# Patient Record
Sex: FEMALE | Race: WHITE | NOT HISPANIC OR LATINO | URBAN - METROPOLITAN AREA
[De-identification: names, ages, dates, MRNs, and addresses within clinical notes are randomized per-mention and may not be internally consistent; named-entity substitution may affect disease eponyms.]

---

## 2017-06-18 ENCOUNTER — EMERGENCY (EMERGENCY)
Facility: HOSPITAL | Age: 62
LOS: 0 days | Discharge: NURSING, REHAB OR LONG-TERM CARE | End: 2017-06-18

## 2017-06-18 DIAGNOSIS — N20.0 CALCULUS OF KIDNEY: ICD-10-CM

## 2017-06-18 DIAGNOSIS — E11.9 TYPE 2 DIABETES MELLITUS WITHOUT COMPLICATIONS: ICD-10-CM

## 2017-06-28 DIAGNOSIS — E11.9 TYPE 2 DIABETES MELLITUS WITHOUT COMPLICATIONS: ICD-10-CM

## 2017-06-28 DIAGNOSIS — Z79.899 OTHER LONG TERM (CURRENT) DRUG THERAPY: ICD-10-CM

## 2017-06-28 DIAGNOSIS — J18.9 PNEUMONIA, UNSPECIFIED ORGANISM: ICD-10-CM

## 2017-06-28 DIAGNOSIS — Z79.51 LONG TERM (CURRENT) USE OF INHALED STEROIDS: ICD-10-CM

## 2017-06-28 DIAGNOSIS — I10 ESSENTIAL (PRIMARY) HYPERTENSION: ICD-10-CM

## 2017-06-28 DIAGNOSIS — Z88.8 ALLERGY STATUS TO OTHER DRUGS, MEDICAMENTS AND BIOLOGICAL SUBSTANCES STATUS: ICD-10-CM

## 2017-06-28 DIAGNOSIS — Z79.84 LONG TERM (CURRENT) USE OF ORAL HYPOGLYCEMIC DRUGS: ICD-10-CM

## 2017-06-28 DIAGNOSIS — Z88.0 ALLERGY STATUS TO PENICILLIN: ICD-10-CM

## 2017-07-12 ENCOUNTER — EMERGENCY (EMERGENCY)
Facility: HOSPITAL | Age: 62
LOS: 0 days | Discharge: NURSING, REHAB OR LONG-TERM CARE | End: 2017-07-12

## 2017-07-12 DIAGNOSIS — Z88.0 ALLERGY STATUS TO PENICILLIN: ICD-10-CM

## 2017-07-12 DIAGNOSIS — R19.7 DIARRHEA, UNSPECIFIED: ICD-10-CM

## 2017-07-12 DIAGNOSIS — E11.9 TYPE 2 DIABETES MELLITUS WITHOUT COMPLICATIONS: ICD-10-CM

## 2017-07-12 DIAGNOSIS — Z79.84 LONG TERM (CURRENT) USE OF ORAL HYPOGLYCEMIC DRUGS: ICD-10-CM

## 2017-07-12 DIAGNOSIS — R05 COUGH: ICD-10-CM

## 2017-07-12 DIAGNOSIS — R11.0 NAUSEA: ICD-10-CM

## 2017-07-12 DIAGNOSIS — N20.0 CALCULUS OF KIDNEY: ICD-10-CM

## 2017-07-12 DIAGNOSIS — J45.909 UNSPECIFIED ASTHMA, UNCOMPLICATED: ICD-10-CM

## 2017-07-12 DIAGNOSIS — Z88.8 ALLERGY STATUS TO OTHER DRUGS, MEDICAMENTS AND BIOLOGICAL SUBSTANCES STATUS: ICD-10-CM

## 2017-07-12 DIAGNOSIS — Z79.899 OTHER LONG TERM (CURRENT) DRUG THERAPY: ICD-10-CM

## 2017-10-18 ENCOUNTER — INPATIENT (INPATIENT)
Facility: HOSPITAL | Age: 62
LOS: 4 days | Discharge: NURSING, REHAB OR LONG-TERM CARE | End: 2017-10-23
Attending: INTERNAL MEDICINE | Admitting: INTERNAL MEDICINE

## 2017-10-18 DIAGNOSIS — N20.0 CALCULUS OF KIDNEY: ICD-10-CM

## 2017-10-18 DIAGNOSIS — E11.9 TYPE 2 DIABETES MELLITUS WITHOUT COMPLICATIONS: ICD-10-CM

## 2017-10-26 DIAGNOSIS — A41.9 SEPSIS, UNSPECIFIED ORGANISM: ICD-10-CM

## 2017-10-26 DIAGNOSIS — J45.909 UNSPECIFIED ASTHMA, UNCOMPLICATED: ICD-10-CM

## 2017-10-26 DIAGNOSIS — R09.02 HYPOXEMIA: ICD-10-CM

## 2017-10-26 DIAGNOSIS — N13.6 PYONEPHROSIS: ICD-10-CM

## 2017-10-26 DIAGNOSIS — G40.909 EPILEPSY, UNSPECIFIED, NOT INTRACTABLE, WITHOUT STATUS EPILEPTICUS: ICD-10-CM

## 2017-10-26 DIAGNOSIS — N20.0 CALCULUS OF KIDNEY: ICD-10-CM

## 2017-10-26 DIAGNOSIS — E66.01 MORBID (SEVERE) OBESITY DUE TO EXCESS CALORIES: ICD-10-CM

## 2017-10-26 DIAGNOSIS — E11.9 TYPE 2 DIABETES MELLITUS WITHOUT COMPLICATIONS: ICD-10-CM

## 2017-10-26 DIAGNOSIS — Z88.8 ALLERGY STATUS TO OTHER DRUGS, MEDICAMENTS AND BIOLOGICAL SUBSTANCES: ICD-10-CM

## 2017-10-26 DIAGNOSIS — L03.311 CELLULITIS OF ABDOMINAL WALL: ICD-10-CM

## 2017-10-26 DIAGNOSIS — N39.0 URINARY TRACT INFECTION, SITE NOT SPECIFIED: ICD-10-CM

## 2017-10-26 DIAGNOSIS — N17.9 ACUTE KIDNEY FAILURE, UNSPECIFIED: ICD-10-CM

## 2017-10-26 DIAGNOSIS — K80.20 CALCULUS OF GALLBLADDER WITHOUT CHOLECYSTITIS WITHOUT OBSTRUCTION: ICD-10-CM

## 2017-10-26 DIAGNOSIS — Z88.0 ALLERGY STATUS TO PENICILLIN: ICD-10-CM

## 2017-10-26 DIAGNOSIS — Z90.710 ACQUIRED ABSENCE OF BOTH CERVIX AND UTERUS: ICD-10-CM

## 2017-10-26 DIAGNOSIS — Z79.4 LONG TERM (CURRENT) USE OF INSULIN: ICD-10-CM

## 2017-10-31 PROBLEM — Z00.00 ENCOUNTER FOR PREVENTIVE HEALTH EXAMINATION: Status: ACTIVE | Noted: 2017-10-31

## 2017-11-16 ENCOUNTER — APPOINTMENT (OUTPATIENT)
Dept: UROLOGY | Facility: CLINIC | Age: 62
End: 2017-11-16

## 2018-04-19 ENCOUNTER — EMERGENCY (EMERGENCY)
Facility: HOSPITAL | Age: 63
LOS: 0 days | Discharge: NURSING, REHAB OR LONG-TERM CARE | End: 2018-04-19
Attending: EMERGENCY MEDICINE | Admitting: EMERGENCY MEDICINE

## 2018-04-19 VITALS
HEART RATE: 75 BPM | OXYGEN SATURATION: 98 % | TEMPERATURE: 97 F | DIASTOLIC BLOOD PRESSURE: 53 MMHG | RESPIRATION RATE: 19 BRPM | SYSTOLIC BLOOD PRESSURE: 120 MMHG

## 2018-04-19 VITALS
HEART RATE: 69 BPM | WEIGHT: 293 LBS | DIASTOLIC BLOOD PRESSURE: 64 MMHG | OXYGEN SATURATION: 98 % | TEMPERATURE: 96 F | HEIGHT: 61 IN | SYSTOLIC BLOOD PRESSURE: 133 MMHG | RESPIRATION RATE: 18 BRPM

## 2018-04-19 DIAGNOSIS — M25.562 PAIN IN LEFT KNEE: ICD-10-CM

## 2018-04-19 DIAGNOSIS — E11.9 TYPE 2 DIABETES MELLITUS WITHOUT COMPLICATIONS: ICD-10-CM

## 2018-04-19 DIAGNOSIS — Z90.710 ACQUIRED ABSENCE OF BOTH CERVIX AND UTERUS: ICD-10-CM

## 2018-04-19 DIAGNOSIS — Z88.0 ALLERGY STATUS TO PENICILLIN: ICD-10-CM

## 2018-04-19 DIAGNOSIS — R31.9 HEMATURIA, UNSPECIFIED: ICD-10-CM

## 2018-04-19 DIAGNOSIS — Z79.84 LONG TERM (CURRENT) USE OF ORAL HYPOGLYCEMIC DRUGS: ICD-10-CM

## 2018-04-19 DIAGNOSIS — Z98.890 OTHER SPECIFIED POSTPROCEDURAL STATES: Chronic | ICD-10-CM

## 2018-04-19 DIAGNOSIS — Z79.899 OTHER LONG TERM (CURRENT) DRUG THERAPY: ICD-10-CM

## 2018-04-19 DIAGNOSIS — J45.909 UNSPECIFIED ASTHMA, UNCOMPLICATED: ICD-10-CM

## 2018-04-19 DIAGNOSIS — N39.0 URINARY TRACT INFECTION, SITE NOT SPECIFIED: ICD-10-CM

## 2018-04-19 DIAGNOSIS — Z88.8 ALLERGY STATUS TO OTHER DRUGS, MEDICAMENTS AND BIOLOGICAL SUBSTANCES STATUS: ICD-10-CM

## 2018-04-19 DIAGNOSIS — N21.9 CALCULUS OF LOWER URINARY TRACT, UNSPECIFIED: ICD-10-CM

## 2018-04-19 LAB
ALBUMIN SERPL ELPH-MCNC: 3.6 G/DL — SIGNIFICANT CHANGE UP (ref 3.5–5.2)
ALP SERPL-CCNC: 53 U/L — SIGNIFICANT CHANGE UP (ref 30–115)
ALT FLD-CCNC: 11 U/L — SIGNIFICANT CHANGE UP (ref 0–41)
ANION GAP SERPL CALC-SCNC: 12 MMOL/L — SIGNIFICANT CHANGE UP (ref 7–14)
APPEARANCE UR: CLEAR — SIGNIFICANT CHANGE UP
AST SERPL-CCNC: 13 U/L — SIGNIFICANT CHANGE UP (ref 0–41)
BACTERIA # UR AUTO: (no result)
BASOPHILS # BLD AUTO: 0.05 K/UL — SIGNIFICANT CHANGE UP (ref 0–0.2)
BASOPHILS NFR BLD AUTO: 0.6 % — SIGNIFICANT CHANGE UP (ref 0–1)
BILIRUB SERPL-MCNC: 0.2 MG/DL — SIGNIFICANT CHANGE UP (ref 0.2–1.2)
BILIRUB UR-MCNC: NEGATIVE — SIGNIFICANT CHANGE UP
BUN SERPL-MCNC: 13 MG/DL — SIGNIFICANT CHANGE UP (ref 10–20)
CALCIUM SERPL-MCNC: 8.6 MG/DL — SIGNIFICANT CHANGE UP (ref 8.5–10.1)
CHLORIDE SERPL-SCNC: 106 MMOL/L — SIGNIFICANT CHANGE UP (ref 98–110)
CO2 SERPL-SCNC: 23 MMOL/L — SIGNIFICANT CHANGE UP (ref 17–32)
COLOR SPEC: YELLOW — SIGNIFICANT CHANGE UP
COMMENT - URINE: SIGNIFICANT CHANGE UP
CREAT SERPL-MCNC: 0.8 MG/DL — SIGNIFICANT CHANGE UP (ref 0.7–1.5)
DIFF PNL FLD: (no result)
EOSINOPHIL # BLD AUTO: 0.21 K/UL — SIGNIFICANT CHANGE UP (ref 0–0.7)
EOSINOPHIL NFR BLD AUTO: 2.7 % — SIGNIFICANT CHANGE UP (ref 0–8)
EPI CELLS # UR: (no result) /HPF
GLUCOSE SERPL-MCNC: 101 MG/DL — HIGH (ref 70–99)
GLUCOSE UR QL: NEGATIVE — SIGNIFICANT CHANGE UP
HCT VFR BLD CALC: 40.7 % — SIGNIFICANT CHANGE UP (ref 37–47)
HGB BLD-MCNC: 13.3 G/DL — SIGNIFICANT CHANGE UP (ref 12–16)
IMM GRANULOCYTES NFR BLD AUTO: 0.4 % — HIGH (ref 0.1–0.3)
KETONES UR-MCNC: NEGATIVE — SIGNIFICANT CHANGE UP
LEUKOCYTE ESTERASE UR-ACNC: SIGNIFICANT CHANGE UP
LYMPHOCYTES # BLD AUTO: 1.63 K/UL — SIGNIFICANT CHANGE UP (ref 1.2–3.4)
LYMPHOCYTES # BLD AUTO: 20.8 % — SIGNIFICANT CHANGE UP (ref 20.5–51.1)
MCHC RBC-ENTMCNC: 29.8 PG — SIGNIFICANT CHANGE UP (ref 27–31)
MCHC RBC-ENTMCNC: 32.7 G/DL — SIGNIFICANT CHANGE UP (ref 32–37)
MCV RBC AUTO: 91.1 FL — SIGNIFICANT CHANGE UP (ref 81–99)
MONOCYTES # BLD AUTO: 0.66 K/UL — HIGH (ref 0.1–0.6)
MONOCYTES NFR BLD AUTO: 8.4 % — SIGNIFICANT CHANGE UP (ref 1.7–9.3)
NEUTROPHILS # BLD AUTO: 5.24 K/UL — SIGNIFICANT CHANGE UP (ref 1.4–6.5)
NEUTROPHILS NFR BLD AUTO: 67.1 % — SIGNIFICANT CHANGE UP (ref 42.2–75.2)
NITRITE UR-MCNC: NEGATIVE — SIGNIFICANT CHANGE UP
NRBC # BLD: 0 /100 WBCS — SIGNIFICANT CHANGE UP (ref 0–0)
PH UR: >=9 — SIGNIFICANT CHANGE UP (ref 5–8)
PLATELET # BLD AUTO: 234 K/UL — SIGNIFICANT CHANGE UP (ref 130–400)
POTASSIUM SERPL-MCNC: 4.3 MMOL/L — SIGNIFICANT CHANGE UP (ref 3.5–5)
POTASSIUM SERPL-SCNC: 4.3 MMOL/L — SIGNIFICANT CHANGE UP (ref 3.5–5)
PROT SERPL-MCNC: 6 G/DL — SIGNIFICANT CHANGE UP (ref 6–8)
PROT UR-MCNC: >=300 MG/DL — SIGNIFICANT CHANGE UP
RBC # BLD: 4.47 M/UL — SIGNIFICANT CHANGE UP (ref 4.2–5.4)
RBC # FLD: 14.8 % — HIGH (ref 11.5–14.5)
RBC CASTS # UR COMP ASSIST: (no result) /HPF
SODIUM SERPL-SCNC: 141 MMOL/L — SIGNIFICANT CHANGE UP (ref 135–146)
SP GR SPEC: 1.02 — SIGNIFICANT CHANGE UP (ref 1.01–1.03)
UROBILINOGEN FLD QL: 0.2 — SIGNIFICANT CHANGE UP (ref 0.2–0.2)
WBC # BLD: 7.82 K/UL — SIGNIFICANT CHANGE UP (ref 4.8–10.8)
WBC # FLD AUTO: 7.82 K/UL — SIGNIFICANT CHANGE UP (ref 4.8–10.8)
WBC UR QL: (no result) /HPF

## 2018-04-19 RX ORDER — MOXIFLOXACIN HYDROCHLORIDE TABLETS, 400 MG 400 MG/1
1 TABLET, FILM COATED ORAL
Qty: 14 | Refills: 0 | OUTPATIENT
Start: 2018-04-19 | End: 2018-04-25

## 2018-04-19 NOTE — ED PROVIDER NOTE - NS ED ROS FT
Review of Systems    Constitutional: (-) fever/ chills   Eyes/ENT: (-) blurry vision, (-) sore throat (-) ear pain  Cardiovascular: (-) chest pain, (-) syncope (-) palpitations  Respiratory: (-) cough, (-) shortness of breath  Gastrointestinal: (-) vomiting, (-) diarrhea (-) abdominal pain  Musculoskeletal: (-) neck pain, (-) back pain, (+) knee pain(-) pedal edema   Integumentary: (-) rash, (-) swelling  Neurological: (-) headache, (-) altered mental status  Psychiatric: (-) hallucinations or depression   Allergic/Immunologic: (-) pruritus

## 2018-04-19 NOTE — ED PROVIDER NOTE - ATTENDING CONTRIBUTION TO CARE
I personally evaluated the patient. I reviewed the Resident’s or Physician Assistant’s note (as assigned above), and agree with the findings and plan except as documented in my note.  Patient requests urinary stent be removed, no fever, PE as above, labs and studies reviewed, explained to patient she must f/u with  for removal, has no hydro, will d/c on abx

## 2018-04-19 NOTE — ED ADULT NURSE NOTE - PAIN: DESCRIPTION (FREQUENCY/QUALITY)
"it feels like there is a board on it" "it changes it , sometimes its a stabbing when I stand up"/constant

## 2018-04-19 NOTE — ED PROVIDER NOTE - OBJECTIVE STATEMENT
64 y/o female with hx of diabetes, renal failure, renal stones presents c/o hematuria and dysuria x few days. patient denies any fever,back pain, abdominal pain. patient s/p fall over 6 months ago , had xray , having current physical therapy without any relief of symptoms. patient denies tingling to feet or deformity to leg.

## 2018-04-19 NOTE — ED ADULT NURSE NOTE - CHIEF COMPLAINT QUOTE
BIBA via PCA from [facility], patient states, "UTI and leg pain" patient C/O of blood in urine and sensation of burning while voiding, and left "chronic knee pain"

## 2018-04-19 NOTE — ED ADULT TRIAGE NOTE - CHIEF COMPLAINT QUOTE
BIBA via PCA from [facility], patient states, "UTI and leg pain" patient C/O of blood in urine and sensation of burning while voiding, and left "chronic knee pain"

## 2018-04-19 NOTE — ED PROVIDER NOTE - PHYSICAL EXAMINATION
Vital Signs: I have reviewed the initial vital signs.  Constitutional:  no acute distress, normocephalic, dischelved  Eyes: PERRLA, EOMI, clear conjunctiva  ENT: MMM,  no nasal congestion  Cardiovascular: regular rate, regular rhythm, no murmur appreciated  Respiratory: unlabored respiratory effort, clear to auscultation bilaterally  Gastrointestinal: soft, non-tender, non-distended  abdomen, no CVA tenderness  Musculoskeletal: supple neck, no lower extremity edema, +left knee without deformtiy, swelling, erythema/good rom   Integumentary: warm, dry, no rash  Neurologic: awake, alert, cranial nerves II-XII grossly intact, extremities’ motor and sensory functions grossly intact, no focal deficits, GCS 15  Psychiatric: appropriate mood, appropriate affect

## 2018-04-23 RX ORDER — NITROFURANTOIN MACROCRYSTAL 50 MG
1 CAPSULE ORAL
Qty: 14 | Refills: 0 | OUTPATIENT
Start: 2018-04-23 | End: 2018-04-29

## 2018-04-23 NOTE — ED POST DISCHARGE NOTE - OTHER COMMUNICATION
SPOKE WITH MD ARNAUD ASSISTANT AND FAXED REPORT - [phone]. THIS PATIENT LIVES AT [facility]. NITROFURANTOIN SENT TO PHARMACY ON RECORD TODAY. MD WILL F/U ON THIS PATIENT AND REPEAT UCX AFTER TREATMENT.

## 2018-05-10 ENCOUNTER — EMERGENCY (EMERGENCY)
Facility: HOSPITAL | Age: 63
LOS: 0 days | Discharge: NURSING, REHAB OR LONG-TERM CARE | End: 2018-05-10
Attending: EMERGENCY MEDICINE | Admitting: EMERGENCY MEDICINE

## 2018-05-10 VITALS
RESPIRATION RATE: 18 BRPM | HEART RATE: 78 BPM | WEIGHT: 293 LBS | SYSTOLIC BLOOD PRESSURE: 100 MMHG | HEIGHT: 62 IN | OXYGEN SATURATION: 97 % | DIASTOLIC BLOOD PRESSURE: 64 MMHG

## 2018-05-10 VITALS
SYSTOLIC BLOOD PRESSURE: 123 MMHG | RESPIRATION RATE: 18 BRPM | OXYGEN SATURATION: 96 % | HEART RATE: 77 BPM | DIASTOLIC BLOOD PRESSURE: 59 MMHG

## 2018-05-10 DIAGNOSIS — Z88.0 ALLERGY STATUS TO PENICILLIN: ICD-10-CM

## 2018-05-10 DIAGNOSIS — Z79.2 LONG TERM (CURRENT) USE OF ANTIBIOTICS: ICD-10-CM

## 2018-05-10 DIAGNOSIS — Z79.899 OTHER LONG TERM (CURRENT) DRUG THERAPY: ICD-10-CM

## 2018-05-10 DIAGNOSIS — Z88.8 ALLERGY STATUS TO OTHER DRUGS, MEDICAMENTS AND BIOLOGICAL SUBSTANCES: ICD-10-CM

## 2018-05-10 DIAGNOSIS — R39.89 OTHER SYMPTOMS AND SIGNS INVOLVING THE GENITOURINARY SYSTEM: ICD-10-CM

## 2018-05-10 DIAGNOSIS — Z79.84 LONG TERM (CURRENT) USE OF ORAL HYPOGLYCEMIC DRUGS: ICD-10-CM

## 2018-05-10 DIAGNOSIS — Z98.890 OTHER SPECIFIED POSTPROCEDURAL STATES: Chronic | ICD-10-CM

## 2018-05-10 DIAGNOSIS — N39.0 URINARY TRACT INFECTION, SITE NOT SPECIFIED: ICD-10-CM

## 2018-05-10 LAB
APPEARANCE UR: CLEAR — SIGNIFICANT CHANGE UP
BACTERIA # UR AUTO: (no result)
BILIRUB UR-MCNC: NEGATIVE — SIGNIFICANT CHANGE UP
COLOR SPEC: YELLOW — SIGNIFICANT CHANGE UP
DIFF PNL FLD: (no result)
GLUCOSE UR QL: NEGATIVE — SIGNIFICANT CHANGE UP
KETONES UR-MCNC: NEGATIVE — SIGNIFICANT CHANGE UP
LEUKOCYTE ESTERASE UR-ACNC: SIGNIFICANT CHANGE UP
NITRITE UR-MCNC: NEGATIVE — SIGNIFICANT CHANGE UP
PH UR: >=9 — SIGNIFICANT CHANGE UP (ref 5–8)
PROT UR-MCNC: >=300 MG/DL — SIGNIFICANT CHANGE UP
RBC CASTS # UR COMP ASSIST: (no result) /HPF
SP GR SPEC: 1.01 — SIGNIFICANT CHANGE UP (ref 1.01–1.03)
UROBILINOGEN FLD QL: 0.2 — SIGNIFICANT CHANGE UP (ref 0.2–0.2)
WBC UR QL: (no result) /HPF

## 2018-05-10 RX ORDER — NITROFURANTOIN MACROCRYSTAL 50 MG
1 CAPSULE ORAL
Qty: 28 | Refills: 0 | OUTPATIENT
Start: 2018-05-10 | End: 2018-05-16

## 2018-05-10 RX ORDER — NITROFURANTOIN MACROCRYSTAL 50 MG
1 CAPSULE ORAL
Qty: 14 | Refills: 0 | OUTPATIENT
Start: 2018-05-10 | End: 2018-05-16

## 2018-05-10 NOTE — ED PROVIDER NOTE - PROGRESS NOTE DETAILS
pt requesting urine check , food, and then wants to be discharged home pt tolerating po requesting dc back to [facility]. abx based off last urine culture in system

## 2018-05-10 NOTE — ED PROVIDER NOTE - OBJECTIVE STATEMENT
63 to f w/pmhx sig for urethral stent that was placed for kidney stone reports with similar complain 2 weeks ago reports pinching sensation while urinating. Pt. reports that she wants a stent removed. Previously pt. reports she was not able to make it for her appointment. Denies abdominal pain, fevers, chills, rigors, N/V, dark urine.    I have reviewed available current nursing and previous documentation of past medical, surgical, family, and/or social history.

## 2018-05-10 NOTE — ED ADULT NURSE NOTE - CHIEF COMPLAINT QUOTE
Patient from [facility]'s residence here for UTI symptoms and requesting removal of her kidney stent.

## 2018-05-10 NOTE — ED PROVIDER NOTE - ATTENDING CONTRIBUTION TO CARE
63yr old female with hx of urethreal stone here for pinching sensation with urination. pt reports that stent is uncomfortable and wants it out but has trouble following up with urologist and doesn't want to pay 250 one urologist wanted for it. pt has no abd pain, fever, chills, n/v/back pain. here on exam pt no distress soft obese abdomen, with ttp, no cva ttp. pt did not want to have prolonged testing done, requesting food tray. pt tx for possible uti. urine obtained was possible not clean catch. macrobid, pt reports being pencillin allergic

## 2018-05-10 NOTE — ED PROVIDER NOTE - PHYSICAL EXAMINATION
Physical Exam    Vital Signs: I have reviewed the initial vital signs.  Constitutional: well-nourished, appears stated age, no acute distress  ENT: neck supple with no adenopathy, moist MM.  Cardiovascular: +S1/S2, no murmurs, regular rate, regular rhythm, well-perfused extremities  Respiratory: unlabored respiratory effort, clear to auscultation bilaterally, speaks in full sentences  Gastrointestinal: soft, non-tender abdomen, no pulsatile mass.

## 2018-05-10 NOTE — ED PROVIDER NOTE - NS ED ROS FT
Review of Systems    Constitutional: (-) fever (-) weakness (-) diaphoresis   ENT: (-) sore throat  Cardiovascular: (-) chest pain  (-) palpitations  Respiratory: (-) SOB (-) cough   GI: (-) abdominal pain (-) N/V (-) diarrhea  : See HPI  Integumentary: (-) rash (-) redness

## 2018-05-10 NOTE — ED ADULT TRIAGE NOTE - CHIEF COMPLAINT QUOTE
Patient from [facility]'s residence here for UTI symptoms and requesting removal of her kidney stent.

## 2018-06-11 ENCOUNTER — APPOINTMENT (OUTPATIENT)
Dept: UROLOGY | Facility: CLINIC | Age: 63
End: 2018-06-11

## 2018-06-21 ENCOUNTER — EMERGENCY (EMERGENCY)
Facility: HOSPITAL | Age: 63
LOS: 0 days | Discharge: NURSING, REHAB OR LONG-TERM CARE | End: 2018-06-21
Attending: EMERGENCY MEDICINE | Admitting: EMERGENCY MEDICINE

## 2018-06-21 VITALS
TEMPERATURE: 99 F | SYSTOLIC BLOOD PRESSURE: 136 MMHG | HEART RATE: 78 BPM | RESPIRATION RATE: 20 BRPM | DIASTOLIC BLOOD PRESSURE: 72 MMHG | OXYGEN SATURATION: 98 %

## 2018-06-21 VITALS
HEART RATE: 83 BPM | SYSTOLIC BLOOD PRESSURE: 130 MMHG | TEMPERATURE: 100 F | RESPIRATION RATE: 20 BRPM | DIASTOLIC BLOOD PRESSURE: 61 MMHG | WEIGHT: 229.94 LBS | OXYGEN SATURATION: 98 %

## 2018-06-21 DIAGNOSIS — E11.9 TYPE 2 DIABETES MELLITUS WITHOUT COMPLICATIONS: ICD-10-CM

## 2018-06-21 DIAGNOSIS — R10.9 UNSPECIFIED ABDOMINAL PAIN: ICD-10-CM

## 2018-06-21 DIAGNOSIS — Z79.2 LONG TERM (CURRENT) USE OF ANTIBIOTICS: ICD-10-CM

## 2018-06-21 DIAGNOSIS — G40.909 EPILEPSY, UNSPECIFIED, NOT INTRACTABLE, WITHOUT STATUS EPILEPTICUS: ICD-10-CM

## 2018-06-21 DIAGNOSIS — Z96.0 PRESENCE OF UROGENITAL IMPLANTS: ICD-10-CM

## 2018-06-21 DIAGNOSIS — Z87.442 PERSONAL HISTORY OF URINARY CALCULI: ICD-10-CM

## 2018-06-21 DIAGNOSIS — Z88.0 ALLERGY STATUS TO PENICILLIN: ICD-10-CM

## 2018-06-21 DIAGNOSIS — Z88.8 ALLERGY STATUS TO OTHER DRUGS, MEDICAMENTS AND BIOLOGICAL SUBSTANCES: ICD-10-CM

## 2018-06-21 DIAGNOSIS — J45.909 UNSPECIFIED ASTHMA, UNCOMPLICATED: ICD-10-CM

## 2018-06-21 DIAGNOSIS — M54.9 DORSALGIA, UNSPECIFIED: ICD-10-CM

## 2018-06-21 DIAGNOSIS — Z79.899 OTHER LONG TERM (CURRENT) DRUG THERAPY: ICD-10-CM

## 2018-06-21 DIAGNOSIS — Z98.890 OTHER SPECIFIED POSTPROCEDURAL STATES: Chronic | ICD-10-CM

## 2018-06-21 DIAGNOSIS — Z79.84 LONG TERM (CURRENT) USE OF ORAL HYPOGLYCEMIC DRUGS: ICD-10-CM

## 2018-06-21 DIAGNOSIS — B49 UNSPECIFIED MYCOSIS: ICD-10-CM

## 2018-06-21 LAB
ALBUMIN SERPL ELPH-MCNC: 3.7 G/DL — SIGNIFICANT CHANGE UP (ref 3.5–5.2)
ALP SERPL-CCNC: 60 U/L — SIGNIFICANT CHANGE UP (ref 30–115)
ALT FLD-CCNC: 17 U/L — SIGNIFICANT CHANGE UP (ref 0–41)
ANION GAP SERPL CALC-SCNC: 12 MMOL/L — SIGNIFICANT CHANGE UP (ref 7–14)
APPEARANCE UR: CLEAR — SIGNIFICANT CHANGE UP
AST SERPL-CCNC: 43 U/L — HIGH (ref 0–41)
BACTERIA # UR AUTO: (no result)
BASE EXCESS BLDV CALC-SCNC: -2.6 MMOL/L — LOW (ref -2–2)
BILIRUB SERPL-MCNC: 0.2 MG/DL — SIGNIFICANT CHANGE UP (ref 0.2–1.2)
BILIRUB UR-MCNC: NEGATIVE — SIGNIFICANT CHANGE UP
BUN SERPL-MCNC: 15 MG/DL — SIGNIFICANT CHANGE UP (ref 10–20)
CALCIUM SERPL-MCNC: 9.2 MG/DL — SIGNIFICANT CHANGE UP (ref 8.5–10.1)
CHLORIDE SERPL-SCNC: 105 MMOL/L — SIGNIFICANT CHANGE UP (ref 98–110)
CO2 SERPL-SCNC: 24 MMOL/L — SIGNIFICANT CHANGE UP (ref 17–32)
COD CRY URNS QL: NEGATIVE — SIGNIFICANT CHANGE UP
COLOR SPEC: YELLOW — SIGNIFICANT CHANGE UP
CREAT SERPL-MCNC: 1 MG/DL — SIGNIFICANT CHANGE UP (ref 0.7–1.5)
DIFF PNL FLD: (no result)
EPI CELLS # UR: (no result) /HPF
GLUCOSE SERPL-MCNC: 95 MG/DL — SIGNIFICANT CHANGE UP (ref 70–99)
GLUCOSE UR QL: NEGATIVE — SIGNIFICANT CHANGE UP
GRAN CASTS # UR COMP ASSIST: NEGATIVE — SIGNIFICANT CHANGE UP
HCO3 BLDV-SCNC: 22 MMOL/L — SIGNIFICANT CHANGE UP (ref 22–29)
HCT VFR BLD CALC: 41.6 % — SIGNIFICANT CHANGE UP (ref 37–47)
HGB BLD-MCNC: 13.6 G/DL — SIGNIFICANT CHANGE UP (ref 12–16)
HOROWITZ INDEX BLDV+IHG-RTO: 21 — SIGNIFICANT CHANGE UP
HYALINE CASTS # UR AUTO: NEGATIVE — SIGNIFICANT CHANGE UP
KETONES UR-MCNC: NEGATIVE — SIGNIFICANT CHANGE UP
LACTATE BLDV-MCNC: 1.3 MMOL/L — SIGNIFICANT CHANGE UP (ref 0.5–1.6)
LEUKOCYTE ESTERASE UR-ACNC: SIGNIFICANT CHANGE UP
MCHC RBC-ENTMCNC: 30.2 PG — SIGNIFICANT CHANGE UP (ref 27–31)
MCHC RBC-ENTMCNC: 32.7 G/DL — SIGNIFICANT CHANGE UP (ref 32–37)
MCV RBC AUTO: 92.2 FL — SIGNIFICANT CHANGE UP (ref 81–99)
NITRITE UR-MCNC: NEGATIVE — SIGNIFICANT CHANGE UP
NRBC # BLD: 0 /100 WBCS — SIGNIFICANT CHANGE UP (ref 0–0)
PCO2 BLDV: 37 MMHG — LOW (ref 41–51)
PH BLDV: 7.39 — SIGNIFICANT CHANGE UP (ref 7.26–7.43)
PH UR: 8.5 — SIGNIFICANT CHANGE UP (ref 5–8)
PLATELET # BLD AUTO: 200 K/UL — SIGNIFICANT CHANGE UP (ref 130–400)
PO2 BLDV: 62 MMHG — HIGH (ref 20–40)
POTASSIUM SERPL-MCNC: 5.9 MMOL/L — HIGH (ref 3.5–5)
POTASSIUM SERPL-SCNC: 5.9 MMOL/L — HIGH (ref 3.5–5)
PROT SERPL-MCNC: 7.6 G/DL — SIGNIFICANT CHANGE UP (ref 6–8)
PROT UR-MCNC: >=300 MG/DL — SIGNIFICANT CHANGE UP
RBC # BLD: 4.51 M/UL — SIGNIFICANT CHANGE UP (ref 4.2–5.4)
RBC # FLD: 14.9 % — HIGH (ref 11.5–14.5)
RBC CASTS # UR COMP ASSIST: (no result) /HPF
SAO2 % BLDV: 94 % — SIGNIFICANT CHANGE UP
SODIUM SERPL-SCNC: 141 MMOL/L — SIGNIFICANT CHANGE UP (ref 135–146)
SP GR SPEC: 1.02 — SIGNIFICANT CHANGE UP (ref 1.01–1.03)
TRI-PHOS CRY UR QL COMP ASSIST: (no result) /HPF
URATE CRY FLD QL MICRO: NEGATIVE — SIGNIFICANT CHANGE UP
UROBILINOGEN FLD QL: 0.2 — SIGNIFICANT CHANGE UP (ref 0.2–0.2)
WBC # BLD: 9.57 K/UL — SIGNIFICANT CHANGE UP (ref 4.8–10.8)
WBC # FLD AUTO: 9.57 K/UL — SIGNIFICANT CHANGE UP (ref 4.8–10.8)
WBC UR QL: (no result) /HPF

## 2018-06-21 RX ORDER — CEFPODOXIME PROXETIL 100 MG
1 TABLET ORAL
Qty: 14 | Refills: 0 | OUTPATIENT
Start: 2018-06-21 | End: 2018-06-27

## 2018-06-21 NOTE — ED PROVIDER NOTE - NS ED ROS FT
Review of Systems    Constitutional: (-) fever  Eyes/ENT: (-) blurry vision, (-) epistaxis  Cardiovascular: (-) chest pain, (-) syncope  Respiratory: (-) cough, (-) shortness of breath  Gastrointestinal: (-) vomiting, (-) diarrhea  Musculoskeletal: (-) neck pain, (+) back pain, (-) joint pain  Integumentary: (-) rash, (-) edema  Neurological: (-) headache, (-) altered mental status

## 2018-06-21 NOTE — ED PROVIDER NOTE - PROGRESS NOTE DETAILS
Sx SURINDER Elias aware and will come see patient Patient seen by SURINDER Elias, patient can be d/c home and f/u with Dr. Dillard as outpatient for stent removal. Urine results noted. Prior cultures reviewed. Patient had adverse reaction to Macrobid previously. Patient states when she was 5 she had a rash after taking PCN. Patient seen by SURINDER Elias, patient can be d/c home and f/u with Dr. Dillard as outpatient for stent removal. Urine results noted. Prior cultures reviewed. Patient had adverse reaction to Macrobid previously. Patient states when she was 5 she had a rash after taking PCN. Will give rx for Vantin and fungal cream.

## 2018-06-21 NOTE — ED ADULT NURSE NOTE - CHIEF COMPLAINT QUOTE
Pt sent in from [facility]'s [facility] for lower back pain.

## 2018-06-21 NOTE — CONSULT NOTE ADULT - SUBJECTIVE AND OBJECTIVE BOX
HPI:  Patient is a 64yo F with nephrolithiasis, s/p left ureter stent on 10/17 whom has failed to follow up with urology    PAST MEDICAL & SURGICAL HISTORY:  Knee pain  Chronic cough  Asthma  Epilepsy  Renal failure  Renal stone  Diabetes  H/O abdominal hysterectomy      Allergies    Compazine (Unknown)  penicillins (Unknown)      General:	no fever, weight loss,  chills  Skin: no rash, ulcers  Ophthalmologic: no visual changes  ENMT:	no sore throat  Respiratory and Thorax: no cough, wheeze,  sob  Cardiovascular:	no chest pain, palpitations, dizziness  Gastrointestinal:	no nausea, vomiting, diarrhea, abd pain  Genitourinary:	no dysuria, hematuria  Musculoskeletal:	no joint pains  Neurological:	 no speech disturbance, focal weakness, numbness  Psychiatric:	no depression, anxiety, psychosis  Hematology/Lymphatics:	no anemia  Endocrine:	no polyuria, polydipsia        Vital Signs Last 24 Hrs  T(C): 37.1 (21 Jun 2018 15:29), Max: 37.7 (21 Jun 2018 15:19)  T(F): 98.8 (21 Jun 2018 15:29), Max: 99.8 (21 Jun 2018 15:19)  HR: 83 (21 Jun 2018 15:29) (83 - 83)  BP: 130/61 (21 Jun 2018 15:29) (130/61 - 130/61)  BP(mean): --  RR: 18 (21 Jun 2018 15:29) (18 - 20)  SpO2: 97% (21 Jun 2018 15:29) (97% - 98%)    PHYSICAL EXAM:      Constitutional: A&Ox4  Respiratory: cta b/l  Cardiovascular: s1 s2 rrr  Gastrointestinal: soft nt  nd + bs no rebound or guarding  Genitourinary: no cva tenderness  Extremities: normal rom, no edema, calf tenderness  Neurological:no focal deficits  Skin: no rash                            13.6   9.57  )-----------( 200      ( 21 Jun 2018 15:45 )             41.6       06-21    141  |  105  |  15  ----------------------------<  95  5.9<H>   |  24  |  1.0    Ca    9.2      21 Jun 2018 15:45    TPro  7.6  /  Alb  3.7  /  TBili  0.2  /  DBili  x   /  AST  43<H>  /  ALT  17  /  AlkPhos  60  06-21    < from: US Kidney and Bladder (04.19.18 @ 15:56) >  Impression:  Left renal nonobstructing stones measuring 1.1 cm and 0.8 cm at the   midpole, increased in size since October 18, 2017.    No hydronephrosis in either kidney.    Urinary bladder stone measuring 3.8 cm, new.    Mild wall thickening of the urinary bladder (5.4 mm), which may be   secondary to underdistention. Correlate with urinalysis for cystitis.    < from: Xray Kidney Ureter Bladder (04.19.18 @ 13:59) >  There is left side nephroureteral stent. No evidence of ureteral calculus   along the course of the stent. There is limited evaluation of the left   kidney secondary to bowel gas shadow overlying the renal parenchyma. No   obvious nephrolithiasis is seen.    < end of copied text > HPI:  Patient is a 62yo F with nephrolithiasis, s/p left ureter stent on 10/17 whom has failed to follow up with urology as instructed. Patient states it was [birth year] her birthday, not having money for copayment, and that dr hidalgo switched to oncology. patient finally recently seen a urologist in [place] and was told she needed stent removed in OR.  patient c/o left mild flank pain and dysuria    PAST MEDICAL & SURGICAL HISTORY:  Knee pain  Chronic cough  Asthma  Epilepsy  Renal failure  Renal stone  Diabetes  H/O abdominal hysterectomy      Allergies    Compazine (Unknown)  penicillins (Unknown)      General:	no fever, weight loss,  chills  Skin: no rash, ulcers  Ophthalmologic: no visual changes  ENMT:	no sore throat  Respiratory and Thorax: no cough, wheeze,  sob  Cardiovascular:	no chest pain, palpitations, dizziness  Gastrointestinal:	no nausea, vomiting, diarrhea,   Genitourinary:	+flank pain, dysuria  Musculoskeletal:	no joint pains  Neurological:	 no speech disturbance, focal weakness, numbness  Psychiatric:	no depression, anxiety, psychosis  Hematology/Lymphatics:	no anemia  Endocrine:	no polyuria, polydipsia        Vital Signs Last 24 Hrs  T(C): 37.1 (21 Jun 2018 15:29), Max: 37.7 (21 Jun 2018 15:19)  T(F): 98.8 (21 Jun 2018 15:29), Max: 99.8 (21 Jun 2018 15:19)  HR: 83 (21 Jun 2018 15:29) (83 - 83)  BP: 130/61 (21 Jun 2018 15:29) (130/61 - 130/61)  BP(mean): --  RR: 18 (21 Jun 2018 15:29) (18 - 20)  SpO2: 97% (21 Jun 2018 15:29) (97% - 98%)    PHYSICAL EXAM:      Constitutional: A&Ox4  Respiratory: cta b/l  Cardiovascular: s1 s2 rrr  Gastrointestinal: soft nt  nd + bs no rebound or guarding  Genitourinary: no cva tenderness  Extremities: normal rom, no edema, calf tenderness  Neurological:no focal deficits  Skin: no rash                            13.6   9.57  )-----------( 200      ( 21 Jun 2018 15:45 )             41.6       06-21    141  |  105  |  15  ----------------------------<  95  5.9<H>   |  24  |  1.0    Ca    9.2      21 Jun 2018 15:45    TPro  7.6  /  Alb  3.7  /  TBili  0.2  /  DBili  x   /  AST  43<H>  /  ALT  17  /  AlkPhos  60  06-21    < from: US Kidney and Bladder (04.19.18 @ 15:56) >  Impression:  Left renal nonobstructing stones measuring 1.1 cm and 0.8 cm at the   midpole, increased in size since October 18, 2017.    No hydronephrosis in either kidney.    Urinary bladder stone measuring 3.8 cm, new.    Mild wall thickening of the urinary bladder (5.4 mm), which may be   secondary to underdistention. Correlate with urinalysis for cystitis.    < from: Xray Kidney Ureter Bladder (04.19.18 @ 13:59) >  There is left side nephroureteral stent. No evidence of ureteral calculus   along the course of the stent. There is limited evaluation of the left   kidney secondary to bowel gas shadow overlying the renal parenchyma. No   obvious nephrolithiasis is seen.    < end of copied text >

## 2018-06-21 NOTE — CONSULT NOTE ADULT - PROBLEM SELECTOR RECOMMENDATION 9
patient counseled on importance of follow up and possible complications if she does not such as UTI, and kidney injury.  Case was discussed with Dr Liang, patient can follow up in office on Thursday to schedule procedure to be done OR.  No need to admit patient to hospital right now from a urological stand point.

## 2018-06-21 NOTE — ED PROVIDER NOTE - OBJECTIVE STATEMENT
62 yo female hx of seizures, DM, kidney stones here for right sided back pain x months. She states she had a stent placed when she had a kidney stone. Patient has had trouble with urology f/u for stent removal.

## 2018-06-21 NOTE — ED PROVIDER NOTE - ATTENDING CONTRIBUTION TO CARE
64 yo F PMHx noted presents from residence with c/o pinching to her back.  States that she has had a stent for months and she cannot get anyone to remove it.  no fever or chills, no dysuria, On exam pt in NAD AOA x 3, abd isosft nt nd, no CVA tenderness + rash to umbilicus

## 2018-06-21 NOTE — ED PROVIDER NOTE - PHYSICAL EXAMINATION
Gen: Alert, NAD, well appearing  Head: NC, AT, PERRL, EOMI, normal lids/conjunctiva  Neck: +supple, no tenderness/meningismus,  Pulm: Bilateral BS, normal resp effort, no wheeze/stridor/retractions  CV: RRR, no murmer  Abd: soft, NT/ND, large pannus. +fungal infection to umbilicus  Mskel: no edema/erythema/cyanosis  Skin: no rash, warm/dry  Neuro: AAOx3, no sensory/motor deficits

## 2018-07-12 ENCOUNTER — OUTPATIENT (OUTPATIENT)
Dept: OUTPATIENT SERVICES | Facility: HOSPITAL | Age: 63
LOS: 1 days | Discharge: HOME | End: 2018-07-12

## 2018-07-12 ENCOUNTER — APPOINTMENT (OUTPATIENT)
Dept: UROLOGY | Facility: CLINIC | Age: 63
End: 2018-07-12
Payer: MEDICAID

## 2018-07-12 VITALS
HEIGHT: 62 IN | RESPIRATION RATE: 20 BRPM | BODY MASS INDEX: 53.92 KG/M2 | DIASTOLIC BLOOD PRESSURE: 84 MMHG | SYSTOLIC BLOOD PRESSURE: 140 MMHG | HEART RATE: 73 BPM | WEIGHT: 293 LBS

## 2018-07-12 DIAGNOSIS — Z98.890 OTHER SPECIFIED POSTPROCEDURAL STATES: Chronic | ICD-10-CM

## 2018-07-12 DIAGNOSIS — M15.9 POLYOSTEOARTHRITIS, UNSPECIFIED: ICD-10-CM

## 2018-07-12 DIAGNOSIS — Z86.39 PERSONAL HISTORY OF OTHER ENDOCRINE, NUTRITIONAL AND METABOLIC DISEASE: ICD-10-CM

## 2018-07-12 DIAGNOSIS — J45.20 MILD INTERMITTENT ASTHMA, UNCOMPLICATED: ICD-10-CM

## 2018-07-12 DIAGNOSIS — Z86.69 PERSONAL HISTORY OF OTHER DISEASES OF THE NERVOUS SYSTEM AND SENSE ORGANS: ICD-10-CM

## 2018-07-12 DIAGNOSIS — R46.0 VERY LOW LVL OF PERSONAL HYGIENE: ICD-10-CM

## 2018-07-12 PROCEDURE — 99213 OFFICE O/P EST LOW 20 MIN: CPT

## 2018-07-12 RX ORDER — PHENOBARBITAL 32.4 MG/1
32.4 TABLET ORAL
Refills: 0 | Status: ACTIVE | COMMUNITY

## 2018-07-12 RX ORDER — ACETAMINOPHEN 325 MG/1
325 TABLET, FILM COATED ORAL
Refills: 0 | Status: ACTIVE | COMMUNITY

## 2018-07-12 RX ORDER — METFORMIN HYDROCHLORIDE 500 MG/1
500 TABLET, FILM COATED ORAL
Refills: 0 | Status: ACTIVE | COMMUNITY

## 2018-07-12 RX ORDER — LORATADINE 5 MG/5 ML
10 SOLUTION, ORAL ORAL
Refills: 0 | Status: ACTIVE | COMMUNITY

## 2018-07-12 RX ORDER — MONTELUKAST SODIUM 10 MG/1
10 TABLET, FILM COATED ORAL
Refills: 0 | Status: ACTIVE | COMMUNITY

## 2018-07-12 RX ORDER — TOPIRAMATE 100 MG/1
100 TABLET, FILM COATED ORAL
Refills: 0 | Status: ACTIVE | COMMUNITY

## 2018-07-13 ENCOUNTER — OTHER (OUTPATIENT)
Age: 63
End: 2018-07-13

## 2018-07-20 ENCOUNTER — OUTPATIENT (OUTPATIENT)
Dept: OUTPATIENT SERVICES | Facility: HOSPITAL | Age: 63
LOS: 1 days | Discharge: HOME | End: 2018-07-20

## 2018-07-20 VITALS
DIASTOLIC BLOOD PRESSURE: 67 MMHG | HEART RATE: 82 BPM | OXYGEN SATURATION: 96 % | WEIGHT: 293 LBS | RESPIRATION RATE: 15 BRPM | HEIGHT: 62 IN | SYSTOLIC BLOOD PRESSURE: 112 MMHG | TEMPERATURE: 99 F

## 2018-07-20 DIAGNOSIS — Z01.818 ENCOUNTER FOR OTHER PREPROCEDURAL EXAMINATION: ICD-10-CM

## 2018-07-20 DIAGNOSIS — N21.0 CALCULUS IN BLADDER: ICD-10-CM

## 2018-07-20 DIAGNOSIS — K08.409 PARTIAL LOSS OF TEETH, UNSPECIFIED CAUSE, UNSPECIFIED CLASS: Chronic | ICD-10-CM

## 2018-07-20 DIAGNOSIS — Z98.890 OTHER SPECIFIED POSTPROCEDURAL STATES: Chronic | ICD-10-CM

## 2018-07-20 DIAGNOSIS — Z90.89 ACQUIRED ABSENCE OF OTHER ORGANS: Chronic | ICD-10-CM

## 2018-07-20 PROBLEM — M25.569 PAIN IN UNSPECIFIED KNEE: Chronic | Status: ACTIVE | Noted: 2018-04-19

## 2018-07-20 PROBLEM — G40.909 EPILEPSY, UNSPECIFIED, NOT INTRACTABLE, WITHOUT STATUS EPILEPTICUS: Chronic | Status: ACTIVE | Noted: 2018-04-19

## 2018-07-20 PROBLEM — R05 COUGH: Chronic | Status: ACTIVE | Noted: 2018-04-19

## 2018-07-20 PROBLEM — E11.9 TYPE 2 DIABETES MELLITUS WITHOUT COMPLICATIONS: Chronic | Status: ACTIVE | Noted: 2018-04-19

## 2018-07-20 PROBLEM — N19 UNSPECIFIED KIDNEY FAILURE: Chronic | Status: ACTIVE | Noted: 2018-04-19

## 2018-07-20 PROBLEM — J45.909 UNSPECIFIED ASTHMA, UNCOMPLICATED: Chronic | Status: ACTIVE | Noted: 2018-04-19

## 2018-07-20 LAB
ALBUMIN SERPL ELPH-MCNC: 4.1 G/DL — SIGNIFICANT CHANGE UP (ref 3.5–5.2)
ALP SERPL-CCNC: 57 U/L — SIGNIFICANT CHANGE UP (ref 30–115)
ALT FLD-CCNC: 13 U/L — SIGNIFICANT CHANGE UP (ref 0–41)
ANION GAP SERPL CALC-SCNC: 16 MMOL/L — HIGH (ref 7–14)
APPEARANCE UR: ABNORMAL
APTT BLD: 31.6 SEC — SIGNIFICANT CHANGE UP (ref 27–39.2)
AST SERPL-CCNC: 14 U/L — SIGNIFICANT CHANGE UP (ref 0–41)
BACTERIA # UR AUTO: ABNORMAL /HPF
BILIRUB SERPL-MCNC: <0.2 MG/DL — SIGNIFICANT CHANGE UP (ref 0.2–1.2)
BILIRUB UR-MCNC: NEGATIVE — SIGNIFICANT CHANGE UP
BUN SERPL-MCNC: 20 MG/DL — SIGNIFICANT CHANGE UP (ref 10–20)
CALCIUM SERPL-MCNC: 9.4 MG/DL — SIGNIFICANT CHANGE UP (ref 8.5–10.1)
CHLORIDE SERPL-SCNC: 106 MMOL/L — SIGNIFICANT CHANGE UP (ref 98–110)
CO2 SERPL-SCNC: 21 MMOL/L — SIGNIFICANT CHANGE UP (ref 17–32)
COLOR SPEC: YELLOW — SIGNIFICANT CHANGE UP
CREAT SERPL-MCNC: 1 MG/DL — SIGNIFICANT CHANGE UP (ref 0.7–1.5)
DIFF PNL FLD: ABNORMAL
EPI CELLS # UR: ABNORMAL /HPF
ESTIMATED AVERAGE GLUCOSE: 114 MG/DL — SIGNIFICANT CHANGE UP (ref 68–114)
GLUCOSE SERPL-MCNC: 120 MG/DL — HIGH (ref 70–99)
GLUCOSE UR QL: NEGATIVE — SIGNIFICANT CHANGE UP
HBA1C BLD-MCNC: 5.6 % — SIGNIFICANT CHANGE UP (ref 4–5.6)
HCT VFR BLD CALC: 41.6 % — SIGNIFICANT CHANGE UP (ref 37–47)
HGB BLD-MCNC: 13.4 G/DL — SIGNIFICANT CHANGE UP (ref 12–16)
INR BLD: 1.06 RATIO — SIGNIFICANT CHANGE UP (ref 0.65–1.3)
KETONES UR-MCNC: NEGATIVE — SIGNIFICANT CHANGE UP
LEUKOCYTE ESTERASE UR-ACNC: ABNORMAL
MCHC RBC-ENTMCNC: 29.6 PG — SIGNIFICANT CHANGE UP (ref 27–31)
MCHC RBC-ENTMCNC: 32.2 G/DL — SIGNIFICANT CHANGE UP (ref 32–37)
MCV RBC AUTO: 91.8 FL — SIGNIFICANT CHANGE UP (ref 81–99)
NITRITE UR-MCNC: POSITIVE
NRBC # BLD: 0 /100 WBCS — SIGNIFICANT CHANGE UP (ref 0–0)
PH UR: 8.5 — SIGNIFICANT CHANGE UP (ref 5–8)
PLATELET # BLD AUTO: 239 K/UL — SIGNIFICANT CHANGE UP (ref 130–400)
POTASSIUM SERPL-MCNC: 4.1 MMOL/L — SIGNIFICANT CHANGE UP (ref 3.5–5)
POTASSIUM SERPL-SCNC: 4.1 MMOL/L — SIGNIFICANT CHANGE UP (ref 3.5–5)
PROT SERPL-MCNC: 7.1 G/DL — SIGNIFICANT CHANGE UP (ref 6–8)
PROT UR-MCNC: >=300
PROTHROM AB SERPL-ACNC: 11.4 SEC — SIGNIFICANT CHANGE UP (ref 9.95–12.87)
RBC # BLD: 4.53 M/UL — SIGNIFICANT CHANGE UP (ref 4.2–5.4)
RBC # FLD: 15.6 % — HIGH (ref 11.5–14.5)
RBC CASTS # UR COMP ASSIST: SIGNIFICANT CHANGE UP /HPF
SODIUM SERPL-SCNC: 143 MMOL/L — SIGNIFICANT CHANGE UP (ref 135–146)
SP GR SPEC: <=1.005 — SIGNIFICANT CHANGE UP (ref 1.01–1.03)
TRI-PHOS CRY UR QL COMP ASSIST: ABNORMAL /HPF
UROBILINOGEN FLD QL: 0.2 — SIGNIFICANT CHANGE UP (ref 0.2–0.2)
WBC # BLD: 9.24 K/UL — SIGNIFICANT CHANGE UP (ref 4.8–10.8)
WBC # FLD AUTO: 9.24 K/UL — SIGNIFICANT CHANGE UP (ref 4.8–10.8)
WBC UR QL: SIGNIFICANT CHANGE UP /HPF

## 2018-07-20 RX ORDER — TOPIRAMATE 25 MG
1 TABLET ORAL
Qty: 0 | Refills: 0 | COMMUNITY

## 2018-07-20 NOTE — H&P PST ADULT - ADDITIONAL PE
After 3 attempts to contact patient's re: the condition of the patients hygiene, Jo Ann Urena R.N., identifying herself as being in charge of patient care in the "adult home" returned my call and I explained the patients hygiene on arrival her dishelved appearance, the caked black dirt between her toes and on her feet, the thick hair on her face and her greasy hair. She expressed to me that the patient was "difficult" I then inquired if the patient had been evaluated by psych, she said yes they it had been one of the interventions. Proceeding my interaction with Jo Ann, it was very difficult to get in touch with someone , I finally was put on the phone with Eligio Moreira who identified himself as , when I asked him what that meant he told me "its what it was the " I then asked him if it meant he ran the boEtaphase room, and he said it was part of his job. I asked him he was a medical person or if he was in charge of the patient care. He then called me arrogant and asked me who did I think I was. I told him I was the patient advocate, and if I did not get someone who could help me with the patient I was going to call the state, only then did he attempt to call someone to help me. The patient stated that there was no one there to help them or her and that no-one cared at the residence. I also communicated that to Jo Ann. In addition to the horrible hygienic condition the patient was in, the aide that was sent with her, could not be bothered with helping her. She kept leaving to go outside the patient care area to listen to some form of radio or phone in her ears. I eventually had to sternly re-direct her to her present responsibility , the patient. This was also communicated to Jo Ann. Please f/u when patient arrives at the or to make sure ,as promised by Jo Ann the patient would be cared for. Lexis SCHWAB

## 2018-07-20 NOTE — H&P PST ADULT - NSANTHOSAYNRD_GEN_A_CORE
No. THOM screening performed.  STOP BANG Legend: 0-2 = LOW Risk; 3-4 = INTERMEDIATE Risk; 5-8 = HIGH Risk/see thom tool

## 2018-07-20 NOTE — H&P PST ADULT - HISTORY OF PRESENT ILLNESS
63 year year old female here for above procedure  fos=0  denies chest pain sob palp  limited by pain in back and leg  denies recent uri had recent uti

## 2018-07-20 NOTE — H&P PST ADULT - PMH
Asthma    Chronic cough    Diabetes    Epilepsy    Knee pain    Osteoarthritis    Renal failure    Renal stone    Uterine cancer

## 2018-07-20 NOTE — H&P PST ADULT - NS ABD PE RECTAL EXAM
"2018       RE: Benita Olivo  [address] [facility] [address]     Dear Colleague,    Thank you for referring your patient, Benita Olivo, to the [facility] at [facility]. Please see a copy of my visit note below.    Doing well. Good movement, no ctx, lof, vb. Feeling \"big.\"   O: /71  Pulse 89  Wt 111.6 kg (246 lb)  LMP 2017  Breastfeeding? No  BMI 35.6 kg/m2  A/P: 38 year old  @34w3d   LGA fetus: Last growth 99%ile. Has repeat in 2 weeks. Will make delivery planning at next visit based on EFW. Discussed primary CS for >5000g at time of delivery (39wks). Last baby  [id number].   Homozygous Factor V, H/o PE: On therapeutic lovenox. Plan IOL or CS at 39 wks  H/o AIS: Consider hysterectomy when done child bearing  RTC 2 wks JEROME and US    Doreen Fernandez MD      " detailed exam

## 2018-07-26 PROBLEM — M19.90 UNSPECIFIED OSTEOARTHRITIS, UNSPECIFIED SITE: Chronic | Status: ACTIVE | Noted: 2018-07-20

## 2018-08-02 ENCOUNTER — OUTPATIENT (OUTPATIENT)
Dept: OUTPATIENT SERVICES | Facility: HOSPITAL | Age: 63
LOS: 1 days | Discharge: HOME | End: 2018-08-02

## 2018-08-02 DIAGNOSIS — Z01.810 ENCOUNTER FOR PREPROCEDURAL CARDIOVASCULAR EXAMINATION: ICD-10-CM

## 2018-08-02 DIAGNOSIS — R94.31 ABNORMAL ELECTROCARDIOGRAM [ECG] [EKG]: ICD-10-CM

## 2018-08-02 DIAGNOSIS — K08.409 PARTIAL LOSS OF TEETH, UNSPECIFIED CAUSE, UNSPECIFIED CLASS: Chronic | ICD-10-CM

## 2018-08-02 DIAGNOSIS — Z90.89 ACQUIRED ABSENCE OF OTHER ORGANS: Chronic | ICD-10-CM

## 2018-08-02 DIAGNOSIS — Z98.890 OTHER SPECIFIED POSTPROCEDURAL STATES: Chronic | ICD-10-CM

## 2018-08-03 ENCOUNTER — OUTPATIENT (OUTPATIENT)
Dept: OUTPATIENT SERVICES | Facility: HOSPITAL | Age: 63
LOS: 1 days | Discharge: HOME | End: 2018-08-03

## 2018-08-03 ENCOUNTER — APPOINTMENT (OUTPATIENT)
Dept: UROLOGY | Facility: HOSPITAL | Age: 63
End: 2018-08-03
Payer: MEDICAID

## 2018-08-03 VITALS
HEIGHT: 62 IN | OXYGEN SATURATION: 97 % | TEMPERATURE: 97 F | SYSTOLIC BLOOD PRESSURE: 160 MMHG | WEIGHT: 293 LBS | RESPIRATION RATE: 18 BRPM | DIASTOLIC BLOOD PRESSURE: 76 MMHG | HEART RATE: 79 BPM

## 2018-08-03 VITALS — HEART RATE: 67 BPM | RESPIRATION RATE: 78 BRPM | DIASTOLIC BLOOD PRESSURE: 70 MMHG | SYSTOLIC BLOOD PRESSURE: 134 MMHG

## 2018-08-03 DIAGNOSIS — Z98.890 OTHER SPECIFIED POSTPROCEDURAL STATES: Chronic | ICD-10-CM

## 2018-08-03 DIAGNOSIS — K08.409 PARTIAL LOSS OF TEETH, UNSPECIFIED CAUSE, UNSPECIFIED CLASS: Chronic | ICD-10-CM

## 2018-08-03 DIAGNOSIS — Z90.89 ACQUIRED ABSENCE OF OTHER ORGANS: Chronic | ICD-10-CM

## 2018-08-03 PROCEDURE — 52318 REMOVE BLADDER STONE: CPT

## 2018-08-03 RX ORDER — MORPHINE SULFATE 50 MG/1
2 CAPSULE, EXTENDED RELEASE ORAL
Qty: 0 | Refills: 0 | Status: DISCONTINUED | OUTPATIENT
Start: 2018-08-03 | End: 2018-08-03

## 2018-08-03 RX ORDER — PHENAZOPYRIDINE HCL 100 MG
1 TABLET ORAL
Qty: 4 | Refills: 0 | OUTPATIENT
Start: 2018-08-03 | End: 2018-08-04

## 2018-08-03 RX ORDER — ACETAMINOPHEN WITH CODEINE 300MG-30MG
1 TABLET ORAL
Qty: 12 | Refills: 0 | OUTPATIENT
Start: 2018-08-03 | End: 2018-08-05

## 2018-08-03 RX ORDER — SODIUM CHLORIDE 9 MG/ML
1000 INJECTION, SOLUTION INTRAVENOUS
Qty: 0 | Refills: 0 | Status: DISCONTINUED | OUTPATIENT
Start: 2018-08-03 | End: 2018-08-18

## 2018-08-03 RX ORDER — AZTREONAM 2 G
1 VIAL (EA) INJECTION
Qty: 10 | Refills: 0 | OUTPATIENT
Start: 2018-08-03 | End: 2018-08-07

## 2018-08-03 RX ORDER — ONDANSETRON 8 MG/1
4 TABLET, FILM COATED ORAL ONCE
Qty: 0 | Refills: 0 | Status: DISCONTINUED | OUTPATIENT
Start: 2018-08-03 | End: 2018-08-18

## 2018-08-03 RX ADMIN — SODIUM CHLORIDE 100 MILLILITER(S): 9 INJECTION, SOLUTION INTRAVENOUS at 16:40

## 2018-08-03 NOTE — BRIEF OPERATIVE NOTE - OPERATION/FINDINGS
large bladder stone over 3cm formed from the stent. this was fragmented and the stent is free in the bladder but there is calcification in the kidney that keeps us from pulling the stent.  Will need to return to OR for PCNL and removal and replacement of the stent

## 2018-08-03 NOTE — ASU DISCHARGE PLAN (ADULT/PEDIATRIC). - MEDICATION SUMMARY - MEDICATIONS TO TAKE
I will START or STAY ON the medications listed below when I get home from the hospital:    Tylenol with Codeine #3 oral tablet  -- 1 tab(s) by mouth 4 times a day MDD:4 pills  -- Caution federal law prohibits the transfer of this drug to any person other  than the person for whom it was prescribed.  May cause drowsiness.  Alcohol may intensify this effect.  Use care when operating dangerous machinery.  This product contains acetaminophen.  Do not use  with any other product containing acetaminophen to prevent possible liver damage.  Using more of this medication than prescribed may cause serious breathing problems.    -- Indication: For N21.0, 73983, 74045, 02063    PHENobarbital 32.4 mg oral tablet  -- 1 tab(s) by mouth 3 times a day  -- Indication: For N21.0, 42813, 64714, 65393    Topamax 100 mg oral tablet  -- 1 tab(s) by mouth 2 times a day  -- Indication: For N21.0, 82539, 26510, 01720    metFORMIN 500 mg oral tablet  -- 1 tab(s) by mouth once a day at 5pm   -- Indication: For N21.0, 08947, 58404, 82828    Claritin 10 mg oral tablet  -- 1 tab(s) by mouth once a day at 5pm  -- Indication: For N21.0, 54096, 06718, 04130    Singulair 10 mg oral tablet  -- 1 tab(s) by mouth once a day at 5pm   -- Indication: For N21.0, 47119, 35632, 04974    Pyridium 100 mg oral tablet  -- 1 tab(s) by mouth 2 times a day -for bladder spasms   -- May discolor urine or feces.  Medication should be taken with plenty of water.  Take with food or milk.    -- Indication: For N21.0, 47920, 43767, 26469    SMZ-TMP  mg-160 mg oral tablet  -- 1 tab(s) by mouth 2 times a day (with meals)   -- Avoid prolonged or excessive exposure to direct and/or artificial sunlight while taking this medication.  Finish all this medication unless otherwise directed by prescriber.  Medication should be taken with plenty of water.    -- Indication: For N21.0, 49990, 47816, 09575    Synthroid 50 mcg (0.05 mg) oral tablet  -- 1 tab(s) by mouth once a day at 5pm   -- Indication: For N21.0, 44736, 94078, 33354    Multiple Vitamins oral tablet  -- 1 tab(s) by mouth once a day at 5pm  -- Indication: For N21.0, 57335, 29157, 02688

## 2018-08-03 NOTE — PRE-ANESTHESIA EVALUATION ADULT - NSANTHOSAYNRD_GEN_A_CORE
see nick tool/No. NICK screening performed.  STOP BANG Legend: 0-2 = LOW Risk; 3-4 = INTERMEDIATE Risk; 5-8 = HIGH Risk

## 2018-08-03 NOTE — BRIEF OPERATIVE NOTE - PROCEDURE
<<-----Click on this checkbox to enter Procedure Cystolitholapaxy of large calculus  08/03/2018    Active  [id number]

## 2018-08-08 DIAGNOSIS — N21.0 CALCULUS IN BLADDER: ICD-10-CM

## 2018-08-08 DIAGNOSIS — Z88.0 ALLERGY STATUS TO PENICILLIN: ICD-10-CM

## 2018-08-08 DIAGNOSIS — Z88.8 ALLERGY STATUS TO OTHER DRUGS, MEDICAMENTS AND BIOLOGICAL SUBSTANCES: ICD-10-CM

## 2018-08-08 DIAGNOSIS — E11.9 TYPE 2 DIABETES MELLITUS WITHOUT COMPLICATIONS: ICD-10-CM

## 2018-08-15 ENCOUNTER — APPOINTMENT (OUTPATIENT)
Dept: UROLOGY | Facility: CLINIC | Age: 63
End: 2018-08-15
Payer: MEDICAID

## 2018-08-15 VITALS — WEIGHT: 293 LBS | HEIGHT: 62 IN | BODY MASS INDEX: 53.92 KG/M2

## 2018-08-15 DIAGNOSIS — Z87.448 PERSONAL HISTORY OF OTHER DISEASES OF URINARY SYSTEM: ICD-10-CM

## 2018-08-15 PROCEDURE — 99215 OFFICE O/P EST HI 40 MIN: CPT

## 2018-08-15 NOTE — ADDENDUM
[FreeTextEntry1] : ALEX LAM M.D.\par [address] [place] [address], [address]\par [place], [place] [address]\par [phone]\par FAX#: [phone]\par \par August 15, 2018\par \par Dear TAYEJACOB		: 1955\par \par You have been scheduled for percutaneous Removal of your left Kidney Stones and encrusted JJ stent at [facility] on Tuesday, __________ ,  at _____ A/PM.  Please avoid all Plavix, aspirin and aspirin like products, etc., for 7 DAYS before.  You have been given a pre admission instruction sheet.  If you have any questions concerning what you should or should not do please contact me.\par \par If all goes well, you should be leaving hospital the day after the procedure.  At that time the nephrostomy tube that will be placed in the operating room, will hopefully be clamped.  \par \par The morning of the procedure the invasive radiologist will obtain access into the LEFT and then at a later date the RIGHT kidney for me.  That will be done either earlier in the day, in the Interventional suite and you will then be transferred to the OR.  Alternatively access may be obtained as a team approach in the OR.  Regardless after access is obtained, I, working with our team which can include a PA or another doctor will dilate the tract and try, but cannot guarantee to take out all of the stones.  Again I cannot say which but depending on how well it goes and how you feel you will either be discharged home after the procedure or later the next morning.\par  \par An x-ray study to check for completeness as well as the lack of extra drainage will be  performed usually the next day, either as an outpatient if you are sent home or before discharge if we keep you overnight.  Occasionally, if not done pre discharge it will be done post discharge.  If the study is normal the tube will be clamped and you will, see me later the same or the next day for removal of the residual hardware.\par \par If at any point you are concerned please Do Not Hesitate to call. If it is after normal business hours and it is urgent you can reach me via service at [phone].  If it is not urgent please call the appropriate office during regular business hours.  As always if you are in doubt, I would rather you call unnecessarily than not call when you should have, I thank you again for the confidence of allowing me to care for you.\par \par Sincerely,\par \par Alex Lam MD\par \par Alex Lam MD\par Director of the Division of Urology, in the Department of Surgery\par [place], [place]\par PS: IF you have any ALLERGIES or any special requirements, such as special prescription blanks or you need the prescriptions in advance, please let me know.\par

## 2018-08-15 NOTE — LETTER HEADER
[FreeTextEntry3] : Alex Lam M.D.\par Director of Urology\par [facility]\par [address] [place] [address], [address]\[place] [address]

## 2018-08-15 NOTE — PHYSICAL EXAM
[Normal Appearance] : normal appearance [General Appearance - In No Acute Distress] : no acute distress [Bowel Sounds] : normal bowel sounds [Abdomen Soft] : soft [Abdomen Tenderness] : non-tender [Costovertebral Angle Tenderness] : no ~M costovertebral angle tenderness [Urinary Bladder Findings] : the bladder was normal on palpation [] : no respiratory distress [Respiration, Rhythm And Depth] : normal respiratory rhythm and effort [Exaggerated Use Of Accessory Muscles For Inspiration] : no accessory muscle use [Auscultation Breath Sounds / Voice Sounds] : lungs were clear to auscultation bilaterally [Oriented To Time, Place, And Person] : oriented to person, place, and time [Affect] : the affect was normal [Mood] : the mood was normal [Not Anxious] : not anxious [No Focal Deficits] : no focal deficits [No Palpable Adenopathy] : no palpable adenopathy [FreeTextEntry1] : Ambulating with walker

## 2018-08-15 NOTE — ASSESSMENT
[FreeTextEntry1] : Physical exam shows no tenderness but she is morbidly obese. The distance to the kidney is far but I think it is doable. The home will get me her recent CAT scan on disk, I will over that with the interventional radiologist and I think working with the lower pole access, if needed cutting the fat of the flank so we can get closer and/or bearing the tube with in the fat will enable me to reach the stent, break up the stone and then remove the double-J.\par \par I discussed with her the risk benefits and alternatives including but not limited to trying shockwave lithotripsy. However though that may not the stone off of the stent she would still have a lot of stone material inside, if we remove the stent she might end up with a Steinstrasse and then end up with another stent. Given how much trouble the stone and stent had caused I like to be a little more aggressive but hopefully become one and done. Another option would be open surgery but in her case even more than others I think that is something we would try and avoid at all costs. Finally the percutaneous nephrolithotripsy is not a guaranteed of success that was probably over 90%. She is likely to be done as an outpatient, something she’s not happy about as she finds the bed it the home uncomfortable. However I explained to her the realities of 2018 and I cannot keep her in the hospital because the bed is uncomfortable. If we find clinical indications he will of course keep her but if she is stable enough, for example she says last time she was dizzy, the anesthesiologist will evaluate her prior to discharge and if they feel she can go home she will be sent home. She will then come back the next day where she will undergo a CAT scan followed by a nephrostogram at which point we will see if we can take out the tubes if there any stones left and what else has to be done. She is aware that once all the tubes are out the stone bear will be placed in that the tract usually closes on its own within 24 or 48 hours.\par She does acknowledge there may be an element of auto dehydration because of the symptoms. That is something that will need to be evaluated once we get her stent and stone free\par

## 2018-08-15 NOTE — HISTORY OF PRESENT ILLNESS
[FreeTextEntry1] : Alee is a 63 year old female who presents for evaluation of an encrusted LEFT ureteral stent placed. In October 2017 she presented to the ED complaining of LEFT flank pain and fever. A LEFT stent was placed by Dr. Liang secondary to a LEFT UPJ obstructing stone. She did not follow up until July 2018, with Dr. Marina. KUB showed 3.6 cm bladder calculi and LEFT stent without any upper tract calculus. She was taken to the OR, by Dr. Marina for litholopaxy of her bladder stone. At that time he took out the bladder portion, however this that would not come out. He sent her for a CAT scan and she presents today for consultation regarding her continued LEFT ureteral stent and a 1.3 x 1.1 cm encrustation on the proximal coil of the double-J that is in the LEFT renal pelvis and lower pole calyx. She reports no issues with her stent at this time. When queried she acknowledges that she urinates often at night it was six and is now “down” to four times. She describes the volume per void as moderate but I don’t know what that means.  [None] : no symptoms

## 2018-08-15 NOTE — LETTER BODY
[Courtesy Letter:] : I had the pleasure of seeing your patient, [unfilled], in my office today. [Please see my note below.] : Please see my note below. [Sincerely,] : Sincerely,

## 2018-08-20 ENCOUNTER — APPOINTMENT (OUTPATIENT)
Dept: UROLOGY | Facility: CLINIC | Age: 63
End: 2018-08-20

## 2018-08-22 ENCOUNTER — OUTPATIENT (OUTPATIENT)
Dept: OUTPATIENT SERVICES | Facility: HOSPITAL | Age: 63
LOS: 1 days | Discharge: HOME | End: 2018-08-22

## 2018-08-22 VITALS
HEART RATE: 76 BPM | SYSTOLIC BLOOD PRESSURE: 137 MMHG | OXYGEN SATURATION: 96 % | HEIGHT: 62 IN | DIASTOLIC BLOOD PRESSURE: 67 MMHG | TEMPERATURE: 98 F | RESPIRATION RATE: 18 BRPM | WEIGHT: 293 LBS

## 2018-08-22 DIAGNOSIS — N20.2 CALCULUS OF KIDNEY WITH CALCULUS OF URETER: ICD-10-CM

## 2018-08-22 DIAGNOSIS — Z87.448 PERSONAL HISTORY OF OTHER DISEASES OF URINARY SYSTEM: Chronic | ICD-10-CM

## 2018-08-22 DIAGNOSIS — Z90.89 ACQUIRED ABSENCE OF OTHER ORGANS: Chronic | ICD-10-CM

## 2018-08-22 DIAGNOSIS — Z98.890 OTHER SPECIFIED POSTPROCEDURAL STATES: Chronic | ICD-10-CM

## 2018-08-22 DIAGNOSIS — Z01.818 ENCOUNTER FOR OTHER PREPROCEDURAL EXAMINATION: ICD-10-CM

## 2018-08-22 DIAGNOSIS — K08.409 PARTIAL LOSS OF TEETH, UNSPECIFIED CAUSE, UNSPECIFIED CLASS: Chronic | ICD-10-CM

## 2018-08-22 LAB
ALBUMIN SERPL ELPH-MCNC: 4.1 G/DL — SIGNIFICANT CHANGE UP (ref 3.5–5.2)
ALP SERPL-CCNC: 61 U/L — SIGNIFICANT CHANGE UP (ref 30–115)
ALT FLD-CCNC: 9 U/L — SIGNIFICANT CHANGE UP (ref 0–41)
ANION GAP SERPL CALC-SCNC: 20 MMOL/L — HIGH (ref 7–14)
APPEARANCE UR: ABNORMAL
APTT BLD: 30.9 SEC — SIGNIFICANT CHANGE UP (ref 27–39.2)
AST SERPL-CCNC: 12 U/L — SIGNIFICANT CHANGE UP (ref 0–41)
BACTERIA # UR AUTO: ABNORMAL /HPF
BASOPHILS # BLD AUTO: 0.04 K/UL — SIGNIFICANT CHANGE UP (ref 0–0.2)
BASOPHILS NFR BLD AUTO: 0.5 % — SIGNIFICANT CHANGE UP (ref 0–1)
BILIRUB SERPL-MCNC: <0.2 MG/DL — SIGNIFICANT CHANGE UP (ref 0.2–1.2)
BILIRUB UR-MCNC: NEGATIVE — SIGNIFICANT CHANGE UP
BUN SERPL-MCNC: 19 MG/DL — SIGNIFICANT CHANGE UP (ref 10–20)
CALCIUM SERPL-MCNC: 9.1 MG/DL — SIGNIFICANT CHANGE UP (ref 8.5–10.1)
CHLORIDE SERPL-SCNC: 105 MMOL/L — SIGNIFICANT CHANGE UP (ref 98–110)
CO2 SERPL-SCNC: 18 MMOL/L — SIGNIFICANT CHANGE UP (ref 17–32)
COLOR SPEC: YELLOW — SIGNIFICANT CHANGE UP
CREAT SERPL-MCNC: 0.9 MG/DL — SIGNIFICANT CHANGE UP (ref 0.7–1.5)
DIFF PNL FLD: ABNORMAL
EOSINOPHIL # BLD AUTO: 0.23 K/UL — SIGNIFICANT CHANGE UP (ref 0–0.7)
EOSINOPHIL NFR BLD AUTO: 2.9 % — SIGNIFICANT CHANGE UP (ref 0–8)
EPI CELLS # UR: ABNORMAL /HPF
GLUCOSE SERPL-MCNC: 88 MG/DL — SIGNIFICANT CHANGE UP (ref 70–99)
GLUCOSE UR QL: NEGATIVE MG/DL — SIGNIFICANT CHANGE UP
HCT VFR BLD CALC: 39.4 % — SIGNIFICANT CHANGE UP (ref 37–47)
HGB BLD-MCNC: 12.8 G/DL — SIGNIFICANT CHANGE UP (ref 12–16)
IMM GRANULOCYTES NFR BLD AUTO: 0.3 % — SIGNIFICANT CHANGE UP (ref 0.1–0.3)
INR BLD: 1.02 RATIO — SIGNIFICANT CHANGE UP (ref 0.65–1.3)
KETONES UR-MCNC: NEGATIVE — SIGNIFICANT CHANGE UP
LEUKOCYTE ESTERASE UR-ACNC: ABNORMAL
LYMPHOCYTES # BLD AUTO: 2.05 K/UL — SIGNIFICANT CHANGE UP (ref 1.2–3.4)
LYMPHOCYTES # BLD AUTO: 25.8 % — SIGNIFICANT CHANGE UP (ref 20.5–51.1)
MCHC RBC-ENTMCNC: 30.4 PG — SIGNIFICANT CHANGE UP (ref 27–31)
MCHC RBC-ENTMCNC: 32.5 G/DL — SIGNIFICANT CHANGE UP (ref 32–37)
MCV RBC AUTO: 93.6 FL — SIGNIFICANT CHANGE UP (ref 81–99)
MONOCYTES # BLD AUTO: 0.66 K/UL — HIGH (ref 0.1–0.6)
MONOCYTES NFR BLD AUTO: 8.3 % — SIGNIFICANT CHANGE UP (ref 1.7–9.3)
NEUTROPHILS # BLD AUTO: 4.94 K/UL — SIGNIFICANT CHANGE UP (ref 1.4–6.5)
NEUTROPHILS NFR BLD AUTO: 62.2 % — SIGNIFICANT CHANGE UP (ref 42.2–75.2)
NITRITE UR-MCNC: POSITIVE
NRBC # BLD: 0 /100 WBCS — SIGNIFICANT CHANGE UP (ref 0–0)
PH UR: 7 — SIGNIFICANT CHANGE UP (ref 5–8)
PLATELET # BLD AUTO: 216 K/UL — SIGNIFICANT CHANGE UP (ref 130–400)
POTASSIUM SERPL-MCNC: 3.9 MMOL/L — SIGNIFICANT CHANGE UP (ref 3.5–5)
POTASSIUM SERPL-SCNC: 3.9 MMOL/L — SIGNIFICANT CHANGE UP (ref 3.5–5)
PROT SERPL-MCNC: 6.7 G/DL — SIGNIFICANT CHANGE UP (ref 6–8)
PROT UR-MCNC: 30 MG/DL
PROTHROM AB SERPL-ACNC: 11 SEC — SIGNIFICANT CHANGE UP (ref 9.95–12.87)
RBC # BLD: 4.21 M/UL — SIGNIFICANT CHANGE UP (ref 4.2–5.4)
RBC # FLD: 14.7 % — HIGH (ref 11.5–14.5)
RBC CASTS # UR COMP ASSIST: ABNORMAL /HPF
SODIUM SERPL-SCNC: 143 MMOL/L — SIGNIFICANT CHANGE UP (ref 135–146)
SP GR SPEC: 1.02 — SIGNIFICANT CHANGE UP (ref 1.01–1.03)
UROBILINOGEN FLD QL: 0.2 MG/DL — SIGNIFICANT CHANGE UP (ref 0.2–0.2)
WBC # BLD: 7.94 K/UL — SIGNIFICANT CHANGE UP (ref 4.8–10.8)
WBC # FLD AUTO: 7.94 K/UL — SIGNIFICANT CHANGE UP (ref 4.8–10.8)
WBC UR QL: >50 /HPF

## 2018-08-22 NOTE — H&P PST ADULT - PSH
H/O abdominal hysterectomy  NO RADIATION AND CHEMO  History of bladder stone  SURGERY 8/3/2018  History of surgery  JJ STENT PLACEMENT LEFT OCTOBER 2017  History of tonsillectomy  13 years old  [place] teeth removed

## 2018-08-22 NOTE — H&P PST ADULT - NSANTHOSAYNRD_GEN_A_CORE
No. THOM screening performed.  STOP BANG Legend: 0-2 = LOW Risk; 3-4 = INTERMEDIATE Risk; 5-8 = HIGH Risk

## 2018-08-22 NOTE — H&P PST ADULT - HISTORY OF PRESENT ILLNESS
"left kidney stone and taking out the stent on the left side"  PT CURRENTLY DENIES CHEST PAIN, PALPITATIONS, DYSURIA, RECENT ILLNESS  EXERCISE TOLERANCE WALKS WITH WALKER   LESS THAN 1/2 BLOCK  PT DENIES ANY RASHES, ABRASION, OR OPEN WOUNDS OR CUTS "left kidney stone and taking out the stent on the left side"  PT CURRENTLY DENIES CHEST PAIN, PALPITATIONS, DYSURIA, RECENT ILLNESS  EXERCISE TOLERANCE WALKS WITH WALKER   LESS THAN 1/2 BLOCK  PT DENIES ANY RASHES, ABRASION, OR OPEN WOUNDS OR CUTS    AS PER THE PT, THIS IS A COMPLETE MEDICAL AND SURGICAL HX, INCLUDING MEDICATIONS PRESCRIBED AND OVER THE COUNTER

## 2018-08-22 NOTE — H&P PST ADULT - PRO ANTICIPATED DISCH DISP
extended care facility/[facility] RESIDENCE   PT WILL BE ESCORTED HOME DOP BY AIDE FROM [facility]

## 2018-08-23 LAB
ESTIMATED AVERAGE GLUCOSE: 100 MG/DL — SIGNIFICANT CHANGE UP (ref 68–114)
HBA1C BLD-MCNC: 5.1 % — SIGNIFICANT CHANGE UP (ref 4–5.6)

## 2018-08-27 ENCOUNTER — RESULT REVIEW (OUTPATIENT)
Age: 63
End: 2018-08-27

## 2018-08-31 ENCOUNTER — OUTPATIENT (OUTPATIENT)
Dept: OUTPATIENT SERVICES | Facility: HOSPITAL | Age: 63
LOS: 1 days | Discharge: HOME | End: 2018-08-31

## 2018-08-31 ENCOUNTER — LABORATORY RESULT (OUTPATIENT)
Age: 63
End: 2018-08-31

## 2018-08-31 ENCOUNTER — APPOINTMENT (OUTPATIENT)
Dept: UROLOGY | Facility: CLINIC | Age: 63
End: 2018-08-31
Payer: MEDICAID

## 2018-08-31 VITALS
WEIGHT: 293 LBS | DIASTOLIC BLOOD PRESSURE: 90 MMHG | HEIGHT: 62 IN | BODY MASS INDEX: 53.92 KG/M2 | SYSTOLIC BLOOD PRESSURE: 135 MMHG | HEART RATE: 70 BPM

## 2018-08-31 DIAGNOSIS — Z98.890 OTHER SPECIFIED POSTPROCEDURAL STATES: Chronic | ICD-10-CM

## 2018-08-31 DIAGNOSIS — Z90.89 ACQUIRED ABSENCE OF OTHER ORGANS: Chronic | ICD-10-CM

## 2018-08-31 DIAGNOSIS — Z87.448 PERSONAL HISTORY OF OTHER DISEASES OF URINARY SYSTEM: Chronic | ICD-10-CM

## 2018-08-31 DIAGNOSIS — K08.409 PARTIAL LOSS OF TEETH, UNSPECIFIED CAUSE, UNSPECIFIED CLASS: Chronic | ICD-10-CM

## 2018-08-31 DIAGNOSIS — N39.0 URINARY TRACT INFECTION, SITE NOT SPECIFIED: ICD-10-CM

## 2018-08-31 LAB
BILIRUB UR QL STRIP: NORMAL
CLARITY UR: CLEAR
COLLECTION METHOD: NORMAL
GLUCOSE UR-MCNC: NORMAL
HCG UR QL: NORMAL EU/DL
HGB UR QL STRIP.AUTO: 50
KETONES UR-MCNC: NORMAL
LEUKOCYTE ESTERASE UR QL STRIP: 500
NITRITE UR QL STRIP: NORMAL
PH UR STRIP: 6.5
PROT UR STRIP-MCNC: 30
SP GR UR STRIP: 1.01

## 2018-08-31 PROCEDURE — 99213 OFFICE O/P EST LOW 20 MIN: CPT | Mod: 25

## 2018-08-31 PROCEDURE — 51701 INSERT BLADDER CATHETER: CPT

## 2018-08-31 PROCEDURE — 81003 URINALYSIS AUTO W/O SCOPE: CPT | Mod: QW

## 2018-09-04 ENCOUNTER — INPATIENT (INPATIENT)
Facility: HOSPITAL | Age: 63
LOS: 1 days | Discharge: NURSING, REHAB OR LONG-TERM CARE | End: 2018-09-06
Attending: SURGERY | Admitting: SURGERY

## 2018-09-04 ENCOUNTER — RESULT REVIEW (OUTPATIENT)
Age: 63
End: 2018-09-04

## 2018-09-04 ENCOUNTER — APPOINTMENT (OUTPATIENT)
Dept: UROLOGY | Facility: HOSPITAL | Age: 63
End: 2018-09-04
Payer: MEDICAID

## 2018-09-04 VITALS
DIASTOLIC BLOOD PRESSURE: 84 MMHG | WEIGHT: 293 LBS | TEMPERATURE: 98 F | SYSTOLIC BLOOD PRESSURE: 147 MMHG | RESPIRATION RATE: 18 BRPM | HEART RATE: 72 BPM | HEIGHT: 62 IN

## 2018-09-04 DIAGNOSIS — Z98.890 OTHER SPECIFIED POSTPROCEDURAL STATES: Chronic | ICD-10-CM

## 2018-09-04 DIAGNOSIS — Z90.89 ACQUIRED ABSENCE OF OTHER ORGANS: Chronic | ICD-10-CM

## 2018-09-04 DIAGNOSIS — Z87.448 PERSONAL HISTORY OF OTHER DISEASES OF URINARY SYSTEM: Chronic | ICD-10-CM

## 2018-09-04 DIAGNOSIS — K08.409 PARTIAL LOSS OF TEETH, UNSPECIFIED CAUSE, UNSPECIFIED CLASS: Chronic | ICD-10-CM

## 2018-09-04 LAB
ABO RH CONFIRMATION: SIGNIFICANT CHANGE UP
BLD GP AB SCN SERPL QL: SIGNIFICANT CHANGE UP
GLUCOSE BLDC GLUCOMTR-MCNC: 107 MG/DL — HIGH (ref 70–99)
GLUCOSE BLDC GLUCOMTR-MCNC: 124 MG/DL — HIGH (ref 70–99)
TYPE + AB SCN PNL BLD: SIGNIFICANT CHANGE UP

## 2018-09-04 PROCEDURE — 50081 PERQ NL/PL LITHOTRP CPLX>2CM: CPT | Mod: 22,RT

## 2018-09-04 PROCEDURE — 50431 NJX PX NFROSGRM &/URTRGRM: CPT

## 2018-09-04 PROCEDURE — 50395: CPT

## 2018-09-04 RX ORDER — PHENOBARBITAL 60 MG
32.4 TABLET ORAL EVERY 8 HOURS
Qty: 0 | Refills: 0 | Status: DISCONTINUED | OUTPATIENT
Start: 2018-09-04 | End: 2018-09-06

## 2018-09-04 RX ORDER — ONDANSETRON 8 MG/1
4 TABLET, FILM COATED ORAL ONCE
Qty: 0 | Refills: 0 | Status: DISCONTINUED | OUTPATIENT
Start: 2018-09-04 | End: 2018-09-04

## 2018-09-04 RX ORDER — CIPROFLOXACIN LACTATE 400MG/40ML
400 VIAL (ML) INTRAVENOUS ONCE
Qty: 0 | Refills: 0 | Status: DISCONTINUED | OUTPATIENT
Start: 2018-09-04 | End: 2018-09-04

## 2018-09-04 RX ORDER — HEPARIN SODIUM 5000 [USP'U]/ML
5000 INJECTION INTRAVENOUS; SUBCUTANEOUS EVERY 8 HOURS
Qty: 0 | Refills: 0 | Status: DISCONTINUED | OUTPATIENT
Start: 2018-09-04 | End: 2018-09-05

## 2018-09-04 RX ORDER — LORATADINE 10 MG/1
10 TABLET ORAL DAILY
Qty: 0 | Refills: 0 | Status: DISCONTINUED | OUTPATIENT
Start: 2018-09-04 | End: 2018-09-06

## 2018-09-04 RX ORDER — HYDROMORPHONE HYDROCHLORIDE 2 MG/ML
0.5 INJECTION INTRAMUSCULAR; INTRAVENOUS; SUBCUTANEOUS
Qty: 0 | Refills: 0 | Status: DISCONTINUED | OUTPATIENT
Start: 2018-09-04 | End: 2018-09-04

## 2018-09-04 RX ORDER — ALBUTEROL 90 UG/1
2 AEROSOL, METERED ORAL EVERY 6 HOURS
Qty: 0 | Refills: 0 | Status: DISCONTINUED | OUTPATIENT
Start: 2018-09-04 | End: 2018-09-06

## 2018-09-04 RX ORDER — METFORMIN HYDROCHLORIDE 850 MG/1
500 TABLET ORAL DAILY
Qty: 0 | Refills: 0 | Status: DISCONTINUED | OUTPATIENT
Start: 2018-09-04 | End: 2018-09-05

## 2018-09-04 RX ORDER — CIPROFLOXACIN LACTATE 400MG/40ML
400 VIAL (ML) INTRAVENOUS EVERY 12 HOURS
Qty: 0 | Refills: 0 | Status: DISCONTINUED | OUTPATIENT
Start: 2018-09-04 | End: 2018-09-04

## 2018-09-04 RX ORDER — SODIUM CHLORIDE 9 MG/ML
1000 INJECTION, SOLUTION INTRAVENOUS
Qty: 0 | Refills: 0 | Status: DISCONTINUED | OUTPATIENT
Start: 2018-09-04 | End: 2018-09-04

## 2018-09-04 RX ORDER — SODIUM CHLORIDE 9 MG/ML
1000 INJECTION INTRAMUSCULAR; INTRAVENOUS; SUBCUTANEOUS
Qty: 0 | Refills: 0 | Status: DISCONTINUED | OUTPATIENT
Start: 2018-09-04 | End: 2018-09-05

## 2018-09-04 RX ORDER — CEFTRIAXONE 500 MG/1
1 INJECTION, POWDER, FOR SOLUTION INTRAMUSCULAR; INTRAVENOUS EVERY 24 HOURS
Qty: 0 | Refills: 0 | Status: DISCONTINUED | OUTPATIENT
Start: 2018-09-04 | End: 2018-09-06

## 2018-09-04 RX ORDER — MORPHINE SULFATE 50 MG/1
4 CAPSULE, EXTENDED RELEASE ORAL
Qty: 0 | Refills: 0 | Status: DISCONTINUED | OUTPATIENT
Start: 2018-09-04 | End: 2018-09-05

## 2018-09-04 RX ORDER — LEVOTHYROXINE SODIUM 125 MCG
50 TABLET ORAL DAILY
Qty: 0 | Refills: 0 | Status: DISCONTINUED | OUTPATIENT
Start: 2018-09-04 | End: 2018-09-06

## 2018-09-04 RX ORDER — MORPHINE SULFATE 50 MG/1
2 CAPSULE, EXTENDED RELEASE ORAL
Qty: 0 | Refills: 0 | Status: DISCONTINUED | OUTPATIENT
Start: 2018-09-04 | End: 2018-09-04

## 2018-09-04 RX ORDER — TOPIRAMATE 25 MG
100 TABLET ORAL EVERY 12 HOURS
Qty: 0 | Refills: 0 | Status: DISCONTINUED | OUTPATIENT
Start: 2018-09-04 | End: 2018-09-06

## 2018-09-04 RX ORDER — MONTELUKAST 4 MG/1
10 TABLET, CHEWABLE ORAL DAILY
Qty: 0 | Refills: 0 | Status: DISCONTINUED | OUTPATIENT
Start: 2018-09-04 | End: 2018-09-06

## 2018-09-04 RX ORDER — OXYCODONE AND ACETAMINOPHEN 5; 325 MG/1; MG/1
2 TABLET ORAL ONCE
Qty: 0 | Refills: 0 | Status: DISCONTINUED | OUTPATIENT
Start: 2018-09-04 | End: 2018-09-04

## 2018-09-04 RX ADMIN — MORPHINE SULFATE 4 MILLIGRAM(S): 50 CAPSULE, EXTENDED RELEASE ORAL at 23:45

## 2018-09-04 RX ADMIN — Medication 32.4 MILLIGRAM(S): at 22:03

## 2018-09-04 RX ADMIN — MORPHINE SULFATE 4 MILLIGRAM(S): 50 CAPSULE, EXTENDED RELEASE ORAL at 23:26

## 2018-09-04 NOTE — BRIEF OPERATIVE NOTE - COMMENTS
blood loss too high, anesthesia not comfortable with her breathing add that the hour to late and pt morbidly obese from an assisted living home therefore she was admitted

## 2018-09-04 NOTE — PROGRESS NOTE ADULT - SUBJECTIVE AND OBJECTIVE BOX
PREOPERATIVE DAY OF PROCEDURE EVALUATION:     I have personally seen and examined this patient. I agree with the history and physical which I have reviewed and noted any changes below:     Plan is for left sided percutaneous nephroureteral catheter placement    Procedure/ risks/ benefits/ goals/ alternatives were explained. All questions answered. Informed content obtained from patient. Consent placed in chart.

## 2018-09-04 NOTE — PROGRESS NOTE ADULT - SUBJECTIVE AND OBJECTIVE BOX
INTERVENTIONAL RADIOLOGY BRIEF-OPERATIVE NOTE    Procedure: Left nephroureteral catheter insertion    Pre-Op Diagnosis: Nephrolithiasis    Post-Op Diagnosis: Same    Attending: Elliot Landau, MD  Resident: Dionicio Grace MD    Anesthesia (type):  [ ] General Anesthesia  [x] Sedation  [ ] Spinal Anesthesia  [x] Local/Regional    Contrast: 20 cc Cystoconray     Estimated Blood Loss: < 5 cc    Condition:   [ ] Critical  [ ] Serious  [x] Fair   [ ] Good    Findings/Follow up Plan of Care: Placement of left sided 4 Japanese nephroureteral Berenstein catheter, access for Dr. Lam. Patient tolerated procedure well.      Specimens Removed: None    Implants: None    Complications: None    Disposition: Recovery room      Please call Interventional Radiology [phone] with any questions, concerns, or issues.

## 2018-09-04 NOTE — BRIEF OPERATIVE NOTE - PROCEDURE
<<-----Click on this checkbox to enter Procedure Nephrostogram, kidney, left  09/04/2018    Active  HOWARD  Ureteral stent placement, percutaneously  09/04/2018  left  Active  HOWARD  Nephrolithotomy, percutaneous, or percutaneous nephrolithotripsy  09/04/2018  left  Active  HOWARD Removal of ureteral stent  09/04/2018  retained impacted stent pulled out percutaneously  Active  NKATLOWITZ Removal of ureteral stent  09/04/2018  retained impacted stent pulled out percutaneously  Active  Alex Lam  Nephrolithotomy, percutaneous, or percutaneous nephrolithotripsy  09/04/2018  left  Active  Alex Lam  Nephrostogram, kidney, left  09/04/2018    Active  Alex Lam  Ureteral stent placement, percutaneously  09/04/2018  left  Active  Alex Lam

## 2018-09-04 NOTE — CHART NOTE - NSCHARTNOTEFT_GEN_A_CORE
PACU ANESTHESIA ADMISSION NOTE      Procedure: Removal of ureteral stent: retained impacted stent pulled out percutaneously  Nephrolithotomy, percutaneous, or percutaneous nephrolithotripsy: left  Ureteral stent placement, percutaneously: left  Nephrostogram, kidney, left    Post op diagnosis:  Staghorn calculus      ____  Intubated  TV:______       Rate: ______      FiO2: ______    __x__  Patent Airway    __x__  Full return of protective reflexes    __x__  Full recovery from anesthesia / back to baseline status    Vitals:  T(C): 36.9 (09-04-18 @ 10:47), Max: 36.9 (09-04-18 @ 10:47)  HR: 72 (09-04-18 @ 10:47) (72 - 72)  BP: 147/84 (09-04-18 @ 10:47) (147/84 - 147/84)  RR: 18 (09-04-18 @ 10:47) (18 - 18)  SpO2: --    Mental Status:  x____ Awake   __x___ Alert   _____ Drowsy   _____ Sedated    Nausea/Vomiting:  ____ NO  ____x__Yes,   See Post - Op Orders          Pain Scale (0-10):  _____    Treatment: ____ None    x___ See Post - Op/PCA Orders    Post - Operative Fluids:   ____ Oral   ___x_ See Post - Op Orders    Plan: Discharge:   ____Home       _x____Floor     _____Critical Care    _____  Other:_________________    Comments: uneventful anesthesia course no complications. VItals stable. Pt transferred to PACU

## 2018-09-04 NOTE — PROGRESS NOTE ADULT - SUBJECTIVE AND OBJECTIVE BOX
64 yo female POD #0 s/p left PCNL   pt was admitted for observation over night, given ebl of 450 cc and hematuria in left PCN and Ardon bag  pt denies n/v/f/c    Vital Signs Last 24 Hrs  T(C): 35.9 (04 Sep 2018 21:02), Max: 36.9 (04 Sep 2018 10:47)  T(F): 96.6 (04 Sep 2018 21:02), Max: 98.4 (04 Sep 2018 10:47)  HR: 77 (04 Sep 2018 21:02) (64 - 77)  BP: 139/60 (04 Sep 2018 21:02) (82/59 - 147/84)  RR: 18 (04 Sep 2018 21:02) (18 - 30)  SpO2: 100% (04 Sep 2018 21:02) (96% - 100%)    pt seen and examined at bedside  a+ox3, nad, non toxic  abd - soft, nd, nttp  gu - left pcn draining dark bloody urine, Ardon catheter draining bloody urine

## 2018-09-04 NOTE — PROGRESS NOTE ADULT - ASSESSMENT
Pt doing well POD # 0 s/p left PCN    plan  -ct a/p stone protocol in am  -left antegrade nephrostogram in am   -f/u am labs  - abx  - dvt prophylaxis    case d/w Dr. Lam

## 2018-09-05 ENCOUNTER — TRANSCRIPTION ENCOUNTER (OUTPATIENT)
Age: 63
End: 2018-09-05

## 2018-09-05 LAB
ANION GAP SERPL CALC-SCNC: 16 MMOL/L — HIGH (ref 7–14)
APPEARANCE: ABNORMAL
BACTERIA UR CULT: ABNORMAL
BILIRUBIN URINE: NEGATIVE
BLOOD URINE: ABNORMAL
BUN SERPL-MCNC: 14 MG/DL — SIGNIFICANT CHANGE UP (ref 10–20)
CALCIUM SERPL-MCNC: 7.9 MG/DL — LOW (ref 8.5–10.1)
CHLORIDE SERPL-SCNC: 103 MMOL/L — SIGNIFICANT CHANGE UP (ref 98–110)
CO2 SERPL-SCNC: 20 MMOL/L — SIGNIFICANT CHANGE UP (ref 17–32)
COLOR: YELLOW
CREAT SERPL-MCNC: 0.8 MG/DL — SIGNIFICANT CHANGE UP (ref 0.7–1.5)
GLUCOSE BLDC GLUCOMTR-MCNC: 121 MG/DL — HIGH (ref 70–99)
GLUCOSE BLDC GLUCOMTR-MCNC: 150 MG/DL — HIGH (ref 70–99)
GLUCOSE QUALITATIVE U: NEGATIVE MG/DL
GLUCOSE SERPL-MCNC: 120 MG/DL — HIGH (ref 70–99)
HCT VFR BLD CALC: 33.4 % — LOW (ref 37–47)
HGB BLD-MCNC: 10.8 G/DL — LOW (ref 12–16)
KETONES URINE: NEGATIVE
LEUKOCYTE ESTERASE URINE: ABNORMAL
MCHC RBC-ENTMCNC: 30.9 PG — SIGNIFICANT CHANGE UP (ref 27–31)
MCHC RBC-ENTMCNC: 32.3 G/DL — SIGNIFICANT CHANGE UP (ref 32–37)
MCV RBC AUTO: 95.7 FL — SIGNIFICANT CHANGE UP (ref 81–99)
NITRITE URINE: NEGATIVE
NRBC # BLD: 0 /100 WBCS — SIGNIFICANT CHANGE UP (ref 0–0)
PH URINE: 6.5
PLATELET # BLD AUTO: 172 K/UL — SIGNIFICANT CHANGE UP (ref 130–400)
POTASSIUM SERPL-MCNC: 4.1 MMOL/L — SIGNIFICANT CHANGE UP (ref 3.5–5)
POTASSIUM SERPL-SCNC: 4.1 MMOL/L — SIGNIFICANT CHANGE UP (ref 3.5–5)
PROTEIN URINE: 30 MG/DL
RBC # BLD: 3.49 M/UL — LOW (ref 4.2–5.4)
RBC # FLD: 15.3 % — HIGH (ref 11.5–14.5)
SODIUM SERPL-SCNC: 139 MMOL/L — SIGNIFICANT CHANGE UP (ref 135–146)
SPECIFIC GRAVITY URINE: 1.01
UROBILINOGEN URINE: 0.2 MG/DL (ref 0.2–?)
WBC # BLD: 11.84 K/UL — HIGH (ref 4.8–10.8)
WBC # FLD AUTO: 11.84 K/UL — HIGH (ref 4.8–10.8)

## 2018-09-05 RX ORDER — MORPHINE SULFATE 50 MG/1
2 CAPSULE, EXTENDED RELEASE ORAL ONCE
Qty: 0 | Refills: 0 | Status: DISCONTINUED | OUTPATIENT
Start: 2018-09-05 | End: 2018-09-05

## 2018-09-05 RX ORDER — ONDANSETRON 8 MG/1
4 TABLET, FILM COATED ORAL EVERY 8 HOURS
Qty: 0 | Refills: 0 | Status: DISCONTINUED | OUTPATIENT
Start: 2018-09-05 | End: 2018-09-06

## 2018-09-05 RX ORDER — METFORMIN HYDROCHLORIDE 850 MG/1
500 TABLET ORAL EVERY 12 HOURS
Qty: 0 | Refills: 0 | Status: DISCONTINUED | OUTPATIENT
Start: 2018-09-05 | End: 2018-09-06

## 2018-09-05 RX ORDER — NITROFURANTOIN MACROCRYSTAL 50 MG
0 CAPSULE ORAL
Qty: 0 | Refills: 0 | COMMUNITY

## 2018-09-05 RX ORDER — CHLORHEXIDINE GLUCONATE 213 G/1000ML
1 SOLUTION TOPICAL ONCE
Qty: 0 | Refills: 0 | Status: DISCONTINUED | OUTPATIENT
Start: 2018-09-05 | End: 2018-09-06

## 2018-09-05 RX ADMIN — METFORMIN HYDROCHLORIDE 500 MILLIGRAM(S): 850 TABLET ORAL at 17:30

## 2018-09-05 RX ADMIN — Medication 50 MICROGRAM(S): at 06:56

## 2018-09-05 RX ADMIN — Medication 100 MILLIGRAM(S): at 06:56

## 2018-09-05 RX ADMIN — MONTELUKAST 10 MILLIGRAM(S): 4 TABLET, CHEWABLE ORAL at 14:47

## 2018-09-05 RX ADMIN — Medication 32.4 MILLIGRAM(S): at 14:51

## 2018-09-05 RX ADMIN — Medication 100 MILLIGRAM(S): at 17:30

## 2018-09-05 RX ADMIN — CEFTRIAXONE 100 GRAM(S): 500 INJECTION, POWDER, FOR SOLUTION INTRAMUSCULAR; INTRAVENOUS at 14:51

## 2018-09-05 RX ADMIN — Medication 1 TABLET(S): at 14:47

## 2018-09-05 RX ADMIN — Medication 32.4 MILLIGRAM(S): at 21:44

## 2018-09-05 RX ADMIN — LORATADINE 10 MILLIGRAM(S): 10 TABLET ORAL at 14:47

## 2018-09-05 RX ADMIN — ONDANSETRON 4 MILLIGRAM(S): 8 TABLET, FILM COATED ORAL at 17:53

## 2018-09-05 RX ADMIN — Medication 32.4 MILLIGRAM(S): at 07:00

## 2018-09-05 NOTE — PROGRESS NOTE ADULT - ASSESSMENT
64 y/o Female with left staghorn calculus s/p left   percutaneous nephrolithotomy. Pt doing okay c/o nausea, pain this am  in NAD.    A) Stable POD # 1  Drop in H/H    P) Zofran 4 mg  Morphine 2 mg  NPO for CT scan this AM stone protocol  IR f/u for antegrade nephrostogram.  possible d/c later today  will d/w Dr. Lam

## 2018-09-05 NOTE — DISCHARGE NOTE ADULT - CARE PLAN
Principal Discharge DX:	Staghorn renal calculus  Goal:	s/p percutnephrolithotomy  Assessment and plan of treatment:	small residual stone patent collecting system.

## 2018-09-05 NOTE — PROGRESS NOTE ADULT - SUBJECTIVE AND OBJECTIVE BOX
INTERVENTIONAL RADIOLOGY BRIEF-OPERATIVE NOTE    Procedure: Antegrade Left Nephrostogram and removal of Left Nephroureteral stent    Pre-Op Diagnosis: Left Obstructive Uropathy    Post-Op Diagnosis: same as above    Attending: Cheli  Resident: Lesly    Anesthesia (type):  [ ] General Anesthesia  [x ] Sedation  [ ] Spinal Anesthesia  [x ] Local/Regional    Contrast: 30cc optiray      Estimated Blood Loss: <5cc    Condition:   [ ] Critical  [ ] Serious  [ ] Fair   [x ] Good    Findings/Follow up Plan of Care:    - full report in PACs  - fluoroscopic demonstrate patent collecting system   - left nephro-u removed  - patient tolerated procedure well    Specimens Removed:    - none    Implants:    - none    Complications:    - none    Disposition:    - back to recovery     Please call Interventional Radiology [phone] with any questions, concerns, or issues.

## 2018-09-05 NOTE — PROGRESS NOTE ADULT - ATTENDING COMMENTS
HG drop as expected  pt satble and going fro CT nephrostogram this am  final decision pending radiologic results

## 2018-09-05 NOTE — DISCHARGE NOTE ADULT - MEDICATION SUMMARY - MEDICATIONS TO TAKE
I will START or STAY ON the medications listed below when I get home from the hospital:    PHENobarbital 32.4 mg oral tablet  -- 1 tab(s) by mouth 3 times a day  -- Indication: For continue    Topamax 100 mg oral tablet  -- 1 tab(s) by mouth 2 times a day  -- Indication: For continue    metFORMIN 500 mg oral tablet  -- 1 tab(s) by mouth once a day at 5pm   -- Indication: For continue    Claritin 10 mg oral tablet  -- 1 tab(s) by mouth once a day, As Needed  -- Indication: For continue    ProAir HFA 90 mcg/inh inhalation aerosol  -- 2 puff(s) inhaled 4 times a day, As Needed    LAST USE SEVERAL MONTHS AGO  -- Indication: For continue    Singulair 10 mg oral tablet  -- 1 tab(s) by mouth once a day at 5pm   -- Indication: For continue    Synthroid 50 mcg (0.05 mg) oral tablet  -- 1 tab(s) by mouth once a day at 5pm   -- Indication: For continue    Multiple Vitamins oral tablet  -- 1 tab(s) by mouth once a day at 5pm  -- Indication: For continue

## 2018-09-05 NOTE — DISCHARGE NOTE ADULT - CARE PROVIDER_API CALL
Alex Lam), Urology  [address] [place] [address]  [address]  [place] [address]  Phone: [phone]  Fax: [phone]

## 2018-09-05 NOTE — DISCHARGE NOTE ADULT - HOSPITAL COURSE
Pt was admitted to the hospital. Access to the Kidney was obtained by interventional radiology. The pt was then transferred to the operating room.  She underwent a left percutaneous nephrolithotomy. During the procedure the pt lost about 450cc of blood. She tolerated the procedure well but was admitted for observation. repeat CBC                        10.8   11.84 )-----------( 172      ( 05 Sep 2018 06:14 )             09-05              33.4     139  |  103  |  14  ----------------------------<  120<H>  4.1   |  20  |  0.8    Ca    7.9<L>      05 Sep 2018 06:14  Pt was sent for a CT scan which revealed almost all of the stone was removed except a small 6x4 mm fragment in the renal pelvis. A nephrostogram was then performed which revealed a patent system and all her tube were removed without difficulty.    The pt tolerated everything well and will be dicharged back to [facility].  She will f/u with Dr. Lam on Friday 8 am.

## 2018-09-05 NOTE — PROGRESS NOTE ADULT - SUBJECTIVE AND OBJECTIVE BOX
Progress Note    Subjective  64 y/o Female with left staghorn calculus s/p left   percutaneous nephrolithotomy. Pt doing okay c/o nausea, pain this am  in NAD.      Vital signs  T(C): , Max: 37.1 (09-05-18 @ 03:06)  HR: 82 (09-05-18 @ 08:05)  BP: 106/56 (09-05-18 @ 08:05)  SpO2: 100% (09-04-18 @ 21:02)    Gen in NAD sitting in the chair  Abd soft non tender nephrostomy draining slightly tinged urine, dressing intact   Ardon drains clear urine with few small clots    Labs                        10.8   11.84 )-----------( 172      ( 05 Sep 2018 06:14 )             33.4     05 Sep 2018 06:14    139    |  103    |  14     ----------------------------<  120    4.1     |  20     |  0.8      Ca    7.9        05 Sep 2018 06:14

## 2018-09-05 NOTE — DISCHARGE NOTE ADULT - PATIENT PORTAL LINK FT
You can access the [facility] Patient Portal, offered by [facility], by registering with the following website: [email or web address]

## 2018-09-05 NOTE — DISCHARGE NOTE ADULT - REASON FOR ADMISSION
Left Obstructive Uropathy left staghorn calculus s/p left   percutaneous nephrolithotomy Left Obstructive Uropathy left staghorn calculus s/p left   percutaneous nephrolithotomy with > expected amount of bleeding  because of a low grade temp she was watched another day

## 2018-09-05 NOTE — DISCHARGE NOTE ADULT - INSTRUCTIONS
pt with urostomy bag over the opening. The opening will close in 24-48 hrs keep the bag in place until it stops draining and then it may be removed. none No

## 2018-09-05 NOTE — PATIENT PROFILE ADULT. - PSH
H/O abdominal hysterectomy  NO RADIATION AND CHEMO  History of bladder stone  SURGERY 8/3/2018  History of surgery  JJ STENT PLACEMENT LEFT OCTOBER 2017  History of tonsillectomy  13 years old  [place] teeth removed

## 2018-09-06 VITALS
DIASTOLIC BLOOD PRESSURE: 64 MMHG | TEMPERATURE: 97 F | RESPIRATION RATE: 20 BRPM | HEART RATE: 82 BPM | SYSTOLIC BLOOD PRESSURE: 113 MMHG

## 2018-09-06 LAB
GLUCOSE BLDC GLUCOMTR-MCNC: 124 MG/DL — HIGH (ref 70–99)
GLUCOSE BLDC GLUCOMTR-MCNC: 85 MG/DL — SIGNIFICANT CHANGE UP (ref 70–99)

## 2018-09-06 RX ADMIN — Medication 32.4 MILLIGRAM(S): at 13:21

## 2018-09-06 RX ADMIN — LORATADINE 10 MILLIGRAM(S): 10 TABLET ORAL at 12:01

## 2018-09-06 RX ADMIN — Medication 32.4 MILLIGRAM(S): at 05:51

## 2018-09-06 RX ADMIN — Medication 1 TABLET(S): at 12:00

## 2018-09-06 RX ADMIN — MONTELUKAST 10 MILLIGRAM(S): 4 TABLET, CHEWABLE ORAL at 12:01

## 2018-09-06 RX ADMIN — METFORMIN HYDROCHLORIDE 500 MILLIGRAM(S): 850 TABLET ORAL at 05:51

## 2018-09-06 RX ADMIN — Medication 50 MICROGRAM(S): at 05:51

## 2018-09-06 RX ADMIN — Medication 100 MILLIGRAM(S): at 05:51

## 2018-09-06 NOTE — PROGRESS NOTE ADULT - REASON FOR ADMISSION
Left Obstructive Uropathy
nephrolithiasis
Staghorn calculus s/p PCNL
left pcnl
nephrolithiasis/staghorn calculus

## 2018-09-06 NOTE — PROGRESS NOTE ADULT - ASSESSMENT
62 y/o Female with left staghorn calculus s/p left   percutaneous nephrolithotomy. Pt doing okay feels better this am  in NAD. No acute issues overnight    A) Stable POD # 2  s/p left PCNL    P) keep urostomy bag for now  f/u with Dr. Lam tomorrow  d/c home today  d/w Dr. Lam

## 2018-09-06 NOTE — PROGRESS NOTE ADULT - SUBJECTIVE AND OBJECTIVE BOX
Progress Note    Subjective  64 y/o Female with left staghorn calculus s/p left   percutaneous nephrolithotomy. Pt doing okay feels better this am  in NAD. No acute issues overnight      Vital signs  T(C): , Max: 37.4 (09-06-18 @ 00:00)  HR: 82 (09-06-18 @ 00:00)  BP: 128/56 (09-06-18 @ 00:00)    Gen sitting in the chair, feels much better  Abd urostomy drained about 100cc no output in bag now   voiding freely    Labs                        10.8   11.84 )-----------( 172      ( 05 Sep 2018 06:14 )             33.4     05 Sep 2018 06:14    139    |  103    |  14     ----------------------------<  120    4.1     |  20     |  0.8      Ca    7.9        05 Sep 2018 06:14

## 2018-09-07 ENCOUNTER — APPOINTMENT (OUTPATIENT)
Dept: UROLOGY | Facility: CLINIC | Age: 63
End: 2018-09-07
Payer: MEDICAID

## 2018-09-07 VITALS
WEIGHT: 293 LBS | HEART RATE: 84 BPM | DIASTOLIC BLOOD PRESSURE: 69 MMHG | HEIGHT: 62 IN | BODY MASS INDEX: 53.92 KG/M2 | SYSTOLIC BLOOD PRESSURE: 123 MMHG

## 2018-09-07 DIAGNOSIS — N39.0 URINARY TRACT INFECTION, SITE NOT SPECIFIED: ICD-10-CM

## 2018-09-07 DIAGNOSIS — A49.9 URINARY TRACT INFECTION, SITE NOT SPECIFIED: ICD-10-CM

## 2018-09-07 DIAGNOSIS — N20.0 CALCULUS OF KIDNEY: ICD-10-CM

## 2018-09-07 LAB — GLUCOSE BLDC GLUCOMTR-MCNC: 101 MG/DL — HIGH (ref 70–99)

## 2018-09-07 PROCEDURE — 99024 POSTOP FOLLOW-UP VISIT: CPT

## 2018-09-07 RX ORDER — NITROFURANTOIN (MONOHYDRATE/MACROCRYSTALS) 25; 75 MG/1; MG/1
100 CAPSULE ORAL
Qty: 14 | Refills: 0 | Status: COMPLETED | COMMUNITY
Start: 2018-08-27 | End: 2018-09-07

## 2018-09-07 RX ORDER — CEFDINIR 300 MG/1
300 CAPSULE ORAL
Qty: 14 | Refills: 0 | Status: COMPLETED | COMMUNITY
Start: 2018-08-31 | End: 2018-09-07

## 2018-09-07 RX ORDER — AMOXICILLIN AND CLAVULANATE POTASSIUM 875; 125 MG/1; MG/1
875-125 TABLET, COATED ORAL
Qty: 14 | Refills: 0 | Status: COMPLETED | COMMUNITY
Start: 2018-07-23 | End: 2018-09-07

## 2018-09-07 NOTE — HISTORY OF PRESENT ILLNESS
[FreeTextEntry1] : Alee had a right percutaneous nephrolithotripsy with removal of an encrusted retain stent along with a staghorn calculus on Tuesday. Wednesday postop CAT scan showed some dust in various calyces with one three by a five or 6 mm fragment along the course of the nephroureteral stent in the renal pelvis. She had good drainage so all the hardware was removed. Because of her blood loss, was a rather difficult surgery and the fact that she had a low-grade temperature coupled with her body habitus of extreme morbid obesity with a BMI of over 50, a limited mobility and the fact that she lives in an assisted living center which does not have great medical care I elected to keep her overnight. My Thursday morning the drainage was minimal she was discharged home and came in to see me today. There’s been no drainage in the back since she left the hospital, she is feeling well without signs of fever. [None] : no symptoms

## 2018-09-07 NOTE — ASSESSMENT
[FreeTextEntry1] : The bag was removed the skin was almost closed a Band-Aid was placed over the site. I reviewed the films with her, I explained to her that she had stones on the right which have never been addressed, she has some dust and possibly a small fragment remaining on the left which may pass on its own over time and she needs stone prevention as she does not want to go through this again. What we agreed to what she will give a urine culture tomorrow, she finished the antibiotics this morning, she will get an ultrasound in a month so we can see what’s left on the left and decide what to do about the right and she will come in after the studies. She will call Wednesday or Thursday to check that the culture was okay and will go on from there.

## 2018-09-07 NOTE — LETTER HEADER
[FreeTextEntry3] : Alex Lam M.D.\par Director of Urology\par [facility]\par [address] [place] [address], [address]\[place] [address]

## 2018-09-07 NOTE — PHYSICAL EXAM
[General Appearance - Well Developed] : well developed [General Appearance - Well Nourished] : well nourished [Normal Appearance] : normal appearance [General Appearance - In No Acute Distress] : no acute distress [Abdomen Tenderness] : non-tender [] : no respiratory distress [Respiration, Rhythm And Depth] : normal respiratory rhythm and effort [Exaggerated Use Of Accessory Muscles For Inspiration] : no accessory muscle use [Oriented To Time, Place, And Person] : oriented to person, place, and time [Affect] : the affect was normal [Mood] : the mood was normal [Not Anxious] : not anxious [FreeTextEntry1] : walks with discomfort and difficulty with a walker

## 2018-09-07 NOTE — LETTER BODY
[Dear  ___] : Dear  [unfilled], [Courtesy Letter:] : I had the pleasure of seeing your patient, [unfilled], in my office today. [Please see my note below.] : Please see my note below. [Sincerely,] : Sincerely, [FreeTextEntry2] : David Angel MD\par [address]\[place] [address]\par

## 2018-09-07 NOTE — REVIEW OF SYSTEMS
[Arthralgias] : arthralgias [Negative] : Heme/Lymph [FreeTextEntry1] : extreme morbid obesity with gait issues

## 2018-09-10 LAB
COMPN STONE: SIGNIFICANT CHANGE UP
SURGICAL PATHOLOGY STUDY: SIGNIFICANT CHANGE UP

## 2018-09-11 ENCOUNTER — APPOINTMENT (OUTPATIENT)
Dept: PODIATRY | Facility: CLINIC | Age: 63
End: 2018-09-11
Payer: MEDICAID

## 2018-09-11 ENCOUNTER — OUTPATIENT (OUTPATIENT)
Dept: OUTPATIENT SERVICES | Facility: HOSPITAL | Age: 63
LOS: 1 days | Discharge: HOME | End: 2018-09-11

## 2018-09-11 VITALS — WEIGHT: 293 LBS | BODY MASS INDEX: 53.92 KG/M2 | HEIGHT: 62 IN

## 2018-09-11 VITALS — DIASTOLIC BLOOD PRESSURE: 78 MMHG | HEART RATE: 80 BPM | SYSTOLIC BLOOD PRESSURE: 141 MMHG

## 2018-09-11 DIAGNOSIS — M79.671 PAIN IN RIGHT FOOT: ICD-10-CM

## 2018-09-11 DIAGNOSIS — Z98.890 OTHER SPECIFIED POSTPROCEDURAL STATES: Chronic | ICD-10-CM

## 2018-09-11 DIAGNOSIS — B35.1 TINEA UNGUIUM: ICD-10-CM

## 2018-09-11 DIAGNOSIS — Z90.89 ACQUIRED ABSENCE OF OTHER ORGANS: Chronic | ICD-10-CM

## 2018-09-11 DIAGNOSIS — M79.672 PAIN IN LEFT FOOT: ICD-10-CM

## 2018-09-11 DIAGNOSIS — K08.409 PARTIAL LOSS OF TEETH, UNSPECIFIED CAUSE, UNSPECIFIED CLASS: Chronic | ICD-10-CM

## 2018-09-11 DIAGNOSIS — Z87.448 PERSONAL HISTORY OF OTHER DISEASES OF URINARY SYSTEM: Chronic | ICD-10-CM

## 2018-09-11 PROCEDURE — 11721 DEBRIDE NAIL 6 OR MORE: CPT | Mod: NC

## 2018-09-11 PROCEDURE — 99203 OFFICE O/P NEW LOW 30 MIN: CPT | Mod: 25,NC

## 2018-09-17 DIAGNOSIS — G40.909 EPILEPSY, UNSPECIFIED, NOT INTRACTABLE, WITHOUT STATUS EPILEPTICUS: ICD-10-CM

## 2018-09-17 DIAGNOSIS — N20.0 CALCULUS OF KIDNEY: ICD-10-CM

## 2018-09-17 DIAGNOSIS — N13.9 OBSTRUCTIVE AND REFLUX UROPATHY, UNSPECIFIED: ICD-10-CM

## 2018-09-17 DIAGNOSIS — B96.20 UNSPECIFIED ESCHERICHIA COLI [E. COLI] AS THE CAUSE OF DISEASES CLASSIFIED ELSEWHERE: ICD-10-CM

## 2018-09-17 DIAGNOSIS — Z91.040 LATEX ALLERGY STATUS: ICD-10-CM

## 2018-09-17 DIAGNOSIS — M19.90 UNSPECIFIED OSTEOARTHRITIS, UNSPECIFIED SITE: ICD-10-CM

## 2018-09-17 DIAGNOSIS — Z85.42 PERSONAL HISTORY OF MALIGNANT NEOPLASM OF OTHER PARTS OF UTERUS: ICD-10-CM

## 2018-09-17 DIAGNOSIS — R71.0 PRECIPITOUS DROP IN HEMATOCRIT: ICD-10-CM

## 2018-09-17 DIAGNOSIS — E66.01 MORBID (SEVERE) OBESITY DUE TO EXCESS CALORIES: ICD-10-CM

## 2018-09-17 DIAGNOSIS — Z79.84 LONG TERM (CURRENT) USE OF ORAL HYPOGLYCEMIC DRUGS: ICD-10-CM

## 2018-09-17 DIAGNOSIS — R31.9 HEMATURIA, UNSPECIFIED: ICD-10-CM

## 2018-09-17 DIAGNOSIS — J45.909 UNSPECIFIED ASTHMA, UNCOMPLICATED: ICD-10-CM

## 2018-09-17 DIAGNOSIS — Z88.0 ALLERGY STATUS TO PENICILLIN: ICD-10-CM

## 2018-09-17 DIAGNOSIS — N39.0 URINARY TRACT INFECTION, SITE NOT SPECIFIED: ICD-10-CM

## 2018-09-17 DIAGNOSIS — E11.9 TYPE 2 DIABETES MELLITUS WITHOUT COMPLICATIONS: ICD-10-CM

## 2018-09-17 PROBLEM — B35.1 ONYCHOMYCOSIS: Status: ACTIVE | Noted: 2018-09-17

## 2018-09-17 PROBLEM — M79.672 ACUTE PAIN OF LEFT FOOT: Status: ACTIVE | Noted: 2018-09-17

## 2018-09-17 PROBLEM — M79.671 ACUTE PAIN OF RIGHT FOOT: Status: ACTIVE | Noted: 2018-09-17

## 2018-09-17 PROBLEM — M79.671 ACUTE FOOT PAIN, RIGHT: Status: ACTIVE | Noted: 2018-09-17

## 2018-09-21 ENCOUNTER — APPOINTMENT (OUTPATIENT)
Dept: UROLOGY | Facility: CLINIC | Age: 63
End: 2018-09-21

## 2018-09-24 DIAGNOSIS — M79.672 PAIN IN LEFT FOOT: ICD-10-CM

## 2018-09-24 DIAGNOSIS — B35.1 TINEA UNGUIUM: ICD-10-CM

## 2018-09-24 DIAGNOSIS — M79.671 PAIN IN RIGHT FOOT: ICD-10-CM

## 2018-10-01 ENCOUNTER — OUTPATIENT (OUTPATIENT)
Dept: OUTPATIENT SERVICES | Facility: HOSPITAL | Age: 63
LOS: 1 days | Discharge: HOME | End: 2018-10-01

## 2018-10-01 DIAGNOSIS — Z98.890 OTHER SPECIFIED POSTPROCEDURAL STATES: Chronic | ICD-10-CM

## 2018-10-01 DIAGNOSIS — Z90.89 ACQUIRED ABSENCE OF OTHER ORGANS: Chronic | ICD-10-CM

## 2018-10-01 DIAGNOSIS — Z87.448 PERSONAL HISTORY OF OTHER DISEASES OF URINARY SYSTEM: Chronic | ICD-10-CM

## 2018-10-01 DIAGNOSIS — K08.409 PARTIAL LOSS OF TEETH, UNSPECIFIED CAUSE, UNSPECIFIED CLASS: Chronic | ICD-10-CM

## 2018-10-01 DIAGNOSIS — N39.0 URINARY TRACT INFECTION, SITE NOT SPECIFIED: ICD-10-CM

## 2018-10-26 ENCOUNTER — APPOINTMENT (OUTPATIENT)
Dept: UROLOGY | Facility: CLINIC | Age: 63
End: 2018-10-26
Payer: MEDICAID

## 2018-10-26 DIAGNOSIS — N20.0 CALCULUS OF KIDNEY: ICD-10-CM

## 2018-10-26 PROCEDURE — 99213 OFFICE O/P EST LOW 20 MIN: CPT | Mod: 24

## 2018-10-26 NOTE — LETTER BODY
[Dear  ___] : Dear  [unfilled], [Courtesy Letter:] : I had the pleasure of seeing your patient, [unfilled], in my office today. [Please see my note below.] : Please see my note below. [Sincerely,] : Sincerely, [FreeTextEntry2] : Alex Lam M.D.\par Director of Urology\par [facility]\par [address] [place] [address], [address]\[place] [address] \par

## 2018-10-26 NOTE — HISTORY OF PRESENT ILLNESS
[FreeTextEntry1] : On September 4, 2018 Alee had percutaneous removal of her renal stone burden that was encrusted on a retained left stent. CAT scan done after the procedure showed a few small stones left the largest was 3.9 mm and though the report also mentioned punctate right-sided stones the left-sided stones were along the track to the tubes that were in all the hardware was taken out in follow-up to discuss what to do in the future. When she was last seen on September 7 we sent urine to make sure there is no post procedure infection,  ultrasound in October to see that there is no delayed Hydro and to what extent the remaining fragments stayed in the kidney as well to check on the right side so we know what we’re going to have to deal with. She comes back today telling me that [facility], her urinary symptoms are much improved, she has seen no blood no burning and the back has been stable. She wants to know what the ultrasound showed and what we are going to do to help prevent future stones. She has not yet seen a nephrologist something that should’ve been scheduled and was not. [None] : no symptoms

## 2018-10-26 NOTE — PHYSICAL EXAM
[General Appearance - Well Developed] : well developed [General Appearance - Well Nourished] : well nourished [Normal Appearance] : normal appearance [General Appearance - In No Acute Distress] : no acute distress [] : no respiratory distress [Respiration, Rhythm And Depth] : normal respiratory rhythm and effort [Exaggerated Use Of Accessory Muscles For Inspiration] : no accessory muscle use [Oriented To Time, Place, And Person] : oriented to person, place, and time [Affect] : the affect was normal [Mood] : the mood was normal [Not Anxious] : not anxious [FreeTextEntry1] : walks with discomfort and difficulty with a walker

## 2018-10-26 NOTE — LETTER HEADER
[FreeTextEntry3] : Alex Lam M.D.\par Director of Urology\par [facility]\par [address] [place] [address], [address]\[place] [address]

## 2019-01-31 ENCOUNTER — EMERGENCY (EMERGENCY)
Facility: HOSPITAL | Age: 64
LOS: 0 days | Discharge: NURSING, REHAB OR LONG-TERM CARE | End: 2019-02-01
Attending: EMERGENCY MEDICINE | Admitting: EMERGENCY MEDICINE

## 2019-01-31 VITALS
HEIGHT: 62 IN | WEIGHT: 293 LBS | OXYGEN SATURATION: 99 % | SYSTOLIC BLOOD PRESSURE: 126 MMHG | HEART RATE: 70 BPM | RESPIRATION RATE: 18 BRPM | TEMPERATURE: 98 F | DIASTOLIC BLOOD PRESSURE: 58 MMHG

## 2019-01-31 DIAGNOSIS — Z90.89 ACQUIRED ABSENCE OF OTHER ORGANS: Chronic | ICD-10-CM

## 2019-01-31 DIAGNOSIS — Z98.890 OTHER SPECIFIED POSTPROCEDURAL STATES: Chronic | ICD-10-CM

## 2019-01-31 DIAGNOSIS — Z79.84 LONG TERM (CURRENT) USE OF ORAL HYPOGLYCEMIC DRUGS: ICD-10-CM

## 2019-01-31 DIAGNOSIS — Z91.040 LATEX ALLERGY STATUS: ICD-10-CM

## 2019-01-31 DIAGNOSIS — R21 RASH AND OTHER NONSPECIFIC SKIN ERUPTION: ICD-10-CM

## 2019-01-31 DIAGNOSIS — E66.01 MORBID (SEVERE) OBESITY DUE TO EXCESS CALORIES: ICD-10-CM

## 2019-01-31 DIAGNOSIS — J45.909 UNSPECIFIED ASTHMA, UNCOMPLICATED: ICD-10-CM

## 2019-01-31 DIAGNOSIS — Z79.51 LONG TERM (CURRENT) USE OF INHALED STEROIDS: ICD-10-CM

## 2019-01-31 DIAGNOSIS — E11.9 TYPE 2 DIABETES MELLITUS WITHOUT COMPLICATIONS: ICD-10-CM

## 2019-01-31 DIAGNOSIS — Z88.0 ALLERGY STATUS TO PENICILLIN: ICD-10-CM

## 2019-01-31 DIAGNOSIS — B36.9 SUPERFICIAL MYCOSIS, UNSPECIFIED: ICD-10-CM

## 2019-01-31 DIAGNOSIS — Z85.42 PERSONAL HISTORY OF MALIGNANT NEOPLASM OF OTHER PARTS OF UTERUS: ICD-10-CM

## 2019-01-31 DIAGNOSIS — Z87.448 PERSONAL HISTORY OF OTHER DISEASES OF URINARY SYSTEM: Chronic | ICD-10-CM

## 2019-01-31 DIAGNOSIS — Z87.442 PERSONAL HISTORY OF URINARY CALCULI: ICD-10-CM

## 2019-01-31 DIAGNOSIS — Z87.891 PERSONAL HISTORY OF NICOTINE DEPENDENCE: ICD-10-CM

## 2019-01-31 DIAGNOSIS — K08.409 PARTIAL LOSS OF TEETH, UNSPECIFIED CAUSE, UNSPECIFIED CLASS: Chronic | ICD-10-CM

## 2019-01-31 DIAGNOSIS — L03.317 CELLULITIS OF BUTTOCK: ICD-10-CM

## 2019-01-31 RX ORDER — NYSTATIN CREAM 100000 [USP'U]/G
1 CREAM TOPICAL ONCE
Qty: 0 | Refills: 0 | Status: COMPLETED | OUTPATIENT
Start: 2019-01-31 | End: 2019-01-31

## 2019-01-31 RX ORDER — CEPHALEXIN 500 MG
250 CAPSULE ORAL EVERY 6 HOURS
Qty: 0 | Refills: 0 | Status: DISCONTINUED | OUTPATIENT
Start: 2019-01-31 | End: 2019-01-31

## 2019-01-31 RX ORDER — NYSTATIN CREAM 100000 [USP'U]/G
1 CREAM TOPICAL ONCE
Qty: 0 | Refills: 0 | Status: DISCONTINUED | OUTPATIENT
Start: 2019-01-31 | End: 2019-01-31

## 2019-01-31 RX ORDER — CEPHALEXIN 500 MG
500 CAPSULE ORAL ONCE
Qty: 0 | Refills: 0 | Status: COMPLETED | OUTPATIENT
Start: 2019-01-31 | End: 2019-01-31

## 2019-01-31 RX ORDER — FLUCONAZOLE 150 MG/1
200 TABLET ORAL ONCE
Qty: 0 | Refills: 0 | Status: COMPLETED | OUTPATIENT
Start: 2019-01-31 | End: 2019-01-31

## 2019-01-31 RX ADMIN — Medication 250 MILLIGRAM(S): at 23:41

## 2019-01-31 RX ADMIN — FLUCONAZOLE 200 MILLIGRAM(S): 150 TABLET ORAL at 23:41

## 2019-01-31 NOTE — ED ADULT TRIAGE NOTE - CHIEF COMPLAINT QUOTE
pt lia from [facility], pt thinks she is having a reaction to a skin cleanser she has been using for 4 days, pt c/o redness and purple discoloration to buttocks.

## 2019-02-01 VITALS
TEMPERATURE: 100 F | RESPIRATION RATE: 16 BRPM | DIASTOLIC BLOOD PRESSURE: 84 MMHG | OXYGEN SATURATION: 99 % | SYSTOLIC BLOOD PRESSURE: 136 MMHG | HEART RATE: 74 BPM

## 2019-02-01 RX ADMIN — Medication 500 MILLIGRAM(S): at 00:07

## 2019-02-01 RX ADMIN — NYSTATIN CREAM 1 APPLICATION(S): 100000 CREAM TOPICAL at 00:07

## 2019-02-01 NOTE — ED ADULT NURSE NOTE - CHIEF COMPLAINT QUOTE
pt lia from [facility], pt thinks she is having a reaction to a skin cleanser she has been using for 4 days, pt c/o redness and purple discoloration to buttocks.

## 2019-02-01 NOTE — ED PROVIDER NOTE - ATTENDING CONTRIBUTION TO CARE
64 y/o Morbidly Obese female PMH DM on Metformin, Arthritis, Seizures who presents for evaluation of skin redness.  Patient was using a new spray skin cleanser and started having redness and irritation. Denies much pain, fever, chills, or pus drainage.  Area evaluated and consistent with a yeast skin rash and is in moistened areas of buttock and pannus.  Small area of skin breakdown and possible mild cellulitis superinfection.  Will treat for fungal rash and give Keflex for cellulitis coverage.  Patient has a rash to PCN at best and will attempt Keflex. Patient aware of cross-reactivity potential.    Treatment plan discussed with patient.  Will DC with continued skin care.    Full DC instructions discussed and patient knows when to seek immediate medical attention.  Patient has proper follow up. Understanding of instructions verbalized. 64 y/o Morbidly Obese female PMH DM on Metformin, Arthritis, Seizures who presents for evaluation of skin redness.  Patient was using a new spray skin cleanser and started having redness and irritation. Denies much pain, fever, chills, or pus drainage.  Area evaluated and consistent with a yeast skin rash and is in moistened areas of buttock and pannus.  Small area of skin breakdown and possible mild cellulitis superinfection.  Will treat for fungal rash and give Keflex for cellulitis coverage.  Patient has a rash to PCN at best and will attempt Keflex. Patient aware of cross-reactivity potential.    Treatment plan discussed with patient.  Will DC with continued skin care.    Full DC instructions discussed and patient knows when to seek immediate medical attention.  Patient has proper follow up. Understanding of instructions verbalized..

## 2019-02-01 NOTE — ED PROVIDER NOTE - CARE PLAN
Principal Discharge DX:	Rash and nonspecific skin eruption  Secondary Diagnosis:	Fungal rash of trunk  Secondary Diagnosis:	Cellulitis of buttock

## 2019-02-01 NOTE — ED PROVIDER NOTE - NSFOLLOWUPINSTRUCTIONS_ED_ALL_ED_FT
Fungal skin rash, Possible cellulitis    Please take medications as prescribed and keep skin dry. Monitor for fever or pus drainage. Follow up with your medical doctor for re-evaluation.      WHAT YOU NEED TO KNOW:    Yeast is normally present on the skin. Infection happens when you have too much yeast, or when it gets into a cut on your skin. Certain types of mold and fungus can cause a yeast infection. A skin yeast infection can appear anywhere on your skin or nail beds. Skin yeast infections are usually found on warm, moist parts of the body. Examples include between skin folds or under the breasts.    DISCHARGE INSTRUCTIONS:  Return to the emergency department if:  You have signs of infection, such as pus, warmth or red streaks coming from the wound, or a fever.  Contact your healthcare provider if:  Your symptoms worsen or do not get better within 7 to 10 days.  You have new or returning signs of a skin yeast infection after treatment.  You have questions or concerns about your condition or care.  Medicines:  Antifungal medicine may be given as a cream, ointment, or pill.  Take your medicine as directed. Contact your healthcare provider if you think your medicine is not helping or if you have side effects. Tell him or her if you are allergic to any medicine. Keep a list of the medicines, vitamins, and herbs you take. Include the amounts, and when and why you take them. Bring the list or the pill bottles to follow-up visits. Carry your medicine list with you in case of an emergency.  Care for the skin near the infection:  You may only have discolored patches of skin, or areas that are dry and flaking. Care for these skin problems as directed by your healthcare provider. If you have painful skin or an open sore, you will need to protect the skin and prevent damage. You will also need to keep the skin dry as much as possible. Ask your healthcare provider how to care for your skin while the infection clears. The following are general guidelines for caring for painful or open skin:    Keep the skin clean. Ask your healthcare provider if you should wash with mild soap and water. Do not use soap that contains alcohol. Alcohol can dry and irritate the skin and make symptoms worse. Your baby's healthcare provider may tell you to use diaper cream or ointment when you change his diaper. This will protect the skin and prevent moisture from collecting.  Keep the skin dry. Pat the area dry with a towel. Do not rub, because this may irritate the skin. If you have a skin yeast infection between skin folds, lift the top part gently and hold it while you dry between your skin folds. Always dry your feet completely after you swim or bathe, including between your toes. Dry your skin if you are sweating from exercise or exposure to heat. Use a clean towel each time to prevent spreading or continuing the infection.  Keep the skin protected. Ask your healthcare provider if you should cover the area with a bandage or leave it open. Check your skin each day to make sure you do not have new or worsening problems. You may need to have someone check the skin if you cannot see the area easily.       Prevent another skin yeast infection:  Do not share clothing or towels  Wear shower shoes if you need to use a public shower  Dry your feet completely after you bathe, and apply antifungal powder or cream as directed  Put on socks before you get dressed so you do not spread fungus from your feet  Wear light clothing that allows air to get to your skin  Manage your weight to prevent skin folds where yeast can collect  Manage diabetes  Change your baby's diaper often, and keep the area clean and dry as much as possible  Use a diaper cream or ointment that contains zinc oxide or dimethicone on your baby's diaper area as directed  Follow up with your healthcare provider as directed:  Write down your questions so you remember to ask them during your visits.

## 2019-02-01 NOTE — ED ADULT NURSE NOTE - NSIMPLEMENTINTERV_GEN_ALL_ED
Implemented All Universal Safety Interventions:  [place] to call system. Call bell, personal items and telephone within reach. Instruct patient to call for assistance. Room bathroom lighting operational. Non-slip footwear when patient is off stretcher. Physically safe environment: no spills, clutter or unnecessary equipment. Stretcher in lowest position, wheels locked, appropriate side rails in place.

## 2019-02-01 NOTE — ED PROVIDER NOTE - NS ED ROS FT
CONST: No fever, chills or bodyaches  EYES: No pain, redness, drainage or visual changes.  ENT: No ear pain or discharge, nasal discharge or congestion. No sore throat  CARD: No chest pain, palpitations  RESP: No SOB, cough, hemoptysis. No hx of asthma or COPD  GI: No abdominal pain, N/V/D  MS: No joint pain, back pain or extremity pain/injury  SKIN: + rash to buttocks   NEURO: No headache, dizziness, paresthesias or LOC

## 2019-02-01 NOTE — ED PROVIDER NOTE - PHYSICAL EXAMINATION
CONST: Well appearing in NAD  EYES: Sclera and conjunctiva clear.  GI: large panus w/ erythematous rash appearing to be fungal, Soft, non-tender, non-distended.  MS: Normal ROM in all extremities. No edema of lower extremities, no calf pain, radial pulses 2+ bilaterally  SKIN: + erythema of right buttocks w/ a few small areas of skin breakdown, no TTP, no discharge  NEURO: no sensory deficit

## 2019-02-01 NOTE — ED PROVIDER NOTE - PROGRESS NOTE DETAILS
pt w/ fungal rash.  concerned w/ skin breakdown for potential for bacterial infection. given abx and antifungal powder.  discussed wound care w/ pt.  All questions were answered and return precautions discussed.  Pt is asx and comfortable at this time.  No further concerns at this time from pt.  Will follow up with PMD.  Pt understands and agrees with tx plan.

## 2019-02-01 NOTE — ED PROVIDER NOTE - MEDICAL DECISION MAKING DETAILS
64 y/o Morbidly Obese female PMH DM on Metformin, Arthritis, Seizures who presents for evaluation of skin redness.  Patient was using a new spray skin cleanser and started having redness and irritation. Denies much pain, fever, chills, or pus drainage.  Area evaluated and consistent with a yeast skin rash and is in moistened areas of buttock and pannus.  Small area of skin breakdown and possible mild cellulitis superinfection.  Will treat for fungal rash and give Keflex for cellulitis coverage.  Patient has a rash to PCN at best and will attempt Keflex. Patient aware of cross-reactivity potential.    Treatment plan discussed with patient.  Will DC with continued skin care.    Full DC instructions discussed and patient knows when to seek immediate medical attention.  Patient has proper follow up. Understanding of instructions verbalized.

## 2019-02-05 ENCOUNTER — EMERGENCY (EMERGENCY)
Facility: HOSPITAL | Age: 64
LOS: 0 days | Discharge: NURSING, REHAB OR LONG-TERM CARE | End: 2019-02-05
Attending: EMERGENCY MEDICINE | Admitting: EMERGENCY MEDICINE

## 2019-02-05 VITALS
OXYGEN SATURATION: 98 % | SYSTOLIC BLOOD PRESSURE: 140 MMHG | HEART RATE: 68 BPM | TEMPERATURE: 97 F | WEIGHT: 293 LBS | DIASTOLIC BLOOD PRESSURE: 81 MMHG | RESPIRATION RATE: 18 BRPM

## 2019-02-05 VITALS
DIASTOLIC BLOOD PRESSURE: 59 MMHG | TEMPERATURE: 98 F | RESPIRATION RATE: 18 BRPM | SYSTOLIC BLOOD PRESSURE: 116 MMHG | HEART RATE: 72 BPM

## 2019-02-05 DIAGNOSIS — Z90.89 ACQUIRED ABSENCE OF OTHER ORGANS: Chronic | ICD-10-CM

## 2019-02-05 DIAGNOSIS — G40.909 EPILEPSY, UNSPECIFIED, NOT INTRACTABLE, WITHOUT STATUS EPILEPTICUS: ICD-10-CM

## 2019-02-05 DIAGNOSIS — Z98.890 OTHER SPECIFIED POSTPROCEDURAL STATES: Chronic | ICD-10-CM

## 2019-02-05 DIAGNOSIS — Z88.0 ALLERGY STATUS TO PENICILLIN: ICD-10-CM

## 2019-02-05 DIAGNOSIS — Z87.448 PERSONAL HISTORY OF OTHER DISEASES OF URINARY SYSTEM: Chronic | ICD-10-CM

## 2019-02-05 DIAGNOSIS — L08.9 LOCAL INFECTION OF THE SKIN AND SUBCUTANEOUS TISSUE, UNSPECIFIED: ICD-10-CM

## 2019-02-05 DIAGNOSIS — L03.317 CELLULITIS OF BUTTOCK: ICD-10-CM

## 2019-02-05 DIAGNOSIS — Z87.442 PERSONAL HISTORY OF URINARY CALCULI: ICD-10-CM

## 2019-02-05 DIAGNOSIS — Z79.84 LONG TERM (CURRENT) USE OF ORAL HYPOGLYCEMIC DRUGS: ICD-10-CM

## 2019-02-05 DIAGNOSIS — L02.91 CUTANEOUS ABSCESS, UNSPECIFIED: ICD-10-CM

## 2019-02-05 DIAGNOSIS — L98.419 NON-PRESSURE CHRONIC ULCER OF BUTTOCK WITH UNSPECIFIED SEVERITY: ICD-10-CM

## 2019-02-05 DIAGNOSIS — Z87.39 PERSONAL HISTORY OF OTHER DISEASES OF THE MUSCULOSKELETAL SYSTEM AND CONNECTIVE TISSUE: ICD-10-CM

## 2019-02-05 DIAGNOSIS — E11.9 TYPE 2 DIABETES MELLITUS WITHOUT COMPLICATIONS: ICD-10-CM

## 2019-02-05 DIAGNOSIS — J45.909 UNSPECIFIED ASTHMA, UNCOMPLICATED: ICD-10-CM

## 2019-02-05 DIAGNOSIS — Z91.040 LATEX ALLERGY STATUS: ICD-10-CM

## 2019-02-05 DIAGNOSIS — K08.409 PARTIAL LOSS OF TEETH, UNSPECIFIED CAUSE, UNSPECIFIED CLASS: Chronic | ICD-10-CM

## 2019-02-05 DIAGNOSIS — Z85.42 PERSONAL HISTORY OF MALIGNANT NEOPLASM OF OTHER PARTS OF UTERUS: ICD-10-CM

## 2019-02-05 RX ORDER — AZTREONAM 2 G
1 VIAL (EA) INJECTION
Qty: 20 | Refills: 0 | OUTPATIENT
Start: 2019-02-05 | End: 2019-02-14

## 2019-02-05 RX ORDER — CEPHALEXIN 500 MG
1 CAPSULE ORAL
Qty: 40 | Refills: 0 | OUTPATIENT
Start: 2019-02-05 | End: 2019-02-14

## 2019-02-05 NOTE — ED PROVIDER NOTE - OBJECTIVE STATEMENT
62 y/o female with hx of NIDDM, obesity  presents with rash to right buttock x 1 week. patient seen in the Ed and didn't take any medications . patient denies any fever,chills, abdominal pain, vomiting , diarrhea. patient able to walk without difficulty. patient states area is burning , red and weeping

## 2019-02-05 NOTE — ED PROVIDER NOTE - MEDICAL DECISION MAKING DETAILS
63yF DM obesity p/w worsening L buttock lesions after not receiving prescribed abx.  Exam concerning for bacterial superinfection of possibly initial fungal infection.  No systemic s/s of illness - hemodynamically stable, afebrile, tolerating PO.  Will dc with additional PO abx, but also careful return precautions, ok to dc with o/p f/u and home wound care.

## 2019-02-05 NOTE — ED ADULT NURSE NOTE - NSIMPLEMENTINTERV_GEN_ALL_ED
Implemented All Universal Safety Interventions:  [place] to call system. Call bell, personal items and telephone within reach. Instruct patient to call for assistance. Room bathroom lighting operational. Non-slip footwear when patient is off stretcher. Physically safe environment: no spills, clutter or unnecessary equipment. Stretcher in lowest position, wheels locked, appropriate side rails in place.

## 2019-02-05 NOTE — ED PROVIDER NOTE - NSFOLLOWUPINSTRUCTIONS_ED_ALL_ED_FT
Cellulitis    Cellulitis is a skin infection caused by bacteria. This condition occurs most often in the arms and lower legs but can occur anywhere over the body. Symptoms include redness, swelling, warm skin, tenderness, and chills/fever. If you were prescribed an antibiotic medicine, take it as told by your health care provider. Do not stop taking the antibiotic even if you start to feel better.    SEEK IMMEDIATE MEDICAL CARE IF YOU HAVE ANY OF THE FOLLOWING SYMPTOMS: worsening fever, red streaks coming from affected area, vomiting or diarrhea, or dizziness/lightheadedness.      Rash    A rash is a change in the color of the skin. A rash can also change the way your skin feels. There are many different conditions and factors that can cause a rash, most of which are not dangerous. Make sure to follow up with your primary care physician or a dermatologist as instructed by your health care provider.    SEEK IMMEDIATE MEDICAL CARE IF YOU HAVE ANY OF THE FOLLOWING SYMPTOMS: fever, blisters, a rash inside your mouth, vaginal or anal pain, or altered mental status.

## 2019-02-05 NOTE — ED PROVIDER NOTE - ATTENDING CONTRIBUTION TO CARE
63yF DM obesity p/w worsening rash to L buttock and gluteal cleft.  No lesions to R buttock/R side of gluteal cleft. No fevers.  Still tolerating PO.  Lesions are weeping but some are crusted over and scabbed.  Was seen recently and discharged on keflex and ? antifungal, but never received scripts.    PMH: DM, obesity, asthma, epilepsy, knee pain, OA, hx uterine cancer  Meds: metformin, claritin, singulair, synthroid, phenobarbital, topamax, proair  All to compazine, PCN, latex,   [facility]  CONSTITUTIONAL: well developed; well nourished; well appearing in no acute distress  HEAD: normocephalic; atraumatic  EYES: no conjunctival injection, no scleral icterus  ENT: no nasal discharge; airway clear.  NECK: supple; non tender. + full passive ROM in all directions  CARD: S1, S2 normal; no murmurs, gallops, or rubs. Regular rate and rhythm  RESP: no wheezes, rales or rhonchi. Good air movement bilaterally without significant accessory muscle use  ABD: soft; non-distended; non-tender. No rebound, no guarding, no pulsatile abdominal mass  Rectal: performed in presence of chaperone, lesions on L buttock extending to gluteal cleft but not perianally, some lesions crusted over, some lesions weeping  EXT: moving all extremities spontaneously, normal ROM. No clubbing, cyanosis or edema  SKIN: warm and dry, no lesions noted  NEURO: alert, oriented, CN II-XII grossly intact, motor and sensory grossly intact, speech nonslurred, no focal deficits. GCS 15  PSYCH: calm, cooperative, appropriate, good eye contact, logical thought process, no apparent danger to self or others    concern for bacterial superinfection of original wound  clear asymmetry possibly shingles, though does not extend to full dermatome and presentation more likely fungal w/ bacterial superinfection  pt would like to go home, appears systemically well  will send scripts for bactrim/keflex, dc with wound care and careful return precautions

## 2019-02-05 NOTE — ED PROVIDER NOTE - PHYSICAL EXAMINATION
skin: +left buttock +large area involving rectum weeping , erythematous , vesicular rash with excoriations and ulcerations of skin. no tenderness to palpation

## 2019-02-05 NOTE — ED PROVIDER NOTE - GASTROINTESTINAL, MLM
Abdomen soft, non-tender, no guarding. large lower abdominal soft hernia . no rashes noted to abdomen

## 2019-02-18 ENCOUNTER — EMERGENCY (EMERGENCY)
Facility: HOSPITAL | Age: 64
LOS: 0 days | Discharge: HOME | End: 2019-02-18
Attending: EMERGENCY MEDICINE | Admitting: EMERGENCY MEDICINE

## 2019-02-18 VITALS
WEIGHT: 293 LBS | SYSTOLIC BLOOD PRESSURE: 151 MMHG | TEMPERATURE: 98 F | DIASTOLIC BLOOD PRESSURE: 81 MMHG | OXYGEN SATURATION: 96 % | RESPIRATION RATE: 18 BRPM | HEART RATE: 74 BPM

## 2019-02-18 VITALS
SYSTOLIC BLOOD PRESSURE: 152 MMHG | OXYGEN SATURATION: 98 % | TEMPERATURE: 98 F | HEART RATE: 71 BPM | RESPIRATION RATE: 18 BRPM | DIASTOLIC BLOOD PRESSURE: 86 MMHG

## 2019-02-18 DIAGNOSIS — N20.0 CALCULUS OF KIDNEY: ICD-10-CM

## 2019-02-18 DIAGNOSIS — R31.9 HEMATURIA, UNSPECIFIED: ICD-10-CM

## 2019-02-18 DIAGNOSIS — Z90.89 ACQUIRED ABSENCE OF OTHER ORGANS: Chronic | ICD-10-CM

## 2019-02-18 DIAGNOSIS — Z91.040 LATEX ALLERGY STATUS: ICD-10-CM

## 2019-02-18 DIAGNOSIS — Z88.8 ALLERGY STATUS TO OTHER DRUGS, MEDICAMENTS AND BIOLOGICAL SUBSTANCES STATUS: ICD-10-CM

## 2019-02-18 DIAGNOSIS — J45.909 UNSPECIFIED ASTHMA, UNCOMPLICATED: ICD-10-CM

## 2019-02-18 DIAGNOSIS — Z98.890 OTHER SPECIFIED POSTPROCEDURAL STATES: Chronic | ICD-10-CM

## 2019-02-18 DIAGNOSIS — E11.9 TYPE 2 DIABETES MELLITUS WITHOUT COMPLICATIONS: ICD-10-CM

## 2019-02-18 DIAGNOSIS — Z90.09 ACQUIRED ABSENCE OF OTHER PART OF HEAD AND NECK: ICD-10-CM

## 2019-02-18 DIAGNOSIS — Z87.448 PERSONAL HISTORY OF OTHER DISEASES OF URINARY SYSTEM: Chronic | ICD-10-CM

## 2019-02-18 DIAGNOSIS — Z79.899 OTHER LONG TERM (CURRENT) DRUG THERAPY: ICD-10-CM

## 2019-02-18 DIAGNOSIS — K08.409 PARTIAL LOSS OF TEETH, UNSPECIFIED CAUSE, UNSPECIFIED CLASS: Chronic | ICD-10-CM

## 2019-02-18 DIAGNOSIS — Z90.710 ACQUIRED ABSENCE OF BOTH CERVIX AND UTERUS: ICD-10-CM

## 2019-02-18 DIAGNOSIS — Z98.890 OTHER SPECIFIED POSTPROCEDURAL STATES: ICD-10-CM

## 2019-02-18 DIAGNOSIS — Z88.0 ALLERGY STATUS TO PENICILLIN: ICD-10-CM

## 2019-02-18 DIAGNOSIS — Z79.84 LONG TERM (CURRENT) USE OF ORAL HYPOGLYCEMIC DRUGS: ICD-10-CM

## 2019-02-18 LAB
ALBUMIN SERPL ELPH-MCNC: 3.7 G/DL — SIGNIFICANT CHANGE UP (ref 3.5–5.2)
ALP SERPL-CCNC: 44 U/L — SIGNIFICANT CHANGE UP (ref 30–115)
ALT FLD-CCNC: 13 U/L — SIGNIFICANT CHANGE UP (ref 0–41)
ANION GAP SERPL CALC-SCNC: 8 MMOL/L — SIGNIFICANT CHANGE UP (ref 7–14)
APPEARANCE UR: ABNORMAL
AST SERPL-CCNC: 26 U/L — SIGNIFICANT CHANGE UP (ref 0–41)
BACTERIA # UR AUTO: ABNORMAL
BASOPHILS # BLD AUTO: 0.05 K/UL — SIGNIFICANT CHANGE UP (ref 0–0.2)
BASOPHILS NFR BLD AUTO: 0.8 % — SIGNIFICANT CHANGE UP (ref 0–1)
BILIRUB SERPL-MCNC: 0.3 MG/DL — SIGNIFICANT CHANGE UP (ref 0.2–1.2)
BILIRUB UR-MCNC: NEGATIVE — SIGNIFICANT CHANGE UP
BUN SERPL-MCNC: 18 MG/DL — SIGNIFICANT CHANGE UP (ref 10–20)
CALCIUM SERPL-MCNC: 8.9 MG/DL — SIGNIFICANT CHANGE UP (ref 8.5–10.1)
CHLORIDE SERPL-SCNC: 104 MMOL/L — SIGNIFICANT CHANGE UP (ref 98–110)
CO2 SERPL-SCNC: 27 MMOL/L — SIGNIFICANT CHANGE UP (ref 17–32)
COLOR SPEC: YELLOW — SIGNIFICANT CHANGE UP
CREAT SERPL-MCNC: 0.8 MG/DL — SIGNIFICANT CHANGE UP (ref 0.7–1.5)
DIFF PNL FLD: ABNORMAL
EOSINOPHIL # BLD AUTO: 0.16 K/UL — SIGNIFICANT CHANGE UP (ref 0–0.7)
EOSINOPHIL NFR BLD AUTO: 2.6 % — SIGNIFICANT CHANGE UP (ref 0–8)
EPI CELLS # UR: ABNORMAL /HPF
GLUCOSE SERPL-MCNC: 113 MG/DL — HIGH (ref 70–99)
GLUCOSE UR QL: NEGATIVE MG/DL — SIGNIFICANT CHANGE UP
HCT VFR BLD CALC: 41.1 % — SIGNIFICANT CHANGE UP (ref 37–47)
HGB BLD-MCNC: 13.1 G/DL — SIGNIFICANT CHANGE UP (ref 12–16)
IMM GRANULOCYTES NFR BLD AUTO: 0.2 % — SIGNIFICANT CHANGE UP (ref 0.1–0.3)
KETONES UR-MCNC: NEGATIVE — SIGNIFICANT CHANGE UP
LACTATE SERPL-SCNC: 1.5 MMOL/L — SIGNIFICANT CHANGE UP (ref 0.5–2.2)
LEUKOCYTE ESTERASE UR-ACNC: ABNORMAL
LYMPHOCYTES # BLD AUTO: 2.14 K/UL — SIGNIFICANT CHANGE UP (ref 1.2–3.4)
LYMPHOCYTES # BLD AUTO: 35.1 % — SIGNIFICANT CHANGE UP (ref 20.5–51.1)
MCHC RBC-ENTMCNC: 30.6 PG — SIGNIFICANT CHANGE UP (ref 27–31)
MCHC RBC-ENTMCNC: 31.9 G/DL — LOW (ref 32–37)
MCV RBC AUTO: 96 FL — SIGNIFICANT CHANGE UP (ref 81–99)
MONOCYTES # BLD AUTO: 0.65 K/UL — HIGH (ref 0.1–0.6)
MONOCYTES NFR BLD AUTO: 10.7 % — HIGH (ref 1.7–9.3)
NEUTROPHILS # BLD AUTO: 3.08 K/UL — SIGNIFICANT CHANGE UP (ref 1.4–6.5)
NEUTROPHILS NFR BLD AUTO: 50.6 % — SIGNIFICANT CHANGE UP (ref 42.2–75.2)
NITRITE UR-MCNC: POSITIVE
NRBC # BLD: 0 /100 WBCS — SIGNIFICANT CHANGE UP (ref 0–0)
PH UR: 7 — SIGNIFICANT CHANGE UP (ref 5–8)
PLATELET # BLD AUTO: 215 K/UL — SIGNIFICANT CHANGE UP (ref 130–400)
POTASSIUM SERPL-MCNC: 6.1 MMOL/L — CRITICAL HIGH (ref 3.5–5)
POTASSIUM SERPL-SCNC: 6.1 MMOL/L — CRITICAL HIGH (ref 3.5–5)
PROT SERPL-MCNC: 6.7 G/DL — SIGNIFICANT CHANGE UP (ref 6–8)
PROT UR-MCNC: 100 MG/DL
RBC # BLD: 4.28 M/UL — SIGNIFICANT CHANGE UP (ref 4.2–5.4)
RBC # FLD: 14.1 % — SIGNIFICANT CHANGE UP (ref 11.5–14.5)
RBC CASTS # UR COMP ASSIST: SIGNIFICANT CHANGE UP /HPF
SODIUM SERPL-SCNC: 139 MMOL/L — SIGNIFICANT CHANGE UP (ref 135–146)
SP GR SPEC: 1.02 — SIGNIFICANT CHANGE UP (ref 1.01–1.03)
UROBILINOGEN FLD QL: 0.2 MG/DL — SIGNIFICANT CHANGE UP (ref 0.2–0.2)
WBC # BLD: 6.09 K/UL — SIGNIFICANT CHANGE UP (ref 4.8–10.8)
WBC # FLD AUTO: 6.09 K/UL — SIGNIFICANT CHANGE UP (ref 4.8–10.8)

## 2019-02-18 RX ORDER — SODIUM CHLORIDE 9 MG/ML
1000 INJECTION INTRAMUSCULAR; INTRAVENOUS; SUBCUTANEOUS ONCE
Qty: 0 | Refills: 0 | Status: COMPLETED | OUTPATIENT
Start: 2019-02-18 | End: 2019-02-18

## 2019-02-18 RX ORDER — ALBUTEROL 90 UG/1
2 AEROSOL, METERED ORAL
Qty: 0 | Refills: 0 | COMMUNITY

## 2019-02-18 RX ORDER — CIPROFLOXACIN LACTATE 400MG/40ML
400 VIAL (ML) INTRAVENOUS ONCE
Qty: 0 | Refills: 0 | Status: COMPLETED | OUTPATIENT
Start: 2019-02-18 | End: 2019-02-18

## 2019-02-18 RX ADMIN — SODIUM CHLORIDE 1000 MILLILITER(S): 9 INJECTION INTRAMUSCULAR; INTRAVENOUS; SUBCUTANEOUS at 09:27

## 2019-02-18 RX ADMIN — Medication 200 MILLIGRAM(S): at 13:02

## 2019-02-18 NOTE — ED PROVIDER NOTE - ATTENDING CONTRIBUTION TO CARE
64yo F with ah /o kidney stones presents with hematuria x 1 day. Pt denies any abdominal pain, fever, chills, nausea, vomiting, CP, SOB, no dysuria, no increased frequency. On exam: NCAT. PERRLA, EOMI. OP clear. Lungs CTAB. RRR, S1S2 noted. Radial pulses 2+ and equal, pedal pulses 2+ and equal. Abdomen soft, NT/ND, no rebound or guarding. FROM x4 extremities. No focal neuro deficits. Plan: obtain labs, and urine, re-eval

## 2019-02-18 NOTE — ED PROVIDER NOTE - OBJECTIVE STATEMENT
63 year old female with pmhx of DM, kidney stones, CKD, presents with hematuria since last night. pt admits to 1 episode last night and one this morning. No abd pain, flank pain, fever, chills, vaginal bleeding, dysuria or frequency.

## 2019-02-18 NOTE — ED PROVIDER NOTE - PROGRESS NOTE DETAILS
62yo F with ah /o kidney stones presents with hematuria x 1 day. Pt denies any abdominal pain, fever, chills, nausea, vomiting, CP, SOB, no dysuria, no increased frequency. On exam: NCAT. PERRLA, EOMI. OP clear. Lungs CTAB. RRR, S1S2 noted. Radial pulses 2+ and equal, pedal pulses 2+ and equal. Abdomen soft, NT/ND, no rebound or guarding. FROM x4 extremities. No focal neuro deficits. Plan: obtain labs, and urine, re-eval urology to evaluate pt in ED urology evaluated pt, recommends NO axbx at this time. discharge with follow up on wednesday with svetlana.

## 2019-02-18 NOTE — ED PROVIDER NOTE - NS ED ROS FT
Review of Systems:  	•	CONSTITUTIONAL - no fever, no diaphoresis, no chills  	•	SKIN - no rash  	•	EYES - no eye pain, no blurry vision  	•	ENT - no change in hearing, no sore throat, no ear pain or tinnitus  	•	RESPIRATORY - no shortness of breath, no cough  	•	CARDIAC - no chest pain, no palpitations  	•	GI - no abd pain, no nausea, no vomiting, no diarrhea, no constipation  	•	GENITO-URINARY - no discharge, no dysuria; + hematuria, no increased urinary frequency  	•	MUSCULOSKELETAL - no joint paint, no swelling, no redness  	•	NEUROLOGIC - no weakness, no headache, no paresthesias, no LOC

## 2019-02-18 NOTE — ED PROVIDER NOTE - PHYSICAL EXAMINATION
CONST: Well appearing in NAD  EYES: PERRL, EOMI, Sclera and conjunctiva clear.   ENT: No nasal discharge. TM's clear B/L without drainage. Oropharynx normal appearing, no erythema or exudates. No abscess or swelling.  NECK: Non-tender, no meningeal signs. normal ROM. supple   CARD: Normal S1 S2; Normal rate and rhythm  RESP: Equal BS B/L, No wheezes, rhonchi or rales. No distress  GI: Soft, non-tender, non-distended. no cva tenderness. normal BS  MS: Normal ROM in all extremities. No midline spinal tenderness. pulses 2 +. no calf tenderness or swelling  SKIN: Warm, dry, no acute rashes. Good turgor

## 2019-02-18 NOTE — ED PROVIDER NOTE - PSH
H/O abdominal hysterectomy  NO RADIATION AND CHEMO  History of bladder stone  SURGERY 8/3/2018  History of surgery  JJ STENT PLACEMENT LEFT OCTOBER 2017  History of tonsillectomy  13 years old  [place] teeth removed

## 2019-02-18 NOTE — ED ADULT NURSE NOTE - NSIMPLEMENTINTERV_GEN_ALL_ED
Implemented All Fall with Harm Risk Interventions:  [place] to call system. Call bell, personal items and telephone within reach. Instruct patient to call for assistance. Room bathroom lighting operational. Non-slip footwear when patient is off stretcher. Physically safe environment: no spills, clutter or unnecessary equipment. Stretcher in lowest position, wheels locked, appropriate side rails in place. Provide visual cue, wrist band, yellow gown, etc. Monitor gait and stability. Monitor for mental status changes and reorient to person, place, and time. Review medications for side effects contributing to fall risk. Reinforce activity limits and safety measures with patient and family. Provide visual clues: red socks.

## 2019-02-21 RX ORDER — NITROFURANTOIN MACROCRYSTAL 50 MG
1 CAPSULE ORAL
Qty: 14 | Refills: 0
Start: 2019-02-21 | End: 2019-02-27

## 2019-02-21 NOTE — ED POST DISCHARGE NOTE - DETAILS
RESIDES AT [facility]. SPOKE WITH ARNAUD, THE ASSISTANT TO MD AT FACILITY. NITROFURANTOIN SENT TO PHARMACY. ARNAUD WILL CONTACT MD TODAY TO UPDATE.

## 2019-05-06 ENCOUNTER — APPOINTMENT (OUTPATIENT)
Dept: UROLOGY | Facility: CLINIC | Age: 64
End: 2019-05-06

## 2019-07-12 ENCOUNTER — EMERGENCY (EMERGENCY)
Facility: HOSPITAL | Age: 64
LOS: 0 days | Discharge: HOME | End: 2019-07-12
Attending: EMERGENCY MEDICINE | Admitting: EMERGENCY MEDICINE
Payer: MEDICAID

## 2019-07-12 VITALS
OXYGEN SATURATION: 96 % | DIASTOLIC BLOOD PRESSURE: 75 MMHG | SYSTOLIC BLOOD PRESSURE: 111 MMHG | TEMPERATURE: 99 F | RESPIRATION RATE: 16 BRPM | HEART RATE: 69 BPM

## 2019-07-12 VITALS
TEMPERATURE: 98 F | DIASTOLIC BLOOD PRESSURE: 78 MMHG | RESPIRATION RATE: 16 BRPM | OXYGEN SATURATION: 95 % | HEART RATE: 59 BPM | SYSTOLIC BLOOD PRESSURE: 144 MMHG | HEIGHT: 62 IN | WEIGHT: 293 LBS

## 2019-07-12 DIAGNOSIS — Z90.89 ACQUIRED ABSENCE OF OTHER ORGANS: Chronic | ICD-10-CM

## 2019-07-12 DIAGNOSIS — Z88.0 ALLERGY STATUS TO PENICILLIN: ICD-10-CM

## 2019-07-12 DIAGNOSIS — K08.409 PARTIAL LOSS OF TEETH, UNSPECIFIED CAUSE, UNSPECIFIED CLASS: Chronic | ICD-10-CM

## 2019-07-12 DIAGNOSIS — Z79.899 OTHER LONG TERM (CURRENT) DRUG THERAPY: ICD-10-CM

## 2019-07-12 DIAGNOSIS — Z98.890 OTHER SPECIFIED POSTPROCEDURAL STATES: Chronic | ICD-10-CM

## 2019-07-12 DIAGNOSIS — Z87.448 PERSONAL HISTORY OF OTHER DISEASES OF URINARY SYSTEM: Chronic | ICD-10-CM

## 2019-07-12 DIAGNOSIS — Z91.040 LATEX ALLERGY STATUS: ICD-10-CM

## 2019-07-12 DIAGNOSIS — G40.909 EPILEPSY, UNSPECIFIED, NOT INTRACTABLE, WITHOUT STATUS EPILEPTICUS: ICD-10-CM

## 2019-07-12 DIAGNOSIS — Z79.84 LONG TERM (CURRENT) USE OF ORAL HYPOGLYCEMIC DRUGS: ICD-10-CM

## 2019-07-12 DIAGNOSIS — E11.9 TYPE 2 DIABETES MELLITUS WITHOUT COMPLICATIONS: ICD-10-CM

## 2019-07-12 DIAGNOSIS — J45.909 UNSPECIFIED ASTHMA, UNCOMPLICATED: ICD-10-CM

## 2019-07-12 DIAGNOSIS — F98.8 OTHER SPECIFIED BEHAVIORAL AND EMOTIONAL DISORDERS WITH ONSET USUALLY OCCURRING IN CHILDHOOD AND ADOLESCENCE: ICD-10-CM

## 2019-07-12 DIAGNOSIS — Z88.8 ALLERGY STATUS TO OTHER DRUGS, MEDICAMENTS AND BIOLOGICAL SUBSTANCES STATUS: ICD-10-CM

## 2019-07-12 PROCEDURE — 99283 EMERGENCY DEPT VISIT LOW MDM: CPT

## 2019-07-12 NOTE — ED PROVIDER NOTE - PSH
H/O abdominal hysterectomy  NO RADIATION AND CHEMO  History of bladder stone  SURGERY 8/3/2018  History of surgery  JJ STENT PLACEMENT LEFT OCTOBER 2017  History of tonsillectomy  13 years old  [place] teeth removed

## 2019-07-12 NOTE — ED PROVIDER NOTE - PHYSICAL EXAMINATION
GENERAL: Well-nourished, Well-developed. NAD.  HEAD: No visible or palpable bumps or hematomas. No ecchymosis behind ears B/L.  Eyes: PERRLA, EOMI. No asymmetry. No nystagmus. No conjunctival injection. Non-icteric sclera.  ENMT: MMM.   Neck: FROM  CVS: Normal S1,S2. No murmurs appreciated on auscultation   RESP: No use of accessory muscles. Chest rise symmetrical with good expansion. Lungs clear to auscultation B/L. No wheezing, rales, or rhonchi auscultated.  GI: Normal auscultation of bowel sounds in all 4 quadrants. Soft, Nontender, Nondistended.  MSK: No visible signs of trauma such as ecchymosis, erythema, or swelling. FROM of upper and lower extremities B/L.   Skin: Warm, Dry. No rashes or lesions.   EXT: Radial pulses present B/L.   Neuro: AA&O x 3. CNs II-XII grossly intact. Speaking in full sentences. No slurring of speech. No facial droop. No tremors. Sensation grossly intact. Strength 5/5 B/L.   Psych: Cooperative. Calm

## 2019-07-12 NOTE — ED ADULT NURSE NOTE - PSH
H/O abdominal hysterectomy  NO RADIATION AND CHEMO  History of bladder stone  SURGERY 8/3/2018  History of surgery  JJ STENT PLACEMENT LEFT OCTOBER 2017  History of tonsillectomy  13 years old  [place] teeth removed

## 2019-07-12 NOTE — ED ADULT TRIAGE NOTE - CHIEF COMPLAINT QUOTE
BIBA from [facility] for non compliant with "hygiene protocol." Pt. refused to shower

## 2019-07-12 NOTE — ED PROVIDER NOTE - OBJECTIVE STATEMENT
65 yo female with PMH of asthma, OA, borderline DM, uterine cancer (s/p hysterectomy) sent to the ED from [facility] due to refusing to shower earlier today.  Patient 63 yo obese female with PMH of asthma, OA, borderline DM, uterine cancer (s/p hysterectomy) sent to the ED from [facility] due to refusing to shower earlier today.  Patient was placed there in 2015 secondary to frequent falls at that time and is in the process of trying to get her own apartment. Patient able to ambulate with a walker at baseline.  Patient denies fever, chills, chest pain, SOB, N/V/D, weakness, rash, recent fall, or dizziness.

## 2019-07-12 NOTE — ED PROVIDER NOTE - PROGRESS NOTE DETAILS
Spoke to Social Work. They are unable to remove patient from one living facility and place in another.  Patient would need to speak to  from her facility for change of placement. Patient requesting to speak with  for other placement options. Spoke to Social Work. They are unable to remove patient from one living facility and place in another.  Patient would need to speak to  from her facility for change of placement.  Patient not requesting rehab. Patient states she is able to ambulate with walker.  Patient also requesting food in the ED. Placed another call to social work to request them to see the patient at bedside.

## 2019-07-12 NOTE — ED PROVIDER NOTE - NS ED ROS FT
Constitutional: (-) fever (-) chills   Cardiac: (-) chest pain (-) syncope  Respiratory: (-) SOB  GI: (-) nausea (-) vomiting (-) diarrhea   Neuro: (-) headache (-) dizziness (-) numbness/tingling to extremities B/L (-) weakness (-) fall  Skin: (-) rash (-) laceration  Except as documented in the HPI, all other systems are negative. Constitutional: (-) fever (-) chills   Cardiac: (-) chest pain (-) syncope  Respiratory: (-) SOB  GI: (-) nausea (-) vomiting (-) diarrhea   Neuro: (-) headache (-) dizziness (-) numbness/tingling to extremities B/L (-) weakness (-) fall  Skin: (-) rash (-) laceration  Psych: (+) social issue at living facility  Except as documented in the HPI, all other systems are negative.

## 2019-07-12 NOTE — ED PROVIDER NOTE - CLINICAL SUMMARY MEDICAL DECISION MAKING FREE TEXT BOX
no active medical or psychiatric c/o.  In my opinion, out patient treatment and follow up are appropriate.

## 2019-08-22 ENCOUNTER — APPOINTMENT (OUTPATIENT)
Dept: UROLOGY | Facility: CLINIC | Age: 64
End: 2019-08-22
Payer: MEDICAID

## 2019-08-22 VITALS
HEIGHT: 62 IN | BODY MASS INDEX: 53.92 KG/M2 | DIASTOLIC BLOOD PRESSURE: 79 MMHG | WEIGHT: 293 LBS | SYSTOLIC BLOOD PRESSURE: 130 MMHG | HEART RATE: 73 BPM

## 2019-08-22 DIAGNOSIS — E66.01 MORBID (SEVERE) OBESITY DUE TO EXCESS CALORIES: ICD-10-CM

## 2019-08-22 DIAGNOSIS — N32.81 OVERACTIVE BLADDER: ICD-10-CM

## 2019-08-22 DIAGNOSIS — N39.41 URGE INCONTINENCE: ICD-10-CM

## 2019-08-22 DIAGNOSIS — N20.0 CALCULUS OF KIDNEY: ICD-10-CM

## 2019-08-22 PROCEDURE — 99214 OFFICE O/P EST MOD 30 MIN: CPT

## 2019-08-22 NOTE — LETTER BODY
[Dear  ___] : Dear  [unfilled], [Courtesy Letter:] : I had the pleasure of seeing your patient, [unfilled], in my office today. [Please see my note below.] : Please see my note below. [Sincerely,] : Sincerely, [FreeTextEntry2] : David Angel MD\par [address]\[place] [address]\par

## 2019-08-22 NOTE — ASSESSMENT
[FreeTextEntry1] : Limited physical exam shows no CVA or abdominal tenderness. She is morbidly obese can fully fit in the exam room chair and her panniculus is hanging down to her knees.\par \par The data that I do have is from a metabolic workup done in December 2018 by the nephrologist. It shows a borderline low volume at 1.85 L, a low calcium but a high oxalate at 82 and 45. The citrate is extremely low at 98 all the pH surprisingly the high side at 6.9. Animal protein level as evidenced by the uric acid of 46 was acceptable her salt level was good with the sodium and chlorine at 137 133 with a low magnesium at 39.\par \par She needs follow up both with nephrology and urology. Metabolic workup is poor it should be repeated and then will have to decide how best to manage it. She tells me she can’t get a change in diet because the group home that she is that just gives everyone the same food and will make changes something I find hard to believe. As far as her bladder instability/urgency urge incontinence big and want to clean catch urine if he cannot give what you need a straight cath that they can’t do that at the home to have to come in here. During the same time course, I would like her to keep a record of her intake and output and get a CT scan stone protocol so I can see what she has a we could decide where to go.\par

## 2019-08-22 NOTE — PHYSICAL EXAM
[General Appearance - Well Nourished] : well nourished [General Appearance - Well Developed] : well developed [Normal Appearance] : normal appearance [General Appearance - In No Acute Distress] : no acute distress [Abdomen Tenderness] : non-tender [Costovertebral Angle Tenderness] : no ~M costovertebral angle tenderness [] : no respiratory distress [Respiration, Rhythm And Depth] : normal respiratory rhythm and effort [Exaggerated Use Of Accessory Muscles For Inspiration] : no accessory muscle use [Oriented To Time, Place, And Person] : oriented to person, place, and time [Affect] : the affect was normal [Mood] : the mood was normal [Not Anxious] : not anxious [FreeTextEntry1] : trouble walking due to weight

## 2019-08-22 NOTE — LETTER HEADER
[FreeTextEntry3] : Alex Lam M.D.\par Director of Urology\par [facility]\par [address] [place] [address], [address]\[place] [address]

## 2019-08-22 NOTE — HISTORY OF PRESENT ILLNESS
Joe Gallagher MD, saw and evaluated the patient  I have discussed the patient with the resident/non-physician practitioner and agree with the resident's/non-physician practitioner's findings, Plan of Care, and MDM as documented in the resident's/non-physician practitioner's note, except where noted  All available labs and Radiology studies were reviewed  At this point I agree with the current assessment done in the Emergency Department  I have conducted an independent evaluation of this patient including a focused history of:    Emergency Department Note- Shanika John 62 y o  male MRN: [id number]    Unit/Bed#: ED 14 Encounter: [id number]    Shanika John is a 62 y o  male who presents with   Chief Complaint   Patient presents with    Respiratory Distress     Patient reports SOB beginning approx 2 hours ago  Upon EMS arrival patient was pale with O2 sat in low 50's on RA  O2 sat improved to 85% on CPAP  Patient switched to BIPAP upon arrival          History of Present Illness   HPI:  Shanika John is a 62 y o  male who presents for evaluation of:  Hypoxia and respiratory distress  Dyspnea started 2 h PTA  Upon EMS arrival patient was noted to be pale and markedly hypoxic  On arrival in the ED, patient was placed on bipap with a notable improvement in respiratory distress and hypoxia  Patient denies associated chest pain and fever  Review of Systems   Unable to perform ROS: Severe respiratory distress   Constitutional: Negative for chills and fever  Gastrointestinal: Negative for abdominal pain, nausea and vomiting  Genitourinary: Negative for dysuria and hematuria  All other systems reviewed and are negative        Historical Information   Past Medical History:   Diagnosis Date    Chronic obstructive lung disease ([address] )     RESOLVED: 8/17/17    Concussion     LAST ASSESSED: 8/17/17    COPD (chronic obstructive pulmonary disease) ([address] )     Depression     Diabetes mellitus [facility]     Diabetic neuropathy ([facility] )     Difficulty attaining erection     LAST ASSESSEDl: 8/17/17    Elevated liver enzymes     Gall stone pancreatitis     RESOLVED: 3/8/17    Gall stones     RESOLVED: 3/8/17    History of MRSA infection     Hypertension     LAST ASSESSED: 8/17/17    Spinal cord injury, C1-C7 ([facility] [address] [facility] )     Traumatic injury to musculoskeletal system     1-5 FLIGHT FALL DOWN THE STEPS - CERVICAL NECK INJURY - JAN 2, 2012; LAST ASSESSED: 8/75/73    Umbilical hernia without obstruction and without gangrene     RESOLVED: 3/8/17    Varicella     LAST ASSESSED: 8/17/17     Past Surgical History:   Procedure Laterality Date    ARTERIOGRAM Right 7/31/2018    Procedure: ARTERIOGRAM Completion Agram;  Surgeon: Sherrie Veras MD;  [facility];  Service: Vascular    BACK SURGERY      BYPASS FEMORAL-POPLITEAL Right 7/31/2018    Procedure: BYPASS FEMORAL-POPLITEAL R femoral- BK popliteal bypass;  Surgeon: Sherrie Veras MD;  [facility];  Service: Vascular    CERVICAL FUSION      VERTEBRAL; C3-C4 FUSION    CHOLECYSTECTOMY LAPAROSCOPIC N/A 2/24/2017    Procedure: CHOLECYSTECTOMY LAPAROSCOPIC;  Surgeon: Pawel Block MD;  [facility];  Service:     CHOLECYSTECTOMY LAPAROSCOPIC  03/2017    EPIGASTRIC HERNIA REPAIR      WA DEBRIDEMENT, SKIN, SUB-Q TISSUE,=<20 SQ CM Right 8/28/2018    Procedure: RIGHT GROIN AND THIGH DEBRIDEMENT ([address]) [address];  Surgeon: Isrrael Hudson MD;  [facility];  Service: Vascular    WA THROMBOENDARTECTMY FEMORAL COMMON Right 7/31/2018    Procedure: ENDARTERECTOMY ARTERIAL FEMORAL R femoral endarterectomy w/ patch angioplasty ;  Surgeon: Sherrie Veras MD;  [facility];  Service: Vascular    UMBILICAL HERNIA REPAIR N/A 2/24/2017    Procedure: REPAIR HERNIA UMBILICAL;  Surgeon: Pawel Block MD;  [facility];  Service:      Social History   History   Alcohol Use    Yes     Comment: 3-4 mixed vodka drinks/daily History   Drug Use No     History   Smoking Status    Current Every Day Smoker    Packs/day: 0 50    Years: 40 00    Types: Cigarettes   Smokeless Tobacco    Never Used     Family History: non-contributory    Meds/Allergies   all medications and allergies reviewed  Allergies   Allergen Reactions    Seasonal Ic  [Cholestatin]        Objective   First Vitals:   Blood Pressure: 138/71 (19 0505)  Pulse: (!) 122 (19 0505)  Respirations: (!) 30 (19 050)  Height: 5' 10" (177 8 cm) (19 050)  Weight - Scale: 79 4 kg (175 lb) (19 050)  SpO2: (!) 50 % (19 0502)    Current Vitals:   Blood Pressure: 138/71 (19 0505)  Pulse: (!) 122 (19 050)  Respirations: (!) 30 (19)  Height: 5' 10" (177 8 cm) (19 050)  Weight - Scale: 79 4 kg (175 lb) (19 050)  SpO2: 98 % (19 0509)    No intake or output data in the 24 hours ending 19 0514    Invasive Devices     Peripheral Intravenous Line            Peripheral IV 19 Left Forearm less than 1 day    Peripheral IV 19 Left Wrist less than 1 day                Physical Exam   Constitutional: He is oriented to person, place, and time  He appears distressed  Patient presents much improved on bipap   HENT:   Head: Normocephalic and atraumatic  Eyes: Pupils are equal, round, and reactive to light  Conjunctivae are normal    Neck: Normal range of motion  Neck supple  Cardiovascular: Regular rhythm  Tachycardia present  Pulmonary/Chest: He is in respiratory distress  He has wheezes  Abdominal: Soft  Bowel sounds are normal    Musculoskeletal: Normal range of motion  He exhibits no deformity  Neurological: He is alert and oriented to person, place, and time  Skin: Skin is warm and dry  Psychiatric: He has a normal mood and affect  His behavior is normal  Thought content normal    Nursing note and vitals reviewed  Medical Decision Makin   Acute respiratory distress: VBG r/o respiratory failure; CXR r/o pneumonia; ECG and Tn r/o ACS    No results found for this or any previous visit (from the past 39 hour(s))  No orders to display         Portions of the record may have been created with voice recognition software  Occasional wrong word or "sound a like" substitutions may have occurred due to the inherent limitations of voice recognition software  Read the chart carefully and recognize, using context, where substitutions have occurred  [FreeTextEntry1] : Alee is a 64-year-old extremely morbidly obese female who lives in a group home that when it percutaneous nephrolithotripsy in September. The last follow-up was in October which point we wanted a metabolic workup to follow up with KUB and ultrasound to see what’s happening with her stones. She canceled an appointment in September did not make another one until May and then was a no-show for that one. She comes in today telling me that her main problem is urgency and urge incontinence. She explains that she canceled the appointment in September because she found out the day of that the “home” had forgotten to arrange transportation. On the one in May she wore the same T-shirt twice and they refuse to transport her and they refuse to wait while she went upstairs and changed the T-shirt. She then told me that she also missed the appointment because she was brought to the office of the nephrologist instead of here on the day that she had an appointment here. Not sure exactly why or what is there only two appointments and she was last seen that we the canceled or no showed and as I explained to her does it matter why she doesn’t show if she doesn’t show I can’t help her. She now wants to know what I’m going to do for her urgency and urge incontinence. She is positive she doesn’t have an infection is there is no blood and she says she hasn’t had any stone pain.\par \par However the woman that came with her gave me a report from February where she was in the emergency room where they found up to a 7 mm stone on the left kidney which depending on what version of the report was a was not obstructing with some perinephric stranding so I don’t know if it was a pyelonephritis or are you an obstructing stone that then moved out of the way. And when I pointed all this out to Alee she said that never happened. [Urinary Incontinence] : urinary incontinence [Urinary Urgency] : urinary urgency [Urinary Frequency] : urinary frequency

## 2019-08-26 ENCOUNTER — OTHER (OUTPATIENT)
Age: 64
End: 2019-08-26

## 2019-09-09 ENCOUNTER — EMERGENCY (EMERGENCY)
Facility: HOSPITAL | Age: 64
LOS: 0 days | Discharge: HOME | End: 2019-09-09
Attending: EMERGENCY MEDICINE | Admitting: EMERGENCY MEDICINE
Payer: MEDICAID

## 2019-09-09 VITALS
RESPIRATION RATE: 19 BRPM | WEIGHT: 293 LBS | TEMPERATURE: 98 F | DIASTOLIC BLOOD PRESSURE: 73 MMHG | SYSTOLIC BLOOD PRESSURE: 142 MMHG | OXYGEN SATURATION: 96 % | HEART RATE: 72 BPM

## 2019-09-09 DIAGNOSIS — Z98.890 OTHER SPECIFIED POSTPROCEDURAL STATES: Chronic | ICD-10-CM

## 2019-09-09 DIAGNOSIS — K46.9 UNSPECIFIED ABDOMINAL HERNIA WITHOUT OBSTRUCTION OR GANGRENE: ICD-10-CM

## 2019-09-09 DIAGNOSIS — K08.409 PARTIAL LOSS OF TEETH, UNSPECIFIED CAUSE, UNSPECIFIED CLASS: Chronic | ICD-10-CM

## 2019-09-09 DIAGNOSIS — Z88.8 ALLERGY STATUS TO OTHER DRUGS, MEDICAMENTS AND BIOLOGICAL SUBSTANCES STATUS: ICD-10-CM

## 2019-09-09 DIAGNOSIS — Z87.448 PERSONAL HISTORY OF OTHER DISEASES OF URINARY SYSTEM: Chronic | ICD-10-CM

## 2019-09-09 DIAGNOSIS — Z91.040 LATEX ALLERGY STATUS: ICD-10-CM

## 2019-09-09 DIAGNOSIS — Z88.0 ALLERGY STATUS TO PENICILLIN: ICD-10-CM

## 2019-09-09 DIAGNOSIS — Z90.89 ACQUIRED ABSENCE OF OTHER ORGANS: Chronic | ICD-10-CM

## 2019-09-09 DIAGNOSIS — R19.7 DIARRHEA, UNSPECIFIED: ICD-10-CM

## 2019-09-09 LAB
ALBUMIN SERPL ELPH-MCNC: 3.7 G/DL — SIGNIFICANT CHANGE UP (ref 3.5–5.2)
ALBUMIN SERPL ELPH-MCNC: 4.1 G/DL — SIGNIFICANT CHANGE UP (ref 3.5–5.2)
ALP SERPL-CCNC: 45 U/L — SIGNIFICANT CHANGE UP (ref 30–115)
ALP SERPL-CCNC: 51 U/L — SIGNIFICANT CHANGE UP (ref 30–115)
ALT FLD-CCNC: 13 U/L — SIGNIFICANT CHANGE UP (ref 0–41)
ALT FLD-CCNC: 19 U/L — SIGNIFICANT CHANGE UP (ref 0–41)
ANION GAP SERPL CALC-SCNC: 10 MMOL/L — SIGNIFICANT CHANGE UP (ref 7–14)
ANION GAP SERPL CALC-SCNC: 11 MMOL/L — SIGNIFICANT CHANGE UP (ref 7–14)
AST SERPL-CCNC: 16 U/L — SIGNIFICANT CHANGE UP (ref 0–41)
AST SERPL-CCNC: 42 U/L — HIGH (ref 0–41)
BASOPHILS # BLD AUTO: 0.03 K/UL — SIGNIFICANT CHANGE UP (ref 0–0.2)
BASOPHILS NFR BLD AUTO: 0.5 % — SIGNIFICANT CHANGE UP (ref 0–1)
BILIRUB SERPL-MCNC: 0.3 MG/DL — SIGNIFICANT CHANGE UP (ref 0.2–1.2)
BILIRUB SERPL-MCNC: 0.3 MG/DL — SIGNIFICANT CHANGE UP (ref 0.2–1.2)
BUN SERPL-MCNC: 14 MG/DL — SIGNIFICANT CHANGE UP (ref 10–20)
BUN SERPL-MCNC: 14 MG/DL — SIGNIFICANT CHANGE UP (ref 10–20)
CALCIUM SERPL-MCNC: 8.4 MG/DL — LOW (ref 8.5–10.1)
CALCIUM SERPL-MCNC: 8.8 MG/DL — SIGNIFICANT CHANGE UP (ref 8.5–10.1)
CHLORIDE SERPL-SCNC: 103 MMOL/L — SIGNIFICANT CHANGE UP (ref 98–110)
CHLORIDE SERPL-SCNC: 106 MMOL/L — SIGNIFICANT CHANGE UP (ref 98–110)
CO2 SERPL-SCNC: 22 MMOL/L — SIGNIFICANT CHANGE UP (ref 17–32)
CO2 SERPL-SCNC: 24 MMOL/L — SIGNIFICANT CHANGE UP (ref 17–32)
CREAT SERPL-MCNC: 0.8 MG/DL — SIGNIFICANT CHANGE UP (ref 0.7–1.5)
CREAT SERPL-MCNC: 0.9 MG/DL — SIGNIFICANT CHANGE UP (ref 0.7–1.5)
EOSINOPHIL # BLD AUTO: 0.1 K/UL — SIGNIFICANT CHANGE UP (ref 0–0.7)
EOSINOPHIL NFR BLD AUTO: 1.5 % — SIGNIFICANT CHANGE UP (ref 0–8)
GLUCOSE SERPL-MCNC: 102 MG/DL — HIGH (ref 70–99)
GLUCOSE SERPL-MCNC: 104 MG/DL — HIGH (ref 70–99)
HCT VFR BLD CALC: 40.4 % — SIGNIFICANT CHANGE UP (ref 37–47)
HGB BLD-MCNC: 13.2 G/DL — SIGNIFICANT CHANGE UP (ref 12–16)
IMM GRANULOCYTES NFR BLD AUTO: 0.2 % — SIGNIFICANT CHANGE UP (ref 0.1–0.3)
LYMPHOCYTES # BLD AUTO: 1.71 K/UL — SIGNIFICANT CHANGE UP (ref 1.2–3.4)
LYMPHOCYTES # BLD AUTO: 25.9 % — SIGNIFICANT CHANGE UP (ref 20.5–51.1)
MCHC RBC-ENTMCNC: 31.5 PG — HIGH (ref 27–31)
MCHC RBC-ENTMCNC: 32.7 G/DL — SIGNIFICANT CHANGE UP (ref 32–37)
MCV RBC AUTO: 96.4 FL — SIGNIFICANT CHANGE UP (ref 81–99)
MONOCYTES # BLD AUTO: 0.63 K/UL — HIGH (ref 0.1–0.6)
MONOCYTES NFR BLD AUTO: 9.5 % — HIGH (ref 1.7–9.3)
NEUTROPHILS # BLD AUTO: 4.13 K/UL — SIGNIFICANT CHANGE UP (ref 1.4–6.5)
NEUTROPHILS NFR BLD AUTO: 62.4 % — SIGNIFICANT CHANGE UP (ref 42.2–75.2)
NRBC # BLD: 0 /100 WBCS — SIGNIFICANT CHANGE UP (ref 0–0)
PLATELET # BLD AUTO: 156 K/UL — SIGNIFICANT CHANGE UP (ref 130–400)
POTASSIUM SERPL-MCNC: 4.2 MMOL/L — SIGNIFICANT CHANGE UP (ref 3.5–5)
POTASSIUM SERPL-MCNC: 5.4 MMOL/L — HIGH (ref 3.5–5)
POTASSIUM SERPL-SCNC: 4.2 MMOL/L — SIGNIFICANT CHANGE UP (ref 3.5–5)
POTASSIUM SERPL-SCNC: 5.4 MMOL/L — HIGH (ref 3.5–5)
PROT SERPL-MCNC: 6.1 G/DL — SIGNIFICANT CHANGE UP (ref 6–8)
PROT SERPL-MCNC: 6.8 G/DL — SIGNIFICANT CHANGE UP (ref 6–8)
RBC # BLD: 4.19 M/UL — LOW (ref 4.2–5.4)
RBC # FLD: 13.4 % — SIGNIFICANT CHANGE UP (ref 11.5–14.5)
SODIUM SERPL-SCNC: 137 MMOL/L — SIGNIFICANT CHANGE UP (ref 135–146)
SODIUM SERPL-SCNC: 139 MMOL/L — SIGNIFICANT CHANGE UP (ref 135–146)
WBC # BLD: 6.61 K/UL — SIGNIFICANT CHANGE UP (ref 4.8–10.8)
WBC # FLD AUTO: 6.61 K/UL — SIGNIFICANT CHANGE UP (ref 4.8–10.8)

## 2019-09-09 PROCEDURE — 99284 EMERGENCY DEPT VISIT MOD MDM: CPT

## 2019-09-09 RX ORDER — SODIUM CHLORIDE 9 MG/ML
1000 INJECTION INTRAMUSCULAR; INTRAVENOUS; SUBCUTANEOUS ONCE
Refills: 0 | Status: COMPLETED | OUTPATIENT
Start: 2019-09-09 | End: 2019-09-09

## 2019-09-09 RX ADMIN — SODIUM CHLORIDE 1 MILLILITER(S): 9 INJECTION INTRAMUSCULAR; INTRAVENOUS; SUBCUTANEOUS at 12:13

## 2019-09-09 NOTE — ED PROVIDER NOTE - ATTENDING CONTRIBUTION TO CARE
63 yo f sent in from [facility] for diarrhea.  pt had diarrhea 3 days ago.  however, diarrhea has stopped.  Yesterday [facility] called ambulance for patient.  however, pt refused ambulance yesterday.  pt doesn't want to be here now.  pt has no complaints.  no abd pain, no cp, no sob, no headache.  NO diarrhea today.  pt never had abd pain.  pt never had nausea or vomiting.  no other complaints.  pt has a chronic large abd hernia that she has had for many years and has followed up for this in the past.    awake, alert.  abd soft, nontender.  large abd hernia.  pt breathing comfortably.  mmm.  p: labs, ivf, reassess.

## 2019-09-09 NOTE — ED PROVIDER NOTE - OBJECTIVE STATEMENT
this is a 65 yo female who presents to ed for evaluation of diarrhea for the past couple of days. patient states she is feeling better today. patient states her adult home wanted her to come and get checked out

## 2019-09-09 NOTE — ED PROVIDER NOTE - CARE PROVIDER_API CALL
Soham Ibarra (MD)  Internal Medicine  [address] [place] [address]  Phone: [phone]  Fax: [phone]  Follow Up Time:

## 2019-09-09 NOTE — ED PROVIDER NOTE - PATIENT PORTAL LINK FT
You can access the FollowMyHealth Patient Portal offered by [facility] by registering at the following website: [email or web address]. By joining [employer]’s FollowMyHealth portal, you will also be able to view your health information using other applications (apps) compatible with our system.

## 2019-09-09 NOTE — ED PROVIDER NOTE - CLINICAL SUMMARY MEDICAL DECISION MAKING FREE TEXT BOX
pt sent in from [facility].  pt had diarrhea for 3 days.  No diarrhea today.  no cp, no sob, no nausea, no vomiting, no back pain.  pt has no abd pain.  pt has chronic large abd hernia.  pt has no complaints at all.  pt didn't want to come to ED.  Labs unremarkable.  abd soft, nontender.  pt dc back to [facility].  pt nontoxic, well appearing. pt sent in from [facility].  pt had diarrhea for 3 days.  No diarrhea today.  no cp, no sob, no nausea, no vomiting, no back pain.  pt has no abd pain.  pt has chronic large abd hernia.  pt has no complaints at all.  pt didn't want to come to ED.  Labs unremarkable.  abd soft, nontender.  pt dc back to [facility].  pt nontoxic, well appearing.  pt has NO abd pain, no nausea, no vomiting.  abd soft, nontender.

## 2019-09-09 NOTE — ED ADULT NURSE NOTE - NSIMPLEMENTINTERV_GEN_ALL_ED
Implemented All Universal Safety Interventions:  [place] to call system. Call bell, personal items and telephone within reach. Instruct patient to call for assistance. Room bathroom lighting operational. Non-slip footwear when patient is off stretcher. Physically safe environment: no spills, clutter or unnecessary equipment. Stretcher in lowest position, wheels locked, appropriate side rails in place.

## 2019-09-09 NOTE — ED ADULT NURSE NOTE - CHIEF COMPLAINT QUOTE
DEREK from [facility] Pt states "I started having diarrhea on Friday, I feel better, my stomach doesn't hurt and I didn't have diarrhea today".

## 2019-09-09 NOTE — ED ADULT TRIAGE NOTE - CHIEF COMPLAINT QUOTE
DEREK from [facility] Pt states "I started having diarrhea on Friday, I feel better, my stomach doesn't hurt and I didn't have diarrhea today".

## 2019-09-09 NOTE — ED ADULT NURSE NOTE - PSH
H/O abdominal hysterectomy  NO RADIATION AND CHEMO  History of bladder stone  SURGERY 8/3/2018  History of surgery  JJ STENT PLACEMENT LEFT OCTOBER 2017  History of tonsillectomy  13 years old  [place] teeth removed

## 2019-09-09 NOTE — ED PROVIDER NOTE - PSH
H/O abdominal hysterectomy  NO RADIATION AND CHEMO  History of bladder stone  SURGERY 8/3/2018  History of surgery  JJ STENT PLACEMENT LEFT OCTOBER 2017  History of tonsillectomy  13 years old  [place] teeth removed

## 2019-09-09 NOTE — ED PROVIDER NOTE - NS ED ROS FT
Review of Systems:  	•	CONSTITUTIONAL - no fever, no diaphoresis, no chills  	•	SKIN - no rash  	•	HEMATOLOGIC - no bleeding, no bruising  	•	EYES - no eye pain, no blurry vision  	•	ENT - no change in hearing, no sore throat, no ear pain or tinnitus  	•	RESPIRATORY - no shortness of breath, no cough  	•	CARDIAC - no chest pain, no palpitations  	•	GI - no abd pain, no nausea, no vomiting,  positive diarrhea, no constipation  	•	GENITO-URINARY - no discharge, no dysuria; no hematuria, no increased urinary frequency  	•	MUSCULOSKELETAL - no joint paint, no swelling, no redness  	•	NEUROLOGIC - no weakness, no headache, no paresthesias, no LOC  	•	PSYCH - no anxiety, non suicidal, non homicidal, no hallucination, no depression

## 2019-10-18 ENCOUNTER — EMERGENCY (EMERGENCY)
Facility: HOSPITAL | Age: 64
LOS: 0 days | Discharge: NURSING, REHAB OR LONG-TERM CARE | End: 2019-10-18
Attending: EMERGENCY MEDICINE | Admitting: EMERGENCY MEDICINE
Payer: MEDICAID

## 2019-10-18 VITALS — WEIGHT: 293 LBS | HEIGHT: 62 IN

## 2019-10-18 VITALS
TEMPERATURE: 98 F | HEART RATE: 60 BPM | DIASTOLIC BLOOD PRESSURE: 83 MMHG | SYSTOLIC BLOOD PRESSURE: 142 MMHG | RESPIRATION RATE: 18 BRPM

## 2019-10-18 DIAGNOSIS — Z90.89 ACQUIRED ABSENCE OF OTHER ORGANS: Chronic | ICD-10-CM

## 2019-10-18 DIAGNOSIS — Z91.040 LATEX ALLERGY STATUS: ICD-10-CM

## 2019-10-18 DIAGNOSIS — K08.409 PARTIAL LOSS OF TEETH, UNSPECIFIED CAUSE, UNSPECIFIED CLASS: Chronic | ICD-10-CM

## 2019-10-18 DIAGNOSIS — Z98.890 OTHER SPECIFIED POSTPROCEDURAL STATES: Chronic | ICD-10-CM

## 2019-10-18 DIAGNOSIS — Z88.0 ALLERGY STATUS TO PENICILLIN: ICD-10-CM

## 2019-10-18 DIAGNOSIS — R19.7 DIARRHEA, UNSPECIFIED: ICD-10-CM

## 2019-10-18 DIAGNOSIS — Z87.448 PERSONAL HISTORY OF OTHER DISEASES OF URINARY SYSTEM: Chronic | ICD-10-CM

## 2019-10-18 DIAGNOSIS — Z88.8 ALLERGY STATUS TO OTHER DRUGS, MEDICAMENTS AND BIOLOGICAL SUBSTANCES STATUS: ICD-10-CM

## 2019-10-18 PROCEDURE — 99284 EMERGENCY DEPT VISIT MOD MDM: CPT

## 2019-10-18 NOTE — ED PROVIDER NOTE - CARE PROVIDER_API CALL
David Angel)  Internal Medicine  [address] [place] [address]  Phone: [phone]  Fax: [phone]  Follow Up Time: 1-3 Days

## 2019-10-18 NOTE — ED PROVIDER NOTE - PROGRESS NOTE DETAILS
Pt tolerating PO and eating meal at bedside. no abd pain, vomiting or diarrhea after meal.. will dc home

## 2019-10-18 NOTE — ED ADULT NURSE NOTE - NSIMPLEMENTINTERV_GEN_ALL_ED
Implemented All Universal Safety Interventions:  [place] to call system. Call bell, personal items and telephone within reach. Instruct patient to call for assistance. Room bathroom lighting operational. Non-slip footwear when patient is off stretcher. Physically safe environment: no spills, clutter or unnecessary equipment. Stretcher in lowest position, wheels locked, appropriate side rails in place.

## 2019-10-18 NOTE — ED PROVIDER NOTE - NS ED ROS FT
Review of Systems:  	•	CONSTITUTIONAL: no fever, no diaphoresis, no chills  	•	SKIN: no rash  	•	HEMATOLOGIC: no bleeding, no bruising  	•	EYES: no eye pain, no blurry vision  	•	ENT: no change in hearing, no sore throat, no ear pain or tinnitus  	•	RESPIRATORY: no shortness of breath, no cough  	•	CARDIAC: no chest pain, no palpitations  	•	GI: no abd pain, no nausea, no vomiting, + diarrhea, no constipation  	•	GENITO-URINARY: no discharge, no dysuria; no hematuria, no increased urinary frequency  	•	MUSCULOSKELETAL: no joint paint, no swelling, no redness  	•	NEUROLOGIC: no weakness, no headache, no paresthesias, no LOC  	•	PSYCH: no anxiety, non suicidal, non homicidal, no hallucination, no depression

## 2019-10-18 NOTE — ED PROVIDER NOTE - PATIENT PORTAL LINK FT
You can access the FollowMyHealth Patient Portal offered by [facility] by registering at the following website: [email or web address]. By joining [employer]’s FollowMyHealth portal, you will also be able to view your health information using other applications (apps) compatible with our system.

## 2019-10-18 NOTE — ED PROVIDER NOTE - CLINICAL SUMMARY MEDICAL DECISION MAKING FREE TEXT BOX
Pt with no sx at present.  Isolated episode of soft stool that occurred after eating something she states she didn't like.  Will d/c for outpt f/u.  Pt aware of reasons to return.  Patient was discharged from the ED. Verbal instructions were given, including instructions to return to ED immediately for any new, worsening, or concerning symptoms.  I also discussed the follow-up plan and post ED care.  Patient verbalized understanding to me. Written discharge instructions additionally given.

## 2019-10-18 NOTE — ED PROVIDER NOTE - ATTENDING CONTRIBUTION TO CARE
Pt with c/o one episode of diarrhea.  Neg pain or change in stool color.  Neg abd ttp.  a/p: D/c for outpt f/u and to return for return of sx or any other concerns.  Pt verbalized understanding.

## 2019-10-18 NOTE — ED PROVIDER NOTE - CHPI ED SYMPTOMS NEG
no blood in stool/no abdominal distension/no fever/no vomiting/no burning urination/no nausea/no chills/no dysuria

## 2019-10-18 NOTE — ED PROVIDER NOTE - PSH
H/O abdominal hysterectomy  NO RADIATION AND CHEMO  History of bladder stone  SURGERY 8/3/2018  History of surgery  JJ STENT PLACEMENT LEFT OCTOBER 2017  History of tonsillectomy  13 years old  [place] teeth removed

## 2019-11-03 ENCOUNTER — EMERGENCY (EMERGENCY)
Facility: HOSPITAL | Age: 64
LOS: 0 days | Discharge: NURSING, REHAB OR LONG-TERM CARE | End: 2019-11-03
Attending: EMERGENCY MEDICINE | Admitting: EMERGENCY MEDICINE
Payer: MEDICAID

## 2019-11-03 VITALS
WEIGHT: 250 LBS | TEMPERATURE: 98 F | DIASTOLIC BLOOD PRESSURE: 52 MMHG | OXYGEN SATURATION: 96 % | HEART RATE: 80 BPM | SYSTOLIC BLOOD PRESSURE: 114 MMHG | RESPIRATION RATE: 20 BRPM

## 2019-11-03 DIAGNOSIS — Z87.891 PERSONAL HISTORY OF NICOTINE DEPENDENCE: ICD-10-CM

## 2019-11-03 DIAGNOSIS — Z88.8 ALLERGY STATUS TO OTHER DRUGS, MEDICAMENTS AND BIOLOGICAL SUBSTANCES STATUS: ICD-10-CM

## 2019-11-03 DIAGNOSIS — R05 COUGH: ICD-10-CM

## 2019-11-03 DIAGNOSIS — J40 BRONCHITIS, NOT SPECIFIED AS ACUTE OR CHRONIC: ICD-10-CM

## 2019-11-03 DIAGNOSIS — Z88.0 ALLERGY STATUS TO PENICILLIN: ICD-10-CM

## 2019-11-03 DIAGNOSIS — K08.409 PARTIAL LOSS OF TEETH, UNSPECIFIED CAUSE, UNSPECIFIED CLASS: Chronic | ICD-10-CM

## 2019-11-03 DIAGNOSIS — Z91.040 LATEX ALLERGY STATUS: ICD-10-CM

## 2019-11-03 DIAGNOSIS — Z98.890 OTHER SPECIFIED POSTPROCEDURAL STATES: Chronic | ICD-10-CM

## 2019-11-03 DIAGNOSIS — Z87.448 PERSONAL HISTORY OF OTHER DISEASES OF URINARY SYSTEM: Chronic | ICD-10-CM

## 2019-11-03 DIAGNOSIS — Z90.89 ACQUIRED ABSENCE OF OTHER ORGANS: Chronic | ICD-10-CM

## 2019-11-03 PROCEDURE — 99284 EMERGENCY DEPT VISIT MOD MDM: CPT

## 2019-11-03 PROCEDURE — 71046 X-RAY EXAM CHEST 2 VIEWS: CPT | Mod: 26

## 2019-11-03 RX ORDER — METFORMIN HYDROCHLORIDE 850 MG/1
1 TABLET ORAL
Qty: 0 | Refills: 0 | DISCHARGE

## 2019-11-03 RX ORDER — CLARITHROMYCIN 500 MG
2 TABLET ORAL
Qty: 1 | Refills: 0
Start: 2019-11-03 | End: 2019-11-07

## 2019-11-03 NOTE — ED PROVIDER NOTE - OBJECTIVE STATEMENT
Patient from [facility] with  c/o cough for 3 weeks, No SOB, no fever, H/o COPD, Takes her nebs with relief, Sent to ED to R/o PNA, no fever, no SOB.

## 2019-11-03 NOTE — ED PROVIDER NOTE - PATIENT PORTAL LINK FT
You can access the FollowMyHealth Patient Portal offered by [facility] by registering at the following website: [email or web address]. By joining [employer]’s FollowMyHealth portal, you will also be able to view your health information using other applications (apps) compatible with our system.

## 2019-11-03 NOTE — ED PROVIDER NOTE - ATTENDING CONTRIBUTION TO CARE
smoker with cough, PE as above, imaging reveiwed, will d/c on zpak. Patient counseled regarding conditions which should prompt return.

## 2019-11-03 NOTE — ED PROVIDER NOTE - PSH
H/O abdominal hysterectomy  NO RADIATION AND CHEMO  History of bladder stone  SURGERY 8/3/2018  History of surgery  JJ STENT PLACEMENT LEFT OCTOBER 2017  History of tonsillectomy  13 years old  [place] teeth removed

## 2019-11-03 NOTE — ED ADULT NURSE NOTE - PSH
H/O abdominal hysterectomy  NO RADIATION AND CHEMO  History of bladder stone  SURGERY 8/3/2018  History of surgery  JJ STENT PLACEMENT LEFT OCTOBER 2017  History of tonsillectomy  13 years old  [place] teeth removed

## 2019-11-03 NOTE — ED PROVIDER NOTE - NSFOLLOWUPINSTRUCTIONS_ED_ALL_ED_FT
Acute Bronchitis    Bronchitis is inflammation of the airways that extend from the windpipe into the lungs (bronchi). The inflammation often causes mucus to develop. This leads to a cough, which is the most common symptom of bronchitis.     In acute bronchitis, the condition usually develops suddenly and goes away over time, usually in a couple weeks. Smoking, allergies, and asthma can make bronchitis worse. Repeated episodes of bronchitis may cause further lung problems.     CAUSES  Acute bronchitis is most often caused by the same virus that causes a cold. The virus can spread from person to person (contagious) through coughing, sneezing, and touching contaminated objects.    SIGNS AND SYMPTOMS  Cough.    Fever.    Coughing up mucus.    Body aches.    Chest congestion.    Chills.    Shortness of breath.    Sore throat.      DIAGNOSIS  Acute bronchitis is usually diagnosed through a physical exam. Your health care provider will also ask you questions about your medical history. Tests, such as chest X-rays, are sometimes done to rule out other conditions.     TREATMENT  Acute bronchitis usually goes away in a couple weeks. Oftentimes, no medical treatment is necessary. Medicines are sometimes given for relief of fever or cough. Antibiotic medicines are usually not needed but may be prescribed in certain situations. In some cases, an inhaler may be recommended to help reduce shortness of breath and control the cough. A cool mist vaporizer may also be used to help thin bronchial secretions and make it easier to clear the chest.     HOME CARE INSTRUCTIONS  Get plenty of rest.    Drink enough fluids to keep your urine clear or pale yellow (unless you have a medical condition that requires fluid restriction). Increasing fluids may help thin your respiratory secretions (sputum) and reduce chest congestion, and it will prevent dehydration.    Take medicines only as directed by your health care provider.   If you were prescribed an antibiotic medicine, finish it all even if you start to feel better.   Avoid smoking and secondhand smoke. Exposure to cigarette smoke or irritating chemicals will make bronchitis worse. If you are a smoker, consider using nicotine gum or skin patches to help control withdrawal symptoms. Quitting smoking will help your lungs heal faster.    Reduce the chances of another bout of acute bronchitis by washing your hands frequently, avoiding people with cold symptoms, and trying not to touch your hands to your mouth, nose, or eyes.    Keep all follow-up visits as directed by your health care provider.      SEEK MEDICAL CARE IF:  Your symptoms do not improve after 1 week of treatment.     SEEK IMMEDIATE MEDICAL CARE IF:  You develop an increased fever or chills.    You have chest pain.    You have severe shortness of breath.  You have bloody sputum.     You develop dehydration.  You faint or repeatedly feel like you are going to pass out.  You develop repeated vomiting.  You develop a severe headache.     MAKE SURE YOU:  Understand these instructions.   Will watch your condition.  Will get help right away if you are not doing well or get worse.    ADDITIONAL NOTES AND INSTRUCTIONS    Please follow up with your Primary MD in 24-48 hr.  Seek immediate medical care for any new/worsening signs or symptoms.     Document Released: 1/25/2006 Document Revised: 1/8/2016 Document Reviewed: 6/10/2014  [employer] Interactive Patient Education ©2016 [employer] This information is not intended to replace advice given to you by your health care provider. Make sure you discuss any questions you have with your health care provider.

## 2019-11-16 NOTE — ED ADULT NURSE NOTE - NS PRO PASSIVE SMOKE EXP
Approximately 3 weeks status post left Lisfranc's ORIF.  She is doing well.  She lives alone however is managing with the help of her brother.  She has been taking her pain medication which is helping.  She has been keeping the area clean and dry.  Incisions are healing well no signs of infection.  She admittedly has been walking on the foot in her boot despite recommendations to remain completely nonweightbearing secondary to the ORIF procedure. Follow-up in 1 week.  
Unknown

## 2019-11-30 ENCOUNTER — EMERGENCY (EMERGENCY)
Facility: HOSPITAL | Age: 64
LOS: 0 days | Discharge: HOME | End: 2019-11-30
Attending: EMERGENCY MEDICINE | Admitting: EMERGENCY MEDICINE
Payer: MEDICAID

## 2019-11-30 VITALS
TEMPERATURE: 99 F | DIASTOLIC BLOOD PRESSURE: 74 MMHG | SYSTOLIC BLOOD PRESSURE: 127 MMHG | OXYGEN SATURATION: 95 % | RESPIRATION RATE: 18 BRPM | HEART RATE: 78 BPM

## 2019-11-30 VITALS
DIASTOLIC BLOOD PRESSURE: 79 MMHG | TEMPERATURE: 101 F | WEIGHT: 293 LBS | OXYGEN SATURATION: 94 % | SYSTOLIC BLOOD PRESSURE: 140 MMHG | HEART RATE: 76 BPM | HEIGHT: 62 IN | RESPIRATION RATE: 18 BRPM

## 2019-11-30 DIAGNOSIS — Z88.0 ALLERGY STATUS TO PENICILLIN: ICD-10-CM

## 2019-11-30 DIAGNOSIS — Z90.89 ACQUIRED ABSENCE OF OTHER ORGANS: Chronic | ICD-10-CM

## 2019-11-30 DIAGNOSIS — K08.409 PARTIAL LOSS OF TEETH, UNSPECIFIED CAUSE, UNSPECIFIED CLASS: Chronic | ICD-10-CM

## 2019-11-30 DIAGNOSIS — Z91.040 LATEX ALLERGY STATUS: ICD-10-CM

## 2019-11-30 DIAGNOSIS — Z87.448 PERSONAL HISTORY OF OTHER DISEASES OF URINARY SYSTEM: Chronic | ICD-10-CM

## 2019-11-30 DIAGNOSIS — R05 COUGH: ICD-10-CM

## 2019-11-30 DIAGNOSIS — Z88.8 ALLERGY STATUS TO OTHER DRUGS, MEDICAMENTS AND BIOLOGICAL SUBSTANCES STATUS: ICD-10-CM

## 2019-11-30 DIAGNOSIS — Z98.890 OTHER SPECIFIED POSTPROCEDURAL STATES: Chronic | ICD-10-CM

## 2019-11-30 DIAGNOSIS — J11.1 INFLUENZA DUE TO UNIDENTIFIED INFLUENZA VIRUS WITH OTHER RESPIRATORY MANIFESTATIONS: ICD-10-CM

## 2019-11-30 PROCEDURE — 71046 X-RAY EXAM CHEST 2 VIEWS: CPT | Mod: 26

## 2019-11-30 PROCEDURE — 99284 EMERGENCY DEPT VISIT MOD MDM: CPT

## 2019-11-30 RX ORDER — CITALOPRAM 10 MG/1
1 TABLET, FILM COATED ORAL
Qty: 0 | Refills: 0 | DISCHARGE

## 2019-11-30 RX ORDER — PHENOBARBITAL 60 MG
1 TABLET ORAL
Qty: 0 | Refills: 0 | DISCHARGE

## 2019-11-30 RX ORDER — LEVOTHYROXINE SODIUM 125 MCG
1 TABLET ORAL
Qty: 0 | Refills: 0 | DISCHARGE

## 2019-11-30 RX ORDER — DEXAMETHASONE 0.5 MG/5ML
10 ELIXIR ORAL ONCE
Refills: 0 | Status: COMPLETED | OUTPATIENT
Start: 2019-11-30 | End: 2019-11-30

## 2019-11-30 RX ORDER — ACETAMINOPHEN 500 MG
975 TABLET ORAL ONCE
Refills: 0 | Status: COMPLETED | OUTPATIENT
Start: 2019-11-30 | End: 2019-11-30

## 2019-11-30 RX ORDER — TOPIRAMATE 25 MG
1 TABLET ORAL
Qty: 0 | Refills: 0 | DISCHARGE

## 2019-11-30 RX ORDER — POTASSIUM CITRATE MONOHYDRATE 100 %
0 POWDER (GRAM) MISCELLANEOUS
Qty: 0 | Refills: 0 | DISCHARGE

## 2019-11-30 RX ORDER — IPRATROPIUM/ALBUTEROL SULFATE 18-103MCG
3 AEROSOL WITH ADAPTER (GRAM) INHALATION ONCE
Refills: 0 | Status: COMPLETED | OUTPATIENT
Start: 2019-11-30 | End: 2019-11-30

## 2019-11-30 RX ORDER — MONTELUKAST 4 MG/1
1 TABLET, CHEWABLE ORAL
Qty: 0 | Refills: 0 | DISCHARGE

## 2019-11-30 RX ORDER — VORTIOXETINE 5 MG/1
0 TABLET, FILM COATED ORAL
Qty: 0 | Refills: 0 | DISCHARGE

## 2019-11-30 RX ORDER — LORATADINE 10 MG/1
1 TABLET ORAL
Qty: 0 | Refills: 0 | DISCHARGE

## 2019-11-30 RX ORDER — METFORMIN HYDROCHLORIDE 850 MG/1
0 TABLET ORAL
Qty: 0 | Refills: 0 | DISCHARGE

## 2019-11-30 RX ADMIN — Medication 3 MILLILITER(S): at 10:01

## 2019-11-30 RX ADMIN — Medication 10 MILLIGRAM(S): at 11:15

## 2019-11-30 RX ADMIN — Medication 3 MILLILITER(S): at 09:50

## 2019-11-30 RX ADMIN — Medication 975 MILLIGRAM(S): at 10:02

## 2019-11-30 NOTE — ED PROVIDER NOTE - CARDIAC, MLM
Current meds. See medication reconciliation form. Reviewed with pt. Pt denies taking ASA,other blood thinners or anti-inflammatories. Pt has a ride post-procedure (Pedro Luis is ). Printed and verbal discharge instructions given to pt who verbalized understanding.   Normal rate, regular rhythm.  Heart sounds S1, S2.  No murmurs, rubs or gallops.

## 2019-11-30 NOTE — ED PROVIDER NOTE - OBJECTIVE STATEMENT
65 y/o female with hx of COPD, NIDDM presents to the ED for evaluation of cough and congestion x 2 days. patient with chills. no flu vaccine this season. +sick contacts. +non productive cough. no chest pain, leg swelling, syncope. patient denies any hemoptysis , orthopnea, exertional dyspnea. patient without any treatment prior to ED arrival. patient c/o sore scratchy throat , denies any earache.

## 2019-11-30 NOTE — ED ADULT NURSE NOTE - NSIMPLEMENTINTERV_GEN_ALL_ED
Implemented All Universal Safety Interventions:  [place] to call system. Call bell, personal items and telephone within reach. Instruct patient to call for assistance. Room bathroom lighting operational. Non-slip footwear when patient is off stretcher. Physically safe environment: no spills, clutter or unnecessary equipment. Stretcher in lowest position, wheels locked, appropriate side rails in place.

## 2019-11-30 NOTE — ED PROVIDER NOTE - CARE PROVIDER_API CALL
David Angel)  Internal Medicine  [address] [place] [address]  Phone: [phone]  Fax: [phone]  Follow Up Time:

## 2019-11-30 NOTE — ED PROVIDER NOTE - NSFOLLOWUPINSTRUCTIONS_ED_ALL_ED_FT
Influenza    Influenza, more commonly known as "the flu," is a viral infection that primarily affects the respiratory tract. The respiratory tract includes organs that help you breathe, such as the lungs, nose, and throat. The flu causes many common cold symptoms, as well as a high fever and body aches.     The flu spreads easily from person to person (is contagious). Getting a flu shot (influenza vaccination) every year is the best way to prevent influenza.    CAUSES  Influenza is caused by a virus. You can catch the virus by:    Breathing in droplets from an infected person's cough or sneeze.  Touching something that was recently contaminated with the virus and then touching your mouth, nose, or eyes.    RISK FACTORS  The following factors may make you more likely to get the flu:    Not cleaning your hands frequently with soap and water or alcohol-based hand .  Having close contact with many people during cold and flu season.  Touching your mouth, eyes, or nose without washing or sanitizing your hands first.  Not drinking enough fluids or not eating a healthy diet.  Not getting enough sleep or exercise.  Being under a high amount of stress.  Not getting a yearly (annual) flu shot.    You may be at a higher risk of complications from the flu, such as a severe lung infection (pneumonia), if you:    Are over the age of 65.  Are pregnant.  Have a weakened disease-fighting system (immune system). You may have a weakened immune system if you:  Have HIV or AIDS.  Are undergoing chemotherapy.  Are taking medicines that reduce the activity of (suppress) the immune system.  Have a long-term (chronic) illness, such as heart disease, kidney disease, diabetes, or lung disease.  Have a liver disorder.  Are obese.  Have anemia.    SYMPTOMS  Symptoms of this condition typically last 4–10 days and may include:    Fever.  Chills.  Headache, body aches, or muscle aches.  Sore throat.  Cough.  Runny or congested nose.  Chest discomfort and cough.  Poor appetite.  Weakness or tiredness (fatigue).  Dizziness.  Nausea or vomiting.    DIAGNOSIS  This condition may be diagnosed based on your medical history and a physical exam. Your health care provider may do a nose or throat swab test to confirm the diagnosis.    TREATMENT  If influenza is detected early, you can be treated with antiviral medicine that can reduce the length of your illness and the severity of your symptoms. This medicine may be given by mouth (orally) or through an IV tube that is inserted in one of your veins.    The goal of treatment is to relieve symptoms by taking care of yourself at home. This may include taking over-the-counter medicines, drinking plenty of fluids, and adding humidity to the air in your home.    In some cases, influenza goes away on its own. Severe influenza or complications from influenza may be treated in a hospital.     HOME CARE INSTRUCTIONS  Take over-the-counter and prescription medicines only as told by your health care provider.  Use a cool mist humidifier to add humidity to the air in your home. This can make breathing easier.  Rest as needed.  Drink enough fluid to keep your urine clear or pale yellow.  Cover your mouth and nose when you cough or sneeze.  Wash your hands with soap and water often, especially after you cough or sneeze. If soap and water are not available, use hand .  Stay home from work or school as told by your health care provider. Unless you are visiting your health care provider, try to avoid leaving home until your fever has been gone for 24 hours without the use of medicine.  Keep all follow-up visits as told by your health care provider. This is important.    PREVENTION  Getting an annual flu shot is the best way to avoid getting the flu. You may get the flu shot in late summer, fall, or winter. Ask your health care provider when you should get your flu shot.  Wash your hands often or use hand  often.  Avoid contact with people who are sick during cold and flu season.  Eat a healthy diet, drink plenty of fluids, get enough sleep, and exercise regularly.    SEEK MEDICAL CARE IF:  You develop new symptoms.  You have:  Chest pain.  Diarrhea.  A fever.  Your cough gets worse.  You produce more mucus.  You feel nauseous or you vomit.    SEEK IMMEDIATE MEDICAL CARE IF:  You develop shortness of breath or difficulty breathing.  Your skin or nails turn a bluish color.  You have severe pain or stiffness in your neck.  You develop a sudden headache or sudden pain in your face or ear.  You cannot stop vomiting.    ADDITIONAL NOTES AND INSTRUCTIONS    Please follow up with your Primary MD in 24-48 hr.  Seek immediate medical care for any new/worsening signs or symptoms.

## 2019-11-30 NOTE — ED PROVIDER NOTE - ATTENDING CONTRIBUTION TO CARE
64y female above PMH with fever/cough/congestion, multiple sick contacts at group home with rsv and flu, PE as above, imaging reviewed, will d/c on tamiflu given comorbids, pmd f/u. Patient counseled regarding conditions which should prompt return.

## 2019-12-04 ENCOUNTER — EMERGENCY (EMERGENCY)
Facility: HOSPITAL | Age: 64
LOS: 0 days | Discharge: NURSING, REHAB OR LONG-TERM CARE | End: 2019-12-04
Attending: EMERGENCY MEDICINE | Admitting: EMERGENCY MEDICINE
Payer: MEDICAID

## 2019-12-04 VITALS
RESPIRATION RATE: 18 BRPM | OXYGEN SATURATION: 96 % | HEART RATE: 65 BPM | DIASTOLIC BLOOD PRESSURE: 65 MMHG | SYSTOLIC BLOOD PRESSURE: 134 MMHG | TEMPERATURE: 98 F

## 2019-12-04 VITALS
DIASTOLIC BLOOD PRESSURE: 63 MMHG | HEART RATE: 74 BPM | WEIGHT: 293 LBS | TEMPERATURE: 99 F | HEIGHT: 62 IN | SYSTOLIC BLOOD PRESSURE: 137 MMHG | RESPIRATION RATE: 16 BRPM | OXYGEN SATURATION: 93 %

## 2019-12-04 DIAGNOSIS — Z98.890 OTHER SPECIFIED POSTPROCEDURAL STATES: Chronic | ICD-10-CM

## 2019-12-04 DIAGNOSIS — R19.7 DIARRHEA, UNSPECIFIED: ICD-10-CM

## 2019-12-04 DIAGNOSIS — Z87.448 PERSONAL HISTORY OF OTHER DISEASES OF URINARY SYSTEM: Chronic | ICD-10-CM

## 2019-12-04 DIAGNOSIS — K08.409 PARTIAL LOSS OF TEETH, UNSPECIFIED CAUSE, UNSPECIFIED CLASS: Chronic | ICD-10-CM

## 2019-12-04 DIAGNOSIS — Z90.89 ACQUIRED ABSENCE OF OTHER ORGANS: Chronic | ICD-10-CM

## 2019-12-04 DIAGNOSIS — R05 COUGH: ICD-10-CM

## 2019-12-04 LAB
ALBUMIN SERPL ELPH-MCNC: 3.6 G/DL — SIGNIFICANT CHANGE UP (ref 3.5–5.2)
ALP SERPL-CCNC: 48 U/L — SIGNIFICANT CHANGE UP (ref 30–115)
ALT FLD-CCNC: 26 U/L — SIGNIFICANT CHANGE UP (ref 0–41)
ANION GAP SERPL CALC-SCNC: 14 MMOL/L — SIGNIFICANT CHANGE UP (ref 7–14)
AST SERPL-CCNC: 41 U/L — SIGNIFICANT CHANGE UP (ref 0–41)
BILIRUB SERPL-MCNC: 0.3 MG/DL — SIGNIFICANT CHANGE UP (ref 0.2–1.2)
BUN SERPL-MCNC: 11 MG/DL — SIGNIFICANT CHANGE UP (ref 10–20)
CALCIUM SERPL-MCNC: 8.4 MG/DL — LOW (ref 8.5–10.1)
CHLORIDE SERPL-SCNC: 104 MMOL/L — SIGNIFICANT CHANGE UP (ref 98–110)
CO2 SERPL-SCNC: 20 MMOL/L — SIGNIFICANT CHANGE UP (ref 17–32)
CREAT SERPL-MCNC: 0.9 MG/DL — SIGNIFICANT CHANGE UP (ref 0.7–1.5)
GLUCOSE SERPL-MCNC: 98 MG/DL — SIGNIFICANT CHANGE UP (ref 70–99)
HCT VFR BLD CALC: 42.3 % — SIGNIFICANT CHANGE UP (ref 37–47)
HGB BLD-MCNC: 13.9 G/DL — SIGNIFICANT CHANGE UP (ref 12–16)
MCHC RBC-ENTMCNC: 32 PG — HIGH (ref 27–31)
MCHC RBC-ENTMCNC: 32.9 G/DL — SIGNIFICANT CHANGE UP (ref 32–37)
MCV RBC AUTO: 97.2 FL — SIGNIFICANT CHANGE UP (ref 81–99)
NRBC # BLD: 0 /100 WBCS — SIGNIFICANT CHANGE UP (ref 0–0)
PLATELET # BLD AUTO: 142 K/UL — SIGNIFICANT CHANGE UP (ref 130–400)
POTASSIUM SERPL-MCNC: 4.1 MMOL/L — SIGNIFICANT CHANGE UP (ref 3.5–5)
POTASSIUM SERPL-SCNC: 4.1 MMOL/L — SIGNIFICANT CHANGE UP (ref 3.5–5)
PROT SERPL-MCNC: 6.1 G/DL — SIGNIFICANT CHANGE UP (ref 6–8)
RBC # BLD: 4.35 M/UL — SIGNIFICANT CHANGE UP (ref 4.2–5.4)
RBC # FLD: 13.2 % — SIGNIFICANT CHANGE UP (ref 11.5–14.5)
SODIUM SERPL-SCNC: 138 MMOL/L — SIGNIFICANT CHANGE UP (ref 135–146)
WBC # BLD: 4.26 K/UL — LOW (ref 4.8–10.8)
WBC # FLD AUTO: 4.26 K/UL — LOW (ref 4.8–10.8)

## 2019-12-04 PROCEDURE — 99284 EMERGENCY DEPT VISIT MOD MDM: CPT

## 2019-12-04 RX ORDER — SODIUM CHLORIDE 9 MG/ML
1000 INJECTION INTRAMUSCULAR; INTRAVENOUS; SUBCUTANEOUS ONCE
Refills: 0 | Status: COMPLETED | OUTPATIENT
Start: 2019-12-04 | End: 2019-12-04

## 2019-12-04 RX ORDER — ALBUTEROL 90 UG/1
2 AEROSOL, METERED ORAL ONCE
Refills: 0 | Status: COMPLETED | OUTPATIENT
Start: 2019-12-04 | End: 2019-12-04

## 2019-12-04 RX ADMIN — ALBUTEROL 2 PUFF(S): 90 AEROSOL, METERED ORAL at 15:21

## 2019-12-04 RX ADMIN — Medication 60 MILLIGRAM(S): at 15:20

## 2019-12-04 RX ADMIN — SODIUM CHLORIDE 1000 MILLILITER(S): 9 INJECTION INTRAMUSCULAR; INTRAVENOUS; SUBCUTANEOUS at 13:16

## 2019-12-04 NOTE — ED ADULT TRIAGE NOTE - CHIEF COMPLAINT QUOTE
BIBA from adult home c/o of having  diarrhea for 1 week a total of 4 times this as per as per EMS denies abdominal pain.  Patient states she was on antibiotics the beginning of November for a lung infection

## 2019-12-04 NOTE — ED PROVIDER NOTE - CLINICAL SUMMARY MEDICAL DECISION MAKING FREE TEXT BOX
Pt with diarrhea today x 1, unable to provide stool sample in ED since no more BM.  last reassuring, pt recently on tamiflu, possible side effects.  pt otherwise only c/o chronic cough and requesting cough med.  pt dc to [facility]

## 2019-12-04 NOTE — ED PROVIDER NOTE - PROGRESS NOTE DETAILS
pt was unable to give stool sample as she has not had BM in ED.  called [facility] to update regarding pt's status.  pt to be dc back to [facility].  f/u with pmd.  +rx for tessalon perles

## 2019-12-04 NOTE — ED ADULT NURSE NOTE - NSIMPLEMENTINTERV_GEN_ALL_ED
Implemented All Fall with Harm Risk Interventions:  [place] to call system. Call bell, personal items and telephone within reach. Instruct patient to call for assistance. Room bathroom lighting operational. Non-slip footwear when patient is off stretcher. Physically safe environment: no spills, clutter or unnecessary equipment. Stretcher in lowest position, wheels locked, appropriate side rails in place. Provide visual cue, wrist band, yellow gown, etc. Monitor gait and stability. Monitor for mental status changes and reorient to person, place, and time. Review medications for side effects contributing to fall risk. Reinforce activity limits and safety measures with patient and family. Provide visual clues: red socks.

## 2019-12-04 NOTE — ED PROVIDER NOTE - PSH
H/O abdominal hysterectomy  NO RADIATION AND CHEMO  History of bladder stone  SURGERY 8/3/2018  History of surgery  JJ STENT PLACEMENT LEFT OCTOBER 2017  History of tonsillectomy  13 years old  [place] teeth removed

## 2019-12-04 NOTE — ED PROVIDER NOTE - CARE PROVIDER_API CALL
David Angel)  Internal Medicine  [address] [place] [address]  Phone: [phone]  Fax: [phone]  Follow Up Time: 1-3 Days

## 2019-12-04 NOTE — ED PROVIDER NOTE - NS ED ROS FT
Review of Systems:  	•	CONSTITUTIONAL - no fever, no diaphoresis, no weight change  	•	SKIN - no rash  	•	HEMATOLOGIC - no bleeding, no bruising  	•	EYES - no eye pain, no blurred vision  	•	ENT - no change in hearing, no pain  	•	RESPIRATORY - no shortness of breath, + cough  	•	CARDIAC - no chest pain, no palpitations  	•	GI - no abd pain, no nausea, no vomiting, + diarrhea, no constipation, no bleeding  	•	 - no dysuria, no hematuria, no flank pain, no urinary retention  	•	MUSCULOSKELETAL - no joint paint, no swelling, no redness  	•	NEUROLOGIC - no weakness, no headache, no paresthesias, no dizziness  All other systems negative, unless specified in HPI

## 2019-12-04 NOTE — ED PROVIDER NOTE - OBJECTIVE STATEMENT
65 yo f with pmh of dm, asthma, gerd, seizures, sent by [facility] diarrhea.  pt says she one episode today, but none since. no abd pain.  no n/v.  pt was recently in ED for cough, dx with flu like illness and pt has been taking tamiflu.  pt says she has a chronic cough.  having intermittent low grade fever since flu diagnosis.  no cp, no sob.

## 2019-12-04 NOTE — ED ADULT NURSE NOTE - PSH
H/O abdominal hysterectomy  NO RADIATION AND CHEMO  History of bladder stone  SURGERY 8/3/2018  History of surgery  JJ STENT PLACEMENT LEFT OCTOBER 2017  History of tonsillectomy  13 years old  [place] teeth removed

## 2019-12-04 NOTE — ED PROVIDER NOTE - PHYSICAL EXAMINATION
VITAL SIGNS: I have reviewed nursing notes and confirm.  CONSTITUTIONAL: Well-developed; well-nourished; in no acute distress.  SKIN: Skin exam is warm and dry, no acute rash.  HEAD: Normocephalic; atraumatic.  EYES: PERRL, EOM intact; conjunctiva and sclera clear.  ENT: No nasal discharge; airway clear. TMs clear.  NECK: Supple; non tender.  CARD: S1, S2 normal; no murmurs, gallops, or rubs. Regular rate and rhythm.  RESP: No wheezes, rales or rhonchi.  ABD: Normal bowel sounds; soft; non-distended; non-tender; obese  EXT: Normal ROM. No clubbing, cyanosis or edema.  NEURO: aox3, cn2-12 grossly normal, 5/5 strength all ext, sensation intact, finger to nose normal, romberg neg, normal gait  PSYCH: Cooperative, appropriate.

## 2019-12-08 NOTE — ED ADULT TRIAGE NOTE - WEIGHT IN LBS
Per patient's family, patient can not take Lipitor, it causes a very bad side effect of making him angry and wanting to hurt people. He can take Simvastatin 315

## 2020-01-27 ENCOUNTER — INPATIENT (INPATIENT)
Facility: HOSPITAL | Age: 65
LOS: 3 days | Discharge: NURSING, REHAB OR LONG-TERM CARE | End: 2020-01-31
Attending: INTERNAL MEDICINE | Admitting: INTERNAL MEDICINE
Payer: MEDICAID

## 2020-01-27 VITALS
SYSTOLIC BLOOD PRESSURE: 179 MMHG | DIASTOLIC BLOOD PRESSURE: 78 MMHG | HEART RATE: 58 BPM | OXYGEN SATURATION: 97 % | HEIGHT: 62 IN | TEMPERATURE: 96 F | RESPIRATION RATE: 20 BRPM | WEIGHT: 293 LBS

## 2020-01-27 DIAGNOSIS — Z98.890 OTHER SPECIFIED POSTPROCEDURAL STATES: Chronic | ICD-10-CM

## 2020-01-27 DIAGNOSIS — M19.90 UNSPECIFIED OSTEOARTHRITIS, UNSPECIFIED SITE: ICD-10-CM

## 2020-01-27 DIAGNOSIS — Z87.448 PERSONAL HISTORY OF OTHER DISEASES OF URINARY SYSTEM: Chronic | ICD-10-CM

## 2020-01-27 DIAGNOSIS — G40.909 EPILEPSY, UNSPECIFIED, NOT INTRACTABLE, WITHOUT STATUS EPILEPTICUS: ICD-10-CM

## 2020-01-27 DIAGNOSIS — F32.9 MAJOR DEPRESSIVE DISORDER, SINGLE EPISODE, UNSPECIFIED: ICD-10-CM

## 2020-01-27 DIAGNOSIS — J45.909 UNSPECIFIED ASTHMA, UNCOMPLICATED: ICD-10-CM

## 2020-01-27 DIAGNOSIS — Z90.89 ACQUIRED ABSENCE OF OTHER ORGANS: Chronic | ICD-10-CM

## 2020-01-27 DIAGNOSIS — K08.409 PARTIAL LOSS OF TEETH, UNSPECIFIED CAUSE, UNSPECIFIED CLASS: Chronic | ICD-10-CM

## 2020-01-27 DIAGNOSIS — R26.2 DIFFICULTY IN WALKING, NOT ELSEWHERE CLASSIFIED: ICD-10-CM

## 2020-01-27 LAB
ANION GAP SERPL CALC-SCNC: 12 MMOL/L — SIGNIFICANT CHANGE UP (ref 7–14)
APTT BLD: 31.6 SEC — SIGNIFICANT CHANGE UP (ref 27–39.2)
BUN SERPL-MCNC: 17 MG/DL — SIGNIFICANT CHANGE UP (ref 10–20)
CALCIUM SERPL-MCNC: 8.6 MG/DL — SIGNIFICANT CHANGE UP (ref 8.5–10.1)
CHLORIDE SERPL-SCNC: 107 MMOL/L — SIGNIFICANT CHANGE UP (ref 98–110)
CO2 SERPL-SCNC: 23 MMOL/L — SIGNIFICANT CHANGE UP (ref 17–32)
CREAT SERPL-MCNC: 0.9 MG/DL — SIGNIFICANT CHANGE UP (ref 0.7–1.5)
GLUCOSE BLDC GLUCOMTR-MCNC: 117 MG/DL — HIGH (ref 70–99)
GLUCOSE SERPL-MCNC: 100 MG/DL — HIGH (ref 70–99)
HCT VFR BLD CALC: 39.6 % — SIGNIFICANT CHANGE UP (ref 37–47)
HGB BLD-MCNC: 12.5 G/DL — SIGNIFICANT CHANGE UP (ref 12–16)
INR BLD: 1.04 RATIO — SIGNIFICANT CHANGE UP (ref 0.65–1.3)
MCHC RBC-ENTMCNC: 31.2 PG — HIGH (ref 27–31)
MCHC RBC-ENTMCNC: 31.6 G/DL — LOW (ref 32–37)
MCV RBC AUTO: 98.8 FL — SIGNIFICANT CHANGE UP (ref 81–99)
NRBC # BLD: 0 /100 WBCS — SIGNIFICANT CHANGE UP (ref 0–0)
PLATELET # BLD AUTO: 175 K/UL — SIGNIFICANT CHANGE UP (ref 130–400)
POTASSIUM SERPL-MCNC: 4.4 MMOL/L — SIGNIFICANT CHANGE UP (ref 3.5–5)
POTASSIUM SERPL-SCNC: 4.4 MMOL/L — SIGNIFICANT CHANGE UP (ref 3.5–5)
PROTHROM AB SERPL-ACNC: 12 SEC — SIGNIFICANT CHANGE UP (ref 9.95–12.87)
RBC # BLD: 4.01 M/UL — LOW (ref 4.2–5.4)
RBC # FLD: 13.6 % — SIGNIFICANT CHANGE UP (ref 11.5–14.5)
SODIUM SERPL-SCNC: 142 MMOL/L — SIGNIFICANT CHANGE UP (ref 135–146)
WBC # BLD: 9.16 K/UL — SIGNIFICANT CHANGE UP (ref 4.8–10.8)
WBC # FLD AUTO: 9.16 K/UL — SIGNIFICANT CHANGE UP (ref 4.8–10.8)

## 2020-01-27 PROCEDURE — 73110 X-RAY EXAM OF WRIST: CPT | Mod: 26,LT

## 2020-01-27 PROCEDURE — 73564 X-RAY EXAM KNEE 4 OR MORE: CPT | Mod: 26,LT

## 2020-01-27 PROCEDURE — 99285 EMERGENCY DEPT VISIT HI MDM: CPT

## 2020-01-27 PROCEDURE — 73552 X-RAY EXAM OF FEMUR 2/>: CPT | Mod: 26,LT

## 2020-01-27 PROCEDURE — 73590 X-RAY EXAM OF LOWER LEG: CPT | Mod: 26,LT

## 2020-01-27 PROCEDURE — 71045 X-RAY EXAM CHEST 1 VIEW: CPT | Mod: 26

## 2020-01-27 PROCEDURE — 99223 1ST HOSP IP/OBS HIGH 75: CPT

## 2020-01-27 PROCEDURE — 72170 X-RAY EXAM OF PELVIS: CPT | Mod: 26

## 2020-01-27 PROCEDURE — 73090 X-RAY EXAM OF FOREARM: CPT | Mod: 26,LT

## 2020-01-27 RX ORDER — ACETAMINOPHEN 500 MG
650 TABLET ORAL EVERY 6 HOURS
Refills: 0 | Status: DISCONTINUED | OUTPATIENT
Start: 2020-01-27 | End: 2020-01-31

## 2020-01-27 RX ORDER — DEXTROSE 50 % IN WATER 50 %
25 SYRINGE (ML) INTRAVENOUS ONCE
Refills: 0 | Status: DISCONTINUED | OUTPATIENT
Start: 2020-01-27 | End: 2020-01-31

## 2020-01-27 RX ORDER — IBUPROFEN 200 MG
600 TABLET ORAL EVERY 6 HOURS
Refills: 0 | Status: DISCONTINUED | OUTPATIENT
Start: 2020-01-27 | End: 2020-01-31

## 2020-01-27 RX ORDER — LORATADINE 10 MG/1
10 TABLET ORAL DAILY
Refills: 0 | Status: DISCONTINUED | OUTPATIENT
Start: 2020-01-27 | End: 2020-01-31

## 2020-01-27 RX ORDER — OXYCODONE HYDROCHLORIDE 5 MG/1
5 TABLET ORAL EVERY 6 HOURS
Refills: 0 | Status: DISCONTINUED | OUTPATIENT
Start: 2020-01-27 | End: 2020-01-31

## 2020-01-27 RX ORDER — LEVOTHYROXINE SODIUM 125 MCG
50 TABLET ORAL DAILY
Refills: 0 | Status: DISCONTINUED | OUTPATIENT
Start: 2020-01-27 | End: 2020-01-31

## 2020-01-27 RX ORDER — CITALOPRAM 10 MG/1
20 TABLET, FILM COATED ORAL DAILY
Refills: 0 | Status: DISCONTINUED | OUTPATIENT
Start: 2020-01-27 | End: 2020-01-31

## 2020-01-27 RX ORDER — IBUPROFEN 200 MG
600 TABLET ORAL ONCE
Refills: 0 | Status: COMPLETED | OUTPATIENT
Start: 2020-01-27 | End: 2020-01-27

## 2020-01-27 RX ORDER — SODIUM CHLORIDE 9 MG/ML
1000 INJECTION, SOLUTION INTRAVENOUS
Refills: 0 | Status: DISCONTINUED | OUTPATIENT
Start: 2020-01-27 | End: 2020-01-31

## 2020-01-27 RX ORDER — PHENOBARBITAL 60 MG
32.4 TABLET ORAL EVERY 8 HOURS
Refills: 0 | Status: DISCONTINUED | OUTPATIENT
Start: 2020-01-27 | End: 2020-01-31

## 2020-01-27 RX ORDER — CHLORHEXIDINE GLUCONATE 213 G/1000ML
1 SOLUTION TOPICAL
Refills: 0 | Status: DISCONTINUED | OUTPATIENT
Start: 2020-01-27 | End: 2020-01-31

## 2020-01-27 RX ORDER — MONTELUKAST 4 MG/1
10 TABLET, CHEWABLE ORAL DAILY
Refills: 0 | Status: DISCONTINUED | OUTPATIENT
Start: 2020-01-27 | End: 2020-01-31

## 2020-01-27 RX ORDER — GLUCAGON INJECTION, SOLUTION 0.5 MG/.1ML
1 INJECTION, SOLUTION SUBCUTANEOUS ONCE
Refills: 0 | Status: DISCONTINUED | OUTPATIENT
Start: 2020-01-27 | End: 2020-01-31

## 2020-01-27 RX ORDER — METFORMIN HYDROCHLORIDE 850 MG/1
1 TABLET ORAL
Qty: 0 | Refills: 0 | DISCHARGE

## 2020-01-27 RX ORDER — ENOXAPARIN SODIUM 100 MG/ML
40 INJECTION SUBCUTANEOUS DAILY
Refills: 0 | Status: DISCONTINUED | OUTPATIENT
Start: 2020-01-27 | End: 2020-01-31

## 2020-01-27 RX ORDER — DEXTROSE 50 % IN WATER 50 %
15 SYRINGE (ML) INTRAVENOUS ONCE
Refills: 0 | Status: DISCONTINUED | OUTPATIENT
Start: 2020-01-27 | End: 2020-01-31

## 2020-01-27 RX ORDER — DEXTROSE 50 % IN WATER 50 %
12.5 SYRINGE (ML) INTRAVENOUS ONCE
Refills: 0 | Status: DISCONTINUED | OUTPATIENT
Start: 2020-01-27 | End: 2020-01-31

## 2020-01-27 RX ORDER — VORTIOXETINE 5 MG/1
1 TABLET, FILM COATED ORAL
Qty: 0 | Refills: 0 | DISCHARGE

## 2020-01-27 RX ORDER — TOPIRAMATE 25 MG
100 TABLET ORAL
Refills: 0 | Status: DISCONTINUED | OUTPATIENT
Start: 2020-01-27 | End: 2020-01-31

## 2020-01-27 RX ORDER — ACETAMINOPHEN 500 MG
650 TABLET ORAL ONCE
Refills: 0 | Status: COMPLETED | OUTPATIENT
Start: 2020-01-27 | End: 2020-01-27

## 2020-01-27 RX ADMIN — Medication 600 MILLIGRAM(S): at 12:12

## 2020-01-27 RX ADMIN — OXYCODONE HYDROCHLORIDE 5 MILLIGRAM(S): 5 TABLET ORAL at 20:48

## 2020-01-27 RX ADMIN — Medication 600 MILLIGRAM(S): at 18:59

## 2020-01-27 RX ADMIN — Medication 100 MILLIGRAM(S): at 21:16

## 2020-01-27 RX ADMIN — OXYCODONE HYDROCHLORIDE 5 MILLIGRAM(S): 5 TABLET ORAL at 22:30

## 2020-01-27 RX ADMIN — Medication 32.4 MILLIGRAM(S): at 21:16

## 2020-01-27 NOTE — PATIENT PROFILE ADULT - ...
PT staff left pt a message for him to call us and update his status. Last completed PT visit was on Feb 1, 2019.    27-Jan-2020 16:20:31

## 2020-01-27 NOTE — H&P ADULT - NSICDXPASTMEDICALHX_GEN_ALL_CORE_FT
PAST MEDICAL HISTORY:  Asthma     Chronic cough     Diabetes     Epilepsy     Knee pain     Osteoarthritis     Renal failure     Renal stone     Uterine cancer s/p hysterectomy

## 2020-01-27 NOTE — H&P ADULT - NSHPLABSRESULTS_GEN_ALL_CORE
< from: Xray Forearm, Left (01.27.20 @ 11:36) >    Findings/  impression: No acute fracture or dislocation. Elbow degenerative changes.    < end of copied text >    < from: Xray Wrist 3 Views, Left (01.27.20 @ 11:36) >    Findings/  impression: No acute fracture or dislocation. Triscaphe and first metacarpal carpal degenerative changes, mild... Ulnar styloid ossicle    < end of copied text >    < from: Xray Tibia + Fibula 2 Views, Left (01.27.20 @ 11:36) >      Findings/  impression: No acute fracture or dislocation. Knee degenerative arthritis.    < end of copied text >    < from: Xray Femur 2 Views, Left (01.27.20 @ 11:36) >    Findings/  impression: No acute fracture or dislocation.    < end of copied text >    < from: Xray Knee 4 Views, Left (01.27.20 @ 11:36) >    Findings/  impression: No acute fracture. Degenerative arthritis. Synovial chondromatosis..    < end of copied text >    < from: Xray Pelvis AP only (01.27.20 @ 11:36) >    Findings/  impression: No acute fracture or dislocation. Lower lumbar spinedegenerative changes.    < end of copied text >    < from: Xray Chest 1 View-PORTABLE IMMEDIATE (01.27.20 @ 11:36) >    Impression:      Interstitial opacities, unchanged.    < end of copied text >

## 2020-01-27 NOTE — ED ADULT TRIAGE NOTE - CHIEF COMPLAINT QUOTE
BIBA from [facility] as per pt "I fell going to the bathroom this morning, now my left knee and left wrist hurts". Denies LOC and Denies AC

## 2020-01-27 NOTE — H&P ADULT - PROBLEM SELECTOR PLAN 1
admit to medicine  PT evaluation  pain control  VTE lovenox  vitals per routine  regular diet  no evidence of fracture on xrays

## 2020-01-27 NOTE — H&P ADULT - NSICDXPASTSURGICALHX_GEN_ALL_CORE_FT
PAST SURGICAL HISTORY:  H/O abdominal hysterectomy NO RADIATION AND CHEMO    History of bladder stone SURGERY 8/3/2018    History of surgery JJ STENT PLACEMENT LEFT OCTOBER 2017    History of tonsillectomy 13 years old    [place] teeth removed

## 2020-01-27 NOTE — ED PROVIDER NOTE - PHYSICAL EXAMINATION
CONSTITUTIONAL: WA / WN / NAD  HEAD: NCAT  EYES: PERRL; EOMI;   ENT: Normal pharynx; mucous membranes pink/moist, no erythema.  NECK: Supple; no meningeal signs  CARD: RRR; nl S1/S2; no M/R/G.   RESP: Respiratory rate and effort are normal; breath sounds clear and equal bilaterally.  ABD: Soft, NT ND  MSK/EXT: No gross deformities; + left distal radius tenderness no palpaation. No tenderness to anatomical snuff box. +TTP left knee. No midline CTLS tenderness  SKIN: Warm and dry;   NEURO: AAOx3  PSYCH: Memory Intact, Normal Affect

## 2020-01-27 NOTE — H&P ADULT - NSHPPHYSICALEXAM_GEN_ALL_CORE
Vital Signs (24 Hrs):  T(C): 35.6 (01-27-20 @ 10:35), Max: 35.6 (01-27-20 @ 10:35)  HR: 58 (01-27-20 @ 10:35) (58 - 58)  BP: 179/78 (01-27-20 @ 10:35) (179/78 - 179/78)  RR: 20 (01-27-20 @ 10:35) (20 - 20)  SpO2: 97% (01-27-20 @ 10:35) (97% - 97%)  Wt(kg): --  Daily Height in cm: 157.48 (27 Jan 2020 10:35)    Daily     I&O's Summary      PHYSICAL EXAM:  GENERAL: NAD, morbidly obese  SKIN: No rashes or lesions  HEAD:  Atraumatic, Normocephalic  EYES: EOMI, PERRLA, conjunctiva and sclera clear  NECK: Supple, No JVD  CHEST/LUNG: Clear to auscultation bilaterally, symmetrical chest expansion  HEART: RRR, normal s1 s2; No murmur. distal pulses 2+ and equal bilateral  ABDOMEN: Soft, Nontender, Nondistended; Bowel sounds present  EXTREMITIES:  bilateral edema nonpitting, R knee TTP, R wrist TTP  CNS: AAOx3

## 2020-01-27 NOTE — ED PROVIDER NOTE - OBJECTIVE STATEMENT
64 year old female with a pmh of asthma diabetes asthma epilepsy chronic OA of her knee presents here s/p mechanical fall . Patient is from [facility] and was walking to the restroom with her walker when she sustained a mechanical fall. No head trauma no lox not on a/c presents here c/o 64 year old female with a pmh of asthma diabetes asthma epilepsy chronic OA of her knee presents here s/p mechanical fall . [facility] is [facility] and was walking to the restroom with her walker when she sustained a mechanical fall. No head trauma no loc not on a/c presents here c/o left knee pain and left wrist pain.

## 2020-01-27 NOTE — ED PROVIDER NOTE - ATTENDING CONTRIBUTION TO CARE
anterior and medial knee ttp.  + swelling.  painful ROM.  xrays review.  no fx, but pt is unable to safely ambulate or transfer.

## 2020-01-27 NOTE — H&P ADULT - ATTENDING COMMENTS
#Fall, mechanical; no syncope per pt  L knee gave out while walking, h/o severe L knee oa, morbid obesity  PT, walks with rollator  no evidence of infection, check ua  outpt f/u pmd for oa    #Progress Note Handoff:  Pending (specify):  Consults_________, Tests________, Test Results_______, Other___pt______  Family discussion:d/w pt at bedside re: treatment plan, primary dx  Disposition: Home___/SNF___/Other________/Unknown at this time____x____ #Fall, mechanical; no syncope per pt  L knee gave out while walking, h/o severe L knee oa, morbid obesity  PT, walks with rollator  no evidence of infection, check ua  check cbc, bmp, coags  outpt f/u pmd for oa    #Progress Note Handoff:  Pending (specify):  Consults_________, Tests________, Test Results_______, Other___pt______  Family discussion:d/w pt at bedside re: treatment plan, primary dx  Disposition: Home___/SNF___/Other________/Unknown at this time____x____

## 2020-01-27 NOTE — ED PROVIDER NOTE - NS ED ROS FT
Constitutional: See HPI.  Eyes: No visual changes  ENMT: No neck pain  Cardiac: No cp  Respiratory: No cough   GI: No nausea, vomiting, diarrhea or abdominal pain.  : No dysuria, frequency or burning  MS: see hpi  Psych: No suicidal or homicidal ideations.  Neuro: No headache or weakness. No LOC.  Skin: No skin rash.

## 2020-01-27 NOTE — ED ADULT NURSE NOTE - NSIMPLEMENTINTERV_GEN_ALL_ED
Implemented All Fall with Harm Risk Interventions:  [place] to call system. Call bell, personal items and telephone within reach. Instruct patient to call for assistance. Room bathroom lighting operational. Non-slip footwear when patient is off stretcher. Physically safe environment: no spills, clutter or unnecessary equipment. Stretcher in lowest position, wheels locked, appropriate side rails in place. Provide visual cue, wrist band, yellow gown, etc. Monitor gait and stability. Monitor for mental status changes and reorient to person, place, and time. Review medications for side effects contributing to fall risk. Reinforce activity limits and safety measures with patient and family. Provide visual clues: red socks.

## 2020-01-27 NOTE — ED ADULT NURSE NOTE - CHIEF COMPLAINT QUOTE
BIBA from [facility] as per pt "I fell going to the bathroom this morning, now my left knee and left wrist hurts". Denies LOC and Denies AC

## 2020-01-27 NOTE — ED PROVIDER NOTE - CLINICAL SUMMARY MEDICAL DECISION MAKING FREE TEXT BOX
pt is unsafe for d/c due to fall risk.  In my opinion, in patient treatment is medically justifiable and appropriate.

## 2020-01-28 LAB
ANION GAP SERPL CALC-SCNC: 11 MMOL/L — SIGNIFICANT CHANGE UP (ref 7–14)
BUN SERPL-MCNC: 15 MG/DL — SIGNIFICANT CHANGE UP (ref 10–20)
CALCIUM SERPL-MCNC: 8.4 MG/DL — LOW (ref 8.5–10.1)
CHLORIDE SERPL-SCNC: 107 MMOL/L — SIGNIFICANT CHANGE UP (ref 98–110)
CO2 SERPL-SCNC: 23 MMOL/L — SIGNIFICANT CHANGE UP (ref 17–32)
CREAT SERPL-MCNC: 0.8 MG/DL — SIGNIFICANT CHANGE UP (ref 0.7–1.5)
GLUCOSE BLDC GLUCOMTR-MCNC: 111 MG/DL — HIGH (ref 70–99)
GLUCOSE BLDC GLUCOMTR-MCNC: 117 MG/DL — HIGH (ref 70–99)
GLUCOSE BLDC GLUCOMTR-MCNC: 97 MG/DL — SIGNIFICANT CHANGE UP (ref 70–99)
GLUCOSE BLDC GLUCOMTR-MCNC: 98 MG/DL — SIGNIFICANT CHANGE UP (ref 70–99)
GLUCOSE SERPL-MCNC: 107 MG/DL — HIGH (ref 70–99)
HCT VFR BLD CALC: 37.7 % — SIGNIFICANT CHANGE UP (ref 37–47)
HGB BLD-MCNC: 12 G/DL — SIGNIFICANT CHANGE UP (ref 12–16)
MAGNESIUM SERPL-MCNC: 2 MG/DL — SIGNIFICANT CHANGE UP (ref 1.8–2.4)
MCHC RBC-ENTMCNC: 31.7 PG — HIGH (ref 27–31)
MCHC RBC-ENTMCNC: 31.8 G/DL — LOW (ref 32–37)
MCV RBC AUTO: 99.7 FL — HIGH (ref 81–99)
NRBC # BLD: 0 /100 WBCS — SIGNIFICANT CHANGE UP (ref 0–0)
PHOSPHATE SERPL-MCNC: 3.2 MG/DL — SIGNIFICANT CHANGE UP (ref 2.1–4.9)
PLATELET # BLD AUTO: 146 K/UL — SIGNIFICANT CHANGE UP (ref 130–400)
POTASSIUM SERPL-MCNC: 3.9 MMOL/L — SIGNIFICANT CHANGE UP (ref 3.5–5)
POTASSIUM SERPL-SCNC: 3.9 MMOL/L — SIGNIFICANT CHANGE UP (ref 3.5–5)
RBC # BLD: 3.78 M/UL — LOW (ref 4.2–5.4)
RBC # FLD: 13.6 % — SIGNIFICANT CHANGE UP (ref 11.5–14.5)
SODIUM SERPL-SCNC: 141 MMOL/L — SIGNIFICANT CHANGE UP (ref 135–146)
WBC # BLD: 7.27 K/UL — SIGNIFICANT CHANGE UP (ref 4.8–10.8)
WBC # FLD AUTO: 7.27 K/UL — SIGNIFICANT CHANGE UP (ref 4.8–10.8)

## 2020-01-28 PROCEDURE — 99233 SBSQ HOSP IP/OBS HIGH 50: CPT

## 2020-01-28 RX ORDER — TEMAZEPAM 15 MG/1
30 CAPSULE ORAL AT BEDTIME
Refills: 0 | Status: DISCONTINUED | OUTPATIENT
Start: 2020-01-28 | End: 2020-01-31

## 2020-01-28 RX ADMIN — MONTELUKAST 10 MILLIGRAM(S): 4 TABLET, CHEWABLE ORAL at 11:40

## 2020-01-28 RX ADMIN — OXYCODONE HYDROCHLORIDE 5 MILLIGRAM(S): 5 TABLET ORAL at 05:14

## 2020-01-28 RX ADMIN — CITALOPRAM 20 MILLIGRAM(S): 10 TABLET, FILM COATED ORAL at 11:40

## 2020-01-28 RX ADMIN — OXYCODONE HYDROCHLORIDE 5 MILLIGRAM(S): 5 TABLET ORAL at 05:55

## 2020-01-28 RX ADMIN — TEMAZEPAM 30 MILLIGRAM(S): 15 CAPSULE ORAL at 22:05

## 2020-01-28 RX ADMIN — Medication 100 MILLIGRAM(S): at 14:18

## 2020-01-28 RX ADMIN — CHLORHEXIDINE GLUCONATE 1 APPLICATION(S): 213 SOLUTION TOPICAL at 05:15

## 2020-01-28 RX ADMIN — Medication 50 MICROGRAM(S): at 05:13

## 2020-01-28 RX ADMIN — Medication 32.4 MILLIGRAM(S): at 05:14

## 2020-01-28 RX ADMIN — OXYCODONE HYDROCHLORIDE 5 MILLIGRAM(S): 5 TABLET ORAL at 14:39

## 2020-01-28 RX ADMIN — LORATADINE 10 MILLIGRAM(S): 10 TABLET ORAL at 11:40

## 2020-01-28 RX ADMIN — OXYCODONE HYDROCHLORIDE 5 MILLIGRAM(S): 5 TABLET ORAL at 14:17

## 2020-01-28 RX ADMIN — OXYCODONE HYDROCHLORIDE 5 MILLIGRAM(S): 5 TABLET ORAL at 21:58

## 2020-01-28 RX ADMIN — Medication 100 MILLIGRAM(S): at 05:14

## 2020-01-28 RX ADMIN — Medication 32.4 MILLIGRAM(S): at 21:58

## 2020-01-28 RX ADMIN — Medication 32.4 MILLIGRAM(S): at 14:17

## 2020-01-28 NOTE — PHYSICAL THERAPY INITIAL EVALUATION ADULT - ACTIVE RANGE OF MOTION EXAMINATION, REHAB EVAL
nemesio. upper extremity Active ROM was WNL (within normal limits)/BLE joints requried AAROM lilia LLE

## 2020-01-28 NOTE — PHYSICAL THERAPY INITIAL EVALUATION ADULT - GENERAL OBSERVATIONS, REHAB EVAL
13:00-13:25 Chart reviewed. Pt encountered semireclined in bed, may be seen by Physical Therapist as confirmed with Nurse. Patient denied pain at rest but can have "severe" pain on (L) knee with movement and weightbearing, but would like to get up to sit in chair; +Primafit

## 2020-01-28 NOTE — PHYSICAL THERAPY INITIAL EVALUATION ADULT - IMPAIRED TRANSFERS: SIT/STAND, REHAB EVAL
impaired balance/pain/decreased strength/impaired postural control/decreased endurance/narrow base of support

## 2020-01-28 NOTE — PROGRESS NOTE ADULT - ASSESSMENT
64F hx of morbid obesity, seizure disorder, osteoarthritis, asthma, hypothyroidism, depression presents to ED after mechanical fall at [facility]. Pt was getting out of bed to go to the bathroom and her left knee buckled. She fell on her L knee and L wrist. Denies LOC, head injury, right sided pain, palpitations, dizziness, vertigo, presyncope, syncope, chest pain, cough, dyspnea, abdominal pain, n/v, diarrhea, dysuria, hematuria.    No anticoag or antiplt agents.    1) S/p Mechanical Fall  -rehab/pt as tolerated  -trauma work up negative for acute fractures  -pain control prn     2) Morbidly obese  -dietary eval  -weight loss and diet modification     3) Depression  -stable mood     4) Seizure disorder  -continue with current AEDs    5) Hypothyroid  -on synthroid    6) dvt and gi ppx     7) suspected CKD stage II  -needs outpatient kidney monitoring and Nephrology follow up     # Progress Note Handoff  PENDING as follows  consults: rehab/pt   Test: imaging studies reviewed and negative for fractures   Family discussion: discussed with patient who comprehends her medical care and is agreeable for inpatient monitoring   Disposition: STR     Attending Physician Dr. Lamar Flaherty # [id number]

## 2020-01-28 NOTE — PROGRESS NOTE ADULT - SUBJECTIVE AND OBJECTIVE BOX
JACOB KOTHARI  64y  Female      Patient is a 64y old  Female who presents with a chief complaint of fall (27 Jan 2020 13:10)      INTERVAL HPI/OVERNIGHT EVENTS:    pt seen and examined at bedside  -needs to be seen by rehab/pt for dispo  -otherwise no complaints   -continue with current home meds   -no acute fractures     REVIEW OF SYSTEMS:  -generalized weakness     Vital Signs Last 24 Hrs  T(C): 36.7 (28 Jan 2020 14:25), Max: 37.1 (27 Jan 2020 21:47)  T(F): 98.1 (28 Jan 2020 14:25), Max: 98.7 (27 Jan 2020 21:47)  HR: 70 (28 Jan 2020 14:25) (70 - 80)  BP: 134/67 (28 Jan 2020 14:25) (111/53 - 134/67)  RR: 17 (28 Jan 2020 14:25) (17 - 18)    PHYSICAL EXAM:  GENERAL: NAD, speaking in full sentences   HEAD:  Atraumatic, Normocephalic  EYES: EOMI, PERRLA, conjunctiva and sclera clear  NERVOUS SYSTEM:  Alert & Oriented X 3  CHEST/LUNG: Clear to percussion bilaterally; No rales, rhonchi, wheezing, or rubs  CV/HEART: Regular rate and rhythm; No murmurs, rubs, or gallops  GI/ABDOMEN: Soft, Nontender, obese abdomen, Bowel sounds present  EXTREMITIES:  2+ Peripheral Pulses, No clubbing, cyanosis, or edema; ROM intact   SKIN: No rashes or lesions    LAB:                        12.0   7.27  )-----------( 146      ( 28 Jan 2020 09:19 )             37.7     01-28    141  |  107  |  15  ----------------------------<  107<H>  3.9   |  23  |  0.8    Ca    8.4<L>      28 Jan 2020 09:19  Phos  3.2     01-28  Mg     2.0     01-28      POCT Blood Glucose.: 117 mg/dL (28 Jan 2020 11:56)  POCT Blood Glucose.: 98 mg/dL (28 Jan 2020 07:38)      RADIOLOGY:    Imaging Personally Reviewed:  [Y ] YES  [ ] NO    Xray Forearm, Left (01.27.20 @ 11:36)     Findings/  impression: No acute fracture or dislocation. Elbow degenerative changes.      HEALTH ISSUES - PROBLEM Dx:  Depression: Depression  Epilepsy: Epilepsy  Asthma: Asthma  Osteoarthritis: Osteoarthritis  Inability to ambulate due to knee: Inability to ambulate due to knee    MEDS:  acetaminophen   Tablet .. 650 milliGRAM(s) Oral every 6 hours PRN  chlorhexidine 4% Liquid 1 Application(s) Topical <User Schedule>  citalopram 20 milliGRAM(s) Oral daily  dextrose 40% Gel 15 Gram(s) Oral once PRN  dextrose 5%. 1000 milliLiter(s) IV Continuous <Continuous>  dextrose 50% Injectable 12.5 Gram(s) IV Push once  dextrose 50% Injectable 25 Gram(s) IV Push once  dextrose 50% Injectable 25 Gram(s) IV Push once  enoxaparin Injectable 40 milliGRAM(s) SubCutaneous daily  glucagon  Injectable 1 milliGRAM(s) IntraMuscular once PRN  guaiFENesin   Syrup  (Sugar-Free) 100 milliGRAM(s) Oral every 6 hours PRN  ibuprofen  Tablet. 600 milliGRAM(s) Oral every 6 hours PRN  levothyroxine 50 MICROGram(s) Oral daily  loratadine 10 milliGRAM(s) Oral daily  montelukast 10 milliGRAM(s) Oral daily  oxyCODONE    IR 5 milliGRAM(s) Oral every 6 hours PRN  PHENobarbital 32.4 milliGRAM(s) Oral every 8 hours  topiramate 100 milliGRAM(s) Oral <User Schedule>

## 2020-01-28 NOTE — PHYSICAL THERAPY INITIAL EVALUATION ADULT - GAIT DEVIATIONS NOTED, PT EVAL
stooped posture, dec heel strike/pushoff, wide MAYELIN/decreased step length/decreased weight-shifting ability/decreased keven

## 2020-01-29 LAB
GLUCOSE BLDC GLUCOMTR-MCNC: 101 MG/DL — HIGH (ref 70–99)
GLUCOSE BLDC GLUCOMTR-MCNC: 103 MG/DL — HIGH (ref 70–99)
GLUCOSE BLDC GLUCOMTR-MCNC: 104 MG/DL — HIGH (ref 70–99)
GLUCOSE BLDC GLUCOMTR-MCNC: 114 MG/DL — HIGH (ref 70–99)
HCV AB S/CO SERPL IA: 0.14 S/CO — SIGNIFICANT CHANGE UP (ref 0–0.99)
HCV AB SERPL-IMP: SIGNIFICANT CHANGE UP

## 2020-01-29 PROCEDURE — 99233 SBSQ HOSP IP/OBS HIGH 50: CPT

## 2020-01-29 RX ORDER — POLYETHYLENE GLYCOL 3350 17 G/17G
17 POWDER, FOR SOLUTION ORAL
Refills: 0 | Status: DISCONTINUED | OUTPATIENT
Start: 2020-01-29 | End: 2020-01-31

## 2020-01-29 RX ADMIN — Medication 50 MICROGRAM(S): at 06:50

## 2020-01-29 RX ADMIN — MONTELUKAST 10 MILLIGRAM(S): 4 TABLET, CHEWABLE ORAL at 11:24

## 2020-01-29 RX ADMIN — Medication 600 MILLIGRAM(S): at 07:53

## 2020-01-29 RX ADMIN — LORATADINE 10 MILLIGRAM(S): 10 TABLET ORAL at 11:24

## 2020-01-29 RX ADMIN — OXYCODONE HYDROCHLORIDE 5 MILLIGRAM(S): 5 TABLET ORAL at 15:03

## 2020-01-29 RX ADMIN — CHLORHEXIDINE GLUCONATE 1 APPLICATION(S): 213 SOLUTION TOPICAL at 06:50

## 2020-01-29 RX ADMIN — TEMAZEPAM 30 MILLIGRAM(S): 15 CAPSULE ORAL at 22:27

## 2020-01-29 RX ADMIN — OXYCODONE HYDROCHLORIDE 5 MILLIGRAM(S): 5 TABLET ORAL at 22:27

## 2020-01-29 RX ADMIN — Medication 100 MILLIGRAM(S): at 14:07

## 2020-01-29 RX ADMIN — Medication 32.4 MILLIGRAM(S): at 14:07

## 2020-01-29 RX ADMIN — Medication 32.4 MILLIGRAM(S): at 06:50

## 2020-01-29 RX ADMIN — POLYETHYLENE GLYCOL 3350 17 GRAM(S): 17 POWDER, FOR SOLUTION ORAL at 17:25

## 2020-01-29 RX ADMIN — Medication 100 MILLIGRAM(S): at 06:50

## 2020-01-29 RX ADMIN — Medication 600 MILLIGRAM(S): at 06:08

## 2020-01-29 RX ADMIN — CITALOPRAM 20 MILLIGRAM(S): 10 TABLET, FILM COATED ORAL at 11:24

## 2020-01-29 RX ADMIN — OXYCODONE HYDROCHLORIDE 5 MILLIGRAM(S): 5 TABLET ORAL at 11:40

## 2020-01-29 RX ADMIN — Medication 32.4 MILLIGRAM(S): at 22:26

## 2020-01-29 NOTE — PROGRESS NOTE ADULT - ASSESSMENT
64F hx of morbid obesity, seizure disorder, osteoarthritis, asthma, hypothyroidism, depression presents to ED after mechanical fall at [facility]. Pt was getting out of bed to go to the bathroom and her left knee buckled. She fell on her L knee and L wrist. Denies LOC, head injury, right sided pain, palpitations, dizziness, vertigo, presyncope, syncope, chest pain, cough, dyspnea, abdominal pain, n/v, diarrhea, dysuria, hematuria.    No anticoag or antiplt agents.    1) S/p Mechanical Fall  -rehab/pt as tolerated  -trauma work up negative for acute fractures  -pain control prn     2) Morbidly obese  -dietary eval  -weight loss and diet modification     3) Depression  -stable mood     4) Seizure disorder  -continue with current AEDs    5) Hypothyroid  -on synthroid    6) dvt and gi ppx     7) suspected CKD stage II  -needs outpatient kidney monitoring and Nephrology follow up     # Progress Note Handoff  PENDING as follows  consults: rehab/pt   Test: imaging studies reviewed and negative for fractures   Family discussion: D/w pt regarding possible SNF placement  Disposition: STR

## 2020-01-29 NOTE — PROGRESS NOTE ADULT - SUBJECTIVE AND OBJECTIVE BOX
JACOB KOTHARI  64y, Female  Allergy: Compazine (Unknown)  latex (Urticaria)  penicillins (Unknown)    Hospital Day: 2d    Patient seen and examined earlier today. No acute events overnight.    PMH/PSH:  PAST MEDICAL & SURGICAL HISTORY:  Uterine cancer: s/p hysterectomy  Osteoarthritis  Knee pain  Chronic cough  Asthma  Epilepsy  Renal failure  Renal stone  Diabetes  History of surgery: JJ STENT PLACEMENT LEFT OCTOBER 2017  History of bladder stone: SURGERY 8/3/2018  [place] teeth removed  History of tonsillectomy: 13 years old  H/O abdominal hysterectomy: NO RADIATION AND CHEMO    VITALS:  T(F): 96 (01-29-20 @ 06:10), Max: 98.1 (01-28-20 @ 14:25)  HR: 65 (01-29-20 @ 06:10)  BP: 118/57 (01-29-20 @ 06:10) (103/54 - 134/67)  RR: 16 (01-29-20 @ 06:10)  SpO2: --    TESTS & MEASUREMENTS:  Weight (Kg): 141.2 (01-27-20 @ 15:50)  BMI (kg/m2): 56.9 (01-27)                        12.0   7.27  )-----------( 146      ( 28 Jan 2020 09:19 )             37.7     PT/INR - ( 27 Jan 2020 19:27 )   PT: 12.00 sec;   INR: 1.04 ratio         PTT - ( 27 Jan 2020 19:27 )  PTT:31.6 sec  01-28    141  |  107  |  15  ----------------------------<  107<H>  3.9   |  23  |  0.8    Ca    8.4<L>      28 Jan 2020 09:19  Phos  3.2     01-28  Mg     2.0     01-28    MEDICATIONS:  MEDICATIONS  (STANDING):  chlorhexidine 4% Liquid 1 Application(s) Topical <User Schedule>  citalopram 20 milliGRAM(s) Oral daily  dextrose 5%. 1000 milliLiter(s) (50 mL/Hr) IV Continuous <Continuous>  dextrose 50% Injectable 12.5 Gram(s) IV Push once  dextrose 50% Injectable 25 Gram(s) IV Push once  dextrose 50% Injectable 25 Gram(s) IV Push once  enoxaparin Injectable 40 milliGRAM(s) SubCutaneous daily  levothyroxine 50 MICROGram(s) Oral daily  loratadine 10 milliGRAM(s) Oral daily  montelukast 10 milliGRAM(s) Oral daily  PHENobarbital 32.4 milliGRAM(s) Oral every 8 hours  topiramate 100 milliGRAM(s) Oral <User Schedule>    MEDICATIONS  (PRN):  acetaminophen   Tablet .. 650 milliGRAM(s) Oral every 6 hours PRN Temp greater or equal to 38C (100.4F), Mild Pain (1 - 3)  dextrose 40% Gel 15 Gram(s) Oral once PRN Blood Glucose LESS THAN 70 milliGRAM(s)/deciliter  glucagon  Injectable 1 milliGRAM(s) IntraMuscular once PRN Glucose LESS THAN 70 milligrams/deciliter  guaiFENesin   Syrup  (Sugar-Free) 100 milliGRAM(s) Oral every 6 hours PRN Cough  ibuprofen  Tablet. 600 milliGRAM(s) Oral every 6 hours PRN Moderate Pain (4 - 6)  oxyCODONE    IR 5 milliGRAM(s) Oral every 6 hours PRN Severe Pain (7 - 10)  temazepam 30 milliGRAM(s) Oral at bedtime PRN Insomnia    HOME MEDICATIONS:  citalopram 20 mg oral tablet (01-27)  Claritin 10 mg oral tablet (01-27)  PHENobarbital 32.4 mg oral tablet (01-27)  Singulair 10 mg oral tablet (01-27)  Synthroid 50 mcg (0.05 mg) oral tablet (01-27)  Topamax 100 mg oral tablet (01-27)  Tylenol 500 mg oral tablet (01-27)  Urocit-K (01-27)  vortioxetine 5 mg oral tablet (01-27)    REVIEW OF SYSTEMS:  All other review of systems is negative unless indicated above.     PHYSICAL EXAM:  GENERAL: NAD  HEENT: No Swelling  CHEST/LUNG: Good air entry, No wheezing  HEART: RRR, No murmurs  ABDOMEN: Soft, Bowel sounds present  EXTREMITIES:  No clubbing

## 2020-01-30 ENCOUNTER — TRANSCRIPTION ENCOUNTER (OUTPATIENT)
Age: 65
End: 2020-01-30

## 2020-01-30 LAB
GLUCOSE BLDC GLUCOMTR-MCNC: 100 MG/DL — HIGH (ref 70–99)
GLUCOSE BLDC GLUCOMTR-MCNC: 104 MG/DL — HIGH (ref 70–99)
GLUCOSE BLDC GLUCOMTR-MCNC: 106 MG/DL — HIGH (ref 70–99)
GLUCOSE BLDC GLUCOMTR-MCNC: 110 MG/DL — HIGH (ref 70–99)

## 2020-01-30 PROCEDURE — 99233 SBSQ HOSP IP/OBS HIGH 50: CPT

## 2020-01-30 RX ADMIN — CITALOPRAM 20 MILLIGRAM(S): 10 TABLET, FILM COATED ORAL at 11:27

## 2020-01-30 RX ADMIN — Medication 32.4 MILLIGRAM(S): at 13:40

## 2020-01-30 RX ADMIN — OXYCODONE HYDROCHLORIDE 5 MILLIGRAM(S): 5 TABLET ORAL at 22:12

## 2020-01-30 RX ADMIN — MONTELUKAST 10 MILLIGRAM(S): 4 TABLET, CHEWABLE ORAL at 11:27

## 2020-01-30 RX ADMIN — OXYCODONE HYDROCHLORIDE 5 MILLIGRAM(S): 5 TABLET ORAL at 05:17

## 2020-01-30 RX ADMIN — OXYCODONE HYDROCHLORIDE 5 MILLIGRAM(S): 5 TABLET ORAL at 15:30

## 2020-01-30 RX ADMIN — Medication 100 MILLIGRAM(S): at 13:41

## 2020-01-30 RX ADMIN — Medication 32.4 MILLIGRAM(S): at 21:33

## 2020-01-30 RX ADMIN — OXYCODONE HYDROCHLORIDE 5 MILLIGRAM(S): 5 TABLET ORAL at 21:39

## 2020-01-30 RX ADMIN — OXYCODONE HYDROCHLORIDE 5 MILLIGRAM(S): 5 TABLET ORAL at 12:54

## 2020-01-30 RX ADMIN — TEMAZEPAM 30 MILLIGRAM(S): 15 CAPSULE ORAL at 21:39

## 2020-01-30 RX ADMIN — LORATADINE 10 MILLIGRAM(S): 10 TABLET ORAL at 11:27

## 2020-01-30 RX ADMIN — Medication 100 MILLIGRAM(S): at 05:18

## 2020-01-30 RX ADMIN — Medication 32.4 MILLIGRAM(S): at 05:17

## 2020-01-30 RX ADMIN — Medication 50 MICROGRAM(S): at 05:18

## 2020-01-30 NOTE — DISCHARGE NOTE PROVIDER - NSDCCPCAREPLAN_GEN_ALL_CORE_FT
PRINCIPAL DISCHARGE DIAGNOSIS  Diagnosis: Inability to ambulate due to knee  Assessment and Plan of Treatment: Complete course of physical therapy at subacute rehab

## 2020-01-30 NOTE — PROGRESS NOTE ADULT - ASSESSMENT
64F hx of morbid obesity, seizure disorder, osteoarthritis, asthma, hypothyroidism, depression presents to ED after mechanical fall at [facility]. Pt was getting out of bed to go to the bathroom and her left knee buckled. She fell on her L knee and L wrist. Denies LOC, head injury, right sided pain, palpitations, dizziness, vertigo, presyncope, syncope, chest pain, cough, dyspnea, abdominal pain, n/v, diarrhea, dysuria, hematuria.    No anticoag or antiplt agents.    1) S/p Mechanical Fall  -rehab/pt as tolerated  -trauma work up negative for acute fractures  -pain control prn   - Pending auth for SNF    2) Morbidly obese  -dietary eval  -weight loss and diet modification     3) Depression  -stable mood     4) Seizure disorder  -continue with current AEDs    5) Hypothyroid  -on synthroid    6) dvt and gi ppx     7) suspected CKD stage II  -needs outpatient kidney monitoring and Nephrology follow up     # Progress Note Handoff  Family discussion: D/w pt regarding pending auth for SNF  Disposition: STR 64F hx of morbid obesity, seizure disorder, osteoarthritis, asthma, hypothyroidism, depression presents to ED after mechanical fall at [facility]. Pt was getting out of bed to go to the bathroom and her left knee buckled. She fell on her L knee and L wrist. Denies LOC, head injury, right sided pain, palpitations, dizziness, vertigo, presyncope, syncope, chest pain, cough, dyspnea, abdominal pain, n/v, diarrhea, dysuria, hematuria.    No anticoag or antiplt agents.    1) S/p Mechanical Fall due to osteoarthritis and debility  -rehab/pt as tolerated  -trauma work up negative for acute fractures  -pain control prn   - Pending auth for SNF    2) Morbidly obese  -dietary eval  -weight loss and diet modification     3) Depression  -stable mood     4) Seizure disorder  -continue with current AEDs    5) Hypothyroid  -on synthroid    6) dvt and gi ppx     7) suspected CKD stage II  -needs outpatient kidney monitoring and Nephrology follow up     # Progress Note Handoff  Family discussion: D/w pt regarding pending auth for SNF  Disposition: STR

## 2020-01-30 NOTE — DISCHARGE NOTE PROVIDER - HOSPITAL COURSE
64F hx of morbid obesity, seizure disorder, osteoarthritis, asthma, hypothyroidism, depression presents to ED after mechanical fall at [facility]. Pt was getting out of bed to go to the bathroom and her left knee buckled. She fell on her L knee and L wrist. Denies LOC, head injury, right sided pain, palpitations, dizziness, vertigo, presyncope, syncope, chest pain, cough, dyspnea, abdominal pain, n/v, diarrhea, dysuria, hematuria.        Trauma workup was negative for fractures. Patient was evaluated by PT and medically stable for discharge to short term rehabilitation.

## 2020-01-30 NOTE — DISCHARGE NOTE PROVIDER - NSDCMRMEDTOKEN_GEN_ALL_CORE_FT
citalopram 20 mg oral tablet: 1 tab(s) orally once a day  Claritin 10 mg oral tablet: 1 tab(s) orally once a day  PHENobarbital 32.4 mg oral tablet: orally 3 times a day  Singulair 10 mg oral tablet: 1 tab(s) orally once a day  Synthroid 50 mcg (0.05 mg) oral tablet: 1 tab(s) orally once a day  Topamax 100 mg oral tablet: 1 tab(s) orally 3 times a day  Tylenol 500 mg oral tablet: 2 tab(s) orally every 6 hours  Urocit-K: orally 2 times a day  vortioxetine 5 mg oral tablet: 0.5 tab(s) orally once a day citalopram 20 mg oral tablet: 1 tab(s) orally once a day  Claritin 10 mg oral tablet: 1 tab(s) orally once a day  PHENobarbital 32.4 mg oral tablet: orally 3 times a day  Singulair 10 mg oral tablet: 1 tab(s) orally once a day  Synthroid 50 mcg (0.05 mg) oral tablet: 1 tab(s) orally once a day  temazepam 30 mg oral capsule: 1 cap(s) orally once a day (at bedtime), As needed, Insomnia  Topamax 100 mg oral tablet: 1 tab(s) orally 3 times a day  Tylenol 500 mg oral tablet: 2 tab(s) orally every 6 hours  Urocit-K: orally 2 times a day  vortioxetine 5 mg oral tablet: 0.5 tab(s) orally once a day

## 2020-01-30 NOTE — PROGRESS NOTE ADULT - SUBJECTIVE AND OBJECTIVE BOX
JACOB KOTHARI  64y, Female  Allergy: Compazine (Unknown)  latex (Urticaria)  penicillins (Unknown)    Hospital Day: 3d    Patient seen and examined earlier today. No acute events overnight.    PMH/PSH:  PAST MEDICAL & SURGICAL HISTORY:  Uterine cancer: s/p hysterectomy  Osteoarthritis  Knee pain  Chronic cough  Asthma  Epilepsy  Renal failure  Renal stone  Diabetes  History of surgery: JJ STENT PLACEMENT LEFT OCTOBER 2017  History of bladder stone: SURGERY 8/3/2018  [place] teeth removed  History of tonsillectomy: 13 years old  H/O abdominal hysterectomy: NO RADIATION AND CHEMO    VITALS:  T(F): 97.3 (01-30-20 @ 06:13), Max: 97.9 (01-29-20 @ 22:00)  HR: 64 (01-30-20 @ 06:13)  BP: 119/57 (01-30-20 @ 06:13) (119/57 - 124/57)  RR: 16 (01-30-20 @ 06:13)  SpO2: --    TESTS & MEASUREMENTS:  Weight (Kg):   BMI (kg/m2): 56.9 (01-27)    01-29-20 @ 07:01  -  01-30-20 @ 07:00  --------------------------------------------------------  IN: 0 mL / OUT: 900 mL / NET: -900 mL    MEDICATIONS:  MEDICATIONS  (STANDING):  chlorhexidine 4% Liquid 1 Application(s) Topical <User Schedule>  citalopram 20 milliGRAM(s) Oral daily  dextrose 5%. 1000 milliLiter(s) (50 mL/Hr) IV Continuous <Continuous>  dextrose 50% Injectable 12.5 Gram(s) IV Push once  dextrose 50% Injectable 25 Gram(s) IV Push once  dextrose 50% Injectable 25 Gram(s) IV Push once  enoxaparin Injectable 40 milliGRAM(s) SubCutaneous daily  levothyroxine 50 MICROGram(s) Oral daily  loratadine 10 milliGRAM(s) Oral daily  montelukast 10 milliGRAM(s) Oral daily  PHENobarbital 32.4 milliGRAM(s) Oral every 8 hours  topiramate 100 milliGRAM(s) Oral <User Schedule>    MEDICATIONS  (PRN):  acetaminophen   Tablet .. 650 milliGRAM(s) Oral every 6 hours PRN Temp greater or equal to 38C (100.4F), Mild Pain (1 - 3)  dextrose 40% Gel 15 Gram(s) Oral once PRN Blood Glucose LESS THAN 70 milliGRAM(s)/deciliter  glucagon  Injectable 1 milliGRAM(s) IntraMuscular once PRN Glucose LESS THAN 70 milligrams/deciliter  guaiFENesin   Syrup  (Sugar-Free) 100 milliGRAM(s) Oral every 6 hours PRN Cough  ibuprofen  Tablet. 600 milliGRAM(s) Oral every 6 hours PRN Moderate Pain (4 - 6)  oxyCODONE    IR 5 milliGRAM(s) Oral every 6 hours PRN Severe Pain (7 - 10)  polyethylene glycol 3350 17 Gram(s) Oral two times a day PRN Constipation  temazepam 30 milliGRAM(s) Oral at bedtime PRN Insomnia    HOME MEDICATIONS:  citalopram 20 mg oral tablet (01-27)  Claritin 10 mg oral tablet (01-27)  PHENobarbital 32.4 mg oral tablet (01-27)  Singulair 10 mg oral tablet (01-27)  Synthroid 50 mcg (0.05 mg) oral tablet (01-27)  Topamax 100 mg oral tablet (01-27)  Tylenol 500 mg oral tablet (01-27)  Urocit-K (01-27)  vortioxetine 5 mg oral tablet (01-27)    REVIEW OF SYSTEMS:  All other review of systems is negative unless indicated above.     PHYSICAL EXAM:  GENERAL: NAD  HEENT: No Swelling  CHEST/LUNG: Good air entry, No wheezing  HEART: RRR, No murmurs  ABDOMEN: Soft, Bowel sounds present  EXTREMITIES:  No clubbing

## 2020-01-31 ENCOUNTER — TRANSCRIPTION ENCOUNTER (OUTPATIENT)
Age: 65
End: 2020-01-31

## 2020-01-31 VITALS
HEART RATE: 60 BPM | SYSTOLIC BLOOD PRESSURE: 105 MMHG | RESPIRATION RATE: 16 BRPM | TEMPERATURE: 97 F | DIASTOLIC BLOOD PRESSURE: 43 MMHG

## 2020-01-31 LAB
ANION GAP SERPL CALC-SCNC: 10 MMOL/L — SIGNIFICANT CHANGE UP (ref 7–14)
BUN SERPL-MCNC: 18 MG/DL — SIGNIFICANT CHANGE UP (ref 10–20)
CALCIUM SERPL-MCNC: 8.7 MG/DL — SIGNIFICANT CHANGE UP (ref 8.5–10.1)
CHLORIDE SERPL-SCNC: 102 MMOL/L — SIGNIFICANT CHANGE UP (ref 98–110)
CO2 SERPL-SCNC: 26 MMOL/L — SIGNIFICANT CHANGE UP (ref 17–32)
CREAT SERPL-MCNC: 0.8 MG/DL — SIGNIFICANT CHANGE UP (ref 0.7–1.5)
GLUCOSE BLDC GLUCOMTR-MCNC: 112 MG/DL — HIGH (ref 70–99)
GLUCOSE SERPL-MCNC: 109 MG/DL — HIGH (ref 70–99)
HCT VFR BLD CALC: 40 % — SIGNIFICANT CHANGE UP (ref 37–47)
HGB BLD-MCNC: 12.9 G/DL — SIGNIFICANT CHANGE UP (ref 12–16)
MCHC RBC-ENTMCNC: 31.8 PG — HIGH (ref 27–31)
MCHC RBC-ENTMCNC: 32.3 G/DL — SIGNIFICANT CHANGE UP (ref 32–37)
MCV RBC AUTO: 98.5 FL — SIGNIFICANT CHANGE UP (ref 81–99)
NRBC # BLD: 0 /100 WBCS — SIGNIFICANT CHANGE UP (ref 0–0)
PLATELET # BLD AUTO: 172 K/UL — SIGNIFICANT CHANGE UP (ref 130–400)
POTASSIUM SERPL-MCNC: 4.2 MMOL/L — SIGNIFICANT CHANGE UP (ref 3.5–5)
POTASSIUM SERPL-SCNC: 4.2 MMOL/L — SIGNIFICANT CHANGE UP (ref 3.5–5)
RBC # BLD: 4.06 M/UL — LOW (ref 4.2–5.4)
RBC # FLD: 13.2 % — SIGNIFICANT CHANGE UP (ref 11.5–14.5)
SODIUM SERPL-SCNC: 138 MMOL/L — SIGNIFICANT CHANGE UP (ref 135–146)
WBC # BLD: 7.47 K/UL — SIGNIFICANT CHANGE UP (ref 4.8–10.8)
WBC # FLD AUTO: 7.47 K/UL — SIGNIFICANT CHANGE UP (ref 4.8–10.8)

## 2020-01-31 PROCEDURE — 99238 HOSP IP/OBS DSCHRG MGMT 30/<: CPT

## 2020-01-31 RX ORDER — TEMAZEPAM 15 MG/1
1 CAPSULE ORAL
Qty: 0 | Refills: 0 | DISCHARGE
Start: 2020-01-31

## 2020-01-31 RX ADMIN — Medication 100 MILLIGRAM(S): at 05:28

## 2020-01-31 RX ADMIN — Medication 50 MICROGRAM(S): at 05:28

## 2020-01-31 RX ADMIN — Medication 32.4 MILLIGRAM(S): at 05:28

## 2020-01-31 NOTE — PROGRESS NOTE ADULT - ASSESSMENT
64F hx of morbid obesity, seizure disorder, osteoarthritis, asthma, hypothyroidism, depression presents to ED after mechanical fall at [facility]. Pt was getting out of bed to go to the bathroom and her left knee buckled. She fell on her L knee and L wrist. Denies LOC, head injury, right sided pain, palpitations, dizziness, vertigo, presyncope, syncope, chest pain, cough, dyspnea, abdominal pain, n/v, diarrhea, dysuria, hematuria.    No anticoag or antiplt agents.    1) S/p Mechanical Fall due to osteoarthritis and debility  -rehab/pt as tolerated  -trauma work up negative for acute fractures  -pain control prn   - DC to SNF today    2) Morbidly obese  -dietary eval  -weight loss and diet modification     3) Depression  -stable mood     4) Seizure disorder  -continue with current AEDs    5) Hypothyroid  -on synthroid    6) dvt and gi ppx     7) suspected CKD stage II  -needs outpatient kidney monitoring and Nephrology follow up     # Progress Note Handoff  Family discussion: D/w pt regarding DC  Disposition: STR

## 2020-01-31 NOTE — DISCHARGE NOTE NURSING/CASE MANAGEMENT/SOCIAL WORK - PATIENT PORTAL LINK FT
You can access the FollowMyHealth Patient Portal offered by [facility] by registering at the following website: [email or web address]. By joining [employer]’s FollowMyHealth portal, you will also be able to view your health information using other applications (apps) compatible with our system.

## 2020-01-31 NOTE — PROGRESS NOTE ADULT - SUBJECTIVE AND OBJECTIVE BOX
JACOB KOTHARI  64y, Female  Allergy: Compazine (Unknown)  latex (Urticaria)  penicillins (Unknown)    Hospital Day: 4d    Patient seen and examined earlier today. No acute events overnight.    PMH/PSH:  PAST MEDICAL & SURGICAL HISTORY:  Uterine cancer: s/p hysterectomy  Osteoarthritis  Knee pain  Chronic cough  Asthma  Epilepsy  Renal failure  Renal stone  Diabetes  History of surgery: JJ STENT PLACEMENT LEFT OCTOBER 2017  History of bladder stone: SURGERY 8/3/2018  [place] teeth removed  History of tonsillectomy: 13 years old  H/O abdominal hysterectomy: NO RADIATION AND CHEMO    VITALS:  T(F): 97.4 (01-31-20 @ 05:12), Max: 97.6 (01-30-20 @ 14:21)  HR: 60 (01-31-20 @ 05:12)  BP: 105/43 (01-31-20 @ 05:12) (105/43 - 137/56)  RR: 16 (01-31-20 @ 05:12)  SpO2: --    TESTS & MEASUREMENTS:  Weight (Kg):   BMI (kg/m2): 56.9 (01-27)    01-29-20 @ 07:01  -  01-30-20 @ 07:00  --------------------------------------------------------  IN: 0 mL / OUT: 900 mL / NET: -900 mL    01-30-20 @ 07:01 - 01-31-20 @ 07:00  --------------------------------------------------------  IN: 0 mL / OUT: 1550 mL / NET: -1550 mL                            12.9   7.47  )-----------( 172      ( 31 Jan 2020 06:44 )             40.0       01-31    138  |  102  |  18  ----------------------------<  109<H>  4.2   |  26  |  0.8    Ca    8.7      31 Jan 2020 06:44    MEDICATIONS:  MEDICATIONS  (STANDING):  chlorhexidine 4% Liquid 1 Application(s) Topical <User Schedule>  citalopram 20 milliGRAM(s) Oral daily  dextrose 5%. 1000 milliLiter(s) (50 mL/Hr) IV Continuous <Continuous>  dextrose 50% Injectable 12.5 Gram(s) IV Push once  dextrose 50% Injectable 25 Gram(s) IV Push once  dextrose 50% Injectable 25 Gram(s) IV Push once  enoxaparin Injectable 40 milliGRAM(s) SubCutaneous daily  levothyroxine 50 MICROGram(s) Oral daily  loratadine 10 milliGRAM(s) Oral daily  montelukast 10 milliGRAM(s) Oral daily  PHENobarbital 32.4 milliGRAM(s) Oral every 8 hours  topiramate 100 milliGRAM(s) Oral <User Schedule>    MEDICATIONS  (PRN):  acetaminophen   Tablet .. 650 milliGRAM(s) Oral every 6 hours PRN Temp greater or equal to 38C (100.4F), Mild Pain (1 - 3)  dextrose 40% Gel 15 Gram(s) Oral once PRN Blood Glucose LESS THAN 70 milliGRAM(s)/deciliter  glucagon  Injectable 1 milliGRAM(s) IntraMuscular once PRN Glucose LESS THAN 70 milligrams/deciliter  guaiFENesin   Syrup  (Sugar-Free) 100 milliGRAM(s) Oral every 6 hours PRN Cough  ibuprofen  Tablet. 600 milliGRAM(s) Oral every 6 hours PRN Moderate Pain (4 - 6)  oxyCODONE    IR 5 milliGRAM(s) Oral every 6 hours PRN Severe Pain (7 - 10)  polyethylene glycol 3350 17 Gram(s) Oral two times a day PRN Constipation  temazepam 30 milliGRAM(s) Oral at bedtime PRN Insomnia    HOME MEDICATIONS:  citalopram 20 mg oral tablet (01-27)  Claritin 10 mg oral tablet (01-27)  PHENobarbital 32.4 mg oral tablet (01-27)  Singulair 10 mg oral tablet (01-27)  Synthroid 50 mcg (0.05 mg) oral tablet (01-27)  temazepam 30 mg oral capsule (01-31)  Topamax 100 mg oral tablet (01-27)  Tylenol 500 mg oral tablet (01-27)  Urocit-K (01-27)  vortioxetine 5 mg oral tablet (01-27)      REVIEW OF SYSTEMS:  All other review of systems is negative unless indicated above.     PHYSICAL EXAM:  GENERAL: NAD  HEENT: No Swelling  CHEST/LUNG: Good air entry, No wheezing  HEART: RRR, No murmurs  ABDOMEN: Soft, Bowel sounds present  EXTREMITIES:  No clubbing

## 2020-02-06 DIAGNOSIS — J45.909 UNSPECIFIED ASTHMA, UNCOMPLICATED: ICD-10-CM

## 2020-02-06 DIAGNOSIS — N18.2 CHRONIC KIDNEY DISEASE, STAGE 2 (MILD): ICD-10-CM

## 2020-02-06 DIAGNOSIS — E03.9 HYPOTHYROIDISM, UNSPECIFIED: ICD-10-CM

## 2020-02-06 DIAGNOSIS — G40.909 EPILEPSY, UNSPECIFIED, NOT INTRACTABLE, WITHOUT STATUS EPILEPTICUS: ICD-10-CM

## 2020-02-06 DIAGNOSIS — Z79.899 OTHER LONG TERM (CURRENT) DRUG THERAPY: ICD-10-CM

## 2020-02-06 DIAGNOSIS — Z79.890 HORMONE REPLACEMENT THERAPY: ICD-10-CM

## 2020-02-06 DIAGNOSIS — Z91.040 LATEX ALLERGY STATUS: ICD-10-CM

## 2020-02-06 DIAGNOSIS — E66.01 MORBID (SEVERE) OBESITY DUE TO EXCESS CALORIES: ICD-10-CM

## 2020-02-06 DIAGNOSIS — M17.12 UNILATERAL PRIMARY OSTEOARTHRITIS, LEFT KNEE: ICD-10-CM

## 2020-02-06 DIAGNOSIS — R26.2 DIFFICULTY IN WALKING, NOT ELSEWHERE CLASSIFIED: ICD-10-CM

## 2020-02-06 DIAGNOSIS — Z88.0 ALLERGY STATUS TO PENICILLIN: ICD-10-CM

## 2020-02-06 DIAGNOSIS — E11.9 TYPE 2 DIABETES MELLITUS WITHOUT COMPLICATIONS: ICD-10-CM

## 2020-02-06 DIAGNOSIS — Z85.42 PERSONAL HISTORY OF MALIGNANT NEOPLASM OF OTHER PARTS OF UTERUS: ICD-10-CM

## 2020-02-06 DIAGNOSIS — Z90.710 ACQUIRED ABSENCE OF BOTH CERVIX AND UTERUS: ICD-10-CM

## 2020-08-05 NOTE — ED PROVIDER NOTE - PATIENT PORTAL LINK FT
You can access the FollowMyHealth Patient Portal offered by [facility] by registering at the following website: [email or web address]. By joining [employer]’s FollowMyHealth portal, you will also be able to view your health information using other applications (apps) compatible with our system. no chest pain, no cough, and no shortness of breath.

## 2020-11-17 NOTE — ED PROVIDER NOTE - THROAT, MLM
Arrived from:  [facility]U   Belongings/meds:  w/ patient   2 RN Skin Assessment Completed by:  Aliya CLAYTON and Diana ARMAS  Non-intact findings documented (yes/no/NA): R mouth incision w/ sutures. Scant serosanguinous drainage. Bruising and soreness to R tongue. Bruising throughout.      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 Disoriented to situation and time. Knew year but didn't know month or date. Forgetful. [place] bilaterally. Denies N/T. 4/5 throughout.   IV: PIV infusing MIVF @75mL/hr.   Resp/trach: LS diminished at bases.   Diet: Advance to full diet as tolerated. Pt tolerating clears this shift.   Bowel status: BS+. No BM this shift. LBM 11/15 per pt report.   : Voiding spontaneously. Pt attempted to urinate @2230 but unable to.   Pain: scheduled tylenol givenx1 for sore throat and soreness to R tongue.   Activity: No OOB this shift. Pt report she was up independently PTA.   Social: [place], home health RN updated by pacu staff before transfer.   Plan: continue to monitor and follow POC.        abnormal/expand...

## 2020-12-16 PROBLEM — N39.0 BACTERIAL UTI: Status: RESOLVED | Noted: 2018-07-23 | Resolved: 2020-12-16

## 2021-03-12 NOTE — DISCHARGE NOTE ADULT - NS DC INTERPRETER YES NO
FIRST PROVIDER CONTACT ASSESSMENT NOTE      Department of Emergency Medicine   ED  First Provider Note   3/12/21  5:15 PM EST    Chief Complaint: Nausea, Emesis (per pt unable to keep anything down since Monday.), and Abdominal Pain (left mid/upper quadrant pain)      History of Present Illness:    Alicia Marrufo is a 44 y.o. male who presents to the ED by private car for nausea, emesis  Focused Screening Exam:  Constitutional:  Alert, appears stated age and is in no distress. *ALLERGIES*     Patient has no known allergies.      ED Triage Vitals   BP Temp Temp src Pulse Resp SpO2 Height Weight   03/12/21 1657 03/12/21 1639 -- 03/12/21 1639 03/12/21 1657 03/12/21 1639 03/12/21 1657 03/12/21 1657   131/77 99.3 °F (37.4 °C)  129 16 93 % 6' 1\" (1.854 m) 223 lb (101.2 kg)        Initial Plan of Care:  Initiate Treatment-Testing, Proceed toTreatment Area When Bed Available for ED Attending/MLP to Continue Care    -----------------END OF FIRST PROVIDER CONTACT ASSESSMENT NOTE--------------  Electronically signed by CARTER Pichardo CNP   DD: 3/12/21 No

## 2021-09-15 ENCOUNTER — INPATIENT (INPATIENT)
Facility: HOSPITAL | Age: 66
LOS: 8 days | Discharge: NURSING, REHAB OR LONG-TERM CARE | End: 2021-09-24
Attending: INTERNAL MEDICINE | Admitting: INTERNAL MEDICINE
Payer: MEDICARE

## 2021-09-15 VITALS
TEMPERATURE: 98 F | OXYGEN SATURATION: 97 % | SYSTOLIC BLOOD PRESSURE: 131 MMHG | WEIGHT: 270.07 LBS | RESPIRATION RATE: 16 BRPM | HEIGHT: 62 IN | HEART RATE: 65 BPM | DIASTOLIC BLOOD PRESSURE: 63 MMHG

## 2021-09-15 DIAGNOSIS — Z90.89 ACQUIRED ABSENCE OF OTHER ORGANS: Chronic | ICD-10-CM

## 2021-09-15 DIAGNOSIS — Z87.448 PERSONAL HISTORY OF OTHER DISEASES OF URINARY SYSTEM: Chronic | ICD-10-CM

## 2021-09-15 DIAGNOSIS — Z98.890 OTHER SPECIFIED POSTPROCEDURAL STATES: Chronic | ICD-10-CM

## 2021-09-15 DIAGNOSIS — K08.409 PARTIAL LOSS OF TEETH, UNSPECIFIED CAUSE, UNSPECIFIED CLASS: Chronic | ICD-10-CM

## 2021-09-15 PROBLEM — N20.0 CALCULUS OF KIDNEY: Chronic | Status: ACTIVE | Noted: 2018-04-19

## 2021-09-15 PROBLEM — C55 MALIGNANT NEOPLASM OF UTERUS, PART UNSPECIFIED: Chronic | Status: ACTIVE | Noted: 2018-07-20

## 2021-09-15 LAB
ALBUMIN SERPL ELPH-MCNC: 4.1 G/DL — SIGNIFICANT CHANGE UP (ref 3.5–5.2)
ALP SERPL-CCNC: 66 U/L — SIGNIFICANT CHANGE UP (ref 30–115)
ALT FLD-CCNC: 12 U/L — SIGNIFICANT CHANGE UP (ref 0–41)
ANION GAP SERPL CALC-SCNC: 12 MMOL/L — SIGNIFICANT CHANGE UP (ref 7–14)
APTT BLD: 31.3 SEC — SIGNIFICANT CHANGE UP (ref 27–39.2)
AST SERPL-CCNC: 12 U/L — SIGNIFICANT CHANGE UP (ref 0–41)
BASOPHILS # BLD AUTO: 0.04 K/UL — SIGNIFICANT CHANGE UP (ref 0–0.2)
BASOPHILS NFR BLD AUTO: 0.3 % — SIGNIFICANT CHANGE UP (ref 0–1)
BILIRUB SERPL-MCNC: 0.3 MG/DL — SIGNIFICANT CHANGE UP (ref 0.2–1.2)
BUN SERPL-MCNC: 19 MG/DL — SIGNIFICANT CHANGE UP (ref 10–20)
CALCIUM SERPL-MCNC: 9.4 MG/DL — SIGNIFICANT CHANGE UP (ref 8.5–10.1)
CHLORIDE SERPL-SCNC: 104 MMOL/L — SIGNIFICANT CHANGE UP (ref 98–110)
CO2 SERPL-SCNC: 22 MMOL/L — SIGNIFICANT CHANGE UP (ref 17–32)
CREAT SERPL-MCNC: 0.7 MG/DL — SIGNIFICANT CHANGE UP (ref 0.7–1.5)
EOSINOPHIL # BLD AUTO: 0.07 K/UL — SIGNIFICANT CHANGE UP (ref 0–0.7)
EOSINOPHIL NFR BLD AUTO: 0.5 % — SIGNIFICANT CHANGE UP (ref 0–8)
GLUCOSE SERPL-MCNC: 131 MG/DL — HIGH (ref 70–99)
HCT VFR BLD CALC: 44.5 % — SIGNIFICANT CHANGE UP (ref 37–47)
HGB BLD-MCNC: 14.6 G/DL — SIGNIFICANT CHANGE UP (ref 12–16)
IMM GRANULOCYTES NFR BLD AUTO: 0.5 % — HIGH (ref 0.1–0.3)
INR BLD: 1.04 RATIO — SIGNIFICANT CHANGE UP (ref 0.65–1.3)
LYMPHOCYTES # BLD AUTO: 1.33 K/UL — SIGNIFICANT CHANGE UP (ref 1.2–3.4)
LYMPHOCYTES # BLD AUTO: 8.6 % — LOW (ref 20.5–51.1)
MAGNESIUM SERPL-MCNC: 1.8 MG/DL — SIGNIFICANT CHANGE UP (ref 1.8–2.4)
MCHC RBC-ENTMCNC: 31.4 PG — HIGH (ref 27–31)
MCHC RBC-ENTMCNC: 32.8 G/DL — SIGNIFICANT CHANGE UP (ref 32–37)
MCV RBC AUTO: 95.7 FL — SIGNIFICANT CHANGE UP (ref 81–99)
MONOCYTES # BLD AUTO: 0.8 K/UL — HIGH (ref 0.1–0.6)
MONOCYTES NFR BLD AUTO: 5.2 % — SIGNIFICANT CHANGE UP (ref 1.7–9.3)
NEUTROPHILS # BLD AUTO: 13.11 K/UL — HIGH (ref 1.4–6.5)
NEUTROPHILS NFR BLD AUTO: 84.9 % — HIGH (ref 42.2–75.2)
NRBC # BLD: 0 /100 WBCS — SIGNIFICANT CHANGE UP (ref 0–0)
PLATELET # BLD AUTO: 204 K/UL — SIGNIFICANT CHANGE UP (ref 130–400)
POTASSIUM SERPL-MCNC: 4 MMOL/L — SIGNIFICANT CHANGE UP (ref 3.5–5)
POTASSIUM SERPL-SCNC: 4 MMOL/L — SIGNIFICANT CHANGE UP (ref 3.5–5)
PROT SERPL-MCNC: 6.7 G/DL — SIGNIFICANT CHANGE UP (ref 6–8)
PROTHROM AB SERPL-ACNC: 11.9 SEC — SIGNIFICANT CHANGE UP (ref 9.95–12.87)
RBC # BLD: 4.65 M/UL — SIGNIFICANT CHANGE UP (ref 4.2–5.4)
RBC # FLD: 13.1 % — SIGNIFICANT CHANGE UP (ref 11.5–14.5)
SARS-COV-2 RNA SPEC QL NAA+PROBE: SIGNIFICANT CHANGE UP
SODIUM SERPL-SCNC: 138 MMOL/L — SIGNIFICANT CHANGE UP (ref 135–146)
WBC # BLD: 15.43 K/UL — HIGH (ref 4.8–10.8)
WBC # FLD AUTO: 15.43 K/UL — HIGH (ref 4.8–10.8)

## 2021-09-15 PROCEDURE — 73560 X-RAY EXAM OF KNEE 1 OR 2: CPT | Mod: 26,LT

## 2021-09-15 PROCEDURE — 71045 X-RAY EXAM CHEST 1 VIEW: CPT | Mod: 26

## 2021-09-15 PROCEDURE — 73552 X-RAY EXAM OF FEMUR 2/>: CPT | Mod: 26,LT

## 2021-09-15 PROCEDURE — 74177 CT ABD & PELVIS W/CONTRAST: CPT | Mod: 26,MA

## 2021-09-15 PROCEDURE — 73502 X-RAY EXAM HIP UNI 2-3 VIEWS: CPT | Mod: 26,LT

## 2021-09-15 PROCEDURE — 93010 ELECTROCARDIOGRAM REPORT: CPT

## 2021-09-15 PROCEDURE — 73700 CT LOWER EXTREMITY W/O DYE: CPT | Mod: 26,LT,MA

## 2021-09-15 PROCEDURE — 99285 EMERGENCY DEPT VISIT HI MDM: CPT

## 2021-09-15 RX ORDER — MONTELUKAST 4 MG/1
1 TABLET, CHEWABLE ORAL
Qty: 0 | Refills: 0 | DISCHARGE

## 2021-09-15 RX ORDER — LORATADINE 10 MG/1
1 TABLET ORAL
Qty: 0 | Refills: 0 | DISCHARGE

## 2021-09-15 RX ORDER — HEPARIN SODIUM 5000 [USP'U]/ML
5000 INJECTION INTRAVENOUS; SUBCUTANEOUS ONCE
Refills: 0 | Status: DISCONTINUED | OUTPATIENT
Start: 2021-09-15 | End: 2021-09-16

## 2021-09-15 RX ORDER — ACETAMINOPHEN 500 MG
2 TABLET ORAL
Qty: 0 | Refills: 0 | DISCHARGE

## 2021-09-15 RX ORDER — TRAZODONE HCL 50 MG
75 TABLET ORAL AT BEDTIME
Refills: 0 | Status: DISCONTINUED | OUTPATIENT
Start: 2021-09-15 | End: 2021-09-16

## 2021-09-15 RX ORDER — POTASSIUM CITRATE MONOHYDRATE 100 %
0 POWDER (GRAM) MISCELLANEOUS
Qty: 0 | Refills: 0 | DISCHARGE

## 2021-09-15 RX ORDER — HEPARIN SODIUM 5000 [USP'U]/ML
5000 INJECTION INTRAVENOUS; SUBCUTANEOUS EVERY 8 HOURS
Refills: 0 | Status: DISCONTINUED | OUTPATIENT
Start: 2021-09-15 | End: 2021-09-15

## 2021-09-15 RX ORDER — ONDANSETRON 8 MG/1
4 TABLET, FILM COATED ORAL ONCE
Refills: 0 | Status: COMPLETED | OUTPATIENT
Start: 2021-09-15 | End: 2021-09-15

## 2021-09-15 RX ORDER — HYDROMORPHONE HYDROCHLORIDE 2 MG/ML
1 INJECTION INTRAMUSCULAR; INTRAVENOUS; SUBCUTANEOUS ONCE
Refills: 0 | Status: DISCONTINUED | OUTPATIENT
Start: 2021-09-15 | End: 2021-09-15

## 2021-09-15 RX ORDER — PRAMIPEXOLE DIHYDROCHLORIDE 0.12 MG/1
0.5 TABLET ORAL AT BEDTIME
Refills: 0 | Status: DISCONTINUED | OUTPATIENT
Start: 2021-09-15 | End: 2021-09-16

## 2021-09-15 RX ORDER — SODIUM CHLORIDE 9 MG/ML
1000 INJECTION INTRAMUSCULAR; INTRAVENOUS; SUBCUTANEOUS
Refills: 0 | Status: DISCONTINUED | OUTPATIENT
Start: 2021-09-15 | End: 2021-09-16

## 2021-09-15 RX ORDER — TOPIRAMATE 25 MG
1 TABLET ORAL
Qty: 0 | Refills: 0 | DISCHARGE

## 2021-09-15 RX ORDER — PHENOBARBITAL 60 MG
0 TABLET ORAL
Qty: 0 | Refills: 0 | DISCHARGE

## 2021-09-15 RX ORDER — CHLORHEXIDINE GLUCONATE 213 G/1000ML
1 SOLUTION TOPICAL
Refills: 0 | Status: DISCONTINUED | OUTPATIENT
Start: 2021-09-15 | End: 2021-09-16

## 2021-09-15 RX ORDER — SENNA PLUS 8.6 MG/1
2 TABLET ORAL AT BEDTIME
Refills: 0 | Status: DISCONTINUED | OUTPATIENT
Start: 2021-09-15 | End: 2021-09-16

## 2021-09-15 RX ORDER — MONTELUKAST 4 MG/1
10 TABLET, CHEWABLE ORAL AT BEDTIME
Refills: 0 | Status: DISCONTINUED | OUTPATIENT
Start: 2021-09-15 | End: 2021-09-16

## 2021-09-15 RX ORDER — MULTIVIT-MIN/FERROUS GLUCONATE 9 MG/15 ML
1 LIQUID (ML) ORAL DAILY
Refills: 0 | Status: DISCONTINUED | OUTPATIENT
Start: 2021-09-15 | End: 2021-09-16

## 2021-09-15 RX ORDER — MORPHINE SULFATE 50 MG/1
6 CAPSULE, EXTENDED RELEASE ORAL ONCE
Refills: 0 | Status: DISCONTINUED | OUTPATIENT
Start: 2021-09-15 | End: 2021-09-15

## 2021-09-15 RX ORDER — LEVOTHYROXINE SODIUM 125 MCG
1 TABLET ORAL
Qty: 0 | Refills: 0 | DISCHARGE

## 2021-09-15 RX ORDER — INFLUENZA VIRUS VACCINE 15; 15; 15; 15 UG/.5ML; UG/.5ML; UG/.5ML; UG/.5ML
0.5 SUSPENSION INTRAMUSCULAR ONCE
Refills: 0 | Status: DISCONTINUED | OUTPATIENT
Start: 2021-09-15 | End: 2021-09-24

## 2021-09-15 RX ORDER — MORPHINE SULFATE 50 MG/1
8 CAPSULE, EXTENDED RELEASE ORAL ONCE
Refills: 0 | Status: DISCONTINUED | OUTPATIENT
Start: 2021-09-15 | End: 2021-09-15

## 2021-09-15 RX ORDER — CITALOPRAM 10 MG/1
1 TABLET, FILM COATED ORAL
Qty: 0 | Refills: 0 | DISCHARGE

## 2021-09-15 RX ORDER — OXYCODONE AND ACETAMINOPHEN 5; 325 MG/1; MG/1
1 TABLET ORAL EVERY 6 HOURS
Refills: 0 | Status: DISCONTINUED | OUTPATIENT
Start: 2021-09-15 | End: 2021-09-16

## 2021-09-15 RX ORDER — PHENOBARBITAL 60 MG
32.4 TABLET ORAL THREE TIMES A DAY
Refills: 0 | Status: DISCONTINUED | OUTPATIENT
Start: 2021-09-15 | End: 2021-09-16

## 2021-09-15 RX ORDER — NYSTATIN CREAM 100000 [USP'U]/G
1 CREAM TOPICAL
Refills: 0 | Status: DISCONTINUED | OUTPATIENT
Start: 2021-09-15 | End: 2021-09-16

## 2021-09-15 RX ORDER — VORTIOXETINE 5 MG/1
0.5 TABLET, FILM COATED ORAL
Qty: 0 | Refills: 0 | DISCHARGE

## 2021-09-15 RX ORDER — OXYCODONE AND ACETAMINOPHEN 5; 325 MG/1; MG/1
2 TABLET ORAL ONCE
Refills: 0 | Status: DISCONTINUED | OUTPATIENT
Start: 2021-09-15 | End: 2021-09-15

## 2021-09-15 RX ADMIN — HYDROMORPHONE HYDROCHLORIDE 1 MILLIGRAM(S): 2 INJECTION INTRAMUSCULAR; INTRAVENOUS; SUBCUTANEOUS at 16:30

## 2021-09-15 RX ADMIN — OXYCODONE AND ACETAMINOPHEN 1 TABLET(S): 5; 325 TABLET ORAL at 21:53

## 2021-09-15 RX ADMIN — MORPHINE SULFATE 6 MILLIGRAM(S): 50 CAPSULE, EXTENDED RELEASE ORAL at 13:19

## 2021-09-15 RX ADMIN — SODIUM CHLORIDE 75 MILLILITER(S): 9 INJECTION INTRAMUSCULAR; INTRAVENOUS; SUBCUTANEOUS at 21:55

## 2021-09-15 RX ADMIN — HYDROMORPHONE HYDROCHLORIDE 1 MILLIGRAM(S): 2 INJECTION INTRAMUSCULAR; INTRAVENOUS; SUBCUTANEOUS at 14:50

## 2021-09-15 RX ADMIN — MONTELUKAST 10 MILLIGRAM(S): 4 TABLET, CHEWABLE ORAL at 21:55

## 2021-09-15 RX ADMIN — OXYCODONE AND ACETAMINOPHEN 2 TABLET(S): 5; 325 TABLET ORAL at 17:54

## 2021-09-15 RX ADMIN — Medication 75 MILLIGRAM(S): at 21:54

## 2021-09-15 RX ADMIN — PRAMIPEXOLE DIHYDROCHLORIDE 0.5 MILLIGRAM(S): 0.12 TABLET ORAL at 21:55

## 2021-09-15 RX ADMIN — Medication 32.4 MILLIGRAM(S): at 22:22

## 2021-09-15 NOTE — ED ADULT TRIAGE NOTE - CHIEF COMPLAINT QUOTE
DEREK from [facility] for a fall. EMS states she slid off the shower chair. Complain of left inner thigh pain

## 2021-09-15 NOTE — ED PROVIDER NOTE - WR ORDER NAME 3
[facility] ER Clinic Follow Up Note    Tammy Law   62 y.o. female    Date of Visit: 1/11/2018    Chief Complaint   Patient presents with     Cough     Subjective  This is a 62-year-old patient of Dr. Arnold Anderson.  She comes in with a persistent respiratory infection that started about 6 days ago.  Symptoms have included congestion, sore throat, minimally productive cough.  She has a lot of fullness in her head.  She denies any fever or chills.  She feels that her symptoms have gradually worsened as the week is progressed.  She is not working this coming weekend and will be able to rest but is wondering if anything else can be done.  No other complaints today.    ROS A comprehensive review of systems was performed and was otherwise negative    Medications, allergies, and problem list were reviewed and updated    Exam  General Appearance:   On examination her blood pressure is 118/60.  Weight is 148 pounds and height is 64 inches.  BMI is 25.40.    Heart is in a sinus rhythm with a rate of 74 and no ectopy.    Lungs are clear with good breath sounds.    No enlarged cervical lymph nodes.    No facial tenderness.    Throat is somewhat erythematous but there is no exudate.    The patient is alert and oriented ×3.      Assessment/Plan  1. URI (upper respiratory infection)       Upper respiratory infection that is nearly a week old.  As the patient seems to be worsening I think would be reasonable to put her on an antibiotic.  We will start her on some Zithromax and have her follow-up with her physician if she is not improving.      Sumanth Patel MD      Current Outpatient Prescriptions on File Prior to Visit   Medication Sig     amoxicillin (AMOXIL) 500 MG capsule Take 4 tablets prior to procedure     cholecalciferol, vitamin D3, 1,000 unit tablet Take 1,000 Units by mouth daily.     citalopram (CELEXA) 40 MG tablet TAKE 1/2 TABLET BY MOUTH EVERY DAY     estrogens,  conjugated,-methylTESTOSTERone (ESTRATEST HS) 0.625-1.25 mg per tablet Take 1 tablet by mouth daily.     furosemide (LASIX) 40 MG tablet TAKE 1 TABLET BY MOUTH EVERY OTHER DAY AS NEEDED FOR SWELLING     hydroCHLOROthiazide (MICROZIDE) 12.5 mg capsule TAKE 1 CAPSULE(12.5 MG) BY MOUTH DAILY     levothyroxine (SYNTHROID, LEVOTHROID) 75 MCG tablet TAKE 1 TABLET BY MOUTH EVERY DAY     oxyCODONE-acetaminophen (PERCOCET) 5-325 mg per tablet Take 1 tablet by mouth every 4 (four) hours as needed for pain.     simvastatin (ZOCOR) 40 MG tablet TAKE 1/2 TABLET BY MOUTH EVERY DAY     warfarin (COUMADIN) 2.5 MG tablet TAKE UP TO 2 TABLETS BY MOUTH DAILY AS DIRECTED BY COUMADIN CLINIC     warfarin (COUMADIN) 2.5 MG tablet TAKE 2 TO 3 TABLETS(5 TO 7.5 MG) BY MOUTH DAILY. ADJUST DOSE BASED ON INR RESULTS AS DIRECTED     [DISCONTINUED] levothyroxine (SYNTHROID, LEVOTHROID) 75 MCG tablet TAKE 1 TABLET BY MOUTH EVERY DAY     [DISCONTINUED] levothyroxine (SYNTHROID, LEVOTHROID) 75 MCG tablet TAKE 1 TABLET BY MOUTH EVERY DAY     [DISCONTINUED] levothyroxine (SYNTHROID, LEVOTHROID) 75 MCG tablet TAKE 1 TABLET BY MOUTH EVERY DAY     No current facility-administered medications on file prior to visit.      Allergies   Allergen Reactions     Meperidine Other (See Comments)     hallucinations      Sulfa (Sulfonamide Antibiotics) Itching and Rash     Social History   Substance Use Topics     Smoking status: Never Smoker     Smokeless tobacco: Never Used     Alcohol use Yes      Comment: twice per year            Xray Chest 1 View- PORTABLE-Urgent

## 2021-09-15 NOTE — H&P ADULT - ATTENDING COMMENTS
A 64 y/o female from nursing home with pmhx of asthma, copd, obesity, epilepsy, hypothyroidism , osteoarthritis , kidney stone  s/p slip and fall at [facility]  admitted for left femoral fracture.    T(F): 97.8 (15 Sep 2021 12:19), Max: 97.8 (15 Sep 2021 12:19)  HR: 65 (15 Sep 2021 12:19) (65 - 65)  BP: 131/63 (15 Sep 2021 12:19) (131/63 - 131/63)  RR: 16 (15 Sep 2021 12:19) (16 - 16)  SpO2: 97% (15 Sep 2021 12:19) (97% - 97%)     NAD  morbidly obese  lungs clear, no wheeze  heart S1S2  abdomen obese  extremity trace edema, bruise, pain with ROM    Acute comminuted, displaced fracture of the left proximal femur with avulsion of the lesser trochanter  - CXR and EKG not on chart  - patient denies chest pain, SOB  - Patient is at best moderate risk for procedure  - DVT prophylaxsis  - incentive spirometer  - pain rx as needed  - ortho appreciated  - rehab eval post OP    Asthma by hx  - not an active problem    Seizure  - on rx stable  - no recent seizure  - neuro consulted    Hypothyroidism  - on replacement    hx of kidney stones  - multiple treatment in the past    hx of Endometrial Cancer  - post ZULEYMA/BSO    Morbid Obesity  - would benefit from diet

## 2021-09-15 NOTE — H&P ADULT - NSHPPHYSICALEXAM_GEN_ALL_CORE
VITALS:     ICU Vital Signs Last 24 Hrs  T(C): 36.6 (15 Sep 2021 12:19), Max: 36.6 (15 Sep 2021 12:19)  T(F): 97.8 (15 Sep 2021 12:19), Max: 97.8 (15 Sep 2021 12:19)  HR: 65 (15 Sep 2021 12:19) (65 - 65)  BP: 131/63 (15 Sep 2021 12:19) (131/63 - 131/63)  RR: 16 (15 Sep 2021 12:19) (16 - 16)  SpO2: 97% (15 Sep 2021 12:19) (97% - 97%)        GENERAL: NAD, lying in bed comfortably  HEAD:  Atraumatic, Normocephalic  EYES: EOMI, PERRLA, conjunctiva and sclera clear  ENT: Moist mucous membranes  NECK: Supple, No JVD, no c-spine tenderness, range of motion intact.   CHEST/LUNG: Clear to auscultation bilaterally; No rales, rhonchi, wheezing, or rubs. Unlabored respirations  HEART: Regular rate and rhythm; No murmurs, rubs, or gallops  ABDOMEN: obese Soft, Nontender, Nondistended. No hepatomegally  EXTREMITIES:  no edema. left hip tenderness, left hamstring soft, no hematoma.   NERVOUS SYSTEM:  Alert & Oriented X3, speech clear. No deficits   MSK: pt not able to flex left hip or knee, Pt able to press with foot. DP/PT present in LLE , sensation intact, foot warm , RLE exam unremarkable   SKIN: No rashes or lesions

## 2021-09-15 NOTE — H&P ADULT - ASSESSMENT
A 62 y/o female with pmhx of asthma, copd, obesity, epilepsy, hypothyroidism , osteoarthritis , kidney stone  s/p slip and fall at [facility] [place]  admitted for left femoral fracture.      #Acute comminuted, displaced fracture of the left proximal femur with avulsion of the lesser trochanter  -ED spoke with ortho pt to be transferred at [facility]   -ortho consult at [place]   -NPO  -pain meds  -bed rest  -type and screen   -cxr, ekg     #Hypothyroidism   -continue synthroid       # epilepsy   -continue phenobarbital       # Obesity   -continue Topamax   - dash diet     #insomnia   -continue trazodone     #kidney stone   -follow up with urology as oupt

## 2021-09-15 NOTE — CONSULT NOTE ADULT - SUBJECTIVE AND OBJECTIVE BOX
Pt Name: JACOB KOTHARI  MRN: [id number]      HPI: 66yFemale with pmhx of asthma, copd, morbid obesity (BMI 49), epilepsy, hypothyroidism, and severe bilateral knee osteoarthritis, BIBEMS to [facility] due to L hip pain after mechanical fall earlier today at her nursing home where she lives. -HT, -LOC. Patient endorses severe pain in bilateral knees at baseline, which is why she has limited ambulation and lives in a nursing home ([place]), but that her L hip pain after her mechanical fall earlier today was new from baseline. Denies pain other than new L hip pain and chronic pain in bilateral knees. Denies numbness/tingling.    Patient states that she is able to ambulate short distances using a walker. She has lived in this facility since January 2020 after her knee "buckled" requiring physical therapy/rehab, but denies fractures at that time. She has required assistive devices for ambulation in the setting of severe bilateral knee pain/ morbid obesity, for the last several years.     PAST MEDICAL & SURGICAL HISTORY:  Diabetes    Renal stone    Renal failure    Epilepsy    Asthma    Chronic cough    Knee pain    Osteoarthritis    Uterine cancer  s/p hysterectomy    H/O abdominal hysterectomy  NO RADIATION AND CHEMO    History of tonsillectomy  13 years old    [place] teeth removed    History of bladder stone  SURGERY 8/3/2018    History of surgery  JJ STENT PLACEMENT LEFT OCTOBER 2017        Allergies: Compazine (Unknown)  latex (Urticaria)  penicillins (Unknown)      Medications: chlorhexidine 4% Liquid 1 Application(s) Topical <User Schedule>  heparin   Injectable 5000 Unit(s) SubCutaneous every 8 hours  montelukast 10 milliGRAM(s) Oral at bedtime  multivitamin/minerals 1 Tablet(s) Oral daily  nystatin Powder 1 Application(s) Topical two times a day  oxycodone    5 mG/acetaminophen 325 mG 1 Tablet(s) Oral every 6 hours PRN  PHENobarbital 32.4 milliGRAM(s) Oral three times a day  pramipexole 0.5 milliGRAM(s) Oral at bedtime  senna 2 Tablet(s) Oral at bedtime PRN  sodium chloride 0.9%. 1000 milliLiter(s) IV Continuous <Continuous>  traZODone 75 milliGRAM(s) Oral at bedtime        PHYSICAL EXAM:    Vital Signs Last 24 Hrs  T(C): 36.6 (15 Sep 2021 12:19), Max: 36.6 (15 Sep 2021 12:19)  T(F): 97.8 (15 Sep 2021 12:19), Max: 97.8 (15 Sep 2021 12:19)  HR: 65 (15 Sep 2021 12:19) (65 - 65)  BP: 131/63 (15 Sep 2021 12:19) (131/63 - 131/63)  BP(mean): --  RR: 16 (15 Sep 2021 12:19) (16 - 16)  SpO2: 97% (15 Sep 2021 12:19) (97% - 97%)    Physical Exam:  General: NAD, Alert, Awake    Bilateral UE:  No open skin or wounds  NTTP shoulder, upper arm, elbow, forearm, wrist or hand  Full baseline painless ROM at shoulder, elbow, wrist, and   SILT in M/R/U nerves  AIN/PIN/U motor intact  2+ radial pulse with brisk cap refill at distal finger tips.     RLE:  No open skin or wounds  NTTP throughout RLE  Limited ROM of knee (15-80d) which is patient's baseline per patient   No pain with log-roll or axial compression  SILT DP/SP/T/Deal/Sa.   EHL/FHL/TA/Gs motor intact.  2+ DP pulses with brisk cap refill distally..    LLE:   LLE rests with hip flexed/ER, and leg shortened   No open skin or wounds  TTP at proximal femur/hip  Did not assess knee ROM secondary to pain at hip  SILT DP/SP/T/Deal/Sa.   EHL/FHL/TA/Gs motor intact.  2+ DP pulses with brisk cap refill distally.  Compartments soft and compressible.     Labs:                        14.6   15.43 )-----------( 204      ( 15 Sep 2021 13:30 )             44.5     09-15    138  |  104  |  19  ----------------------------<  131<H>  4.0   |  22  |  0.7    Ca    9.4      15 Sep 2021 13:30  Mg     1.8     09-15    TPro  6.7  /  Alb  4.1  /  TBili  0.3  /  DBili  x   /  AST  12  /  ALT  12  /  AlkPhos  66  09-15    PT/INR - ( 15 Sep 2021 13:30 )   PT: 11.90 sec;   INR: 1.04 ratio         PTT - ( 15 Sep 2021 13:30 )  PTT:31.3 sec    Imaging:  XR AP pelvis, hip: displaced L intertrochanteric femur fracture with subtrochanteric extension   XR R knee: severe end-stage tricompartmental osteoarthritis      A/P:    66y Female with L intertrochanteric femur fracture with subtrochanteric extension, indicated for ORIF vs. IMN tomorrow with orthopaedics, pending medical/neurology clearances.   -NWB LLE  -pain control  -please give one dose of DVT ppx tonight, then hold tomorrow AM  -pre-op labs: needs TWO type & screens (ordered)  -EKG  -IS  -Pain control  -PT/OT  -case added on for surgery tomorrow, 9/16/21  -please provide medicine clearance,   -neurology clearance in setting of epilepsy hx on phenobarbital     Please call orthopaedics with any questions or concerns  Pt Name: JACOB KOTHARI  MRN: [id number]      HPI: 66yFemale with pmhx of asthma, copd, morbid obesity (BMI 49), epilepsy, hypothyroidism, and severe bilateral knee osteoarthritis, BIBEMS to [facility] due to L hip pain after mechanical fall earlier today at her nursing home where she lives. -HT, -LOC. Patient endorses severe pain in bilateral knees at baseline, which is why she has limited ambulation and lives in a nursing home ([place]), but that her L hip pain after her mechanical fall earlier today was new from baseline. Denies pain other than new L hip pain and chronic pain in bilateral knees. Denies numbness/tingling.    Patient states that she is able to ambulate short distances using a walker. She has lived in this facility since January 2020 after her knee "buckled" requiring physical therapy/rehab, but denies fractures at that time. She has required assistive devices for ambulation in the setting of severe bilateral knee pain/ morbid obesity, for the last several years.     PAST MEDICAL & SURGICAL HISTORY:  Diabetes    Renal stone    Renal failure    Epilepsy    Asthma    Chronic cough    Knee pain    Osteoarthritis    Uterine cancer  s/p hysterectomy    H/O abdominal hysterectomy  NO RADIATION AND CHEMO    History of tonsillectomy  13 years old    [place] teeth removed    History of bladder stone  SURGERY 8/3/2018    History of surgery  JJ STENT PLACEMENT LEFT OCTOBER 2017        Allergies: Compazine (Unknown)  latex (Urticaria)  penicillins (Unknown)      Medications: chlorhexidine 4% Liquid 1 Application(s) Topical <User Schedule>  heparin   Injectable 5000 Unit(s) SubCutaneous every 8 hours  montelukast 10 milliGRAM(s) Oral at bedtime  multivitamin/minerals 1 Tablet(s) Oral daily  nystatin Powder 1 Application(s) Topical two times a day  oxycodone    5 mG/acetaminophen 325 mG 1 Tablet(s) Oral every 6 hours PRN  PHENobarbital 32.4 milliGRAM(s) Oral three times a day  pramipexole 0.5 milliGRAM(s) Oral at bedtime  senna 2 Tablet(s) Oral at bedtime PRN  sodium chloride 0.9%. 1000 milliLiter(s) IV Continuous <Continuous>  traZODone 75 milliGRAM(s) Oral at bedtime        PHYSICAL EXAM:    Vital Signs Last 24 Hrs  T(C): 36.6 (15 Sep 2021 12:19), Max: 36.6 (15 Sep 2021 12:19)  T(F): 97.8 (15 Sep 2021 12:19), Max: 97.8 (15 Sep 2021 12:19)  HR: 65 (15 Sep 2021 12:19) (65 - 65)  BP: 131/63 (15 Sep 2021 12:19) (131/63 - 131/63)  BP(mean): --  RR: 16 (15 Sep 2021 12:19) (16 - 16)  SpO2: 97% (15 Sep 2021 12:19) (97% - 97%)    Physical Exam:  General: NAD, Alert, Awake    Bilateral UE:  No open skin or wounds  NTTP shoulder, upper arm, elbow, forearm, wrist or hand  Full baseline painless ROM at shoulder, elbow, wrist, and   SILT in M/R/U nerves  AIN/PIN/U motor intact  2+ radial pulse with brisk cap refill at distal finger tips.     RLE:  No open skin or wounds  NTTP throughout RLE  Limited ROM of knee (15-80d) which is patient's baseline per patient   No pain with log-roll or axial compression  SILT DP/SP/T/Deal/Sa.   EHL/FHL/TA/Gs motor intact.  2+ DP pulses with brisk cap refill distally..    LLE:   LLE rests with hip flexed/ER, and leg shortened   No open skin or wounds  TTP at proximal femur/hip  Did not assess knee ROM secondary to pain at hip  SILT DP/SP/T/Deal/Sa.   EHL/FHL/TA/Gs motor intact.  2+ DP pulses with brisk cap refill distally.  Compartments soft and compressible.     Labs:                        14.6   15.43 )-----------( 204      ( 15 Sep 2021 13:30 )             44.5     09-15    138  |  104  |  19  ----------------------------<  131<H>  4.0   |  22  |  0.7    Ca    9.4      15 Sep 2021 13:30  Mg     1.8     09-15    TPro  6.7  /  Alb  4.1  /  TBili  0.3  /  DBili  x   /  AST  12  /  ALT  12  /  AlkPhos  66  09-15    PT/INR - ( 15 Sep 2021 13:30 )   PT: 11.90 sec;   INR: 1.04 ratio         PTT - ( 15 Sep 2021 13:30 )  PTT:31.3 sec    Imaging:  XR AP pelvis, hip: displaced L intertrochanteric femur fracture with subtrochanteric extension   XR R knee: severe end-stage tricompartmental osteoarthritis      A/P:    66y Female with L intertrochanteric femur fracture with subtrochanteric extension, indicated for ORIF vs. IMN tomorrow with orthopaedics, pending medical/neurology clearances.   -NWB LLE  -pain control  -please give one dose of DVT ppx tonight, then hold tomorrow AM  -pre-op labs: needs TWO type & screens (ordered)  -EKG  -IS  -Pain control  -PT/OT  -case added on for surgery tomorrow, 9/16/21  -please provide medicine clearance,       Please call orthopaedics with any questions or concerns

## 2021-09-15 NOTE — H&P ADULT - NSICDXPASTSURGICALHX_GEN_ALL_CORE_FT
PAST SURGICAL HISTORY:  H/O abdominal hysterectomy NO RADIATION AND CHEMO    History of bladder stone SURGERY 8/3/2018    History of surgery JJ STENT PLACEMENT LEFT OCTOBER 2017    History of tonsillectomy 13 years old    [place] teeth removed

## 2021-09-15 NOTE — H&P ADULT - NSHPLABSRESULTS_GEN_ALL_CORE
14.6   15.43 )-----------( 204      ( 15 Sep 2021 13:30 )             44.5       09-15    138  |  104  |  19  ----------------------------<  131<H>  4.0   |  22  |  0.7    Ca    9.4      15 Sep 2021 13:30  Mg     1.8     09-15    TPro  6.7  /  Alb  4.1  /  TBili  0.3  /  DBili  x   /  AST  12  /  ALT  12  /  AlkPhos  66  09-15          Magnesium, Serum: 1.8 mg/dL (09-15-21 @ 13:30)      PT/INR - ( 15 Sep 2021 13:30 )   PT: 11.90 sec;   INR: 1.04 ratio         PTT - ( 15 Sep 2021 13:30 )  PTT:31.3 sec    Lactate Trend      < from: CT Hip No Cont, Left (09.15.21 @ 15:59) >    IMPRESSION:  Acute comminuted, displaced fracture of the left proximal femur with avulsion of the lesser trochanter.    --- End of Report ---        LIEN BROOKS MD; Attending Radiologist  This document has been electronically signed. Sep 15 2021  4:58PM    < end of copied text >              < from: CT Abdomen and Pelvis w/ IV Cont (09.15.21 @ 15:59) >    IMPRESSION:  1.  Partially imaged comminuted, displaced left proximal femoral fracture with avulsion of thelesser trochanter. Please see separately dictated report of left hip CT for detailed assessment.    2.  No other acute traumatic pathology in the abdomen or pelvis.    3.  Additional incidental findings as above.    --- End of Report ---              LIEN BROOKS MD; Attending Radiologist  This document has been electronically signed. Sep 15 2021  4:25PM    < end of copied text >    < from: CT Abdomen and Pelvis w/ IV Cont (09.15.21 @ 15:59) >    IMPRESSION:  1.  Partially imaged comminuted, displaced left proximal femoral fracture with avulsion of thelesser trochanter. Please see separately dictated report of left hip CT for detailed assessment.    2.  No other acute traumatic pathology in the abdomen or pelvis.    3.  Additional incidental findings as above.    --- End of Report ---        LIEN BROOKS MD; Attending Radiologist  This document has been electronically signed. Sep 15 2021  4:25PM    < end of copied text >

## 2021-09-15 NOTE — PRE PROCEDURE NOTE - PRE PROCEDURE EVALUATION
ORTHOPEDIC SURGERY PRE OP NOTE    Diagnosis: left intertrochanteric hip fracture with subtrochanteric extension    Planned Procedure: open reduction internal fixation left hip fracture    Consent: TO BE OBTAINED BY ATTENDING                   Risks/benefits/alternatives were discussed with the patient/family and they wish to proceed with surgery.       ANTICIPATED DATE OF PROCEDURE: 9/16/2021  SCHEDULED CASE OR ADD-ON CASE: ADD-ON      Consent: to be obtained by attending surgeon    Clearances:   [ ] Medicine  [ ] Neurology (discussed with neuro team at 21:00pm on 9/15/2021)    T(C): 36.6 (09-15-21 @ 12:19), Max: 36.6 (09-15-21 @ 12:19)  HR: 65 (09-15-21 @ 12:19) (65 - 65)  BP: 131/63 (09-15-21 @ 12:19) (131/63 - 131/63)  RR: 16 (09-15-21 @ 12:19) (16 - 16)  SpO2: 97% (09-15-21 @ 12:19) (97% - 97%)    Labs:                        14.6   15.43 )-----------( 204      ( 15 Sep 2021 13:30 )             44.5     09-15    138  |  104  |  19  ----------------------------<  131<H>  4.0   |  22  |  0.7    Ca    9.4      15 Sep 2021 13:30  Mg     1.8     09-15    TPro  6.7  /  Alb  4.1  /  TBili  0.3  /  DBili  x   /  AST  12  /  ALT  12  /  AlkPhos  66  09-15    PT/INR - ( 15 Sep 2021 13:30 )   PT: 11.90 sec;   INR: 1.04 ratio         PTT - ( 15 Sep 2021 13:30 )  PTT:31.3 sec  Type and Screen X 2:    COVID-19 PCR: NotDetec (15 Sep 2021 12:30)    [ ]EKG: pending; spoke to EKG tech at 21:13 on 9/15/2021   [x]CXR:       DIET: NPO after midnight  IVF: per primary team      ANTICOAGULATION STATUS ( include name of anticoagulant): none                                           A/P: Patient is a 66y y/o Female with a left intertrochanteric hip fracture with subtrochanteric extension, pending open reduction internal fixation left hip fracture with orthopedics tomorrow.   [x] NPO and IVF @ midnight  [x]pain control/analgesia prn per primary team   [ ]Incentive Spirometry   [ ]F/U Clearance  [ ]F/U Pending Labs: two T&S  [ ]Notify Ortho with any questions- spectra [id number]    [x]DISCUSSED WITH PRIMARY TEAM MEMBER (name of team member): Dov (medicine)  [x]Date and Time DISCUSSED WITH PRIMARY TEAM MEMBER: 9/15/2021 at 20:50 ORTHOPEDIC SURGERY PRE OP NOTE    Diagnosis: left intertrochanteric hip fracture with subtrochanteric extension    Planned Procedure: open reduction internal fixation left hip fracture    Consent: TO BE OBTAINED BY ATTENDING                   Risks/benefits/alternatives were discussed with the patient/family and they wish to proceed with surgery.       ANTICIPATED DATE OF PROCEDURE: 9/16/2021  SCHEDULED CASE OR ADD-ON CASE: ADD-ON      Consent: to be obtained by attending surgeon    Clearances:   [ ] Medicine    T(C): 36.6 (09-15-21 @ 12:19), Max: 36.6 (09-15-21 @ 12:19)  HR: 65 (09-15-21 @ 12:19) (65 - 65)  BP: 131/63 (09-15-21 @ 12:19) (131/63 - 131/63)  RR: 16 (09-15-21 @ 12:19) (16 - 16)  SpO2: 97% (09-15-21 @ 12:19) (97% - 97%)    Labs:                        14.6   15.43 )-----------( 204      ( 15 Sep 2021 13:30 )             44.5     09-15    138  |  104  |  19  ----------------------------<  131<H>  4.0   |  22  |  0.7    Ca    9.4      15 Sep 2021 13:30  Mg     1.8     09-15    TPro  6.7  /  Alb  4.1  /  TBili  0.3  /  DBili  x   /  AST  12  /  ALT  12  /  AlkPhos  66  09-15    PT/INR - ( 15 Sep 2021 13:30 )   PT: 11.90 sec;   INR: 1.04 ratio         PTT - ( 15 Sep 2021 13:30 )  PTT:31.3 sec  Type and Screen X 2:    COVID-19 PCR: NotDetec (15 Sep 2021 12:30)    [ ]EKG: pending; spoke to EKG tech at 21:13 on 9/15/2021   [x]CXR:       DIET: NPO after midnight  IVF: per primary team      ANTICOAGULATION STATUS ( include name of anticoagulant): none                                           A/P: Patient is a 66y y/o Female with a left intertrochanteric hip fracture with subtrochanteric extension, pending open reduction internal fixation left hip fracture with orthopedics tomorrow.   [x] NPO and IVF @ midnight  [x]pain control/analgesia prn per primary team   [ ]Incentive Spirometry   [ ]F/U Clearance  [ ]F/U Pending Labs: two T&S  [ ]Notify Ortho with any questions- spectra [id number]    [x]DISCUSSED WITH PRIMARY TEAM MEMBER (name of team member): Dov (medicine)  [x]Date and Time DISCUSSED WITH PRIMARY TEAM MEMBER: 9/15/2021 at 20:50 ORTHOPEDIC SURGERY PRE OP NOTE    Diagnosis: left intertrochanteric hip fracture with subtrochanteric extension    Planned Procedure: open reduction internal fixation left hip fracture    Consent: TO BE OBTAINED BY ATTENDING                   Risks/benefits/alternatives were discussed with the patient/family and they wish to proceed with surgery.       ANTICIPATED DATE OF PROCEDURE: 9/16/2021  SCHEDULED CASE OR ADD-ON CASE: ADD-ON      Consent: to be obtained by attending surgeon    Clearances:   [ ] Medicine    T(C): 36.6 (09-15-21 @ 12:19), Max: 36.6 (09-15-21 @ 12:19)  HR: 65 (09-15-21 @ 12:19) (65 - 65)  BP: 131/63 (09-15-21 @ 12:19) (131/63 - 131/63)  RR: 16 (09-15-21 @ 12:19) (16 - 16)  SpO2: 97% (09-15-21 @ 12:19) (97% - 97%)    Labs:                        14.6   15.43 )-----------( 204      ( 15 Sep 2021 13:30 )             44.5     09-15    138  |  104  |  19  ----------------------------<  131<H>  4.0   |  22  |  0.7    Ca    9.4      15 Sep 2021 13:30  Mg     1.8     09-15    TPro  6.7  /  Alb  4.1  /  TBili  0.3  /  DBili  x   /  AST  12  /  ALT  12  /  AlkPhos  66  09-15    PT/INR - ( 15 Sep 2021 13:30 )   PT: 11.90 sec;   INR: 1.04 ratio         PTT - ( 15 Sep 2021 13:30 )  PTT:31.3 sec  Type and Screen X 2:    COVID-19 PCR: NotDetec (15 Sep 2021 12:30)    [ ]EKG: on chart   [x]CXR: done       DIET: NPO after midnight  IVF: per primary team      ANTICOAGULATION STATUS ( include name of anticoagulant): none                                           A/P: Patient is a 66y y/o Female with a left intertrochanteric hip fracture with subtrochanteric extension, pending open reduction internal fixation left hip fracture with orthopedics tomorrow.   [x] NPO and IVF @ midnight  [x]pain control/analgesia prn per primary team   [ ]Incentive Spirometry   [ ]F/U Clearance  [ ]F/U Pending Labs: two T&S  [ ]Notify Ortho with any questions- spectra [id number]    [x]DISCUSSED WITH PRIMARY TEAM MEMBER (name of team member): Dov (medicine) Dr Bynum @800  [x]Date and Time DISCUSSED WITH PRIMARY TEAM MEMBER: 9/15/2021 at 20:50

## 2021-09-15 NOTE — ED PROVIDER NOTE - CLINICAL SUMMARY MEDICAL DECISION MAKING FREE TEXT BOX
patient presents s/p fall while sitting on shower chair.  she denies any loc and denies any vomiting she denies any cp sob  she pain to the left hip with evidence of fracture on xray given iv pain medication.  iv fluids, we have consulted ortho who advise transfer to [facility] for further evaluation. patient updated and agree with plan of care

## 2021-09-15 NOTE — ED ADULT NURSE NOTE - CHIEF COMPLAINT QUOTE
DEREK from [facility] for a fall. EMS states she slid off the shower chair. Complain of left inner thigh pain

## 2021-09-15 NOTE — ED ADULT NURSE NOTE - NSICDXPASTSURGICALHX_GEN_ALL_CORE_FT
PAST SURGICAL HISTORY:  H/O abdominal hysterectomy NO RADIATION AND CHEMO    History of bladder stone SURGERY 8/3/2018    History of surgery JJ STENT PLACEMENT LEFT OCTOBER 2017    History of tonsillectomy 13 years old    [place] teeth removed

## 2021-09-15 NOTE — H&P ADULT - HISTORY OF PRESENT ILLNESS
A 62 y/o female with pmhx of asthma, copd, obesity, epilepsy, hypothyroidism , osteoarthritis , kidney stone  s/p slip and fall at [facility]  admitted for left femoral fracture. Pt reports was taking shower this am, her shower chair slipped and she fell on her buttocks. PT reports left hip pain. PT  denies neck pain, hitting head or loc. Pt reports was not able to stand or straighten her left leg. Pt reports usually ambulates with a walker. Pt denies numbness or tingling on her left leg. Pt denies urinary or fecal incontinence. PT reports last seizure was 15 yrs ago. Pt denies taking any blood thinners. Pt denies fever, chills, chest pain, shortness of breath, burning with urination, diarrhea.

## 2021-09-15 NOTE — ED PROVIDER NOTE - ATTENDING CONTRIBUTION TO CARE
I was present for and supervised the key and critical aspects of the procedures performed during the care of the patient. patient presents s/p fall she slipped from a shower chair while in the shower, she sustained no loc and no vomiting she denies any neck pain, cp,sob abdominal pain she has pain that is moderate and constant worse with movement noted to the left hip.    on exam patient has no signs of trauma to head, neck chest or abd she does have shortened and externally rotated hip pedal pulses 2 +  a/p- patient given iv pain medication, we obtained xrays and ct which reveals hip fracture to left   we have consulted ortho who advises admission to [facility] for further repair at this time   patient informed and agrees with the plan of care

## 2021-09-15 NOTE — ED PROVIDER NOTE - NSICDXPASTSURGICALHX_GEN_ALL_CORE_FT
PAST SURGICAL HISTORY:  H/O abdominal hysterectomy NO RADIATION AND CHEMO    History of bladder stone SURGERY 8/3/2018    History of surgery JJ STENT PLACEMENT LEFT OCTOBER 2017    History of tonsillectomy 13 years old    [place] teeth removed

## 2021-09-15 NOTE — ED PROVIDER NOTE - PROGRESS NOTE DETAILS
case discussed with ortho. Patient needs to admitted to [facility] for Fracture table, Dr Benito accepts

## 2021-09-15 NOTE — ED ADULT NURSE NOTE - NSIMPLEMENTINTERV_GEN_ALL_ED
Implemented All Fall Risk Interventions:  [place] to call system. Call bell, personal items and telephone within reach. Instruct patient to call for assistance. Room bathroom lighting operational. Non-slip footwear when patient is off stretcher. Physically safe environment: no spills, clutter or unnecessary equipment. Stretcher in lowest position, wheels locked, appropriate side rails in place. Provide visual cue, wrist band, yellow gown, etc. Monitor gait and stability. Monitor for mental status changes and reorient to person, place, and time. Review medications for side effects contributing to fall risk. Reinforce activity limits and safety measures with patient and family.

## 2021-09-16 DIAGNOSIS — S72.22XA DISPLACED SUBTROCHANTERIC FRACTURE OF LEFT FEMUR, INITIAL ENCOUNTER FOR CLOSED FRACTURE: ICD-10-CM

## 2021-09-16 LAB
ALBUMIN SERPL ELPH-MCNC: 2.4 G/DL — LOW (ref 3.5–5.2)
ALBUMIN SERPL ELPH-MCNC: 3.6 G/DL — SIGNIFICANT CHANGE UP (ref 3.5–5.2)
ALP SERPL-CCNC: 47 U/L — SIGNIFICANT CHANGE UP (ref 30–115)
ALP SERPL-CCNC: 60 U/L — SIGNIFICANT CHANGE UP (ref 30–115)
ALT FLD-CCNC: 13 U/L — SIGNIFICANT CHANGE UP (ref 0–41)
ALT FLD-CCNC: 66 U/L — HIGH (ref 0–41)
ANION GAP SERPL CALC-SCNC: 10 MMOL/L — SIGNIFICANT CHANGE UP (ref 7–14)
ANION GAP SERPL CALC-SCNC: 18 MMOL/L — HIGH (ref 7–14)
APTT BLD: 27.5 SEC — SIGNIFICANT CHANGE UP (ref 27–39.2)
AST SERPL-CCNC: 15 U/L — SIGNIFICANT CHANGE UP (ref 0–41)
AST SERPL-CCNC: 85 U/L — HIGH (ref 0–41)
BILIRUB SERPL-MCNC: 0.2 MG/DL — SIGNIFICANT CHANGE UP (ref 0.2–1.2)
BILIRUB SERPL-MCNC: 0.9 MG/DL — SIGNIFICANT CHANGE UP (ref 0.2–1.2)
BLD GP AB SCN SERPL QL: SIGNIFICANT CHANGE UP
BLD GP AB SCN SERPL QL: SIGNIFICANT CHANGE UP
BUN SERPL-MCNC: 17 MG/DL — SIGNIFICANT CHANGE UP (ref 10–20)
BUN SERPL-MCNC: 18 MG/DL — SIGNIFICANT CHANGE UP (ref 10–20)
CALCIUM SERPL-MCNC: 7.3 MG/DL — LOW (ref 8.5–10.1)
CALCIUM SERPL-MCNC: 8.3 MG/DL — LOW (ref 8.5–10.1)
CHLORIDE SERPL-SCNC: 108 MMOL/L — SIGNIFICANT CHANGE UP (ref 98–110)
CHLORIDE SERPL-SCNC: 108 MMOL/L — SIGNIFICANT CHANGE UP (ref 98–110)
CK MB CFR SERPL CALC: 2.2 NG/ML — SIGNIFICANT CHANGE UP (ref 0.6–6.3)
CK SERPL-CCNC: 372 U/L — HIGH (ref 0–225)
CO2 SERPL-SCNC: 18 MMOL/L — SIGNIFICANT CHANGE UP (ref 17–32)
CO2 SERPL-SCNC: 19 MMOL/L — SIGNIFICANT CHANGE UP (ref 17–32)
CREAT SERPL-MCNC: 0.8 MG/DL — SIGNIFICANT CHANGE UP (ref 0.7–1.5)
CREAT SERPL-MCNC: 0.9 MG/DL — SIGNIFICANT CHANGE UP (ref 0.7–1.5)
GAS PNL BLDA: SIGNIFICANT CHANGE UP
GAS PNL BLDA: SIGNIFICANT CHANGE UP
GLUCOSE SERPL-MCNC: 110 MG/DL — HIGH (ref 70–99)
GLUCOSE SERPL-MCNC: 216 MG/DL — HIGH (ref 70–99)
HCT VFR BLD CALC: 32 % — LOW (ref 37–47)
HCT VFR BLD CALC: 40.7 % — SIGNIFICANT CHANGE UP (ref 37–47)
HGB BLD-MCNC: 10.4 G/DL — LOW (ref 12–16)
HGB BLD-MCNC: 13.1 G/DL — SIGNIFICANT CHANGE UP (ref 12–16)
INR BLD: 1.24 RATIO — SIGNIFICANT CHANGE UP (ref 0.65–1.3)
MAGNESIUM SERPL-MCNC: 1.4 MG/DL — LOW (ref 1.8–2.4)
MCHC RBC-ENTMCNC: 30.9 PG — SIGNIFICANT CHANGE UP (ref 27–31)
MCHC RBC-ENTMCNC: 31 PG — SIGNIFICANT CHANGE UP (ref 27–31)
MCHC RBC-ENTMCNC: 32.2 G/DL — SIGNIFICANT CHANGE UP (ref 32–37)
MCHC RBC-ENTMCNC: 32.5 G/DL — SIGNIFICANT CHANGE UP (ref 32–37)
MCV RBC AUTO: 95.5 FL — SIGNIFICANT CHANGE UP (ref 81–99)
MCV RBC AUTO: 96 FL — SIGNIFICANT CHANGE UP (ref 81–99)
NRBC # BLD: 0 /100 WBCS — SIGNIFICANT CHANGE UP (ref 0–0)
NRBC # BLD: 0 /100 WBCS — SIGNIFICANT CHANGE UP (ref 0–0)
PHOSPHATE SERPL-MCNC: 5.4 MG/DL — HIGH (ref 2.1–4.9)
PLATELET # BLD AUTO: 168 K/UL — SIGNIFICANT CHANGE UP (ref 130–400)
PLATELET # BLD AUTO: 194 K/UL — SIGNIFICANT CHANGE UP (ref 130–400)
POTASSIUM SERPL-MCNC: 3.9 MMOL/L — SIGNIFICANT CHANGE UP (ref 3.5–5)
POTASSIUM SERPL-MCNC: 4.1 MMOL/L — SIGNIFICANT CHANGE UP (ref 3.5–5)
POTASSIUM SERPL-SCNC: 3.9 MMOL/L — SIGNIFICANT CHANGE UP (ref 3.5–5)
POTASSIUM SERPL-SCNC: 4.1 MMOL/L — SIGNIFICANT CHANGE UP (ref 3.5–5)
PROT SERPL-MCNC: 3.8 G/DL — LOW (ref 6–8)
PROT SERPL-MCNC: 5.9 G/DL — LOW (ref 6–8)
PROTHROM AB SERPL-ACNC: 14.2 SEC — HIGH (ref 9.95–12.87)
RBC # BLD: 3.35 M/UL — LOW (ref 4.2–5.4)
RBC # BLD: 4.24 M/UL — SIGNIFICANT CHANGE UP (ref 4.2–5.4)
RBC # FLD: 13.1 % — SIGNIFICANT CHANGE UP (ref 11.5–14.5)
RBC # FLD: 14.5 % — SIGNIFICANT CHANGE UP (ref 11.5–14.5)
SODIUM SERPL-SCNC: 137 MMOL/L — SIGNIFICANT CHANGE UP (ref 135–146)
SODIUM SERPL-SCNC: 144 MMOL/L — SIGNIFICANT CHANGE UP (ref 135–146)
TROPONIN T SERPL-MCNC: <0.01 NG/ML — SIGNIFICANT CHANGE UP
WBC # BLD: 11.09 K/UL — HIGH (ref 4.8–10.8)
WBC # BLD: 34.42 K/UL — HIGH (ref 4.8–10.8)
WBC # FLD AUTO: 11.09 K/UL — HIGH (ref 4.8–10.8)
WBC # FLD AUTO: 34.42 K/UL — HIGH (ref 4.8–10.8)

## 2021-09-16 PROCEDURE — 99222 1ST HOSP IP/OBS MODERATE 55: CPT

## 2021-09-16 PROCEDURE — 72170 X-RAY EXAM OF PELVIS: CPT | Mod: 26

## 2021-09-16 PROCEDURE — 99291 CRITICAL CARE FIRST HOUR: CPT | Mod: 25

## 2021-09-16 PROCEDURE — 71045 X-RAY EXAM CHEST 1 VIEW: CPT | Mod: 26

## 2021-09-16 PROCEDURE — 36556 INSERT NON-TUNNEL CV CATH: CPT

## 2021-09-16 RX ORDER — SODIUM BICARBONATE 1 MEQ/ML
50 SYRINGE (ML) INTRAVENOUS
Refills: 0 | Status: COMPLETED | OUTPATIENT
Start: 2021-09-16 | End: 2021-09-16

## 2021-09-16 RX ORDER — ONDANSETRON 8 MG/1
4 TABLET, FILM COATED ORAL ONCE
Refills: 0 | Status: DISCONTINUED | OUTPATIENT
Start: 2021-09-16 | End: 2021-09-16

## 2021-09-16 RX ORDER — HYDROCORTISONE 20 MG
100 TABLET ORAL ONCE
Refills: 0 | Status: COMPLETED | OUTPATIENT
Start: 2021-09-16 | End: 2021-09-16

## 2021-09-16 RX ORDER — SENNA PLUS 8.6 MG/1
2 TABLET ORAL AT BEDTIME
Refills: 0 | Status: DISCONTINUED | OUTPATIENT
Start: 2021-09-16 | End: 2021-09-17

## 2021-09-16 RX ORDER — MONTELUKAST 4 MG/1
10 TABLET, CHEWABLE ORAL AT BEDTIME
Refills: 0 | Status: DISCONTINUED | OUTPATIENT
Start: 2021-09-16 | End: 2021-09-24

## 2021-09-16 RX ORDER — PHENOBARBITAL 60 MG
32.4 TABLET ORAL ONCE
Refills: 0 | Status: DISCONTINUED | OUTPATIENT
Start: 2021-09-16 | End: 2021-09-16

## 2021-09-16 RX ORDER — ENOXAPARIN SODIUM 100 MG/ML
40 INJECTION SUBCUTANEOUS DAILY
Refills: 0 | Status: DISCONTINUED | OUTPATIENT
Start: 2021-09-16 | End: 2021-09-17

## 2021-09-16 RX ORDER — NOREPINEPHRINE BITARTRATE/D5W 8 MG/250ML
0.05 PLASTIC BAG, INJECTION (ML) INTRAVENOUS
Qty: 16 | Refills: 0 | Status: DISCONTINUED | OUTPATIENT
Start: 2021-09-16 | End: 2021-09-20

## 2021-09-16 RX ORDER — OXYCODONE HYDROCHLORIDE 5 MG/1
5 TABLET ORAL EVERY 4 HOURS
Refills: 0 | Status: DISCONTINUED | OUTPATIENT
Start: 2021-09-16 | End: 2021-09-23

## 2021-09-16 RX ORDER — MORPHINE SULFATE 50 MG/1
2 CAPSULE, EXTENDED RELEASE ORAL ONCE
Refills: 0 | Status: DISCONTINUED | OUTPATIENT
Start: 2021-09-16 | End: 2021-09-16

## 2021-09-16 RX ORDER — MORPHINE SULFATE 50 MG/1
4 CAPSULE, EXTENDED RELEASE ORAL EVERY 4 HOURS
Refills: 0 | Status: DISCONTINUED | OUTPATIENT
Start: 2021-09-16 | End: 2021-09-16

## 2021-09-16 RX ORDER — SODIUM CHLORIDE 9 MG/ML
500 INJECTION, SOLUTION INTRAVENOUS ONCE
Refills: 0 | Status: COMPLETED | OUTPATIENT
Start: 2021-09-16 | End: 2021-09-16

## 2021-09-16 RX ORDER — SODIUM CHLORIDE 9 MG/ML
1000 INJECTION, SOLUTION INTRAVENOUS
Refills: 0 | Status: DISCONTINUED | OUTPATIENT
Start: 2021-09-16 | End: 2021-09-16

## 2021-09-16 RX ORDER — SODIUM CHLORIDE 9 MG/ML
1000 INJECTION INTRAMUSCULAR; INTRAVENOUS; SUBCUTANEOUS
Refills: 0 | Status: DISCONTINUED | OUTPATIENT
Start: 2021-09-16 | End: 2021-09-16

## 2021-09-16 RX ORDER — MAGNESIUM HYDROXIDE 400 MG/1
30 TABLET, CHEWABLE ORAL DAILY
Refills: 0 | Status: DISCONTINUED | OUTPATIENT
Start: 2021-09-16 | End: 2021-09-18

## 2021-09-16 RX ORDER — PHENOBARBITAL 60 MG
32.4 TABLET ORAL THREE TIMES A DAY
Refills: 0 | Status: DISCONTINUED | OUTPATIENT
Start: 2021-09-16 | End: 2021-09-24

## 2021-09-16 RX ORDER — CALCIUM GLUCONATE 100 MG/ML
1 VIAL (ML) INTRAVENOUS ONCE
Refills: 0 | Status: COMPLETED | OUTPATIENT
Start: 2021-09-16 | End: 2021-09-16

## 2021-09-16 RX ORDER — CEFAZOLIN SODIUM 1 G
3000 VIAL (EA) INJECTION EVERY 8 HOURS
Refills: 0 | Status: DISCONTINUED | OUTPATIENT
Start: 2021-09-16 | End: 2021-09-17

## 2021-09-16 RX ORDER — TRAZODONE HCL 50 MG
75 TABLET ORAL AT BEDTIME
Refills: 0 | Status: DISCONTINUED | OUTPATIENT
Start: 2021-09-16 | End: 2021-09-24

## 2021-09-16 RX ORDER — MULTIVIT-MIN/FERROUS GLUCONATE 9 MG/15 ML
1 LIQUID (ML) ORAL DAILY
Refills: 0 | Status: DISCONTINUED | OUTPATIENT
Start: 2021-09-16 | End: 2021-09-17

## 2021-09-16 RX ORDER — ACETAMINOPHEN 500 MG
650 TABLET ORAL EVERY 6 HOURS
Refills: 0 | Status: DISCONTINUED | OUTPATIENT
Start: 2021-09-16 | End: 2021-09-24

## 2021-09-16 RX ORDER — SODIUM CHLORIDE 9 MG/ML
1000 INJECTION, SOLUTION INTRAVENOUS
Refills: 0 | Status: DISCONTINUED | OUTPATIENT
Start: 2021-09-16 | End: 2021-09-17

## 2021-09-16 RX ORDER — NOREPINEPHRINE BITARTRATE/D5W 8 MG/250ML
0.05 PLASTIC BAG, INJECTION (ML) INTRAVENOUS
Qty: 8 | Refills: 0 | Status: DISCONTINUED | OUTPATIENT
Start: 2021-09-16 | End: 2021-09-16

## 2021-09-16 RX ORDER — HYDROMORPHONE HYDROCHLORIDE 2 MG/ML
0.5 INJECTION INTRAMUSCULAR; INTRAVENOUS; SUBCUTANEOUS
Refills: 0 | Status: DISCONTINUED | OUTPATIENT
Start: 2021-09-16 | End: 2021-09-16

## 2021-09-16 RX ORDER — SODIUM CHLORIDE 9 MG/ML
1000 INJECTION, SOLUTION INTRAVENOUS ONCE
Refills: 0 | Status: COMPLETED | OUTPATIENT
Start: 2021-09-16 | End: 2021-09-16

## 2021-09-16 RX ORDER — SODIUM BICARBONATE 1 MEQ/ML
50 SYRINGE (ML) INTRAVENOUS ONCE
Refills: 0 | Status: COMPLETED | OUTPATIENT
Start: 2021-09-16 | End: 2021-09-16

## 2021-09-16 RX ORDER — MAGNESIUM SULFATE 500 MG/ML
2 VIAL (ML) INJECTION
Refills: 0 | Status: COMPLETED | OUTPATIENT
Start: 2021-09-16 | End: 2021-09-16

## 2021-09-16 RX ORDER — HYDROCORTISONE 20 MG
50 TABLET ORAL EVERY 6 HOURS
Refills: 0 | Status: DISCONTINUED | OUTPATIENT
Start: 2021-09-16 | End: 2021-09-20

## 2021-09-16 RX ORDER — PRAMIPEXOLE DIHYDROCHLORIDE 0.12 MG/1
0.5 TABLET ORAL AT BEDTIME
Refills: 0 | Status: DISCONTINUED | OUTPATIENT
Start: 2021-09-16 | End: 2021-09-24

## 2021-09-16 RX ADMIN — Medication 50 MILLIEQUIVALENT(S): at 21:35

## 2021-09-16 RX ADMIN — NYSTATIN CREAM 1 APPLICATION(S): 100000 CREAM TOPICAL at 05:23

## 2021-09-16 RX ADMIN — MORPHINE SULFATE 2 MILLIGRAM(S): 50 CAPSULE, EXTENDED RELEASE ORAL at 12:51

## 2021-09-16 RX ADMIN — Medication 50 MILLIEQUIVALENT(S): at 23:00

## 2021-09-16 RX ADMIN — SODIUM CHLORIDE 2000 MILLILITER(S): 9 INJECTION, SOLUTION INTRAVENOUS at 20:15

## 2021-09-16 RX ADMIN — MORPHINE SULFATE 2 MILLIGRAM(S): 50 CAPSULE, EXTENDED RELEASE ORAL at 05:32

## 2021-09-16 RX ADMIN — SODIUM CHLORIDE 75 MILLILITER(S): 9 INJECTION INTRAMUSCULAR; INTRAVENOUS; SUBCUTANEOUS at 11:25

## 2021-09-16 RX ADMIN — OXYCODONE AND ACETAMINOPHEN 1 TABLET(S): 5; 325 TABLET ORAL at 08:02

## 2021-09-16 RX ADMIN — Medication 100 GRAM(S): at 23:15

## 2021-09-16 RX ADMIN — Medication 50 GRAM(S): at 23:00

## 2021-09-16 RX ADMIN — Medication 5.74 MICROGRAM(S)/KG/MIN: at 23:17

## 2021-09-16 RX ADMIN — SODIUM CHLORIDE 1000 MILLILITER(S): 9 INJECTION, SOLUTION INTRAVENOUS at 23:40

## 2021-09-16 RX ADMIN — Medication 100 MILLIGRAM(S): at 23:31

## 2021-09-16 RX ADMIN — SODIUM CHLORIDE 150 MILLILITER(S): 9 INJECTION, SOLUTION INTRAVENOUS at 22:37

## 2021-09-16 RX ADMIN — Medication 1 TABLET(S): at 11:25

## 2021-09-16 RX ADMIN — CHLORHEXIDINE GLUCONATE 1 APPLICATION(S): 213 SOLUTION TOPICAL at 05:23

## 2021-09-16 RX ADMIN — Medication 11.5 MICROGRAM(S)/KG/MIN: at 21:37

## 2021-09-16 RX ADMIN — Medication 50 GRAM(S): at 23:45

## 2021-09-16 RX ADMIN — SODIUM CHLORIDE 3000 MILLILITER(S): 9 INJECTION, SOLUTION INTRAVENOUS at 23:24

## 2021-09-16 RX ADMIN — Medication 50 MILLIEQUIVALENT(S): at 23:06

## 2021-09-16 NOTE — CONSULT NOTE ADULT - ASSESSMENT
Assessment & Plan    66y Female 1d s/p     NEURO:    Acute pain-controlled with     RESP:     Oxygen insufficiency-wean off NC to RA as tolerate    Activity-        09-16 @ 21:05 -- 7.23 / 32 / 174 / 13 / 100.0        SAT  100.0  Lac 5.70   09-16 @ 17:52 -- 7.32 / 37 / 99 / 19 / 187.0        SAT  187.0  Lac 3.70     Current Rx: montelukast 10 at bedtime      Home Rx: Singulair 10 mg oral tablet        CARDS:     Imaging:     Labs:   norepinephrine Infusion 0.05 <Continuous>  norepinephrine Infusion 0.05 <Continuous>      GI/NUTR:         GI Prophylaxis-    Bowel regimen-  magnesium hydroxide Suspension 30 milliLiter(s) Oral daily PRN  senna 2 Tablet(s) Oral at bedtime PRN      /RENAL:        Monitor COLIN-saravia in place    Volume Status:  09-16-21 @ 07:01  -  09-16-21 @ 22:52  --------------------------------------------------------  NET: 2300 mL        Current Rx:     Labs:          BUN/Cr- 19/0.7  -->,  17/0.8  -->          Electrolytes-Na -- // K -- // Mg 1.4 //  Phos 5.4 (09-16 @ 21:22)      Home Rx: Urocit-K 15 mEq oral tablet, extended release        HEME/ONC:       DVT prophylaxis-enoxaparin Injectable  , SCDs    Labs: Hb/Hct:  13.1/40.7  -->,  10.4/32.0  -->                      Plts:  168  -->,  194  -->                 PTT/INR:  27.5/1.24  --->       Home Rx:       ID:  WBC- 15.43  --->>,  11.09  --->>,  34.42  --->>  Temp trend- 24hrs T(F): 97.4 (09-16 @ 20:00), Max: 97.6 (09-16 @ 00:24)  Antibiotics-ceFAZolin   IVPB 3000 every 8 hours  influenza   Vaccine 0.5 once    Cultures:           ENDO:    Glucose Glucose, Serum: 110 (09-16 @ 06:50)      HA1C     LINES/DRAINS:  Rakel HESS Foley     DISPO:    SICU Assessment & Plan  66y Female 1d s/p ORIF of L femur fracture with long IM gamma nail     NEURO:  Acute post op pain  - controlled with prn Tylenol & oxycodone 5mg  HX epilepsy  - per patient last seizures 20 years ago  - c/w home regimen phenobarbital, Mirapex  HX sleep disorder/depression  - c/w Trazadone 75mg at bedtime    RESP:   HX asthma, THOM (does not use CPAP at NH)    Oxygen insufficiency- BiPAP 40% 12/6, weaned to 4L NC    Activity- bedrest, FU ortho weight baring status of LLE     09-16 @ 21:05 -- 7.23 / 32 / 174 / 13 / 100.0        SAT  100.0  Lac 5.70   Current Rx: montelukast 10 at bedtime  Home Rx: Singulair 10 mg oral tablet    CARDS:   Hypotension postop   - levophed gtt  keep MAP >65  - intra op 2U PRBC  - post op total 3.75L in crystalloids  - NICOM 17%, 3%  Sinus tachycardia, improving  - EKG sinus tachycardia, no sign so ischemia  - cards consult, r/o tamponade, cardiogenic shock on bedside echo  [ ] trending cardiac enzyme, 0.01> FU 0600, 1100  [ ] ECHO: rpt pending  - prior 2018, normal systolic function    VASC:  - LUE poorly perfused, first 2 digits appear dusky, on bedside exam diminish radial pulse  - brachial, ulnar and palmar pulses are strong and dopperable  - q1 neurovascular checks of LUE  - urgent vascular consult place, recs to start heparin gtt, cleared by Ortho team  [ ] monitor PTT  [ ] LUE arterial duplex ordered    GI/NUTR:   Diet, NPO except meds    GI Prophylaxis- PPI IV    Bowel regimen- senna  magnesium hydroxide Suspension 30 milliLiter(s) Oral daily PRN  senna 2 Tablet(s) Oral at bedtime PRN    /RENAL:   Strict I&Os, UOP 40-50cc/hr  saravia placed in PACU  BUn/Cr 19/0.9  Hypomagnesia  - repleted 2g x2  Lactic acidosis:  - rising lactate despite resuscitative efforts  - continue to trend lactate 1.5 ->3.7>5.7  IVF: bicarb gtt @ 150cc/hr w/3 amps  Volume Status:  09-16-21 @ 07:01  -  09-17-21 @ 06:20  --------------------------------------------------------  IN: 6717.1 mL / OUT: 410 mL / NET: 6307.1 mL  Labs:          BUN/Cr- 17/0.8  -->,  18/0.9  -->,  19/1.2  -->          Electrolytes-Na 143 // K 3.6 // Mg -- //  Phos -- (09-17 @ 01:40)  Home Rx: holding Urocit-K 15 mEq oral tablet, extended release      HEME/ONC:       DVT prophylaxis-enoxaparin Injectable, SCDs    Labs: Hb/Hct:  13.1/40.7  -->,  10.4/32.0  -->                      Plts:  168  -->,  194  -->                 PTT/INR:  27.5/1.24  --->       Home Rx: none known    ID:  WBC- 15.43  --->>,  11.09  --->>,  34.42  --->>  Temp trend- 24hrs T(F): 97.4 (09-16 @ 20:00), Max: 97.6 (09-16 @ 00:24)  Antibiotics-ceFAZolin   IVPB 3000 every 8 hours  influenza   Vaccine 0.5 once  Cultures: pending     ENDO:    Glucose Glucose, Serum: 110 (09-16 @ 06:50)    HA1C pending    finger sticks q4hr while npo    LINES/DRAINS:  DESHAWN, Saravia , R radial a line    DISPO:    SICU d/w Dr Ireland Assessment & Plan  66y Female 1d s/p ORIF of L femur fracture with long IM gamma nail     NEURO:  Acute post op pain  - controlled with prn Tylenol & oxycodone 5mg  HX epilepsy  - per patient last seizures 20 years ago  - c/w home regimen phenobarbital, Mirapex  HX sleep disorder/depression  - c/w Trazadone 75mg at bedtime    RESP:   HX asthma, THOM (does not use CPAP at NH)    Oxygen insufficiency- BiPAP 40% 12/6, weaned to 4L NC    Activity- bedrest, FU ortho weight baring status of LLE     09-16 @ 21:05 -- 7.23 / 32 / 174 / 13 / 100.0        SAT  100.0  Lac 5.70   Current Rx: montelukast 10 at bedtime  Home Rx: Singulair 10 mg oral tablet    CARDS:   Hypotension postop   - levophed gtt  keep MAP >65  - intra op 2U PRBC  - post op total 3.75L in crystalloids  - NICOM 17%, 3%  Sinus tachycardia, improving  - EKG sinus tachycardia, no sign so ischemia  - cards consult, r/o tamponade, cardiogenic shock on bedside echo  [ ] trending cardiac enzyme, 0.01> FU 0600, 1100  [ ] ECHO: rpt pending  - prior 2018, normal systolic function    VASC:  - LUE poorly perfused, first 2 digits appear dusky, on bedside exam diminish radial pulse  - brachial, ulnar and palmar pulses are strong and dopperable  - q1 neurovascular checks of LUE  - urgent vascular consult place, recs to start heparin gtt, cleared by Ortho team  [ ] monitor PTT  [ ] LUE arterial duplex ordered    GI/NUTR:   Diet, NPO except meds    GI Prophylaxis- PPI IV    Bowel regimen- senna  magnesium hydroxide Suspension 30 milliLiter(s) Oral daily PRN  senna 2 Tablet(s) Oral at bedtime PRN    /RENAL:   Strict I&Os, UOP 40-50cc/hr  saravia placed in PACU  BUn/Cr 19/0.9  Hypomagnesia  - repleted 2g x2  Lactic acidosis:  - rising lactate despite resuscitative efforts  - continue to trend lactate 1.5 ->3.7>5.7  IVF: bicarb gtt @ 150cc/hr w/3 amps  Volume Status:  09-16-21 @ 07:01  -  09-17-21 @ 06:20  --------------------------------------------------------  IN: 6717.1 mL / OUT: 410 mL / NET: 6307.1 mL  Labs:          BUN/Cr- 17/0.8  -->,  18/0.9  -->,  19/1.2  -->          Electrolytes-Na 143 // K 3.6 // Mg -- //  Phos -- (09-17 @ 01:40)  Home Rx: holding Urocit-K 15 mEq oral tablet, extended release      HEME/ONC:       DVT prophylaxis-enoxaparin Injectable, SCDs    Labs: Hb/Hct:  13.1/40.7  -->,  10.4/32.0  -->                      Plts:  168  -->,  194  -->                 PTT/INR:  27.5/1.24  --->       Home Rx: none known    ID:  WBC- 15.43  --->>,  11.09  --->>,  34.42  --->>  Temp trend- 24hrs T(F): 97.4 (09-16 @ 20:00), Max: 97.6 (09-16 @ 00:24)  Antibiotics-ceFAZolin   IVPB 3000 every 8 hours  influenza   Vaccine 0.5 once  Cultures: pending     ENDO:    Glucose Glucose, Serum: 110 (09-16 @ 06:50)    HA1C pending    finger sticks q4hr while npo    LINES/DRAINS:  DESHAWN, Duglas R teresa fung line      CODE STATUS: DNR/DNI, ,MOLST FORM IN CHART    DISPO:    SICU d/w Dr Ireland Assessment & Plan  66y Female 1d s/p ORIF of L femur fracture with long IM gamma nail     NEURO:  Acute post op pain  - controlled with prn Tylenol & oxycodone 5mg  HX epilepsy  - per patient last seizures 20 years ago  - c/w home regimen phenobarbital, Mirapex  HX sleep disorder/depression  - c/w Trazadone 75mg at bedtime    RESP:   HX asthma, THOM (does not use CPAP at NH)    Oxygen insufficiency- BiPAP 40% 12/6, weaned to 4L NC    Activity- bedrest, FU ortho weight baring status of LLE     09-16 @ 21:05 -- 7.23 / 32 / 174 / 13 / 100.0        SAT  100.0  Lac 5.70   Current Rx: montelukast 10 at bedtime  Home Rx: Singulair 10 mg oral tablet    CARDS:   Hypotension postop   - levophed gtt  keep MAP >65  - intra op 2U PRBC  - post op total 3.75L in crystalloids  - NICOM 17%, 3%  Sinus tachycardia, improving  - EKG sinus tachycardia, no sign so ischemia  - cards consult, r/o tamponade, cardiogenic shock on bedside echo  [ ] trending cardiac enzyme, 0.01> FU 0600, 1100  [ ] ECHO: rpt pending  - prior 2018, normal systolic function    VASC:  - LUE poorly perfused, first 2 digits appear dusky, on bedside exam diminish radial pulse  - brachial, ulnar and palmar pulses are strong and dopperable  - q1 neurovascular checks of LUE  - urgent vascular consult place, recs to start heparin gtt, cleared by Ortho team  [ ] monitor PTT  [ ] LUE arterial duplex ordered    GI/NUTR:   Diet, NPO except meds    GI Prophylaxis- PPI IV    Bowel regimen- senna  magnesium hydroxide Suspension 30 milliLiter(s) Oral daily PRN  senna 2 Tablet(s) Oral at bedtime PRN    /RENAL:   Strict I&Os, UOP 40-50cc/hr  saravia placed in PACU  BUn/Cr 19/0.9  Hypomagnesia  - repleted 2g x2  Lactic acidosis:  - rising lactate despite resuscitative efforts  - continue to trend lactate 1.5 ->3.7>5.7  IVF: bicarb gtt @ 150cc/hr w/3 amps  Volume Status:  09-16-21 @ 07:01  -  09-17-21 @ 06:20  --------------------------------------------------------  IN: 6717.1 mL / OUT: 410 mL / NET: 6307.1 mL  Labs:          BUN/Cr- 17/0.8  -->,  18/0.9  -->,  19/1.2  -->          Electrolytes-Na 143 // K 3.6 // Mg -- //  Phos -- (09-17 @ 01:40)  Home Rx: holding Urocit-K 15 mEq oral tablet, extended release      HEME/ONC:       DVT prophylaxis-enoxaparin Injectable, SCDs    Labs: Hb/Hct:  13.1/40.7  -->,  10.4/32.0  -->                      Plts:  168  -->,  194  -->                 PTT/INR:  27.5/1.24  --->       Home Rx: none known    ID:  WBC- 15.43  --->>,  11.09  --->>,  34.42  --->>  Temp trend- 24hrs T(F): 97.4 (09-16 @ 20:00), Max: 97.6 (09-16 @ 00:24)  Antibiotics-ceFAZolin   IVPB 3000 every 8 hours  influenza   Vaccine 0.5 once  Cultures: pending     ENDO:    Glucose Glucose, Serum: 110 (09-16 @ 06:50)    HA1C pending    finger sticks q4hr while npo    LINES/DRAINS:  PIV, Saravia , L radial a line (placed by anesthesia)      CODE STATUS: DNR/DNI, ,MOLST FORM IN CHART    DISPO:    SICU d/w Dr Ireland

## 2021-09-16 NOTE — CONSULT NOTE ADULT - SUBJECTIVE AND OBJECTIVE BOX
Neurology Consult    Patient is a 66y old  Female who presents with a chief complaint of left femoral fracture (15 Sep 2021 21:20)      HPI:  A 62 y/o female with pmhx of asthma, copd, obesity, epilepsy, hypothyroidism , osteoarthritis , kidney stone  s/p slip and fall at [facility]  admitted for left femoral fracture. Pt reports was taking shower this am, her shower chair slipped and she fell on her buttocks. PT reports left hip pain. PT  denies neck pain, hitting head or loc. Pt reports was not able to stand or straighten her left leg. Pt reports usually ambulates with a walker. Pt denies numbness or tingling on her left leg. Pt denies urinary or fecal incontinence. PT reports last seizure was 15 yrs ago. Pt denies taking any blood thinners. Pt denies fever, chills, chest pain, shortness of breath, burning with urination, diarrhea.  (15 Sep 2021 18:29)      PAST MEDICAL & SURGICAL HISTORY:  Diabetes    Renal stone    Renal failure    Epilepsy    Asthma    Chronic cough    Knee pain    Osteoarthritis    Uterine cancer  s/p hysterectomy    H/O abdominal hysterectomy  NO RADIATION AND CHEMO    History of tonsillectomy  13 years old    [place] teeth removed    History of bladder stone  SURGERY 8/3/2018    History of surgery  JJ STENT PLACEMENT LEFT OCTOBER 2017        FAMILY HISTORY:  No pertinent family history in first degree relatives        Social History: (-) x 3    Allergies    Compazine (Unknown)  latex (Urticaria)  penicillins (Unknown)    Intolerances        MEDICATIONS  (STANDING):  chlorhexidine 4% Liquid 1 Application(s) Topical <User Schedule>  heparin   Injectable 5000 Unit(s) SubCutaneous once  influenza   Vaccine 0.5 milliLiter(s) IntraMuscular once  montelukast 10 milliGRAM(s) Oral at bedtime  multivitamin/minerals 1 Tablet(s) Oral daily  nystatin Powder 1 Application(s) Topical two times a day  PHENobarbital 32.4 milliGRAM(s) Oral three times a day  pramipexole 0.5 milliGRAM(s) Oral at bedtime  sodium chloride 0.9%. 1000 milliLiter(s) (75 mL/Hr) IV Continuous <Continuous>  traZODone 75 milliGRAM(s) Oral at bedtime    MEDICATIONS  (PRN):  oxycodone    5 mG/acetaminophen 325 mG 1 Tablet(s) Oral every 6 hours PRN Moderate Pain (4 - 6)  senna 2 Tablet(s) Oral at bedtime PRN Constipation      Vital Signs Last 24 Hrs  T(C): 36.4 (16 Sep 2021 00:24), Max: 36.6 (15 Sep 2021 12:19)  T(F): 97.6 (16 Sep 2021 00:24), Max: 97.8 (15 Sep 2021 12:19)  HR: 68 (16 Sep 2021 00:24) (65 - 68)  BP: 122/58 (16 Sep 2021 00:24) (122/58 - 131/63)  BP(mean): --  RR: 19 (16 Sep 2021 00:24) (16 - 19)  SpO2: 97% (15 Sep 2021 12:19) (97% - 97%)    Examination:  General:  Appearance is consistent with chronologic age.  No abnormal facies.  Gross skin survey within normal limits.    Cognitive/Language:  The patient is oriented to person, place, time and date.  Recent and remote memory intact.  Fund of knowledge is intact and normal.  Language with normal repetition, comprehension and naming.  Nondysarthric.    Eyes: intact VA, VFF.  EOMI w/o nystagmus, skew or reported double vision.  PERRL.  No ptosis/weakness of eyelid closure.    Face:  Facial sensation normal V1 - 3, no facial asymmetry.    Ears/Nose/Throat:  Hearing grossly intact b/l.  Palate elevates midline.  Tongue and uvula midline.   Motor examination:   Normal tone, bulk and range of motion.  No tenderness, twitching, tremors or involuntary movements.  Formal Muscle Strength Testing: (MRC grade R/L) 5/5 UE; 5/5 LE. LLE limited by pain  Reflexes:   2+ b/l pectoralis, biceps, triceps, brachioradialis, patella and Achilles.  Plantar response downgoing b/l.  Jaw jerk, Zachary, clonus absent.  Sensory examination:   Intact to light touch and pinprick, pain, temperature and proprioception and vibration in all extremities.  Cerebellum:   FTN/HKS intact with normal RENZO in all limbs.  No dysmetria or dysdiadokinesia.  Gait narrow based and normal.    NIHSS 0    Labs:   CBC Full  -  ( 15 Sep 2021 13:30 )  WBC Count : 15.43 K/uL  RBC Count : 4.65 M/uL  Hemoglobin : 14.6 g/dL  Hematocrit : 44.5 %  Platelet Count - Automated : 204 K/uL  Mean Cell Volume : 95.7 fL  Mean Cell Hemoglobin : 31.4 pg  Mean Cell Hemoglobin Concentration : 32.8 g/dL  Auto Neutrophil # : 13.11 K/uL  Auto Lymphocyte # : 1.33 K/uL  Auto Monocyte # : 0.80 K/uL  Auto Eosinophil # : 0.07 K/uL  Auto Basophil # : 0.04 K/uL  Auto Neutrophil % : 84.9 %  Auto Lymphocyte % : 8.6 %  Auto Monocyte % : 5.2 %  Auto Eosinophil % : 0.5 %  Auto Basophil % : 0.3 %    09-15    138  |  104  |  19  ----------------------------<  131<H>  4.0   |  22  |  0.7    Ca    9.4      15 Sep 2021 13:30  Mg     1.8     09-15    TPro  6.7  /  Alb  4.1  /  TBili  0.3  /  DBili  x   /  AST  12  /  ALT  12  /  AlkPhos  66  09-15    LIVER FUNCTIONS - ( 15 Sep 2021 13:30 )  Alb: 4.1 g/dL / Pro: 6.7 g/dL / ALK PHOS: 66 U/L / ALT: 12 U/L / AST: 12 U/L / GGT: x           PT/INR - ( 15 Sep 2021 13:30 )   PT: 11.90 sec;   INR: 1.04 ratio         PTT - ( 15 Sep 2021 13:30 )  PTT:31.3 sec

## 2021-09-16 NOTE — PRE-OP CHECKLIST - AS BP NONINV SITE
left lower arm O-Z Plasty Text: The defect edges were debeveled with a #15 scalpel blade.  Given the location of the defect, shape of the defect and the proximity to free margins an O-Z plasty (double transposition flap) was deemed most appropriate.  Using a sterile surgical marker, the appropriate transposition flaps were drawn incorporating the defect and placing the expected incisions within the relaxed skin tension lines where possible.    The area thus outlined was incised deep to adipose tissue with a #15 scalpel blade.  The skin margins were undermined to an appropriate distance in all directions utilizing iris scissors.  Hemostasis was achieved with electrocautery.  The flaps were then transposed into place, one clockwise and the other counterclockwise, and anchored with interrupted buried subcutaneous sutures.

## 2021-09-16 NOTE — PROGRESS NOTE ADULT - ASSESSMENT
A 62 y/o female with pmhx of asthma, copd, obesity, epilepsy, hypothyroidism , osteoarthritis , kidney stone  s/p slip and fall at [facility]  admitted for left femoral fracture.    #Acute comminuted, displaced fracture of the left proximal femur with avulsion of the lesser trochanter  - NPO  - pain meds  - bed rest  - type and screen obtained  < from: 12 Lead ECG (09.15.21 @ 22:02) >  Diagnosis Line Normal sinus rhythm  Cannot rule out Anterior infarct , age undetermined  Abnormal ECG  - f/u cxr    #Hypothyroidism   -continue synthroid     # epilepsy   - last seizure 15 yrs ago  -continue phenobarbital   - f/u neuro clearance for surg    # Obesity   - dash diet; NPO for OR today     #insomnia   -continue trazodone   - continue pramipexol      < end of copied text >    #kidney stone   -follow up with urology as oupt     DVT PPx: held for OR  Dispo: pending OR, PT/OT A 64 y/o female with pmhx of asthma, copd, obesity, epilepsy, hypothyroidism , osteoarthritis , kidney stone  s/p slip and fall at [facility]  admitted for left femoral fracture.    #Acute comminuted, displaced fracture of the left proximal femur with avulsion of the lesser trochanter  - NPO  - pain control -- current pain 7/10. On Oxycodone 5 mg PRN for mod. pain. Will give 2 mg IV morphine, requiring frequent IV pain meds   9/15  IV morphine 6 mg 13:00  IV dilaudid 1 mg 14:00, 16:00  PO oxycodone 10 mg 17:00  IV Dilaudid 1 mg 15:20, 16:30  PO oxycodone 5 at 17:54, 21:53    9/16  IV morphine 2 mg at 5:00  PO oxycodone 5 at 8:00  - bed rest  - type and screen obtained  < from: 12 Lead ECG (09.15.21 @ 22:02) >  Diagnosis Line Normal sinus rhythm  Cannot rule out Anterior infarct , age undetermined  Abnormal ECG  - f/u cxr and medical clearance    #Hypothyroidism   -continue synthroid     # epilepsy   - last seizure 15 yrs ago  -continue phenobarbital   - f/u neuro clearance for surg    # Obesity   - dash diet; NPO for OR today     #insomnia   #restless legs syndrome  -continue trazodone   - continue pramipexol      < end of copied text >    #kidney stone   -follow up with urology as oupt     DVT PPx: held for OR  Dispo: pending OR, PT/OT A 62 y/o female with pmhx of asthma, copd, obesity, epilepsy, hypothyroidism , osteoarthritis , kidney stone  s/p slip and fall at [facility]  admitted for left femoral fracture.    #Acute comminuted, displaced fracture of the left proximal femur with avulsion of the lesser trochanter  - NPO  - pain control -- current pain 7/10, phenobarb missed dose may be contributing. On Oxycodone 5 mg PRN for mod. pain. Follow up after STAT phenobarb for pain/sedation assessment   9/15  IV morphine 6 mg 13:00  IV dilaudid 1 mg 14:00, 16:00  PO oxycodone 10 mg 17:00  IV Dilaudid 1 mg 15:20, 16:30  PO oxycodone 5 at 17:54, 21:53    9/16  IV morphine 2 mg at 5:00  PO oxycodone 5 at 8:00  - bed rest  - type and screen obtained  < from: 12 Lead ECG (09.15.21 @ 22:02) >  Diagnosis Line Normal sinus rhythm  Cannot rule out Anterior infarct , age undetermined  Abnormal ECG  - f/u cxr and medical clearance    #Hypothyroidism   -continue synthroid     # epilepsy   - last seizure 15 yrs ago  - continue phenobarbital TID  - 6 AM dose of phenobarb missed d/t NPO status, ordered STAT dose at 10:48 AM, changed to NPO except for medications  - f/u neuro clearance for surg    # Obesity   - dash diet;   - NPO for OR today     #insomnia   #restless legs syndrome  -continue trazodone   - continue pramipexol      < end of copied text >    #kidney stone   -follow up with urology as oupt     DVT PPx: held for OR  Dispo: pending OR, PT/OT A 62 y/o female with pmhx of asthma, copd, obesity, epilepsy, hypothyroidism , osteoarthritis , kidney stone  s/p slip and fall at [facility]  admitted for left femoral fracture.    #Acute comminuted, displaced fracture of the left proximal femur with avulsion of the lesser trochanter  - NPO  - pain control -- current pain 7/10, phenobarb missed dose may be contributing. On Oxycodone 5 mg PRN for mod. pain. Will give IV morphine 2 mg  9/15  IV morphine 6 mg 13:00  IV dilaudid 1 mg 14:00, 16:00  PO oxycodone 10 mg 17:00  IV Dilaudid 1 mg 15:20, 16:30  PO oxycodone 5 at 17:54, 21:53    9/16  IV morphine 2 mg at 5:00  PO oxycodone 5 at 8:00  - bed rest  - type and screen obtained  < from: 12 Lead ECG (09.15.21 @ 22:02) >  Diagnosis Line Normal sinus rhythm  Cannot rule out Anterior infarct , age undetermined  Abnormal ECG  - f/u cxr and medical clearance    #Hypothyroidism   -continue synthroid     # epilepsy   - last seizure 15 yrs ago  - continue phenobarbital TID  - 6 AM dose of phenobarb missed d/t NPO status, > 4 hours since missed dose, pt on multiple sedating rx, will wait for scheduled 2 PM dose to avoid oversedation  - f/u neuro clearance for surg    # Obesity   - dash diet;   - NPO for OR today     #insomnia   #restless legs syndrome  -continue trazodone   - continue pramipexol      < end of copied text >    #kidney stone   -follow up with urology as oupt     DVT PPx: held for OR  Dispo: pending OR, PT/OT

## 2021-09-16 NOTE — PRE-ANESTHESIA EVALUATION ADULT - NSANTHOSAYNRD_GEN_A_CORE
No. THOM screening performed.  STOP BANG Legend: 0-2 = LOW Risk; 3-4 = INTERMEDIATE Risk; 5-8 = HIGH Risk
No. THOM screening performed.  STOP BANG Legend: 0-2 = LOW Risk; 3-4 = INTERMEDIATE Risk; 5-8 = HIGH Risk

## 2021-09-16 NOTE — PROGRESS NOTE ADULT - SUBJECTIVE AND OBJECTIVE BOX
HPI  Patient is a 66y old Female who presents with a chief complaint of left femoral fracture (16 Sep 2021 06:25)    Currently admitted to medicine with the primary diagnosis of Hip fracture       Today is hospital day 1d.     INTERVAL HPI / OVERNIGHT EVENTS:  Patient was examined and seen at bedside. This morning she complains of pain at fracture site and all over her body.    ROS: Otherwise unremarkable     PAST MEDICAL & SURGICAL HISTORY  Diabetes    Renal stone    Renal failure    Epilepsy    Asthma    Chronic cough    Knee pain    Osteoarthritis    Uterine cancer  s/p hysterectomy    H/O abdominal hysterectomy  NO RADIATION AND CHEMO    History of tonsillectomy  13 years old    [place] teeth removed    History of bladder stone  SURGERY 8/3/2018    History of surgery  JJ STENT PLACEMENT LEFT OCTOBER 2017      ALLERGIES  Compazine (Unknown)  latex (Urticaria)  penicillins (Unknown)    MEDICATIONS  STANDING MEDICATIONS  chlorhexidine 4% Liquid 1 Application(s) Topical <User Schedule>  heparin   Injectable 5000 Unit(s) SubCutaneous once  influenza   Vaccine 0.5 milliLiter(s) IntraMuscular once  montelukast 10 milliGRAM(s) Oral at bedtime  multivitamin/minerals 1 Tablet(s) Oral daily  nystatin Powder 1 Application(s) Topical two times a day  PHENobarbital 32.4 milliGRAM(s) Oral three times a day  pramipexole 0.5 milliGRAM(s) Oral at bedtime  sodium chloride 0.9%. 1000 milliLiter(s) IV Continuous <Continuous>  traZODone 75 milliGRAM(s) Oral at bedtime    PRN MEDICATIONS  oxycodone    5 mG/acetaminophen 325 mG 1 Tablet(s) Oral every 6 hours PRN  senna 2 Tablet(s) Oral at bedtime PRN    VITALS:  T(F): 96  HR: 71  BP: 116/55  RR: 19  SpO2: 97%    PHYSICAL EXAM  GEN: NAD, Resting comfortably in bed  PULM: Clear to auscultation bilaterally, No wheezes  CVS: Regular rate and rhythm, S1-S2, no murmurs  ABD: Soft, non-tender, non-distended, no guarding  EXT: No edema  NEURO: A&Ox3, no focal deficits;   ENDO: hirsutism     LABS                        13.1   11.09 )-----------( 168      ( 16 Sep 2021 06:50 )             40.7     09-16    137  |  108  |  17  ----------------------------<  110<H>  4.1   |  19  |  0.8    Ca    8.3<L>      16 Sep 2021 06:50  Mg     1.8     09-15    TPro  5.9<L>  /  Alb  3.6  /  TBili  0.2  /  DBili  x   /  AST  15  /  ALT  13  /  AlkPhos  60  09-16    PT/INR - ( 15 Sep 2021 13:30 )   PT: 11.90 sec;   INR: 1.04 ratio         PTT - ( 15 Sep 2021 13:30 )  PTT:31.3 sec              RADIOLOGY    < from: CT Hip No Cont, Left (09.15.21 @ 15:59) >  IMPRESSION:  Acute comminuted, displaced fracture of the left proximal femur with avulsion of the lesser trochanter.      < end of copied text >    < from: CT Abdomen and Pelvis w/ IV Cont (09.15.21 @ 15:59) >  IMPRESSION:  1.  Partially imaged comminuted, displaced left proximal femoral fracture with avulsion of thelesser trochanter. Please see separately dictated report of left hip CT for detailed assessment.    2.  No other acute traumatic pathology in the abdomen or pelvis.    3.  Additional incidental findings as above.    --- End of Report ---    < end of copied text >  < from: Xray Hip 2-3 Views, Left (09.15.21 @ 13:01) >  FINDINGS/  IMPRESSION:  Comminuted, displaced left proximal femoral fracture noted with avulsion of the lesser trochanter. Degenerative changes of the left hip seen.    --- End of Report ---      < end of copied text >

## 2021-09-16 NOTE — CONSULT NOTE ADULT - ASSESSMENT
Impression:  A 62 y/o female with pmhx of asthma, copd, obesity, epilepsy, hypothyroidism , osteoarthritis , kidney stone  s/p slip and fall at [facility] [place]  admitted for left femoral fracture. Pt reports was taking shower this am, her shower chair slipped and she fell on her buttocks. Plan for L hip surgery today, called for neuro clearance.    Suggestion:  Attending will follow regarding clearance.

## 2021-09-16 NOTE — PROGRESS NOTE ADULT - ASSESSMENT
ORTHOPAEDIC SURGERY   POST-OP CHECK NOTE    Patient Name: JACOB KOTHARI  Operation: LEFT FEMUR CMN   Surgeon:  DAVID/ED  ---------------------------------------  SUBJECTIVE    66y Female seen and examined approximately 4 hours after above procedure. Patient is resting comfortably in no acute distress.   Denies f/c/cp/sob/n/v/d.    ---------------------------------------  P/E:  T(F): 96.1 (09-17-21 @ 03:00), Max: 98.5 (09-16-21 @ 22:00)  HR: 106 (09-17-21 @ 06:30) (67 - 136)  BP: 134/63 (09-17-21 @ 06:30) (53/35 - 159/67)  RR: 18 (09-17-21 @ 07:00) (16 - 29)    Alert but distressed    LLE  Dressing in place, c/d/i  SILT sp/dp/t/sural/saph  Firing ta/ehl/fhl/gs  Foot WWP, 2+ DP pulse  ---------------------------------------  LABS                        9.6    35.40 )-----------( 132      ( 17 Sep 2021 06:15 )             29.1       09-17    143  |  104  |  19  ----------------------------<  285<H>  3.6   |  16<L>  |  1.2    Ca    7.5<L>      17 Sep 2021 01:40  Phos  5.4     09-16  Mg     1.4     09-16    TPro  3.7<L>  /  Alb  2.3<L>  /  TBili  0.7  /  DBili  0.4<H>  /  AST  100<H>  /  ALT  69<H>  /  AlkPhos  47  09-17    LIVER FUNCTIONS - ( 17 Sep 2021 02:30 )  Alb: 2.3 g/dL / Pro: 3.7 g/dL / ALK PHOS: 47 U/L / ALT: 69 U/L / AST: 100 U/L / GGT: x           PT/INR - ( 17 Sep 2021 01:40 )   PT: 15.40 sec;   INR: 1.34 ratio         PTT - ( 17 Sep 2021 01:40 )  PTT:28.0 sec      COVID-19 PCR: NotDetec (15 Sep 2021 12:30)      MEDS  MEDICATIONS  (STANDING):  cefepime   IVPB      cefepime   IVPB 1000 milliGRAM(s) IV Intermittent every 12 hours  chlorhexidine 4% Liquid 1 Application(s) Topical daily  heparin  Infusion. 1200 Unit(s)/Hr (12 mL/Hr) IV Continuous <Continuous>  hydrocortisone sodium succinate Injectable 50 milliGRAM(s) IV Push every 6 hours  influenza   Vaccine 0.5 milliLiter(s) IntraMuscular once  montelukast 10 milliGRAM(s) Oral at bedtime  multivitamin 1 Tablet(s) Oral daily  multivitamin/minerals 1 Tablet(s) Oral daily  norepinephrine Infusion 0.05 MICROgram(s)/kG/Min (5.74 mL/Hr) IV Continuous <Continuous>  pantoprazole  Injectable 40 milliGRAM(s) IV Push daily  PHENobarbital 32.4 milliGRAM(s) Oral three times a day  pramipexole 0.5 milliGRAM(s) Oral at bedtime  sodium bicarbonate  Infusion 0.184 mEq/kG/Hr (150 mL/Hr) IV Continuous <Continuous>  traZODone 75 milliGRAM(s) Oral at bedtime  vancomycin  IVPB 1500 milliGRAM(s) IV Intermittent every 12 hours  vancomycin  IVPB      vasopressin Infusion 0.1 Unit(s)/Min (6 mL/Hr) IV Continuous <Continuous>    MEDICATIONS  (PRN):  acetaminophen   Tablet .. 650 milliGRAM(s) Oral every 6 hours PRN Temp greater or equal to 38C (100.4F), Mild Pain (1 - 3)  magnesium hydroxide Suspension 30 milliLiter(s) Oral daily PRN Constipation  oxyCODONE    IR 5 milliGRAM(s) Oral every 4 hours PRN Moderate Pain (4 - 6)  senna 2 Tablet(s) Oral at bedtime PRN Constipation    ---------------------------------------    ASSESSMENT  66y Female s/p L FEMUR CMN on  9/16/21 seen approximately 4 hours, after surgery,   Labile BP in PACU requiring pressors. Upgraded to SICU level of care postop.     PLAN:   - follow-up post-op labs  - Activity:  NWISABELL LLE  - Antibiotics: POSTOP ABX, CEFEPIME&vancomycin 2g q8h for 24h   - Pain: continue care per current regimen   - Prophylaxis: SCDs; pharmacologic prophylaxis to begin at 23h post-op  - PT

## 2021-09-16 NOTE — CONSULT NOTE ADULT - ATTENDING COMMENTS
I have personally seen and examined this patient.  I have fully participated in the care of this patient.  I have reviewed all pertinent clinical information, including history, physical exam, plan and note.   I have reviewed all pertinent clinical information and reviewed all relevant imaging and diagnostic studies personally.  PT w/ h/o epilepsy on phenobarbitol w/ last seizure >20 yrs ago now w/ L femoral Fx awaiting surgery.  No neurologic contrindication for surgery at this time.  Recommend continue phenobarbitol 32.4 mg TID home dose and check phenobarbitol trough level- if level therapeutic then no adjustments to medications needed.  Call back as needed.
Pt seen and examined.  Agree with resident exam and plan.  left subtrochanteric hip fracture  plan for ORIF.  NPO.
62 y/o female, S/P Fall.  Closed Left Proximal Femur Fracture.  S/P ORIF.  Postoperative shock.  Hypovolemia.  Acute blood loss anemia.  Lactic Acidosis.  Acute postoperative pain.  Morbid obesity.  COPD.    PLAN:  - pain control  - use O2 with NC  - extubated by Anesthesia to BiPAP.  - follow CXR  - keep MAP>65; use Nicom; Levophed iv  - follow serum electrolytes and UOP  - serial H&H  - DVT prophylaxis  Admit to SICU.

## 2021-09-16 NOTE — CONSULT NOTE ADULT - SUBJECTIVE AND OBJECTIVE BOX
SICU Consultation Note  =====================================================  HPI: 66y Female  HPI:  A 62 y/o female with pmhx of asthma, copd, obesity, epilepsy, hypothyroidism , osteoarthritis , kidney stone  s/p slip and fall at [facility]  admitted for left femoral fracture. Pt reports was taking shower this am, her shower chair slipped and she fell on her buttocks. PT reports left hip pain. PT  denies neck pain, hitting head or loc. Pt reports was not able to stand or straighten her left leg. Pt reports usually ambulates with a walker. Pt denies numbness or tingling on her left leg. Pt denies urinary or fecal incontinence. PT reports last seizure was 15 yrs ago. Pt denies taking any blood thinners. Pt denies fever, chills, chest pain, shortness of breath, burning with urination, diarrhea.  (15 Sep 2021 18:29)      Surgery Information  OR time:      EBL:          IV Fluids:       Blood Products:   UOP:          PAST MEDICAL & SURGICAL HISTORY:  Diabetes    Renal stone    Renal failure    Epilepsy    Asthma    Chronic cough    Knee pain    Osteoarthritis    Uterine cancer  s/p hysterectomy    H/O abdominal hysterectomy  NO RADIATION AND CHEMO    History of tonsillectomy  13 years old    [place] teeth removed    History of bladder stone  SURGERY 8/3/2018    History of surgery  JJ STENT PLACEMENT LEFT OCTOBER 2017      Home Meds: Home Medications:  betamethasone dipropionate 0.05% topical lotion: Apply topically to affected area 2 times a day (15 Sep 2021 18:46)  Desyrel 50 mg oral tablet: 1.5 tab(s) orally once a day (at bedtime) (15 Sep 2021 18:46)  ibuprofen 200 mg oral tablet: 2 tab(s) orally every 8 hours, As Needed (15 Sep 2021 18:46)  Mirapex 0.5 mg oral tablet: 1 tab(s) orally once a day (at bedtime) (15 Sep 2021 18:46)  Multiple Vitamins with Minerals oral capsule: 1 cap(s) orally once a day (15 Sep 2021 18:46)  Nizoral A-D 1% topical shampoo: Apply topically to affected area every 3 days (15 Sep 2021 18:46)  nystatin 100,000 units/g topical cream (obsolete): Apply topically to affected area 2 times a day under bilateral breast and under left arm (15 Sep 2021 18:46)  PHENobarbital 32.4 mg oral tablet: 1 tab(s) orally 3 times a day (15 Sep 2021 18:46)  Senna Plus 50 mg-8.6 mg oral tablet: 2 tab(s) orally once a day (at bedtime) (15 Sep 2021 18:46)  Singulair 10 mg oral tablet: 1 tab(s) orally once a day (at bedtime) (15 Sep 2021 18:46)  Urocit-K 15 mEq oral tablet, extended release: 1 tab(s) orally once a day (15 Sep 2021 18:46)    Allergies: Allergies    Compazine (Unknown)  latex (Urticaria)  penicillins (Unknown)    Intolerances      Soc:   Advanced Directives: Presumed Full Code     ROS:    REVIEW OF SYSTEMS    [ ] A ten-point review of systems was otherwise negative except as noted.  [ ] Due to altered mental status/intubation, subjective information were not able to be obtained from the patient. History was obtained, to the extent possible, from review of the chart and collateral sources of information.      CURRENT MEDICATIONS:   --------------------------------------------------------------------------------------  Neurologic Medications  acetaminophen   Tablet .. 650 milliGRAM(s) Oral every 6 hours PRN Temp greater or equal to 38C (100.4F), Mild Pain (1 - 3)  morphine IVPB 4 milliGRAM(s) IV Intermittent every 4 hours PRN Severe Pain (7 - 10)  ondansetron Injectable 4 milliGRAM(s) IV Push once PRN Nausea and/or Vomiting  oxyCODONE    IR 5 milliGRAM(s) Oral every 4 hours PRN Moderate Pain (4 - 6)  PHENobarbital 32.4 milliGRAM(s) Oral three times a day  pramipexole 0.5 milliGRAM(s) Oral at bedtime  traZODone 75 milliGRAM(s) Oral at bedtime    Respiratory Medications  montelukast 10 milliGRAM(s) Oral at bedtime    Cardiovascular Medications  norepinephrine Infusion 0.05 MICROgram(s)/kG/Min IV Continuous <Continuous>  norepinephrine Infusion 0.05 MICROgram(s)/kG/Min IV Continuous <Continuous>    Gastrointestinal Medications  calcium gluconate IVPB 1 Gram(s) IV Intermittent once  lactated ringers. 1000 milliLiter(s) IV Continuous <Continuous>  magnesium hydroxide Suspension 30 milliLiter(s) Oral daily PRN Constipation  magnesium sulfate  IVPB 2 Gram(s) IV Intermittent every 1 hour  multivitamin 1 Tablet(s) Oral daily  multivitamin/minerals 1 Tablet(s) Oral daily  senna 2 Tablet(s) Oral at bedtime PRN Constipation    Genitourinary Medications    Hematologic/Oncologic Medications  enoxaparin Injectable 40 milliGRAM(s) SubCutaneous daily  influenza   Vaccine 0.5 milliLiter(s) IntraMuscular once    Antimicrobial/Immunologic Medications  ceFAZolin   IVPB 3000 milliGRAM(s) IV Intermittent every 8 hours    Endocrine/Metabolic Medications    Topical/Other Medications    --------------------------------------------------------------------------------------    VITAL SIGNS, INS/OUTS (last 24 hours):  --------------------------------------------------------------------------------------  ICU Vital Signs Last 24 Hrs  T(C): 36.3 (16 Sep 2021 20:00), Max: 36.4 (16 Sep 2021 00:24)  T(F): 97.4 (16 Sep 2021 20:00), Max: 97.6 (16 Sep 2021 00:24)  HR: 125 (16 Sep 2021 22:35) (67 - 125)  BP: 107/54 (16 Sep 2021 22:35) (53/35 - 144/74)  BP(mean): 76 (16 Sep 2021 22:35) (40 - 103)  ABP: 80/66 (16 Sep 2021 22:30) (55/47 - 144/98)  ABP(mean): 70 (16 Sep 2021 22:30) (51 - 108)  RR: 26 (16 Sep 2021 22:35) (16 - 29)  SpO2: 100% (16 Sep 2021 22:35) (99% - 100%)    I&O's Summary    16 Sep 2021 07:01  -  16 Sep 2021 22:48  --------------------------------------------------------  IN: 2300 mL / OUT: 0 mL / NET: 2300 mL      --------------------------------------------------------------------------------------    EXAM:  General/Neuro    Exam: Normal, NAD, alert, oriented x 3, no focal deficits. PERRL    Respiratory  Exam: Lungs clear to auscultation, Normal expansion/effort.  ***  extubated to BIpap 12/6 40%    Cardiovascular  Exam: S1, S2.  Regular rate and rhythm.  Peripheral edema  ***  Cardiac Rhythm: Normal Sinus Rhythm  ECHO: < from: Transthoracic Echocardiogram (08.02.18 @ 09:19) >  Summary:   1. Left ventricular ejection fraction, by visual estimation, is 60 to   65%.   2. Normal left ventricular size and wall thicknesses, with normal   systolic function.   3. Mild tricuspid regurgitation.    < end of copied text >      GI  Exam: Abdomen soft, Non-tender, Non-distended.  Gastrostomy / Jejunostomy tube in place.  Nasogastric tube in place.  Colostomy / Ileostomy.  ***  Wound:   ***  Current Diet:  NPO***      Tubes/Lines/Drains  ***  [x] Peripheral IV  [x] Central Venous Line     	[x] R	[] L	[x] IJ          Date Placed: 9/16  [x] Arterial Line		[x] R		[x] Rad   Date Placed: 9/16  [x] Urinary Catheter		Date Placed: 9/16    Extremities  Exam: Extremities cool       Derm:  Exam: Good skin turgor, no skin breakdown.      :   Exam: Ardon catheter in place. placed in PACU     LABS  --------------------------------------------------------------------------------------  Labs:  CAPILLARY BLOOD GLUCOSE                              10.4   34.42 )-----------( 194      ( 16 Sep 2021 22:33 )             32.0         09-16    137  |  108  |  17  ----------------------------<  110<H>  4.1   |  19  |  0.8      Magnesium, Serum: 1.4 mg/dL (09-16-21 @ 21:22)      LFTs:             5.9  | 0.2  | 15       ------------------[60      ( 16 Sep 2021 06:50 )  3.6  | x    | 13          Lipase:x      Amylase:x         Blood Gas Arterial, Lactate: 5.70 mmol/L (09-16-21 @ 21:05)  Blood Gas Arterial, Lactate: 3.70 mmol/L (09-16-21 @ 17:52)    ABG - ( 16 Sep 2021 21:05 )  pH: 7.23  /  pCO2: 32    /  pO2: 174   / HCO3: 13    / Base Excess: -13.0 /  SaO2: 100.0           ABG - ( 16 Sep 2021 17:52 )  pH: 7.32  /  pCO2: 37    /  pO2: 99    / HCO3: 19    / Base Excess: -6.4  /  SaO2: 187.0           Coags:     14.20  ----< 1.24    ( 16 Sep 2021 21:19 )     27.5        CARDIAC MARKERS ( 16 Sep 2021 21:22 )  x     / x     / 372 U/L / x     / x      CARDIAC MARKERS ( 16 Sep 2021 21:19 )  x     / <0.01 ng/mL / x     / x     / 2.2 ng/mL                --------------------------------------------------------------------------------------    OTHER LABS    IMAGING RESULTS      EXAM:  CT HIP ONLY LT            PROCEDURE DATE:  09/15/2021            INTERPRETATION:  CT OF THE LEFT HIP WITHOUT CONTRAST    CLINICAL HISTORY: Fracture    TECHNIQUE: Images were obtained of the left hip without contrast. Coronal and sagittal reformatted images were also provided.    COMPARISON: Same day left hip and femur radiographs    FINDINGS:    BONES/JOINTS: Acute comminuted, displaced left proximal femoral fracture noted with avulsion of the lesser trochanter. No evidence of intra-articular extension. Degenerative changes of the left hip seen.    SOFT TISSUES: Soft tissue swelling and intramuscular hematoma surrounding the fracture site noted.    IMPRESSION:  Acute comminuted, displaced fracture of the left proximal femur with avulsion of the lesser trochanter.    --- End of Report ---     SICU Consultation Note  =====================================================  HPI: 66y Female  HPI:  A 64 y/o female with pmhx of asthma, copd, obesity, epilepsy, hypothyroidism , osteoarthritis , kidney stone  s/p slip and fall at [facility]  admitted for left femoral fracture s/p fall on 9/15. Pt reports was taking shower this am, her shower chair slipped and she fell on her buttocks. PT reports left hip pain. PT  denies neck pain, hitting head or loc. Pt reports was not able to stand or straighten her left leg. Pt reports usually ambulates with a walker. Pt denies numbness or tingling on her left leg. Pt denies urinary or fecal incontinence. PT reports last seizure was 15 yrs ago. Pt denies taking any blood thinners. Pt denies fever, chills, chest pain, shortness of breath, burning with urination, diarrhea.      Patient was admitted to medicine and orthopedic consult was placed, patient planned for ORIF of L femur and long IM gamma nail placement. Intra op patient was hypotensive requiring chloe gtt throughout the case. At end of the case patient was extubated to BiPAP with improvement in blood pressure. Per report, patients SBP was 120 immediately post op then shortly after patient became profoundly hypotensive to 60/40s while receiving verbal sign out from anesthesia via spectra. This sudden change in hemodynamics prompted SICU to come bedside for bedside sign out from both anesthesia and Orthopedics resident. Upon SICU arrival to PACU, patient mentating appropriately GCS 15 with BP 60/40s, receiving 1l LR bolus and chloe gtt via PIV. R IJ central line was placed emergently by SICU team. Levophed was started and another 1L LR bolused. STAT labs & ABG sent. Patient admitted to SICU for hypotension requiring pressors and lactic acidosis.     Surgery Information  OR time:  5hours     EBL:  1L        IV Fluids: 3.0L        Blood Products: 2uPRBC      UOP:    no saravia      PAST MEDICAL & SURGICAL HISTORY:  Diabetes    Renal stone    Renal failure    Epilepsy    Asthma    Chronic cough    Knee pain    Osteoarthritis    Uterine cancer  s/p hysterectomy    H/O abdominal hysterectomy  NO RADIATION AND CHEMO    History of tonsillectomy  13 years old    [place] teeth removed    History of bladder stone  SURGERY 8/3/2018    History of surgery  JJ STENT PLACEMENT LEFT OCTOBER 2017      Home Meds: Home Medications:  betamethasone dipropionate 0.05% topical lotion: Apply topically to affected area 2 times a day (15 Sep 2021 18:46)  Desyrel 50 mg oral tablet: 1.5 tab(s) orally once a day (at bedtime) (15 Sep 2021 18:46)  ibuprofen 200 mg oral tablet: 2 tab(s) orally every 8 hours, As Needed (15 Sep 2021 18:46)  Mirapex 0.5 mg oral tablet: 1 tab(s) orally once a day (at bedtime) (15 Sep 2021 18:46)  Multiple Vitamins with Minerals oral capsule: 1 cap(s) orally once a day (15 Sep 2021 18:46)  Nizoral A-D 1% topical shampoo: Apply topically to affected area every 3 days (15 Sep 2021 18:46)  nystatin 100,000 units/g topical cream (obsolete): Apply topically to affected area 2 times a day under bilateral breast and under left arm (15 Sep 2021 18:46)  PHENobarbital 32.4 mg oral tablet: 1 tab(s) orally 3 times a day (15 Sep 2021 18:46)  Senna Plus 50 mg-8.6 mg oral tablet: 2 tab(s) orally once a day (at bedtime) (15 Sep 2021 18:46)  Singulair 10 mg oral tablet: 1 tab(s) orally once a day (at bedtime) (15 Sep 2021 18:46)  Urocit-K 15 mEq oral tablet, extended release: 1 tab(s) orally once a day (15 Sep 2021 18:46)    Allergies: Allergies    Compazine (Unknown)  latex (Urticaria)  penicillins (Unknown)    Intolerances      Soc:   Advanced Directives: Presumed Full Code     ROS:    REVIEW OF SYSTEMS    [ ] A ten-point review of systems was otherwise negative except as noted.  [ ] Due to altered mental status/intubation, subjective information were not able to be obtained from the patient. History was obtained, to the extent possible, from review of the chart and collateral sources of information.      CURRENT MEDICATIONS:   --------------------------------------------------------------------------------------  Neurologic Medications  acetaminophen   Tablet .. 650 milliGRAM(s) Oral every 6 hours PRN Temp greater or equal to 38C (100.4F), Mild Pain (1 - 3)  morphine IVPB 4 milliGRAM(s) IV Intermittent every 4 hours PRN Severe Pain (7 - 10)  ondansetron Injectable 4 milliGRAM(s) IV Push once PRN Nausea and/or Vomiting  oxyCODONE    IR 5 milliGRAM(s) Oral every 4 hours PRN Moderate Pain (4 - 6)  PHENobarbital 32.4 milliGRAM(s) Oral three times a day  pramipexole 0.5 milliGRAM(s) Oral at bedtime  traZODone 75 milliGRAM(s) Oral at bedtime    Respiratory Medications  montelukast 10 milliGRAM(s) Oral at bedtime    Cardiovascular Medications  norepinephrine Infusion 0.05 MICROgram(s)/kG/Min IV Continuous <Continuous>  norepinephrine Infusion 0.05 MICROgram(s)/kG/Min IV Continuous <Continuous>    Gastrointestinal Medications  calcium gluconate IVPB 1 Gram(s) IV Intermittent once  lactated ringers. 1000 milliLiter(s) IV Continuous <Continuous>  magnesium hydroxide Suspension 30 milliLiter(s) Oral daily PRN Constipation  magnesium sulfate  IVPB 2 Gram(s) IV Intermittent every 1 hour  multivitamin 1 Tablet(s) Oral daily  multivitamin/minerals 1 Tablet(s) Oral daily  senna 2 Tablet(s) Oral at bedtime PRN Constipation    Genitourinary Medications    Hematologic/Oncologic Medications  enoxaparin Injectable 40 milliGRAM(s) SubCutaneous daily  influenza   Vaccine 0.5 milliLiter(s) IntraMuscular once    Antimicrobial/Immunologic Medications  ceFAZolin   IVPB 3000 milliGRAM(s) IV Intermittent every 8 hours    Endocrine/Metabolic Medications    Topical/Other Medications    --------------------------------------------------------------------------------------    VITAL SIGNS, INS/OUTS (last 24 hours):  --------------------------------------------------------------------------------------  ICU Vital Signs Last 24 Hrs  T(C): 36.3 (16 Sep 2021 20:00), Max: 36.4 (16 Sep 2021 00:24)  T(F): 97.4 (16 Sep 2021 20:00), Max: 97.6 (16 Sep 2021 00:24)  HR: 125 (16 Sep 2021 22:35) (67 - 125)  BP: 107/54 (16 Sep 2021 22:35) (53/35 - 144/74)  BP(mean): 76 (16 Sep 2021 22:35) (40 - 103)  ABP: 80/66 (16 Sep 2021 22:30) (55/47 - 144/98)  ABP(mean): 70 (16 Sep 2021 22:30) (51 - 108)  RR: 26 (16 Sep 2021 22:35) (16 - 29)  SpO2: 100% (16 Sep 2021 22:35) (99% - 100%)    I&O's Summary    16 Sep 2021 07:01  -  16 Sep 2021 22:48  --------------------------------------------------------  IN: 2300 mL / OUT: 0 mL / NET: 2300 mL      --------------------------------------------------------------------------------------    EXAM:  General/Neuro    Exam: Normal, NAD, alert, oriented x 3, no focal deficits. PERRL    Respiratory  Exam: Lungs clear to auscultation, Normal expansion/effort.    extubated to BIpap 12/6 40%    Cardiovascular  Exam: S1, S2.  Regular rate and rhythm.  Peripheral edema    Cardiac Rhythm: Normal Sinus Rhythm  ECHO: < from: Transthoracic Echocardiogram (08.02.18 @ 09:19) >  Summary:   1. Left ventricular ejection fraction, by visual estimation, is 60 to   65%.   2. Normal left ventricular size and wall thicknesses, with normal   systolic function.   3. Mild tricuspid regurgitation.    < end of copied text >      GI  Exam: Abdomen soft, Non-tender, Non-distended.   Current Diet:  NPO      Tubes/Lines/Drains  ***  [x] Peripheral IV  [x] Central Venous Line     	[x] R	[] L	[x] IJ          Date Placed: 9/16  [x] Arterial Line		[x] R		[x] Rad   Date Placed: 9/16  [x] Urinary Catheter		Date Placed: 9/16    Extremities  Exam: Extremities cool     Derm:  Exam: Good skin turgor, no skin breakdown.      :   Exam: Saravia catheter in place. placed in PACU     LABS  --------------------------------------------------------------------------------------  Labs:  CAPILLARY BLOOD GLUCOSE                 10.4   34.42 )-----------( 194      ( 16 Sep 2021 22:33 )             32.0         09-16    137  |  108  |  17  ----------------------------<  110<H>  4.1   |  19  |  0.8      Magnesium, Serum: 1.4 mg/dL (09-16-21 @ 21:22)      LFTs:             5.9  | 0.2  | 15       ------------------[60      ( 16 Sep 2021 06:50 )  3.6  | x    | 13          Lipase:x      Amylase:x         Blood Gas Arterial, Lactate: 5.70 mmol/L (09-16-21 @ 21:05)  Blood Gas Arterial, Lactate: 3.70 mmol/L (09-16-21 @ 17:52)    ABG - ( 16 Sep 2021 21:05 )  pH: 7.23  /  pCO2: 32    /  pO2: 174   / HCO3: 13    / Base Excess: -13.0 /  SaO2: 100.0           ABG - ( 16 Sep 2021 17:52 )  pH: 7.32  /  pCO2: 37    /  pO2: 99    / HCO3: 19    / Base Excess: -6.4  /  SaO2: 187.0           Coags:     14.20  ----< 1.24    ( 16 Sep 2021 21:19 )     27.5        CARDIAC MARKERS ( 16 Sep 2021 21:22 )  x     / x     / 372 U/L / x     / x      CARDIAC MARKERS ( 16 Sep 2021 21:19 )  x     / <0.01 ng/mL / x     / x     / 2.2 ng/mL                --------------------------------------------------------------------------------------    OTHER LABS    IMAGING RESULTS      EXAM:  CT HIP ONLY LT            PROCEDURE DATE:  09/15/2021            INTERPRETATION:  CT OF THE LEFT HIP WITHOUT CONTRAST    CLINICAL HISTORY: Fracture    TECHNIQUE: Images were obtained of the left hip without contrast. Coronal and sagittal reformatted images were also provided.    COMPARISON: Same day left hip and femur radiographs    FINDINGS:    BONES/JOINTS: Acute comminuted, displaced left proximal femoral fracture noted with avulsion of the lesser trochanter. No evidence of intra-articular extension. Degenerative changes of the left hip seen.    SOFT TISSUES: Soft tissue swelling and intramuscular hematoma surrounding the fracture site noted.    IMPRESSION:  Acute comminuted, displaced fracture of the left proximal femur with avulsion of the lesser trochanter.    --- End of Report ---     SICU Consultation Note  =====================================================  HPI: 66y Female  HPI:  A 64 y/o female with pmhx of asthma, copd, obesity, epilepsy, hypothyroidism , osteoarthritis , kidney stone  s/p slip and fall at [facility]  admitted for left femoral fracture s/p fall on 9/15. Pt reports was taking shower this am, her shower chair slipped and she fell on her buttocks. PT reports left hip pain. PT  denies neck pain, hitting head or loc. Pt reports was not able to stand or straighten her left leg. Pt reports usually ambulates with a walker. Pt denies numbness or tingling on her left leg. Pt denies urinary or fecal incontinence. PT reports last seizure was 15 yrs ago. Pt denies taking any blood thinners. Pt denies fever, chills, chest pain, shortness of breath, burning with urination, diarrhea.      Patient was admitted to medicine and orthopedic consult was placed, patient planned for ORIF of L femur and long IM gamma nail placement. Intra op patient was hypotensive requiring chloe gtt throughout the case. At end of the case patient was extubated to BiPAP with improvement in blood pressure. Per report, patients SBP was 120 immediately post op then shortly after patient became profoundly hypotensive to 60/40s while receiving verbal sign out from anesthesia via spectra. This sudden change in hemodynamics prompted SICU to come bedside for bedside sign out from both anesthesia and Orthopedics resident. Upon SICU arrival to PACU, patient mentating appropriately GCS 15 with BP 60/40s, receiving 1l LR bolus and chloe gtt via PIV. R IJ central line was placed emergently by SICU team. Levophed was started and another 1L LR bolused. STAT labs & ABG sent. Patient admitted to SICU for hypotension requiring pressors and lactic acidosis.     Surgery Information  OR time:  5hours     EBL:  1L        IV Fluids: 3.0L        Blood Products: 2uPRBC      UOP:    no saravia      PAST MEDICAL & SURGICAL HISTORY:  Diabetes    Renal stone    Renal failure    Epilepsy    Asthma    Chronic cough    Knee pain    Osteoarthritis    Uterine cancer  s/p hysterectomy    H/O abdominal hysterectomy  NO RADIATION AND CHEMO    History of tonsillectomy  13 years old    [place] teeth removed    History of bladder stone  SURGERY 8/3/2018    History of surgery  JJ STENT PLACEMENT LEFT OCTOBER 2017      Home Meds: Home Medications:  betamethasone dipropionate 0.05% topical lotion: Apply topically to affected area 2 times a day (15 Sep 2021 18:46)  Desyrel 50 mg oral tablet: 1.5 tab(s) orally once a day (at bedtime) (15 Sep 2021 18:46)  ibuprofen 200 mg oral tablet: 2 tab(s) orally every 8 hours, As Needed (15 Sep 2021 18:46)  Mirapex 0.5 mg oral tablet: 1 tab(s) orally once a day (at bedtime) (15 Sep 2021 18:46)  Multiple Vitamins with Minerals oral capsule: 1 cap(s) orally once a day (15 Sep 2021 18:46)  Nizoral A-D 1% topical shampoo: Apply topically to affected area every 3 days (15 Sep 2021 18:46)  nystatin 100,000 units/g topical cream (obsolete): Apply topically to affected area 2 times a day under bilateral breast and under left arm (15 Sep 2021 18:46)  PHENobarbital 32.4 mg oral tablet: 1 tab(s) orally 3 times a day (15 Sep 2021 18:46)  Senna Plus 50 mg-8.6 mg oral tablet: 2 tab(s) orally once a day (at bedtime) (15 Sep 2021 18:46)  Singulair 10 mg oral tablet: 1 tab(s) orally once a day (at bedtime) (15 Sep 2021 18:46)  Urocit-K 15 mEq oral tablet, extended release: 1 tab(s) orally once a day (15 Sep 2021 18:46)    Allergies: Allergies    Compazine (Unknown)  latex (Urticaria)  penicillins (Unknown)    Intolerances      Soc:   Advanced Directives: Presumed Full Code     ROS:    REVIEW OF SYSTEMS    [ ] A ten-point review of systems was otherwise negative except as noted.  [ ] Due to altered mental status/intubation, subjective information were not able to be obtained from the patient. History was obtained, to the extent possible, from review of the chart and collateral sources of information.      CURRENT MEDICATIONS:   --------------------------------------------------------------------------------------  Neurologic Medications  acetaminophen   Tablet .. 650 milliGRAM(s) Oral every 6 hours PRN Temp greater or equal to 38C (100.4F), Mild Pain (1 - 3)  morphine IVPB 4 milliGRAM(s) IV Intermittent every 4 hours PRN Severe Pain (7 - 10)  ondansetron Injectable 4 milliGRAM(s) IV Push once PRN Nausea and/or Vomiting  oxyCODONE    IR 5 milliGRAM(s) Oral every 4 hours PRN Moderate Pain (4 - 6)  PHENobarbital 32.4 milliGRAM(s) Oral three times a day  pramipexole 0.5 milliGRAM(s) Oral at bedtime  traZODone 75 milliGRAM(s) Oral at bedtime    Respiratory Medications  montelukast 10 milliGRAM(s) Oral at bedtime    Cardiovascular Medications  norepinephrine Infusion 0.05 MICROgram(s)/kG/Min IV Continuous <Continuous>  norepinephrine Infusion 0.05 MICROgram(s)/kG/Min IV Continuous <Continuous>    Gastrointestinal Medications  calcium gluconate IVPB 1 Gram(s) IV Intermittent once  lactated ringers. 1000 milliLiter(s) IV Continuous <Continuous>  magnesium hydroxide Suspension 30 milliLiter(s) Oral daily PRN Constipation  magnesium sulfate  IVPB 2 Gram(s) IV Intermittent every 1 hour  multivitamin 1 Tablet(s) Oral daily  multivitamin/minerals 1 Tablet(s) Oral daily  senna 2 Tablet(s) Oral at bedtime PRN Constipation    Genitourinary Medications    Hematologic/Oncologic Medications  enoxaparin Injectable 40 milliGRAM(s) SubCutaneous daily  influenza   Vaccine 0.5 milliLiter(s) IntraMuscular once    Antimicrobial/Immunologic Medications  ceFAZolin   IVPB 3000 milliGRAM(s) IV Intermittent every 8 hours    Endocrine/Metabolic Medications    Topical/Other Medications    --------------------------------------------------------------------------------------    VITAL SIGNS, INS/OUTS (last 24 hours):  --------------------------------------------------------------------------------------  ICU Vital Signs Last 24 Hrs  T(C): 36.3 (16 Sep 2021 20:00), Max: 36.4 (16 Sep 2021 00:24)  T(F): 97.4 (16 Sep 2021 20:00), Max: 97.6 (16 Sep 2021 00:24)  HR: 125 (16 Sep 2021 22:35) (67 - 125)  BP: 107/54 (16 Sep 2021 22:35) (53/35 - 144/74)  BP(mean): 76 (16 Sep 2021 22:35) (40 - 103)  ABP: 80/66 (16 Sep 2021 22:30) (55/47 - 144/98)  ABP(mean): 70 (16 Sep 2021 22:30) (51 - 108)  RR: 26 (16 Sep 2021 22:35) (16 - 29)  SpO2: 100% (16 Sep 2021 22:35) (99% - 100%)    I&O's Summary    16 Sep 2021 07:01  -  16 Sep 2021 22:48  --------------------------------------------------------  IN: 2300 mL / OUT: 0 mL / NET: 2300 mL      --------------------------------------------------------------------------------------    EXAM:  General/Neuro    Exam: Normal, NAD, alert, oriented x 3, no focal deficits. PERRL    Respiratory  Exam: Lungs clear to auscultation, Normal expansion/effort.    extubated to BIpap 12/6 40%    Cardiovascular  Exam: S1, S2.  Regular rate and rhythm.  Peripheral edema    Cardiac Rhythm: Normal Sinus Rhythm  ECHO: < from: Transthoracic Echocardiogram (08.02.18 @ 09:19) >  Summary:   1. Left ventricular ejection fraction, by visual estimation, is 60 to   65%.   2. Normal left ventricular size and wall thicknesses, with normal   systolic function.   3. Mild tricuspid regurgitation.  < end of copied text >    GI  Exam: Abdomen soft, Non-tender, Non-distended.   Current Diet:  NPO      Tubes/Lines/Drains  ***  [x] Peripheral IV  [x] Central Venous Line     	[x] R	[] L	[x] IJ          Date Placed: 9/16  [x] Arterial Line		[x] R		[x] Rad   Date Placed: 9/16  [x] Urinary Catheter		Date Placed: 9/16    Extremities  Exam: Extremities cool     Derm:  Exam: Good skin turgor, no skin breakdown.      :   Exam: Saravia catheter in place. placed in PACU     LABS  --------------------------------------------------------------------------------------  Labs:  CAPILLARY BLOOD GLUCOSE                 10.4   34.42 )-----------( 194      ( 16 Sep 2021 22:33 )             32.0         09-16    137  |  108  |  17  ----------------------------<  110<H>  4.1   |  19  |  0.8      Magnesium, Serum: 1.4 mg/dL (09-16-21 @ 21:22)      LFTs:             5.9  | 0.2  | 15       ------------------[60      ( 16 Sep 2021 06:50 )  3.6  | x    | 13          Lipase:x      Amylase:x         Blood Gas Arterial, Lactate: 5.70 mmol/L (09-16-21 @ 21:05)  Blood Gas Arterial, Lactate: 3.70 mmol/L (09-16-21 @ 17:52)    ABG - ( 16 Sep 2021 21:05 )  pH: 7.23  /  pCO2: 32    /  pO2: 174   / HCO3: 13    / Base Excess: -13.0 /  SaO2: 100.0           ABG - ( 16 Sep 2021 17:52 )  pH: 7.32  /  pCO2: 37    /  pO2: 99    / HCO3: 19    / Base Excess: -6.4  /  SaO2: 187.0           Coags:     14.20  ----< 1.24    ( 16 Sep 2021 21:19 )     27.5        CARDIAC MARKERS ( 16 Sep 2021 21:22 )  x     / x     / 372 U/L / x     / x      CARDIAC MARKERS ( 16 Sep 2021 21:19 )  x     / <0.01 ng/mL / x     / x     / 2.2 ng/mL                --------------------------------------------------------------------------------------    OTHER LABS    IMAGING RESULTS      EXAM:  CT HIP ONLY LT            PROCEDURE DATE:  09/15/2021            INTERPRETATION:  CT OF THE LEFT HIP WITHOUT CONTRAST    CLINICAL HISTORY: Fracture    TECHNIQUE: Images were obtained of the left hip without contrast. Coronal and sagittal reformatted images were also provided.    COMPARISON: Same day left hip and femur radiographs    FINDINGS:    BONES/JOINTS: Acute comminuted, displaced left proximal femoral fracture noted with avulsion of the lesser trochanter. No evidence of intra-articular extension. Degenerative changes of the left hip seen.    SOFT TISSUES: Soft tissue swelling and intramuscular hematoma surrounding the fracture site noted.    IMPRESSION:  Acute comminuted, displaced fracture of the left proximal femur with avulsion of the lesser trochanter.    --- End of Report ---     SICU Consultation Note  =====================================================  HPI: 66y Female  HPI:  A 62 y/o female with pmhx of asthma, copd, obesity, epilepsy, hypothyroidism , osteoarthritis , kidney stone  s/p slip and fall at [facility]  admitted for left femoral fracture s/p fall on 9/15. Pt reports was taking shower this am, her shower chair slipped and she fell on her buttocks. PT reports left hip pain. PT  denies neck pain, hitting head or loc. Pt reports was not able to stand or straighten her left leg. Pt reports usually ambulates with a walker. Pt denies numbness or tingling on her left leg. Pt denies urinary or fecal incontinence. PT reports last seizure was 15 yrs ago. Pt denies taking any blood thinners. Pt denies fever, chills, chest pain, shortness of breath, burning with urination, diarrhea.      Patient was admitted to medicine and orthopedic consult was placed, patient planned for ORIF of L femur and long IM gamma nail placement. Intra op patient was hypotensive requiring chloe gtt throughout the case. At end of the case patient was extubated to BiPAP with improvement in blood pressure. Per report, patients SBP was 120 immediately post op then shortly after patient became profoundly hypotensive to 60/40s while receiving verbal sign out from anesthesia via spectra. This sudden change in hemodynamics prompted SICU to come bedside for bedside sign out from both anesthesia and Orthopedics resident. Upon SICU arrival to PACU, patient mentating appropriately GCS 15 with BP 60/40s via left radial a line, receiving 1l LR bolus and chloe gtt via PIV. R IJ central line was placed emergently by SICU team. Levophed was started and another 1L LR bolused. STAT labs & ABG sent. Patient admitted to SICU for hypotension requiring pressors and lactic acidosis.     Surgery Information  OR time:  5hours     EBL:  1L        IV Fluids: 3.0L        Blood Products: 2uPRBC      UOP:    no saravia      PAST MEDICAL & SURGICAL HISTORY:  Diabetes    Renal stone    Renal failure    Epilepsy    Asthma    Chronic cough    Knee pain    Osteoarthritis    Uterine cancer  s/p hysterectomy    H/O abdominal hysterectomy  NO RADIATION AND CHEMO    History of tonsillectomy  13 years old    [place] teeth removed    History of bladder stone  SURGERY 8/3/2018    History of surgery  JJ STENT PLACEMENT LEFT OCTOBER 2017      Home Meds: Home Medications:  betamethasone dipropionate 0.05% topical lotion: Apply topically to affected area 2 times a day (15 Sep 2021 18:46)  Desyrel 50 mg oral tablet: 1.5 tab(s) orally once a day (at bedtime) (15 Sep 2021 18:46)  ibuprofen 200 mg oral tablet: 2 tab(s) orally every 8 hours, As Needed (15 Sep 2021 18:46)  Mirapex 0.5 mg oral tablet: 1 tab(s) orally once a day (at bedtime) (15 Sep 2021 18:46)  Multiple Vitamins with Minerals oral capsule: 1 cap(s) orally once a day (15 Sep 2021 18:46)  Nizoral A-D 1% topical shampoo: Apply topically to affected area every 3 days (15 Sep 2021 18:46)  nystatin 100,000 units/g topical cream (obsolete): Apply topically to affected area 2 times a day under bilateral breast and under left arm (15 Sep 2021 18:46)  PHENobarbital 32.4 mg oral tablet: 1 tab(s) orally 3 times a day (15 Sep 2021 18:46)  Senna Plus 50 mg-8.6 mg oral tablet: 2 tab(s) orally once a day (at bedtime) (15 Sep 2021 18:46)  Singulair 10 mg oral tablet: 1 tab(s) orally once a day (at bedtime) (15 Sep 2021 18:46)  Urocit-K 15 mEq oral tablet, extended release: 1 tab(s) orally once a day (15 Sep 2021 18:46)    Allergies: Allergies    Compazine (Unknown)  latex (Urticaria)  penicillins (Unknown)    Intolerances      Soc:   Advanced Directives: Presumed Full Code     ROS:    REVIEW OF SYSTEMS    [x ] A ten-point review of systems was otherwise negative except as noted.  [ ] Due to altered mental status/intubation, subjective information were not able to be obtained from the patient. History was obtained, to the extent possible, from review of the chart and collateral sources of information.      CURRENT MEDICATIONS:   --------------------------------------------------------------------------------------  Neurologic Medications  acetaminophen   Tablet .. 650 milliGRAM(s) Oral every 6 hours PRN Temp greater or equal to 38C (100.4F), Mild Pain (1 - 3)  morphine IVPB 4 milliGRAM(s) IV Intermittent every 4 hours PRN Severe Pain (7 - 10)  ondansetron Injectable 4 milliGRAM(s) IV Push once PRN Nausea and/or Vomiting  oxyCODONE    IR 5 milliGRAM(s) Oral every 4 hours PRN Moderate Pain (4 - 6)  PHENobarbital 32.4 milliGRAM(s) Oral three times a day  pramipexole 0.5 milliGRAM(s) Oral at bedtime  traZODone 75 milliGRAM(s) Oral at bedtime    Respiratory Medications  montelukast 10 milliGRAM(s) Oral at bedtime    Cardiovascular Medications  norepinephrine Infusion 0.05 MICROgram(s)/kG/Min IV Continuous <Continuous>  norepinephrine Infusion 0.05 MICROgram(s)/kG/Min IV Continuous <Continuous>    Gastrointestinal Medications  calcium gluconate IVPB 1 Gram(s) IV Intermittent once  lactated ringers. 1000 milliLiter(s) IV Continuous <Continuous>  magnesium hydroxide Suspension 30 milliLiter(s) Oral daily PRN Constipation  magnesium sulfate  IVPB 2 Gram(s) IV Intermittent every 1 hour  multivitamin 1 Tablet(s) Oral daily  multivitamin/minerals 1 Tablet(s) Oral daily  senna 2 Tablet(s) Oral at bedtime PRN Constipation    Genitourinary Medications    Hematologic/Oncologic Medications  enoxaparin Injectable 40 milliGRAM(s) SubCutaneous daily  influenza   Vaccine 0.5 milliLiter(s) IntraMuscular once    Antimicrobial/Immunologic Medications  ceFAZolin   IVPB 3000 milliGRAM(s) IV Intermittent every 8 hours    Endocrine/Metabolic Medications    Topical/Other Medications    --------------------------------------------------------------------------------------    VITAL SIGNS, INS/OUTS (last 24 hours):  --------------------------------------------------------------------------------------  ICU Vital Signs Last 24 Hrs  T(C): 36.3 (16 Sep 2021 20:00), Max: 36.4 (16 Sep 2021 00:24)  T(F): 97.4 (16 Sep 2021 20:00), Max: 97.6 (16 Sep 2021 00:24)  HR: 125 (16 Sep 2021 22:35) (67 - 125)  BP: 107/54 (16 Sep 2021 22:35) (53/35 - 144/74)  BP(mean): 76 (16 Sep 2021 22:35) (40 - 103)  ABP: 80/66 (16 Sep 2021 22:30) (55/47 - 144/98)  ABP(mean): 70 (16 Sep 2021 22:30) (51 - 108)  RR: 26 (16 Sep 2021 22:35) (16 - 29)  SpO2: 100% (16 Sep 2021 22:35) (99% - 100%)    I&O's Summary    16 Sep 2021 07:01  -  16 Sep 2021 22:48  --------------------------------------------------------  IN: 2300 mL / OUT: 0 mL / NET: 2300 mL      --------------------------------------------------------------------------------------    EXAM:  General/Neuro    Exam: Normal, NAD, alert, oriented x 3, no focal deficits. PERRL    Respiratory  Exam: Lungs clear to auscultation, Normal expansion/effort.    extubated to BIpap 12/6 40%    Cardiovascular  Exam: S1, S2.  Regular rate and rhythm.  Peripheral edema    Cardiac Rhythm: Normal Sinus Rhythm  ECHO: < from: Transthoracic Echocardiogram (08.02.18 @ 09:19) >  Summary:   1. Left ventricular ejection fraction, by visual estimation, is 60 to   65%.   2. Normal left ventricular size and wall thicknesses, with normal   systolic function.   3. Mild tricuspid regurgitation.  < end of copied text >    GI  Exam: Abdomen soft, Non-tender, Non-distended.   Current Diet:  NPO      Tubes/Lines/Drains  ***  [x] Peripheral IV  [x] Central Venous Line     	[x] R	[] L	[x] IJ          Date Placed: 9/16  [x] Arterial Line	  L radial a line placed intra op by anesthesia, was DC in PACU due to clotting off  R radial a line place in PACU with 1 attempt, good blood return but shortly after no wave form   attempted R femoral a line without success    [x] Urinary Catheter		Date Placed: 9/16    Extremities  Exam: Extremities cool     Derm:  Exam: Good skin turgor, no skin breakdown.      :   Exam: Saravia catheter in place. placed in PACU     LABS  --------------------------------------------------------------------------------------  Labs:  CAPILLARY BLOOD GLUCOSE                 10.4   34.42 )-----------( 194      ( 16 Sep 2021 22:33 )             32.0         09-16    137  |  108  |  17  ----------------------------<  110<H>  4.1   |  19  |  0.8      Magnesium, Serum: 1.4 mg/dL (09-16-21 @ 21:22)      LFTs:             5.9  | 0.2  | 15       ------------------[60      ( 16 Sep 2021 06:50 )  3.6  | x    | 13          Lipase:x      Amylase:x         Blood Gas Arterial, Lactate: 5.70 mmol/L (09-16-21 @ 21:05)  Blood Gas Arterial, Lactate: 3.70 mmol/L (09-16-21 @ 17:52)    ABG - ( 16 Sep 2021 21:05 )  pH: 7.23  /  pCO2: 32    /  pO2: 174   / HCO3: 13    / Base Excess: -13.0 /  SaO2: 100.0           ABG - ( 16 Sep 2021 17:52 )  pH: 7.32  /  pCO2: 37    /  pO2: 99    / HCO3: 19    / Base Excess: -6.4  /  SaO2: 187.0           Coags:     14.20  ----< 1.24    ( 16 Sep 2021 21:19 )     27.5        CARDIAC MARKERS ( 16 Sep 2021 21:22 )  x     / x     / 372 U/L / x     / x      CARDIAC MARKERS ( 16 Sep 2021 21:19 )  x     / <0.01 ng/mL / x     / x     / 2.2 ng/mL                --------------------------------------------------------------------------------------    OTHER LABS    IMAGING RESULTS      EXAM:  CT HIP ONLY LT            PROCEDURE DATE:  09/15/2021            INTERPRETATION:  CT OF THE LEFT HIP WITHOUT CONTRAST    CLINICAL HISTORY: Fracture    TECHNIQUE: Images were obtained of the left hip without contrast. Coronal and sagittal reformatted images were also provided.    COMPARISON: Same day left hip and femur radiographs    FINDINGS:    BONES/JOINTS: Acute comminuted, displaced left proximal femoral fracture noted with avulsion of the lesser trochanter. No evidence of intra-articular extension. Degenerative changes of the left hip seen.    SOFT TISSUES: Soft tissue swelling and intramuscular hematoma surrounding the fracture site noted.    IMPRESSION:  Acute comminuted, displaced fracture of the left proximal femur with avulsion of the lesser trochanter.    --- End of Report ---

## 2021-09-16 NOTE — CHART NOTE - NSCHARTNOTEFT_GEN_A_CORE
PACU ANESTHESIA ADMISSION NOTE      Procedure: Intramedullary rodding, femur      Post op diagnosis:  Subtrochanteric fracture of left femur        ____  Intubated  TV:______       Rate: ______      FiO2: ______  x  ____  Patent Airway  x  ____  Full return of protective reflexes  x  ____  Full recovery from anesthesia / back to baseline status    Vitals:  T(C): 36  HR: 101  BP: 144/74   RR: 26  SpO2: 100%    Mental Status:  __x__ Awake   _____ Alert   _____ Drowsy   _____ Sedated    Nausea/Vomiting:  __x__ NO  ______Yes,   See Post - Op Orders          Pain Scale (0-10):  _____    Treatment: __x__ None    ____ See Post - Op/PCA Orders    Post - Operative Fluids:   ____ Oral   _x___ See Post - Op Orders    Plan: Discharge:   ____Home       _____Floor     __x___Critical Care    _____  Other:_________________    Comments: report given to RENETTA CADENA, pt started on levophed gtt and on bipap machine, central placed by RENETTA CADENA

## 2021-09-16 NOTE — CHART NOTE - NSCHARTNOTEFT_GEN_A_CORE
Per neuro, as patient missed her 6 AM phenobarb dose she should get double her afternoon dose at 2 PM. However, unable to order as patient is in OR. Per attending, ok to give STAT dose of phenobarb after patient returns from surgery. Will sign out to covering intern and alert RN.

## 2021-09-17 LAB
A1C WITH ESTIMATED AVERAGE GLUCOSE RESULT: 5.2 % — SIGNIFICANT CHANGE UP (ref 4–5.6)
ALBUMIN SERPL ELPH-MCNC: 2.3 G/DL — LOW (ref 3.5–5.2)
ALP SERPL-CCNC: 47 U/L — SIGNIFICANT CHANGE UP (ref 30–115)
ALT FLD-CCNC: 69 U/L — HIGH (ref 0–41)
ANION GAP SERPL CALC-SCNC: 17 MMOL/L — HIGH (ref 7–14)
ANION GAP SERPL CALC-SCNC: 19 MMOL/L — HIGH (ref 7–14)
ANION GAP SERPL CALC-SCNC: 23 MMOL/L — HIGH (ref 7–14)
APPEARANCE UR: ABNORMAL
APTT BLD: 103.4 SEC — CRITICAL HIGH (ref 27–39.2)
APTT BLD: 28 SEC — SIGNIFICANT CHANGE UP (ref 27–39.2)
APTT BLD: 97.8 SEC — CRITICAL HIGH (ref 27–39.2)
AST SERPL-CCNC: 100 U/L — HIGH (ref 0–41)
BACTERIA # UR AUTO: NEGATIVE — SIGNIFICANT CHANGE UP
BASE EXCESS BLDV CALC-SCNC: -0.2 MMOL/L — SIGNIFICANT CHANGE UP (ref -2–3)
BASE EXCESS BLDV CALC-SCNC: 3.8 MMOL/L — HIGH (ref -2–3)
BASE EXCESS BLDV CALC-SCNC: 5.8 MMOL/L — HIGH (ref -2–3)
BASOPHILS # BLD AUTO: 0.03 K/UL — SIGNIFICANT CHANGE UP (ref 0–0.2)
BASOPHILS # BLD AUTO: 0.03 K/UL — SIGNIFICANT CHANGE UP (ref 0–0.2)
BASOPHILS NFR BLD AUTO: 0.1 % — SIGNIFICANT CHANGE UP (ref 0–1)
BASOPHILS NFR BLD AUTO: 0.1 % — SIGNIFICANT CHANGE UP (ref 0–1)
BILIRUB DIRECT SERPL-MCNC: 0.4 MG/DL — HIGH (ref 0–0.2)
BILIRUB INDIRECT FLD-MCNC: 0.3 MG/DL — SIGNIFICANT CHANGE UP (ref 0.2–1.2)
BILIRUB SERPL-MCNC: 0.7 MG/DL — SIGNIFICANT CHANGE UP (ref 0.2–1.2)
BILIRUB UR-MCNC: NEGATIVE — SIGNIFICANT CHANGE UP
BUN SERPL-MCNC: 19 MG/DL — SIGNIFICANT CHANGE UP (ref 10–20)
BUN SERPL-MCNC: 20 MG/DL — SIGNIFICANT CHANGE UP (ref 10–20)
BUN SERPL-MCNC: 20 MG/DL — SIGNIFICANT CHANGE UP (ref 10–20)
CA-I SERPL-SCNC: 0.97 MMOL/L — LOW (ref 1.15–1.33)
CA-I SERPL-SCNC: 0.97 MMOL/L — LOW (ref 1.15–1.33)
CA-I SERPL-SCNC: 1.01 MMOL/L — LOW (ref 1.15–1.33)
CALCIUM SERPL-MCNC: 7 MG/DL — LOW (ref 8.5–10.1)
CALCIUM SERPL-MCNC: 7.2 MG/DL — LOW (ref 8.5–10.1)
CALCIUM SERPL-MCNC: 7.5 MG/DL — LOW (ref 8.5–10.1)
CHLORIDE SERPL-SCNC: 102 MMOL/L — SIGNIFICANT CHANGE UP (ref 98–110)
CHLORIDE SERPL-SCNC: 104 MMOL/L — SIGNIFICANT CHANGE UP (ref 98–110)
CHLORIDE SERPL-SCNC: 105 MMOL/L — SIGNIFICANT CHANGE UP (ref 98–110)
CK MB CFR SERPL CALC: 14.7 NG/ML — HIGH (ref 0.6–6.3)
CK MB CFR SERPL CALC: 15.7 NG/ML — HIGH (ref 0.6–6.3)
CK MB CFR SERPL CALC: 8 NG/ML — HIGH (ref 0.6–6.3)
CK SERPL-CCNC: 1966 U/L — HIGH (ref 0–225)
CK SERPL-CCNC: 3410 U/L — HIGH (ref 0–225)
CK SERPL-CCNC: 3530 U/L — HIGH (ref 0–225)
CO2 SERPL-SCNC: 16 MMOL/L — LOW (ref 17–32)
CO2 SERPL-SCNC: 20 MMOL/L — SIGNIFICANT CHANGE UP (ref 17–32)
CO2 SERPL-SCNC: 24 MMOL/L — SIGNIFICANT CHANGE UP (ref 17–32)
COLOR SPEC: YELLOW — SIGNIFICANT CHANGE UP
COVID-19 SPIKE DOMAIN AB INTERP: POSITIVE
COVID-19 SPIKE DOMAIN ANTIBODY RESULT: >250 U/ML — HIGH
CREAT SERPL-MCNC: 1 MG/DL — SIGNIFICANT CHANGE UP (ref 0.7–1.5)
CREAT SERPL-MCNC: 1.1 MG/DL — SIGNIFICANT CHANGE UP (ref 0.7–1.5)
CREAT SERPL-MCNC: 1.2 MG/DL — SIGNIFICANT CHANGE UP (ref 0.7–1.5)
DIFF PNL FLD: ABNORMAL
EOSINOPHIL # BLD AUTO: 0 K/UL — SIGNIFICANT CHANGE UP (ref 0–0.7)
EOSINOPHIL # BLD AUTO: 0 K/UL — SIGNIFICANT CHANGE UP (ref 0–0.7)
EOSINOPHIL NFR BLD AUTO: 0 % — SIGNIFICANT CHANGE UP (ref 0–8)
EOSINOPHIL NFR BLD AUTO: 0 % — SIGNIFICANT CHANGE UP (ref 0–8)
EPI CELLS # UR: 1 /HPF — SIGNIFICANT CHANGE UP (ref 0–5)
ESTIMATED AVERAGE GLUCOSE: 103 MG/DL — SIGNIFICANT CHANGE UP (ref 68–114)
GAS PNL BLDA: SIGNIFICANT CHANGE UP
GAS PNL BLDV: 138 MMOL/L — SIGNIFICANT CHANGE UP (ref 136–145)
GAS PNL BLDV: 139 MMOL/L — SIGNIFICANT CHANGE UP (ref 136–145)
GAS PNL BLDV: 139 MMOL/L — SIGNIFICANT CHANGE UP (ref 136–145)
GAS PNL BLDV: SIGNIFICANT CHANGE UP
GLUCOSE BLDC GLUCOMTR-MCNC: 160 MG/DL — HIGH (ref 70–99)
GLUCOSE BLDC GLUCOMTR-MCNC: 181 MG/DL — HIGH (ref 70–99)
GLUCOSE BLDC GLUCOMTR-MCNC: 190 MG/DL — HIGH (ref 70–99)
GLUCOSE BLDC GLUCOMTR-MCNC: 226 MG/DL — HIGH (ref 70–99)
GLUCOSE BLDC GLUCOMTR-MCNC: 263 MG/DL — HIGH (ref 70–99)
GLUCOSE BLDC GLUCOMTR-MCNC: 281 MG/DL — HIGH (ref 70–99)
GLUCOSE SERPL-MCNC: 269 MG/DL — HIGH (ref 70–99)
GLUCOSE SERPL-MCNC: 274 MG/DL — HIGH (ref 70–99)
GLUCOSE SERPL-MCNC: 285 MG/DL — HIGH (ref 70–99)
GLUCOSE UR QL: NEGATIVE — SIGNIFICANT CHANGE UP
HCO3 BLDV-SCNC: 25 MMOL/L — SIGNIFICANT CHANGE UP (ref 22–29)
HCO3 BLDV-SCNC: 30 MMOL/L — HIGH (ref 22–29)
HCO3 BLDV-SCNC: 31 MMOL/L — HIGH (ref 22–29)
HCT VFR BLD CALC: 21.8 % — LOW (ref 37–47)
HCT VFR BLD CALC: 24.6 % — LOW (ref 37–47)
HCT VFR BLD CALC: 29.1 % — LOW (ref 37–47)
HCT VFR BLD CALC: 29.4 % — LOW (ref 37–47)
HCT VFR BLDA CALC: 25 % — LOW (ref 34.5–46.5)
HCT VFR BLDA CALC: 28 % — LOW (ref 34.5–46.5)
HCT VFR BLDA CALC: 28 % — LOW (ref 34.5–46.5)
HGB BLD CALC-MCNC: 8.2 G/DL — LOW (ref 11.7–16.1)
HGB BLD CALC-MCNC: 9.2 G/DL — LOW (ref 11.7–16.1)
HGB BLD CALC-MCNC: 9.2 G/DL — LOW (ref 11.7–16.1)
HGB BLD-MCNC: 7.4 G/DL — LOW (ref 12–16)
HGB BLD-MCNC: 8.5 G/DL — LOW (ref 12–16)
HGB BLD-MCNC: 9.6 G/DL — LOW (ref 12–16)
HGB BLD-MCNC: 9.6 G/DL — LOW (ref 12–16)
HYALINE CASTS # UR AUTO: 2 /LPF — SIGNIFICANT CHANGE UP (ref 0–7)
IMM GRANULOCYTES NFR BLD AUTO: 0.8 % — HIGH (ref 0.1–0.3)
IMM GRANULOCYTES NFR BLD AUTO: 0.8 % — HIGH (ref 0.1–0.3)
INR BLD: 1.34 RATIO — HIGH (ref 0.65–1.3)
KETONES UR-MCNC: SIGNIFICANT CHANGE UP
LACTATE BLDV-MCNC: 4.7 MMOL/L — CRITICAL HIGH (ref 0.5–2)
LACTATE BLDV-MCNC: 6.2 MMOL/L — CRITICAL HIGH (ref 0.5–2)
LACTATE BLDV-MCNC: 8.2 MMOL/L — CRITICAL HIGH (ref 0.5–2)
LACTATE SERPL-SCNC: 10.5 MMOL/L — CRITICAL HIGH (ref 0.7–2)
LEUKOCYTE ESTERASE UR-ACNC: ABNORMAL
LYMPHOCYTES # BLD AUTO: 12.9 % — LOW (ref 20.5–51.1)
LYMPHOCYTES # BLD AUTO: 14.2 % — LOW (ref 20.5–51.1)
LYMPHOCYTES # BLD AUTO: 3.34 K/UL — SIGNIFICANT CHANGE UP (ref 1.2–3.4)
LYMPHOCYTES # BLD AUTO: 3.56 K/UL — HIGH (ref 1.2–3.4)
MAGNESIUM SERPL-MCNC: 1.9 MG/DL — SIGNIFICANT CHANGE UP (ref 1.8–2.4)
MAGNESIUM SERPL-MCNC: 1.9 MG/DL — SIGNIFICANT CHANGE UP (ref 1.8–2.4)
MCHC RBC-ENTMCNC: 30.2 PG — SIGNIFICANT CHANGE UP (ref 27–31)
MCHC RBC-ENTMCNC: 30.3 PG — SIGNIFICANT CHANGE UP (ref 27–31)
MCHC RBC-ENTMCNC: 30.5 PG — SIGNIFICANT CHANGE UP (ref 27–31)
MCHC RBC-ENTMCNC: 31 PG — SIGNIFICANT CHANGE UP (ref 27–31)
MCHC RBC-ENTMCNC: 32.7 G/DL — SIGNIFICANT CHANGE UP (ref 32–37)
MCHC RBC-ENTMCNC: 33 G/DL — SIGNIFICANT CHANGE UP (ref 32–37)
MCHC RBC-ENTMCNC: 33.9 G/DL — SIGNIFICANT CHANGE UP (ref 32–37)
MCHC RBC-ENTMCNC: 34.6 G/DL — SIGNIFICANT CHANGE UP (ref 32–37)
MCV RBC AUTO: 89.3 FL — SIGNIFICANT CHANGE UP (ref 81–99)
MCV RBC AUTO: 89.8 FL — SIGNIFICANT CHANGE UP (ref 81–99)
MCV RBC AUTO: 91.5 FL — SIGNIFICANT CHANGE UP (ref 81–99)
MCV RBC AUTO: 93.3 FL — SIGNIFICANT CHANGE UP (ref 81–99)
MONOCYTES # BLD AUTO: 2.04 K/UL — HIGH (ref 0.1–0.6)
MONOCYTES # BLD AUTO: 2.27 K/UL — HIGH (ref 0.1–0.6)
MONOCYTES NFR BLD AUTO: 7.4 % — SIGNIFICANT CHANGE UP (ref 1.7–9.3)
MONOCYTES NFR BLD AUTO: 9.6 % — HIGH (ref 1.7–9.3)
NEUTROPHILS # BLD AUTO: 17.75 K/UL — HIGH (ref 1.4–6.5)
NEUTROPHILS # BLD AUTO: 21.75 K/UL — HIGH (ref 1.4–6.5)
NEUTROPHILS NFR BLD AUTO: 75.3 % — HIGH (ref 42.2–75.2)
NEUTROPHILS NFR BLD AUTO: 78.8 % — HIGH (ref 42.2–75.2)
NITRITE UR-MCNC: NEGATIVE — SIGNIFICANT CHANGE UP
NRBC # BLD: 0 /100 WBCS — SIGNIFICANT CHANGE UP (ref 0–0)
NT-PROBNP SERPL-SCNC: 161 PG/ML — SIGNIFICANT CHANGE UP (ref 0–300)
PCO2 BLDV: 45 MMHG — HIGH (ref 39–42)
PCO2 BLDV: 45 MMHG — HIGH (ref 39–42)
PCO2 BLDV: 50 MMHG — HIGH (ref 39–42)
PH BLDV: 7.36 — SIGNIFICANT CHANGE UP (ref 7.32–7.43)
PH BLDV: 7.38 — SIGNIFICANT CHANGE UP (ref 7.32–7.43)
PH BLDV: 7.44 — HIGH (ref 7.32–7.43)
PH UR: 5.5 — SIGNIFICANT CHANGE UP (ref 5–8)
PHENOBARB SERPL-MCNC: 6.1 UG/ML — LOW (ref 15–40)
PHOSPHATE SERPL-MCNC: 2.7 MG/DL — SIGNIFICANT CHANGE UP (ref 2.1–4.9)
PHOSPHATE SERPL-MCNC: 3.8 MG/DL — SIGNIFICANT CHANGE UP (ref 2.1–4.9)
PLATELET # BLD AUTO: 100 K/UL — LOW (ref 130–400)
PLATELET # BLD AUTO: 132 K/UL — SIGNIFICANT CHANGE UP (ref 130–400)
PLATELET # BLD AUTO: 134 K/UL — SIGNIFICANT CHANGE UP (ref 130–400)
PLATELET # BLD AUTO: 162 K/UL — SIGNIFICANT CHANGE UP (ref 130–400)
PO2 BLDV: 35 MMHG — SIGNIFICANT CHANGE UP
PO2 BLDV: 38 MMHG — SIGNIFICANT CHANGE UP
PO2 BLDV: 43 MMHG — SIGNIFICANT CHANGE UP
POTASSIUM BLDV-SCNC: 3 MMOL/L — LOW (ref 3.5–5.1)
POTASSIUM BLDV-SCNC: 3.1 MMOL/L — LOW (ref 3.5–5.1)
POTASSIUM BLDV-SCNC: 3.1 MMOL/L — LOW (ref 3.5–5.1)
POTASSIUM SERPL-MCNC: 3.2 MMOL/L — LOW (ref 3.5–5)
POTASSIUM SERPL-MCNC: 3.5 MMOL/L — SIGNIFICANT CHANGE UP (ref 3.5–5)
POTASSIUM SERPL-MCNC: 3.6 MMOL/L — SIGNIFICANT CHANGE UP (ref 3.5–5)
POTASSIUM SERPL-SCNC: 3.2 MMOL/L — LOW (ref 3.5–5)
POTASSIUM SERPL-SCNC: 3.5 MMOL/L — SIGNIFICANT CHANGE UP (ref 3.5–5)
POTASSIUM SERPL-SCNC: 3.6 MMOL/L — SIGNIFICANT CHANGE UP (ref 3.5–5)
PROCALCITONIN SERPL-MCNC: 0.56 NG/ML — HIGH (ref 0.02–0.1)
PROT SERPL-MCNC: 3.7 G/DL — LOW (ref 6–8)
PROT UR-MCNC: ABNORMAL
PROTHROM AB SERPL-ACNC: 15.4 SEC — HIGH (ref 9.95–12.87)
RBC # BLD: 2.44 M/UL — LOW (ref 4.2–5.4)
RBC # BLD: 2.74 M/UL — LOW (ref 4.2–5.4)
RBC # BLD: 3.15 M/UL — LOW (ref 4.2–5.4)
RBC # BLD: 3.18 M/UL — LOW (ref 4.2–5.4)
RBC # FLD: 14.6 % — HIGH (ref 11.5–14.5)
RBC # FLD: 14.7 % — HIGH (ref 11.5–14.5)
RBC # FLD: 14.9 % — HIGH (ref 11.5–14.5)
RBC # FLD: 15.1 % — HIGH (ref 11.5–14.5)
RBC CASTS # UR COMP ASSIST: 3 /HPF — SIGNIFICANT CHANGE UP (ref 0–4)
SAO2 % BLDV: 65.9 % — SIGNIFICANT CHANGE UP
SAO2 % BLDV: 71.1 % — SIGNIFICANT CHANGE UP
SAO2 % BLDV: 78 % — SIGNIFICANT CHANGE UP
SARS-COV-2 IGG+IGM SERPL QL IA: >250 U/ML — HIGH
SARS-COV-2 IGG+IGM SERPL QL IA: POSITIVE
SODIUM SERPL-SCNC: 141 MMOL/L — SIGNIFICANT CHANGE UP (ref 135–146)
SODIUM SERPL-SCNC: 143 MMOL/L — SIGNIFICANT CHANGE UP (ref 135–146)
SODIUM SERPL-SCNC: 146 MMOL/L — SIGNIFICANT CHANGE UP (ref 135–146)
SP GR SPEC: 1.02 — SIGNIFICANT CHANGE UP (ref 1.01–1.03)
TROPONIN T SERPL-MCNC: 0.03 NG/ML — CRITICAL HIGH
TROPONIN T SERPL-MCNC: 0.05 NG/ML — CRITICAL HIGH
TROPONIN T SERPL-MCNC: 0.06 NG/ML — CRITICAL HIGH
UROBILINOGEN FLD QL: ABNORMAL
WBC # BLD: 23.57 K/UL — HIGH (ref 4.8–10.8)
WBC # BLD: 27.59 K/UL — HIGH (ref 4.8–10.8)
WBC # BLD: 35.4 K/UL — HIGH (ref 4.8–10.8)
WBC # BLD: 37.85 K/UL — HIGH (ref 4.8–10.8)
WBC # FLD AUTO: 23.57 K/UL — HIGH (ref 4.8–10.8)
WBC # FLD AUTO: 27.59 K/UL — HIGH (ref 4.8–10.8)
WBC # FLD AUTO: 35.4 K/UL — HIGH (ref 4.8–10.8)
WBC # FLD AUTO: 37.85 K/UL — HIGH (ref 4.8–10.8)
WBC UR QL: 199 /HPF — HIGH (ref 0–5)

## 2021-09-17 PROCEDURE — 99291 CRITICAL CARE FIRST HOUR: CPT | Mod: 24,25

## 2021-09-17 PROCEDURE — 99222 1ST HOSP IP/OBS MODERATE 55: CPT

## 2021-09-17 PROCEDURE — 93931 UPPER EXTREMITY STUDY: CPT | Mod: 26,LT

## 2021-09-17 PROCEDURE — 93010 ELECTROCARDIOGRAM REPORT: CPT

## 2021-09-17 PROCEDURE — 71045 X-RAY EXAM CHEST 1 VIEW: CPT | Mod: 26

## 2021-09-17 RX ORDER — GLUCAGON INJECTION, SOLUTION 0.5 MG/.1ML
1 INJECTION, SOLUTION SUBCUTANEOUS ONCE
Refills: 0 | Status: DISCONTINUED | OUTPATIENT
Start: 2021-09-17 | End: 2021-09-18

## 2021-09-17 RX ORDER — PANTOPRAZOLE SODIUM 20 MG/1
40 TABLET, DELAYED RELEASE ORAL
Refills: 0 | Status: DISCONTINUED | OUTPATIENT
Start: 2021-09-17 | End: 2021-09-17

## 2021-09-17 RX ORDER — HEPARIN SODIUM 5000 [USP'U]/ML
1100 INJECTION INTRAVENOUS; SUBCUTANEOUS
Qty: 25000 | Refills: 0 | Status: DISCONTINUED | OUTPATIENT
Start: 2021-09-17 | End: 2021-09-18

## 2021-09-17 RX ORDER — CEFEPIME 1 G/1
INJECTION, POWDER, FOR SOLUTION INTRAMUSCULAR; INTRAVENOUS
Refills: 0 | Status: DISCONTINUED | OUTPATIENT
Start: 2021-09-17 | End: 2021-09-20

## 2021-09-17 RX ORDER — CALCIUM GLUCONATE 100 MG/ML
1 VIAL (ML) INTRAVENOUS ONCE
Refills: 0 | Status: COMPLETED | OUTPATIENT
Start: 2021-09-17 | End: 2021-09-17

## 2021-09-17 RX ORDER — PANTOPRAZOLE SODIUM 20 MG/1
40 TABLET, DELAYED RELEASE ORAL DAILY
Refills: 0 | Status: DISCONTINUED | OUTPATIENT
Start: 2021-09-17 | End: 2021-09-20

## 2021-09-17 RX ORDER — CEFEPIME 1 G/1
1000 INJECTION, POWDER, FOR SOLUTION INTRAMUSCULAR; INTRAVENOUS EVERY 12 HOURS
Refills: 0 | Status: DISCONTINUED | OUTPATIENT
Start: 2021-09-17 | End: 2021-09-20

## 2021-09-17 RX ORDER — VASOPRESSIN 20 [USP'U]/ML
0.1 INJECTION INTRAVENOUS
Qty: 50 | Refills: 0 | Status: DISCONTINUED | OUTPATIENT
Start: 2021-09-17 | End: 2021-09-20

## 2021-09-17 RX ORDER — MEROPENEM 1 G/30ML
1000 INJECTION INTRAVENOUS EVERY 8 HOURS
Refills: 0 | Status: DISCONTINUED | OUTPATIENT
Start: 2021-09-17 | End: 2021-09-17

## 2021-09-17 RX ORDER — SODIUM CHLORIDE 9 MG/ML
250 INJECTION, SOLUTION INTRAVENOUS ONCE
Refills: 0 | Status: COMPLETED | OUTPATIENT
Start: 2021-09-17 | End: 2021-09-17

## 2021-09-17 RX ORDER — SODIUM BICARBONATE 1 MEQ/ML
50 SYRINGE (ML) INTRAVENOUS
Refills: 0 | Status: COMPLETED | OUTPATIENT
Start: 2021-09-17 | End: 2021-09-17

## 2021-09-17 RX ORDER — SODIUM CHLORIDE 9 MG/ML
1000 INJECTION, SOLUTION INTRAVENOUS
Refills: 0 | Status: DISCONTINUED | OUTPATIENT
Start: 2021-09-17 | End: 2021-09-18

## 2021-09-17 RX ORDER — HEPARIN SODIUM 5000 [USP'U]/ML
1200 INJECTION INTRAVENOUS; SUBCUTANEOUS
Qty: 25000 | Refills: 0 | Status: DISCONTINUED | OUTPATIENT
Start: 2021-09-17 | End: 2021-09-17

## 2021-09-17 RX ORDER — POTASSIUM CHLORIDE 20 MEQ
20 PACKET (EA) ORAL ONCE
Refills: 0 | Status: DISCONTINUED | OUTPATIENT
Start: 2021-09-17 | End: 2021-09-17

## 2021-09-17 RX ORDER — VANCOMYCIN HCL 1 G
VIAL (EA) INTRAVENOUS
Refills: 0 | Status: DISCONTINUED | OUTPATIENT
Start: 2021-09-17 | End: 2021-09-20

## 2021-09-17 RX ORDER — CEFEPIME 1 G/1
1000 INJECTION, POWDER, FOR SOLUTION INTRAMUSCULAR; INTRAVENOUS ONCE
Refills: 0 | Status: COMPLETED | OUTPATIENT
Start: 2021-09-17 | End: 2021-09-17

## 2021-09-17 RX ORDER — CHLORHEXIDINE GLUCONATE 213 G/1000ML
1 SOLUTION TOPICAL DAILY
Refills: 0 | Status: DISCONTINUED | OUTPATIENT
Start: 2021-09-17 | End: 2021-09-24

## 2021-09-17 RX ORDER — VASOPRESSIN 20 [USP'U]/ML
1 INJECTION INTRAVENOUS
Qty: 50 | Refills: 0 | Status: DISCONTINUED | OUTPATIENT
Start: 2021-09-17 | End: 2021-09-17

## 2021-09-17 RX ORDER — TOPIRAMATE 25 MG
100 TABLET ORAL EVERY 24 HOURS
Refills: 0 | Status: DISCONTINUED | OUTPATIENT
Start: 2021-09-17 | End: 2021-09-24

## 2021-09-17 RX ORDER — LEVETIRACETAM 250 MG/1
1000 TABLET, FILM COATED ORAL ONCE
Refills: 0 | Status: DISCONTINUED | OUTPATIENT
Start: 2021-09-17 | End: 2021-09-17

## 2021-09-17 RX ORDER — MAGNESIUM SULFATE 500 MG/ML
1 VIAL (ML) INJECTION ONCE
Refills: 0 | Status: COMPLETED | OUTPATIENT
Start: 2021-09-17 | End: 2021-09-17

## 2021-09-17 RX ORDER — POLYETHYLENE GLYCOL 3350 17 G/17G
17 POWDER, FOR SOLUTION ORAL DAILY
Refills: 0 | Status: DISCONTINUED | OUTPATIENT
Start: 2021-09-17 | End: 2021-09-18

## 2021-09-17 RX ORDER — MAGNESIUM SULFATE 500 MG/ML
2 VIAL (ML) INJECTION
Refills: 0 | Status: DISCONTINUED | OUTPATIENT
Start: 2021-09-17 | End: 2021-09-17

## 2021-09-17 RX ORDER — CITALOPRAM 10 MG/1
20 TABLET, FILM COATED ORAL EVERY 24 HOURS
Refills: 0 | Status: DISCONTINUED | OUTPATIENT
Start: 2021-09-17 | End: 2021-09-24

## 2021-09-17 RX ORDER — POTASSIUM CHLORIDE 20 MEQ
20 PACKET (EA) ORAL ONCE
Refills: 0 | Status: COMPLETED | OUTPATIENT
Start: 2021-09-17 | End: 2021-09-17

## 2021-09-17 RX ORDER — VANCOMYCIN HCL 1 G
1500 VIAL (EA) INTRAVENOUS ONCE
Refills: 0 | Status: COMPLETED | OUTPATIENT
Start: 2021-09-17 | End: 2021-09-17

## 2021-09-17 RX ORDER — SODIUM BICARBONATE 1 MEQ/ML
0.18 SYRINGE (ML) INTRAVENOUS
Qty: 150 | Refills: 0 | Status: DISCONTINUED | OUTPATIENT
Start: 2021-09-17 | End: 2021-09-18

## 2021-09-17 RX ORDER — SODIUM CHLORIDE 9 MG/ML
500 INJECTION, SOLUTION INTRAVENOUS ONCE
Refills: 0 | Status: COMPLETED | OUTPATIENT
Start: 2021-09-17 | End: 2021-09-17

## 2021-09-17 RX ORDER — LEVOTHYROXINE SODIUM 125 MCG
50 TABLET ORAL EVERY 24 HOURS
Refills: 0 | Status: DISCONTINUED | OUTPATIENT
Start: 2021-09-17 | End: 2021-09-24

## 2021-09-17 RX ORDER — INSULIN HUMAN 100 [IU]/ML
INJECTION, SOLUTION SUBCUTANEOUS EVERY 4 HOURS
Refills: 0 | Status: DISCONTINUED | OUTPATIENT
Start: 2021-09-17 | End: 2021-09-19

## 2021-09-17 RX ORDER — ALBUTEROL 90 UG/1
2 AEROSOL, METERED ORAL EVERY 6 HOURS
Refills: 0 | Status: DISCONTINUED | OUTPATIENT
Start: 2021-09-17 | End: 2021-09-24

## 2021-09-17 RX ORDER — VANCOMYCIN HCL 1 G
1500 VIAL (EA) INTRAVENOUS EVERY 12 HOURS
Refills: 0 | Status: DISCONTINUED | OUTPATIENT
Start: 2021-09-17 | End: 2021-09-20

## 2021-09-17 RX ORDER — LEVETIRACETAM 250 MG/1
500 TABLET, FILM COATED ORAL EVERY 12 HOURS
Refills: 0 | Status: DISCONTINUED | OUTPATIENT
Start: 2021-09-17 | End: 2021-09-17

## 2021-09-17 RX ADMIN — Medication 50 MILLIGRAM(S): at 11:40

## 2021-09-17 RX ADMIN — INSULIN HUMAN 1: 100 INJECTION, SOLUTION SUBCUTANEOUS at 16:13

## 2021-09-17 RX ADMIN — Medication 5.74 MICROGRAM(S)/KG/MIN: at 10:53

## 2021-09-17 RX ADMIN — Medication 300 MILLIGRAM(S): at 17:19

## 2021-09-17 RX ADMIN — Medication 50 MILLIEQUIVALENT(S): at 02:36

## 2021-09-17 RX ADMIN — Medication 50 MILLIEQUIVALENT(S): at 02:38

## 2021-09-17 RX ADMIN — HEPARIN SODIUM 1200 UNIT(S)/HR: 5000 INJECTION INTRAVENOUS; SUBCUTANEOUS at 07:18

## 2021-09-17 RX ADMIN — INSULIN HUMAN 1: 100 INJECTION, SOLUTION SUBCUTANEOUS at 17:33

## 2021-09-17 RX ADMIN — VASOPRESSIN 6 UNIT(S)/MIN: 20 INJECTION INTRAVENOUS at 11:11

## 2021-09-17 RX ADMIN — Medication 300 MILLIGRAM(S): at 03:21

## 2021-09-17 RX ADMIN — Medication 100 GRAM(S): at 02:32

## 2021-09-17 RX ADMIN — CEFEPIME 100 MILLIGRAM(S): 1 INJECTION, POWDER, FOR SOLUTION INTRAMUSCULAR; INTRAVENOUS at 06:26

## 2021-09-17 RX ADMIN — Medication 50 MILLIEQUIVALENT(S): at 02:39

## 2021-09-17 RX ADMIN — Medication 50 MILLIGRAM(S): at 17:19

## 2021-09-17 RX ADMIN — INSULIN HUMAN 3: 100 INJECTION, SOLUTION SUBCUTANEOUS at 23:05

## 2021-09-17 RX ADMIN — Medication 32.4 MILLIGRAM(S): at 22:07

## 2021-09-17 RX ADMIN — Medication 100 MILLIGRAM(S): at 11:10

## 2021-09-17 RX ADMIN — OXYCODONE HYDROCHLORIDE 5 MILLIGRAM(S): 5 TABLET ORAL at 12:47

## 2021-09-17 RX ADMIN — Medication 150 MEQ/KG/HR: at 08:33

## 2021-09-17 RX ADMIN — CHLORHEXIDINE GLUCONATE 1 APPLICATION(S): 213 SOLUTION TOPICAL at 13:50

## 2021-09-17 RX ADMIN — SODIUM CHLORIDE 1500 MILLILITER(S): 9 INJECTION, SOLUTION INTRAVENOUS at 02:11

## 2021-09-17 RX ADMIN — PANTOPRAZOLE SODIUM 40 MILLIGRAM(S): 20 TABLET, DELAYED RELEASE ORAL at 05:56

## 2021-09-17 RX ADMIN — MEROPENEM 100 MILLIGRAM(S): 1 INJECTION INTRAVENOUS at 02:47

## 2021-09-17 RX ADMIN — CITALOPRAM 20 MILLIGRAM(S): 10 TABLET, FILM COATED ORAL at 11:10

## 2021-09-17 RX ADMIN — PRAMIPEXOLE DIHYDROCHLORIDE 0.5 MILLIGRAM(S): 0.12 TABLET ORAL at 22:07

## 2021-09-17 RX ADMIN — Medication 100 MILLIEQUIVALENT(S): at 09:53

## 2021-09-17 RX ADMIN — HEPARIN SODIUM 1100 UNIT(S)/HR: 5000 INJECTION INTRAVENOUS; SUBCUTANEOUS at 22:00

## 2021-09-17 RX ADMIN — SODIUM CHLORIDE 3000 MILLILITER(S): 9 INJECTION, SOLUTION INTRAVENOUS at 10:38

## 2021-09-17 RX ADMIN — VASOPRESSIN 6 UNIT(S)/MIN: 20 INJECTION INTRAVENOUS at 02:11

## 2021-09-17 RX ADMIN — CEFEPIME 100 MILLIGRAM(S): 1 INJECTION, POWDER, FOR SOLUTION INTRAMUSCULAR; INTRAVENOUS at 17:18

## 2021-09-17 RX ADMIN — Medication 50 MICROGRAM(S): at 11:11

## 2021-09-17 RX ADMIN — Medication 32.4 MILLIGRAM(S): at 14:52

## 2021-09-17 RX ADMIN — MONTELUKAST 10 MILLIGRAM(S): 4 TABLET, CHEWABLE ORAL at 22:07

## 2021-09-17 RX ADMIN — INSULIN HUMAN 3: 100 INJECTION, SOLUTION SUBCUTANEOUS at 07:16

## 2021-09-17 RX ADMIN — Medication 75 MILLIGRAM(S): at 22:09

## 2021-09-17 RX ADMIN — Medication 50 MILLIGRAM(S): at 06:02

## 2021-09-17 RX ADMIN — INSULIN HUMAN 2: 100 INJECTION, SOLUTION SUBCUTANEOUS at 11:08

## 2021-09-17 RX ADMIN — POLYETHYLENE GLYCOL 3350 17 GRAM(S): 17 POWDER, FOR SOLUTION ORAL at 11:11

## 2021-09-17 RX ADMIN — Medication 100 GRAM(S): at 08:33

## 2021-09-17 NOTE — PROGRESS NOTE ADULT - SUBJECTIVE AND OBJECTIVE BOX
JACOB KOTHARI  [id number]  66y Female    Indication for ICU admission: s/p L femur fracture ORIF & long gamma IM nail placement, EBL 1L, hypotensive on pressors  Admit Date:09-15-21  ICU Date: 9/16  OR Date: 9/16    Compazine (Unknown)  latex (Urticaria)  penicillins (Unknown)    PAST MEDICAL & SURGICAL HISTORY:  Diabetes    Renal stone    Renal failure    Epilepsy    Asthma    Chronic cough    Knee pain    Osteoarthritis    Uterine cancer  s/p hysterectomy    H/O abdominal hysterectomy  NO RADIATION AND CHEMO    History of tonsillectomy  13 years old    [place] teeth removed    History of bladder stone  SURGERY 8/3/2018    History of surgery  JJ STENT PLACEMENT LEFT OCTOBER 2017      Home Medications:  betamethasone dipropionate 0.05% topical lotion: Apply topically to affected area 2 times a day (15 Sep 2021 18:46)  Desyrel 50 mg oral tablet: 1.5 tab(s) orally once a day (at bedtime) (15 Sep 2021 18:46)  ibuprofen 200 mg oral tablet: 2 tab(s) orally every 8 hours, As Needed (15 Sep 2021 18:46)  Mirapex 0.5 mg oral tablet: 1 tab(s) orally once a day (at bedtime) (15 Sep 2021 18:46)  Multiple Vitamins with Minerals oral capsule: 1 cap(s) orally once a day (15 Sep 2021 18:46)  Nizoral A-D 1% topical shampoo: Apply topically to affected area every 3 days (15 Sep 2021 18:46)  nystatin 100,000 units/g topical cream (obsolete): Apply topically to affected area 2 times a day under bilateral breast and under left arm (15 Sep 2021 18:46)  PHENobarbital 32.4 mg oral tablet: 1 tab(s) orally 3 times a day (15 Sep 2021 18:46)  Senna Plus 50 mg-8.6 mg oral tablet: 2 tab(s) orally once a day (at bedtime) (15 Sep 2021 18:46)  Singulair 10 mg oral tablet: 1 tab(s) orally once a day (at bedtime) (15 Sep 2021 18:46)  Urocit-K 15 mEq oral tablet, extended release: 1 tab(s) orally once a day (15 Sep 2021 18:46)        24HRS EVENT:    1L LR by anesthesia post op  arrival of SICU team patient HoTN to 60/30, emergent sterile TLC placed  STAT abg post op lact 5.7 (from 3.4), pH 7.21  shortly after L radial a line went down, changed over a wire in PACU by SICU  shortly after L radial a line went down again, d/c;d line  SICU team placed R radial rio, shortly after wave form went down but still with positive blood return, given high llevophed requirement....  SICU team attemped R fem a line, unable to pass wire  Other interventions by SICU in PACU:  - 1L LR by sicu team in pacu  - NICOM with 500, 17%, gave another 500ml  - pH 7.2, levo at 0.8, gave two amps of bicarb  - 1 ca glu, 2g Mg x2 from Mg 1.4  ...total post op fluid in PACU 3L by anesthsia and SICU    on arrival to BURN, hypotensive 80/40 on 1.5 levo, started vasopressin 0.04  500 LR bolus  STAT labs adn STAT ABG w/lytes revealed pH 7.2, lact 14  2 amp of bicarb for pH 7.2  while on high dose pressors and not responding  started bicarb gtt @150cc/hr with 3 amps bicarb, d/c LR @ 150cc/hr  bedside echo, questionable moderate pericardial effusion  STAT cards bedside eval, small effusion, definitely not tamponade  EKG, reviewed bedside by cards fellow--> no signs of ischemia  LUE appears ascending cyanosis, significantly cooler than other extremites  STAT vasc consult place, weak dopperable radial pulse, strong brachial, ulnar, palmar pulses present  STAT LUE duplex order  Per vasc , recs for heparin gtt, cleared by Ortho (primary) and SICU attending OVN  started hep gtt slow (1200) , no bolus given recent OR with high EBL  DC lovenox  UOP maintainign at 50cc/hr, MONSTER with Cr 1.2 from 0.9  Rpt NICOM with 250 LR = 3%  ...total BURN ICU fluid 750  Other interventions in BURN by SICU  -CaGlu 1g  -vanc/cefepime for empiric coverage  -sent blood cultures  -UA negative  -DC right radial artery           DVT PTX: hep gtt    GI PTX:pantoprazole  Injectable 40 milliGRAM(s) IV Push daily      ***Tubes/Lines/Drains  ***  [x] Peripheral IV  [x] Central Venous Line     	[x] R	[] L	[x] IJ          Date Placed: 9/16  [x] Arterial Line	  L radial a line placed intra op by anesthesia, was DC in PACU due to clotting off  R radial a line place in PACU with 1 attempt, good blood return but shortly after no wave form   attempted R femoral a line without success  [X] Ardon   placed post op in PACU 9/16    REVIEW OF SYSTEMS    [ ] A ten-point review of systems was otherwise negative except as noted.  [ ] Due to altered mental status/intubation, subjective information were not able to be obtained from the patient. History was obtained, to the extent possible, from review of the chart and collateral sources of information.

## 2021-09-17 NOTE — PROGRESS NOTE ADULT - ASSESSMENT
A 62 y/o female with pmhx of asthma, copd, obesity, epilepsy, hypothyroidism , osteoarthritis , kidney stone  s/p slip and fall at [facility]  admitted for left femoral fracture.    Acute comminuted, displaced fracture of the left proximal femur with avulsion of the lesser trochanter  - POD #1, in SICU post left femur IMN  - hospital course complicated by absence of left radial pulse  - vascular evaluation noted  - on Heparin drip  - attempting to wean off Levo, remains on Vasopresin  - plan as per surgical team  - patient on Cefepime    Hypothyroidism   - continue Synthroid     Seizure Disorder  - continue Phenobarbital     Morbid Obesity   - resume low olga dash diet when able  - provide incentive spirometer    DM  - on oral agent in SNF  - FS noted   - insulin coverage    hx of Depression  - continue home rx    hx of Asthma  - resp treatment as needed prn    Patient remains acute  Patient in SICU, case discussed with RN staff.  Emotional support rendered at bedside to the patient who is upset about NPO status.

## 2021-09-17 NOTE — PROGRESS NOTE ADULT - SUBJECTIVE AND OBJECTIVE BOX
VASCULAR SURGERY PROGRESS NOTE    CC: cyanotic LUE, absent radial pulse after A line placement   Hospital Day # 3    Events of past 24 hours: Patient was admitted s/p ORIF and IMN left femur by ortho. Patient was started on pressors intraop and required placement of central line/ [place] in preop. After placement, patient had no radial pulse and fingers/LUE were becoming cyanotic. Patient was started on a heparin drip and had wilfred hugger placed on LUE. Upon seeing her this morning, patient's ulnar pulse, palmar arch, and brachial pulse were dopplerable in left arm. Patient has full ROM of fingers of left upper extremity and reports minimal pain.    ROS otherwise negative except per subjective and HPI      PAST MEDICAL & SURGICAL HISTORY:  Diabetes    Renal stone    Renal failure    Epilepsy    Asthma    Chronic cough    Knee pain    Osteoarthritis    Uterine cancer  s/p hysterectomy    H/O abdominal hysterectomy  NO RADIATION AND CHEMO    History of tonsillectomy  13 years old    Munday teeth removed    History of bladder stone  SURGERY 8/3/2018    History of surgery  JJ STENT PLACEMENT LEFT 2017        Vital Signs Last 24 Hrs  T(C): 35.6 (17 Sep 2021 03:00), Max: 36.9 (16 Sep 2021 22:00)  T(F): 96.1 (17 Sep 2021 03:00), Max: 98.5 (16 Sep 2021 22:00)  HR: 102 (17 Sep 2021 09:00) (67 - 136)  BP: 148/65 (17 Sep 2021 09:00) (53/35 - 159/67)  BP(mean): 93 (17 Sep 2021 09:00) (40 - 107)  RR: 18 (17 Sep 2021 07:00) (16 - 29)  SpO2: 100% (17 Sep 2021 09:00) (96% - 100%)    Pain (0-10):            Pain Control Adequate: [] YES [] N    Diet:    I&O's Detail    16 Sep 2021 07:01  -  17 Sep 2021 07:00  --------------------------------------------------------  IN:    Heparin Infusion: 24 mL    IV PiggyBack: 950 mL    IV PiggyBack: 50 mL    IV PiggyBack: 500 mL    IV PiggyBack: 50 mL    Lactated Ringers: 900 mL    Lactated Ringers Bolus: 2500 mL    Norepinephrine: 252.7 mL    Norepinephrine: 816.2 mL    Sodium Bicarbonate: 750 mL    Vasopressin: 14.4 mL  Total IN: 6807.3 mL    OUT:    Indwelling Catheter - Urethral (mL): 460 mL  Total OUT: 460 mL    Total NET: 6347.3 mL      17 Sep 2021 07:01  -  17 Sep 2021 09:52  --------------------------------------------------------  IN:    Heparin Infusion: 12 mL    Norepinephrine: 69 mL    Sodium Bicarbonate: 300 mL    Vasopressin: 4.8 mL  Total IN: 385.8 mL    OUT:  Total OUT: 0 mL    Total NET: 385.8 mL    PHYSICAL EXAM    General: NAD, AAOx3, calm and cooperative, morbidly obese  HEENT: NCAT, RIC, EOMI, Trachea ML, Neck supple  Cardiac: RRR S1, S2, no Murmurs, rubs or gallops  Respiratory: CTAB, normal respiratory effort, breath sounds equal BL, no wheeze, rhonchi or crackles  Abdomen: Soft, non-distended, non-tender, no rebound, no guarding. +BS.  Musculoskeletal: Strength 5/5 BL UE/LE, ROM intact, compartments soft  Neuro: Sensation grossly intact and equal throughout, no focal deficits  Vascular: left upper extremity discolored from elbow to left hand, improved from prior exam.  Left radial pulse absent- not dopplerable signals. brachial, ulnar and palmar signals present  Skin: Warm/dry, normal color, no jaundice        MEDICATIONS:   MEDICATIONS  (STANDING):  cefepime   IVPB      cefepime   IVPB 1000 milliGRAM(s) IV Intermittent every 12 hours  chlorhexidine 4% Liquid 1 Application(s) Topical daily  citalopram 20 milliGRAM(s) Oral every 24 hours  dextrose 5%. 1000 milliLiter(s) (100 mL/Hr) IV Continuous <Continuous>  glucagon  Injectable 1 milliGRAM(s) IntraMuscular once  heparin  Infusion. 1200 Unit(s)/Hr (12 mL/Hr) IV Continuous <Continuous>  hydrocortisone sodium succinate Injectable 50 milliGRAM(s) IV Push every 6 hours  influenza   Vaccine 0.5 milliLiter(s) IntraMuscular once  insulin regular  human corrective regimen sliding scale   IV Push every 4 hours  lactated ringers Bolus 250 milliLiter(s) IV Bolus once  levothyroxine 50 MICROGram(s) Oral every 24 hours  montelukast 10 milliGRAM(s) Oral at bedtime  norepinephrine Infusion 0.05 MICROgram(s)/kG/Min (5.74 mL/Hr) IV Continuous <Continuous>  pantoprazole  Injectable 40 milliGRAM(s) IV Push daily  PHENobarbital 32.4 milliGRAM(s) Oral three times a day  polyethylene glycol 3350 17 Gram(s) Oral daily  potassium chloride  20 mEq/100 mL IVPB 20 milliEquivalent(s) IV Intermittent once  pramipexole 0.5 milliGRAM(s) Oral at bedtime  sodium bicarbonate  Infusion 0.184 mEq/kG/Hr (150 mL/Hr) IV Continuous <Continuous>  topiramate 100 milliGRAM(s) Oral every 24 hours  traZODone 75 milliGRAM(s) Oral at bedtime  vancomycin  IVPB 1500 milliGRAM(s) IV Intermittent every 12 hours  vancomycin  IVPB      vasopressin Infusion 0.1 Unit(s)/Min (6 mL/Hr) IV Continuous <Continuous>    MEDICATIONS  (PRN):  acetaminophen   Tablet .. 650 milliGRAM(s) Oral every 6 hours PRN Temp greater or equal to 38C (100.4F), Mild Pain (1 - 3)  magnesium hydroxide Suspension 30 milliLiter(s) Oral daily PRN Constipation  oxyCODONE    IR 5 milliGRAM(s) Oral every 4 hours PRN Moderate Pain (4 - 6)    LAB/STUDIES:                        9.6    35.40 )-----------( 132      ( 17 Sep 2021 06:15 )             29.1     09-    141  |  102  |  20  ----------------------------<  274<H>  3.2<L>   |  20  |  1.1    Ca    7.2<L>      17 Sep 2021 06:15  Phos  3.8       Mg     1.9     -    TPro  3.7<L>  /  Alb  2.3<L>  /  TBili  0.7  /  DBili  0.4<H>  /  AST  100<H>  /  ALT  69<H>  /  AlkPhos  47  09-17    PT/INR - ( 17 Sep 2021 01:40 )   PT: 15.40 sec;   INR: 1.34 ratio         PTT - ( 17 Sep 2021 01:40 )  PTT:28.0 sec  LIVER FUNCTIONS - ( 17 Sep 2021 02:30 )  Alb: 2.3 g/dL / Pro: 3.7 g/dL / ALK PHOS: 47 U/L / ALT: 69 U/L / AST: 100 U/L / GGT: x             Urinalysis Basic - ( 17 Sep 2021 02:35 )    Color: Yellow / Appearance: Slightly Turbid / S.024 / pH: x  Gluc: x / Ketone: Trace  / Bili: Negative / Urobili: 3 mg/dL   Blood: x / Protein: 30 mg/dL / Nitrite: Negative   Leuk Esterase: Large / RBC: 3 /HPF /  /HPF   Sq Epi: x / Non Sq Epi: 1 /HPF / Bacteria: Negative      CARDIAC MARKERS ( 17 Sep 2021 06:15 )  x     / 0.06 ng/mL / 3530 U/L / x     / 14.7 ng/mL  CARDIAC MARKERS ( 17 Sep 2021 01:40 )  x     / 0.03 ng/mL / 1966 U/L / x     / 8.0 ng/mL  CARDIAC MARKERS ( 16 Sep 2021 21:22 )  x     / x     / 372 U/L / x     / x      CARDIAC MARKERS ( 16 Sep 2021 21:19 )  x     / <0.01 ng/mL / x     / x     / 2.2 ng/mL          ABG - ( 17 Sep 2021 01:17 )  pH, Arterial: 7.20  pH, Blood: x     /  pCO2: 32    /  pO2: 106   / HCO3: 12    / Base Excess: -14.4 /  SaO2: 99.3

## 2021-09-17 NOTE — PROGRESS NOTE ADULT - ASSESSMENT
Assessment & Plan  66y Female 1d s/p ORIF of L femur fracture with long IM gamma nail     NEURO:  Acute post op pain  - controlled with prn Tylenol & oxycodone 5mg  HX epilepsy  - per patient last seizures 20 years ago  - c/w home regimen phenobarbital, Mirapex  HX sleep disorder/depression  - c/w Trazadone 75mg at bedtime    RESP:   HX asthma, THOM (does not use CPAP at NH)    Oxygen insufficiency- BiPAP 40% 12/6, weaned to 4L NC    Activity- bedrest, FU ortho weight baring status of LLE     09-16 @ 21:05 -- 7.23 / 32 / 174 / 13 / 100.0        SAT  100.0  Lac 5.70   Current Rx: montelukast 10 at bedtime  Home Rx: Singulair 10 mg oral tablet    CARDS:   Hypotension postop   - levophed gtt  keep MAP >65  - intra op 2U PRBC  - post op total 3.75L in crystalloids  - NICOM 17%, 3%  Sinus tachycardia, improving  - EKG sinus tachycardia, no sign so ischemia  - cards consult, r/o tamponade, cardiogenic shock on bedside echo  [ ] trending cardiac enzyme, 0.01> FU 0600, 1100  [ ] ECHO: rpt pending  - prior 2018, normal systolic function    VASC:  - LUE poorly perfused, first 2 digits appear dusky, on bedside exam diminish radial pulse  - brachial, ulnar and palmar pulses are strong and dopperable  - q1 neurovascular checks of LUE  - urgent vascular consult place, recs to start heparin gtt, cleared by Ortho team  [ ] monitor PTT  [ ] LUE arterial duplex ordered    GI/NUTR:   Diet, NPO except meds    GI Prophylaxis- PPI IV    Bowel regimen- senna  magnesium hydroxide Suspension 30 milliLiter(s) Oral daily PRN  senna 2 Tablet(s) Oral at bedtime PRN    /RENAL:   Strict I&Os, UOP 40-50cc/hr  saravia placed in PACU  MONSTER  - BUN/Cr 19/0.9 -->1.2  Hypomagnesia  - repleted 2g x2  Lactic acidosis:  - rising lactate despite resuscitative efforts  - continue to trend lactate 1.5 ->3.7>5.7  IVF: bicarb gtt @ 150cc/hr w/3 amps  Volume Status:  09-16-21 @ 07:01  -  09-17-21 @ 06:20  --------------------------------------------------------  IN: 6717.1 mL / OUT: 410 mL / NET: 6307.1 mL  Labs:          BUN/Cr- 17/0.8  -->,  18/0.9  -->,  19/1.2  -->          Electrolytes-Na 143 // K 3.6 // Mg -- //  Phos -- (09-17 @ 01:40)  Home Rx: holding Urocit-K 15 mEq oral tablet, extended release      HEME/ONC:       DVT prophylaxis-enoxaparin Injectable, SCDs    Labs: Hb/Hct:  13.1/40.7  -->,  10.4/32.0  -->                      Plts:  168  -->,  194  -->                 PTT/INR:  27.5/1.24  --->       Home Rx: none known    ID:  WBC- 15.43  --->>,  11.09  --->>,  34.42  --->>  Temp trend- 24hrs T(F): 97.4 (09-16 @ 20:00), Max: 97.6 (09-16 @ 00:24)  Antibiotics- vanc/cefe  influenza   Vaccine 0.5 once  Cultures: pending   UA negative    ENDO:    Glucose Glucose, Serum: 110 (09-16 @ 06:50)    HA1C pending    finger sticks q4hr while npo    LINES/DRAINS:  DESHAWN, Duglas R radial a line      DISPO:    SICU d/w Dr Ireland             Assessment & Plan  66y Female 1d s/p ORIF of L femur fracture with long IM gamma nail     NEURO:  Acute post op pain  - controlled with prn Tylenol & oxycodone 5mg  HX epilepsy  - per patient last seizures 20 years ago  - c/w home regimen phenobarbital, Mirapex  HX sleep disorder/depression  - c/w Trazadone 75mg at bedtime    RESP:   HX asthma, THOM (does not use CPAP at NH)    Oxygen insufficiency- BiPAP 40% 12/6, weaned to 4L NC    Activity- bedrest, FU ortho weight baring status of LLE     09-16 @ 21:05 -- 7.23 / 32 / 174 / 13 / 100.0        SAT  100.0  Lac 5.70   Current Rx: montelukast 10 at bedtime  Home Rx: Singulair 10 mg oral tablet    CARDS:   Hypotension postop   - levophed gtt  keep MAP >65  - intra op 2U PRBC  - post op total 3.75L in crystalloids  - NICOM 17%, 3%  Sinus tachycardia, improving  - EKG sinus tachycardia, no sign so ischemia  - cards consult, r/o tamponade, cardiogenic shock on bedside echo  [ ] trending cardiac enzyme, 0.01> FU 0600, 1100  [ ] ECHO: rpt pending  - prior 2018, normal systolic function    VASC:  - LUE poorly perfused, first 2 digits appear dusky, on bedside exam diminish radial pulse  - brachial, ulnar and palmar pulses are strong and dopperable  - q1 neurovascular checks of LUE  - urgent vascular consult place, recs to start heparin gtt, cleared by Ortho team  [ ] monitor PTT  [ ] LUE arterial duplex ordered    GI/NUTR:   Diet, NPO except meds    GI Prophylaxis- PPI IV    Bowel regimen- senna  magnesium hydroxide Suspension 30 milliLiter(s) Oral daily PRN  senna 2 Tablet(s) Oral at bedtime PRN    /RENAL:   Strict I&Os, UOP 40-50cc/hr  saravia placed in PACU  MONSTER  - BUN/Cr 19/0.9 -->1.2  Hypomagnesia  - repleted 2g x2  Lactic acidosis:  - rising lactate despite resuscitative efforts  - continue to trend lactate 1.5 ->3.7>5.7  IVF: bicarb gtt @ 150cc/hr w/3 amps  Volume Status:  09-16-21 @ 07:01  -  09-17-21 @ 06:20  --------------------------------------------------------  IN: 6717.1 mL / OUT: 410 mL / NET: 6307.1 mL  Labs:          BUN/Cr- 17/0.8  -->,  18/0.9  -->,  19/1.2  -->          Electrolytes-Na 143 // K 3.6 // Mg -- //  Phos -- (09-17 @ 01:40)  Home Rx: holding Urocit-K 15 mEq oral tablet, extended release      HEME/ONC:       DVT prophylaxis-enoxaparin Injectable, SCDs    Labs: Hb/Hct:  13.1/40.7  -->,  10.4/32.0  -->                      Plts:  168  -->,  194  -->                 PTT/INR:  27.5/1.24  --->       Home Rx: none known    ID:  WBC- 15.43  --->>,  11.09  --->>,  34.42  --->>  Temp trend- 24hrs T(F): 97.4 (09-16 @ 20:00), Max: 97.6 (09-16 @ 00:24)  Antibiotics- vanc/cefe  influenza   Vaccine 0.5 once  Cultures: pending   UA negative    ENDO:    Glucose Glucose, Serum: 110 (09-16 @ 06:50)    HA1C pending    finger sticks q4hr while npo    LINES/DRAINS:  DESHAWN, SHERIN Saravia TLC      DISPO:    SICU d/w Dr Ireland

## 2021-09-17 NOTE — PHARMACOTHERAPY INTERVENTION NOTE - COMMENTS
Clarified pt is currently on 150mL/hr of sodium bicarbonate (150mEq/1000mL) receiving 22mEeQ/hr. Rec to adjust the rate depending on her clinical status (resolvement of rhabdo, fluid status)
Rec to add back home levothyroxine, 50mcg
s/w md 1506- pt ordered vasopressin at dose rate 1unit/min- recommended to change to dose rate to max of 0.1 units/min
Spoke to PA [id number]. Patient has reported penicillin allergy. MD is aware and will continue with meropenem therapy. In addition, ID will follow up with patient in the AM. Will confirm and dispense meropenem
rec to obtain vanco trough prior to the 4th dose at tomorrow 9/18 1700

## 2021-09-17 NOTE — PHARMACOTHERAPY INTERVENTION NOTE - INTERVENTION TYPE RECOOMEND
Dose Optimization/Non-renal Dose Adjustment
Dose Optimization/Non-renal Dose Adjustment
Therapy Recommended - Formulary adherence

## 2021-09-17 NOTE — PROGRESS NOTE ADULT - ASSESSMENT
ORTHO PROGRESS NOTE     66yFemale POD #1 s/p left femur IMN.    Patient seen and examined at bedside . The patient is awake and alert in NAD. No complaints of chest pain, SOB, N/V.    MEDICATIONS  (STANDING):  cefepime   IVPB      cefepime   IVPB 1000 milliGRAM(s) IV Intermittent every 12 hours  chlorhexidine 4% Liquid 1 Application(s) Topical daily  dextrose 5%. 1000 milliLiter(s) (100 mL/Hr) IV Continuous <Continuous>  glucagon  Injectable 1 milliGRAM(s) IntraMuscular once  heparin  Infusion. 1200 Unit(s)/Hr (12 mL/Hr) IV Continuous <Continuous>  hydrocortisone sodium succinate Injectable 50 milliGRAM(s) IV Push every 6 hours  influenza   Vaccine 0.5 milliLiter(s) IntraMuscular once  insulin regular  human corrective regimen sliding scale   IV Push every 4 hours  montelukast 10 milliGRAM(s) Oral at bedtime  multivitamin 1 Tablet(s) Oral daily  multivitamin/minerals 1 Tablet(s) Oral daily  norepinephrine Infusion 0.05 MICROgram(s)/kG/Min (5.74 mL/Hr) IV Continuous <Continuous>  pantoprazole  Injectable 40 milliGRAM(s) IV Push daily  PHENobarbital 32.4 milliGRAM(s) Oral three times a day  potassium chloride  20 mEq/100 mL IVPB 20 milliEquivalent(s) IV Intermittent once  pramipexole 0.5 milliGRAM(s) Oral at bedtime  sodium bicarbonate  Infusion 0.184 mEq/kG/Hr (150 mL/Hr) IV Continuous <Continuous>  traZODone 75 milliGRAM(s) Oral at bedtime  vancomycin  IVPB 1500 milliGRAM(s) IV Intermittent every 12 hours  vancomycin  IVPB      vasopressin Infusion 0.1 Unit(s)/Min (6 mL/Hr) IV Continuous <Continuous>    MEDICATIONS  (PRN):  acetaminophen   Tablet .. 650 milliGRAM(s) Oral every 6 hours PRN Temp greater or equal to 38C (100.4F), Mild Pain (1 - 3)  magnesium hydroxide Suspension 30 milliLiter(s) Oral daily PRN Constipation  oxyCODONE    IR 5 milliGRAM(s) Oral every 4 hours PRN Moderate Pain (4 - 6)  senna 2 Tablet(s) Oral at bedtime PRN Constipation      Vital Signs Last 24 Hrs  T(C): 35.6 (17 Sep 2021 03:00), Max: 36.9 (16 Sep 2021 22:00)  T(F): 96.1 (17 Sep 2021 03:00), Max: 98.5 (16 Sep 2021 22:00)  HR: 108 (17 Sep 2021 07:00) (67 - 136)  BP: 147/64 (17 Sep 2021 07:00) (53/35 - 159/67)  BP(mean): 92 (17 Sep 2021 07:00) (40 - 107)  RR: 18 (17 Sep 2021 07:00) (16 - 29)  SpO2: 98% (17 Sep 2021 07:00) (96% - 100%)    PE:   Dressing C/D/I   Compartments soft and compressible  Motor intact distally  SILT distally  CR<2sec                    9.6    35.40 )-----------( 132      ( 17 Sep 2021 06:15 )             29.1     09-17    141  |  102  |  20  ----------------------------<  274<H>  3.2<L>   |  20  |  1.1    Ca    7.2<L>      17 Sep 2021 06:15  Phos  3.8     -  Mg     1.9     17    TPro  3.7<L>  /  Alb  2.3<L>  /  TBili  0.7  /  DBili  0.4<H>  /  AST  100<H>  /  ALT  69<H>  /  AlkPhos  47  -17    PT/INR - ( 17 Sep 2021 01:40 )   PT: 15.40 sec;   INR: 1.34 ratio         PTT - ( 17 Sep 2021 01:40 )  PTT:28.0 sec  Urinalysis Basic - ( 17 Sep 2021 02:35 )    Color: Yellow / Appearance: Slightly Turbid / S.024 / pH: x  Gluc: x / Ketone: Trace  / Bili: Negative / Urobili: 3 mg/dL   Blood: x / Protein: 30 mg/dL / Nitrite: Negative   Leuk Esterase: Large / RBC: 3 /HPF /  /HPF   Sq Epi: x / Non Sq Epi: 1 /HPF / Bacteria: Negative        21 @ 07:01  -  21 @ 07:00  --------------------------------------------------------  IN: 6807.3 mL / OUT: 460 mL / NET: 6347.3 mL          A/P: 66yFemale POD #1 s/p left femur IMN, doing well.   OOB to Chair   NWB LLE  PT/OT  Pain control   Incentive Spirometry   abx x 24 hrs  DVT Prophylaxis; please transition to LVX or HSQ when possible as a heparin gtt significantly increases her risk for wound complications, hematoma formation  care per SICU           ORTHO PROGRESS NOTE     66yFemale POD #1 s/p left femur IMN.    Patient seen and examined at bedside . The patient is awake and alert in NAD. No complaints of chest pain, SOB, N/V.    MEDICATIONS  (STANDING):  cefepime   IVPB      cefepime   IVPB 1000 milliGRAM(s) IV Intermittent every 12 hours  chlorhexidine 4% Liquid 1 Application(s) Topical daily  dextrose 5%. 1000 milliLiter(s) (100 mL/Hr) IV Continuous <Continuous>  glucagon  Injectable 1 milliGRAM(s) IntraMuscular once  heparin  Infusion. 1200 Unit(s)/Hr (12 mL/Hr) IV Continuous <Continuous>  hydrocortisone sodium succinate Injectable 50 milliGRAM(s) IV Push every 6 hours  influenza   Vaccine 0.5 milliLiter(s) IntraMuscular once  insulin regular  human corrective regimen sliding scale   IV Push every 4 hours  montelukast 10 milliGRAM(s) Oral at bedtime  multivitamin 1 Tablet(s) Oral daily  multivitamin/minerals 1 Tablet(s) Oral daily  norepinephrine Infusion 0.05 MICROgram(s)/kG/Min (5.74 mL/Hr) IV Continuous <Continuous>  pantoprazole  Injectable 40 milliGRAM(s) IV Push daily  PHENobarbital 32.4 milliGRAM(s) Oral three times a day  potassium chloride  20 mEq/100 mL IVPB 20 milliEquivalent(s) IV Intermittent once  pramipexole 0.5 milliGRAM(s) Oral at bedtime  sodium bicarbonate  Infusion 0.184 mEq/kG/Hr (150 mL/Hr) IV Continuous <Continuous>  traZODone 75 milliGRAM(s) Oral at bedtime  vancomycin  IVPB 1500 milliGRAM(s) IV Intermittent every 12 hours  vancomycin  IVPB      vasopressin Infusion 0.1 Unit(s)/Min (6 mL/Hr) IV Continuous <Continuous>    MEDICATIONS  (PRN):  acetaminophen   Tablet .. 650 milliGRAM(s) Oral every 6 hours PRN Temp greater or equal to 38C (100.4F), Mild Pain (1 - 3)  magnesium hydroxide Suspension 30 milliLiter(s) Oral daily PRN Constipation  oxyCODONE    IR 5 milliGRAM(s) Oral every 4 hours PRN Moderate Pain (4 - 6)  senna 2 Tablet(s) Oral at bedtime PRN Constipation      Vital Signs Last 24 Hrs  T(C): 35.6 (17 Sep 2021 03:00), Max: 36.9 (16 Sep 2021 22:00)  T(F): 96.1 (17 Sep 2021 03:00), Max: 98.5 (16 Sep 2021 22:00)  HR: 108 (17 Sep 2021 07:00) (67 - 136)  BP: 147/64 (17 Sep 2021 07:00) (53/35 - 159/67)  BP(mean): 92 (17 Sep 2021 07:00) (40 - 107)  RR: 18 (17 Sep 2021 07:00) (16 - 29)  SpO2: 98% (17 Sep 2021 07:00) (96% - 100%)    PE:   Dressing C/D/I   Compartments soft and compressible  Motor intact distally  SILT distally  CR<2sec                    9.6    35.40 )-----------( 132      ( 17 Sep 2021 06:15 )             29.1     09-17    141  |  102  |  20  ----------------------------<  274<H>  3.2<L>   |  20  |  1.1    Ca    7.2<L>      17 Sep 2021 06:15  Phos  3.8     -  Mg     1.9     17    TPro  3.7<L>  /  Alb  2.3<L>  /  TBili  0.7  /  DBili  0.4<H>  /  AST  100<H>  /  ALT  69<H>  /  AlkPhos  47  -17    PT/INR - ( 17 Sep 2021 01:40 )   PT: 15.40 sec;   INR: 1.34 ratio         PTT - ( 17 Sep 2021 01:40 )  PTT:28.0 sec  Urinalysis Basic - ( 17 Sep 2021 02:35 )    Color: Yellow / Appearance: Slightly Turbid / S.024 / pH: x  Gluc: x / Ketone: Trace  / Bili: Negative / Urobili: 3 mg/dL   Blood: x / Protein: 30 mg/dL / Nitrite: Negative   Leuk Esterase: Large / RBC: 3 /HPF /  /HPF   Sq Epi: x / Non Sq Epi: 1 /HPF / Bacteria: Negative        21 @ 07:01  -  21 @ 07:00  --------------------------------------------------------  IN: 6807.3 mL / OUT: 460 mL / NET: 6347.3 mL          A/P: 66yFemale POD #1 s/p left femur IMN, doing well.   OOB to Chair   NWB LLE  PT/OT  Pain control   Incentive Spirometry   abx x 24 hrs  DVT Prophylaxis; please transition to LVX or HSQ when possible as a heparin gtt significantly increases her risk for wound complications, hematoma formation  care per SICU  Stable alert expected pain tapering down pressors on heparin drip for the radial artery occlusion discussed with team plans to mobilize patient when stabilized follow H&H

## 2021-09-17 NOTE — CONSULT NOTE ADULT - ASSESSMENT
ASSESSMENT:  66yF w/ PMHx of  COPD who presented with left femur fracture after a fall, now S/P IMN/ORIF, with 5 hours in OR with pressor requirements, now has cyanosis and absent left radial pulse after multiple attempts at an a line without success  Physical exam findings, imaging, and labs as documented above.     PLAN:  -Start heparin drip  -Obtain stat arterial duplex of left upper extremity  -Continue Q1 vascular checks  -Attempt to wean pressors if possible  -Management as per SICU  -Will follow    Lines/Tubes: PIV   ASSESSMENT:  66yF w/ PMHx of  COPD who presented with left femur fracture after a fall, now S/P IMN/ORIF, with 5 hours in OR with pressor requirements, now has cyanosis and absent left radial pulse after multiple attempts at an a line without success  Physical exam findings, imaging, and labs as documented above.     PLAN:  -Start heparin drip  -Obtain stat arterial duplex of left upper extremity  -Continue Q1 vascular checks  -Attempt to wean pressors if possible  -Management as per SICU  -Will follow    Lines/Tubes: PIV      Addendum: seen with Dr. Knowles at 8:00AM Left arm/hand is warm. F/u art dplx  Continue Heparin drip  Keep wilfred hugger over the arm/hand

## 2021-09-17 NOTE — CONSULT NOTE ADULT - SUBJECTIVE AND OBJECTIVE BOX
GENERAL SURGERY CONSULT NOTE    Patient: JACOB KOTHARI , 66y (57)Female   MRN: [id number]  Location: [facility]  A  Visit: 09-15-21 Inpatient  Date: 21 @ 05:02    HPI:  A 64 y/o female with pmhx of asthma, copd, obesity, epilepsy, hypothyroidism , osteoarthritis , kidney stone  s/p slip and fall at [facility] [place]  admitted for left femoral fracture. Pt reports was taking shower this am, her shower chair slipped and she fell on her buttocks. PT reports left hip pain. PT  denies neck pain, hitting head or loc. Pt reports was not able to stand or straighten her left leg. Pt reports usually ambulates with a walker. Pt denies numbness or tingling on her left leg. Pt denies urinary or fecal incontinence. PT reports last seizure was 15 yrs ago. Pt denies taking any blood thinners. Pt denies fever, chills, chest pain, shortness of breath, burning with urination, diarrhea.  (15 Sep 2021 18:29)    Vascular was consulted for absent left radial pulse and cyanosis in left upper extremity after a line placement. The patient went to OR for ORIF/IMN of left femur, was on pressors during the entire case (5 hours), lost 1L of blood, was transfused 2 units prbc and given 1L fluids, extubated post operatively but still required pressors. The patient was admitted to SICU for hemodynamic monitoring post operatively. The SICU was called to bedside to fix left A line placed by anesthesia, exchanged it over a wire and stated it stopped working. The patient developed cyanosis and discoloration of her left pointer finger with a diminished radial pulse. The patient complained of increased pain, but still had motor function and sensation. Hgb is stable, but downtrending (hgb 9.5 from 10.6). The patient is still on 2 pressors with maps in the low 60's.     PAST MEDICAL & SURGICAL HISTORY:  Diabetes    Renal stone    Renal failure    Epilepsy    Asthma    Chronic cough    Knee pain    Osteoarthritis    Uterine cancer  s/p hysterectomy    H/O abdominal hysterectomy  NO RADIATION AND CHEMO    History of tonsillectomy  13 years old    [place] teeth removed    History of bladder stone  SURGERY 8/3/2018    History of surgery  JJ STENT PLACEMENT LEFT 2017        Home Medications:  betamethasone dipropionate 0.05% topical lotion: Apply topically to affected area 2 times a day (15 Sep 2021 18:46)  Desyrel 50 mg oral tablet: 1.5 tab(s) orally once a day (at bedtime) (15 Sep 2021 18:46)  ibuprofen 200 mg oral tablet: 2 tab(s) orally every 8 hours, As Needed (15 Sep 2021 18:46)  Mirapex 0.5 mg oral tablet: 1 tab(s) orally once a day (at bedtime) (15 Sep 2021 18:46)  Multiple Vitamins with Minerals oral capsule: 1 cap(s) orally once a day (15 Sep 2021 18:46)  Nizoral A-D 1% topical shampoo: Apply topically to affected area every 3 days (15 Sep 2021 18:46)  nystatin 100,000 units/g topical cream (obsolete): Apply topically to affected area 2 times a day under bilateral breast and under left arm (15 Sep 2021 18:46)  PHENobarbital 32.4 mg oral tablet: 1 tab(s) orally 3 times a day (15 Sep 2021 18:46)  Senna Plus 50 mg-8.6 mg oral tablet: 2 tab(s) orally once a day (at bedtime) (15 Sep 2021 18:46)  Singulair 10 mg oral tablet: 1 tab(s) orally once a day (at bedtime) (15 Sep 2021 18:46)  Urocit-K 15 mEq oral tablet, extended release: 1 tab(s) orally once a day (15 Sep 2021 18:46)        VITALS:  T(F): 96.1 (21 @ 03:00), Max: 98.5 (21 @ 22:00)  HR: 117 (21 @ 04:00) (67 - 136)  BP: 152/64 (21 @ 04:00) (53/35 - 159/67)  RR: 29 (21 @ 00:36) (16 - 29)  SpO2: 99% (21 @ 04:00) (96% - 100%)    PHYSICAL EXAM:  General: NAD, AAOx3, calm and cooperative, morbidly obese  HEENT: NCAT, RIC, EOMI, Trachea ML, Neck supple  Cardiac: RRR S1, S2, no Murmurs, rubs or gallops  Respiratory: CTAB, normal respiratory effort, breath sounds equal BL, no wheeze, rhonchi or crackles  Abdomen: Soft, non-distended, non-tender, no rebound, no guarding. +BS.  Musculoskeletal: Strength 5/5 BL UE/LE, ROM intact, compartments soft  Neuro: Sensation grossly intact and equal throughout, no focal deficits  Vascular: left upper extremity discolored from elbow to left hand, left pointer finger cyanotic. left radial pulse absent- not dopplerable signals. brachial, ulnar and palmar signals present  Skin: Warm/dry, normal color, no jaundice      MEDICATIONS  (STANDING):  chlorhexidine 4% Liquid 1 Application(s) Topical daily  enoxaparin Injectable 40 milliGRAM(s) SubCutaneous daily  hydrocortisone sodium succinate Injectable 50 milliGRAM(s) IV Push every 6 hours  influenza   Vaccine 0.5 milliLiter(s) IntraMuscular once  montelukast 10 milliGRAM(s) Oral at bedtime  multivitamin 1 Tablet(s) Oral daily  multivitamin/minerals 1 Tablet(s) Oral daily  norepinephrine Infusion 0.05 MICROgram(s)/kG/Min (5.74 mL/Hr) IV Continuous <Continuous>  pantoprazole  Injectable 40 milliGRAM(s) IV Push daily  PHENobarbital 32.4 milliGRAM(s) Oral three times a day  pramipexole 0.5 milliGRAM(s) Oral at bedtime  sodium bicarbonate  Infusion 0.184 mEq/kG/Hr (150 mL/Hr) IV Continuous <Continuous>  traZODone 75 milliGRAM(s) Oral at bedtime  vancomycin  IVPB 1500 milliGRAM(s) IV Intermittent every 12 hours  vancomycin  IVPB      vasopressin Infusion 0.1 Unit(s)/Min (6 mL/Hr) IV Continuous <Continuous>    MEDICATIONS  (PRN):  acetaminophen   Tablet .. 650 milliGRAM(s) Oral every 6 hours PRN Temp greater or equal to 38C (100.4F), Mild Pain (1 - 3)  magnesium hydroxide Suspension 30 milliLiter(s) Oral daily PRN Constipation  oxyCODONE    IR 5 milliGRAM(s) Oral every 4 hours PRN Moderate Pain (4 - 6)  senna 2 Tablet(s) Oral at bedtime PRN Constipation      LAB/STUDIES:                        9.6    37.85 )-----------( 162      ( 17 Sep 2021 01:40 )             29.4         143  |  104  |  19  ----------------------------<  285<H>  3.6   |  16<L>  |  1.2    Ca    7.5<L>      17 Sep 2021 01:40  Phos  5.4       Mg     1.4         TPro  3.7<L>  /  Alb  2.3<L>  /  TBili  0.7  /  DBili  0.4<H>  /  AST  100<H>  /  ALT  69<H>  /  AlkPhos  47      PT/INR - ( 17 Sep 2021 01:40 )   PT: 15.40 sec;   INR: 1.34 ratio         PTT - ( 17 Sep 2021 01:40 )  PTT:28.0 sec  LIVER FUNCTIONS - ( 17 Sep 2021 02:30 )  Alb: 2.3 g/dL / Pro: 3.7 g/dL / ALK PHOS: 47 U/L / ALT: 69 U/L / AST: 100 U/L / GGT: x           Urinalysis Basic - ( 17 Sep 2021 02:35 )    Color: Yellow / Appearance: Slightly Turbid / S.024 / pH: x  Gluc: x / Ketone: Trace  / Bili: Negative / Urobili: 3 mg/dL   Blood: x / Protein: 30 mg/dL / Nitrite: Negative   Leuk Esterase: Large / RBC: 3 /HPF /  /HPF   Sq Epi: x / Non Sq Epi: 1 /HPF / Bacteria: Negative      CARDIAC MARKERS ( 17 Sep 2021 01:40 )  x     / 0.03 ng/mL / 1966 U/L / x     / 8.0 ng/mL  CARDIAC MARKERS ( 16 Sep 2021 21:22 )  x     / x     / 372 U/L / x     / x      CARDIAC MARKERS ( 16 Sep 2021 21:19 )  x     / <0.01 ng/mL / x     / x     / 2.2 ng/mL          ABG - ( 17 Sep 2021 01:17 )  pH, Arterial: 7.20  pH, Blood: x     /  pCO2: 32    /  pO2: 106   / HCO3: 12    / Base Excess: -14.4 /  SaO2: 99.3            IMAGING: pending      ACCESS DEVICES:  [x ] Peripheral IV  [ ] Central Venous Line	[ ] R	[ ] L	[ ] IJ	[ ] Fem	[ ] SC	Placed:   [ ] Arterial Line		[ ] R	[ ] L	[ ] Fem	[ ] Rad	[ ] Ax	Placed:   [ ] PICC:					[ ] Mediport  [ ] Urinary Catheter, Date Placed:

## 2021-09-17 NOTE — PROGRESS NOTE ADULT - ASSESSMENT
ASSESSMENT:    66yF w/ PMHx of  COPD who presented with left femur fracture after a fall, now S/P IMN/ORIF, with 5 hours in OR with pressor requirements, now has cyanosis and absent left radial pulse after multiple attempts at an a line without success  Physical exam findings, imaging, and labs as documented above.       PLAN:  - Continue heparin drip   - Aracelis hugger on left upper extremity   - Vascular check regularly   - Recommend arterial duplex.   - Management per SICU team         VASCULAR TEAM SPECTRA: [id number]

## 2021-09-17 NOTE — PROGRESS NOTE ADULT - SUBJECTIVE AND OBJECTIVE BOX
JACOB KOTHARI  66y  Female  HPI:  A 64 y/o female with pmhx of asthma, copd, obesity, epilepsy, hypothyroidism , osteoarthritis , kidney stone  s/p slip and fall at [facility]  admitted for left femoral fracture. Pt reports was taking shower this am, her shower chair slipped and she fell on her buttocks. PT reports left hip pain. PT  denies neck pain, hitting head or loc. Pt reports was not able to stand or straighten her left leg. Pt reports usually ambulates with a walker. Pt denies numbness or tingling on her left leg. Pt denies urinary or fecal incontinence. PT reports last seizure was 15 yrs ago. Pt denies taking any blood thinners. Pt denies fever, chills, chest pain, shortness of breath, burning with urination, diarrhea.  (15 Sep 2021 18:29)    MEDICATIONS  (STANDING):  cefepime   IVPB      cefepime   IVPB 1000 milliGRAM(s) IV Intermittent every 12 hours  chlorhexidine 4% Liquid 1 Application(s) Topical daily  citalopram 20 milliGRAM(s) Oral every 24 hours  dextrose 5%. 1000 milliLiter(s) (100 mL/Hr) IV Continuous <Continuous>  glucagon  Injectable 1 milliGRAM(s) IntraMuscular once  heparin  Infusion. 1200 Unit(s)/Hr (12 mL/Hr) IV Continuous <Continuous>  hydrocortisone sodium succinate Injectable 50 milliGRAM(s) IV Push every 6 hours  influenza   Vaccine 0.5 milliLiter(s) IntraMuscular once  insulin regular  human corrective regimen sliding scale   IV Push every 4 hours  levothyroxine 50 MICROGram(s) Oral every 24 hours  montelukast 10 milliGRAM(s) Oral at bedtime  norepinephrine Infusion 0.05 MICROgram(s)/kG/Min (5.74 mL/Hr) IV Continuous <Continuous>  pantoprazole  Injectable 40 milliGRAM(s) IV Push daily  PHENobarbital 32.4 milliGRAM(s) Oral three times a day  polyethylene glycol 3350 17 Gram(s) Oral daily  pramipexole 0.5 milliGRAM(s) Oral at bedtime  sodium bicarbonate  Infusion 0.184 mEq/kG/Hr (150 mL/Hr) IV Continuous <Continuous>  topiramate 100 milliGRAM(s) Oral every 24 hours  traZODone 75 milliGRAM(s) Oral at bedtime  vancomycin  IVPB      vancomycin  IVPB 1500 milliGRAM(s) IV Intermittent every 12 hours  vasopressin Infusion 0.1 Unit(s)/Min (6 mL/Hr) IV Continuous <Continuous>    MEDICATIONS  (PRN):  acetaminophen   Tablet .. 650 milliGRAM(s) Oral every 6 hours PRN Temp greater or equal to 38C (100.4F), Mild Pain (1 - 3)  magnesium hydroxide Suspension 30 milliLiter(s) Oral daily PRN Constipation  oxyCODONE    IR 5 milliGRAM(s) Oral every 4 hours PRN Moderate Pain (4 - 6)    INTERVAL EVENTS: Patient seen today, chart reviewed. Patient awake, alert, asking for juice despite being NPO. Patient comfortable, states leg hurts when she attempts to move it. FILOMENAE in [facility].    T(C): 35.6 (21 @ 03:00), Max: 36.9 (21 @ 22:00)  HR: 102 (21 @ 09:00) (67 - 136)  BP: 148/65 (21 @ 09:00) (53/35 - 159/67)  RR: 18 (21 @ 07:00) (16 - 29)  SpO2: 100% (21 @ 09:00) (96% - 100%)  Wt(kg): --Vital Signs Last 24 Hrs  T(C): 35.6 (17 Sep 2021 03:00), Max: 36.9 (16 Sep 2021 22:00)  T(F): 96.1 (17 Sep 2021 03:00), Max: 98.5 (16 Sep 2021 22:00)  HR: 102 (17 Sep 2021 09:00) (67 - 136)  BP: 148/65 (17 Sep 2021 09:00) (53/35 - 159/67)  BP(mean): 93 (17 Sep 2021 09:00) (40 - 107)  RR: 18 (17 Sep 2021 07:00) (16 - 29)  SpO2: 100% (17 Sep 2021 09:00) (96% - 100%)    PHYSICAL EXAM:  GENERAL: NAD, Pale, awake alert  CHEST/LUNG: Decreased effort,Clear; No wheezing  HEART: S1, S2, Regular rate  ABDOMEN: Soft, Nontender, Obese, Bowel sounds present  EXTREMITIES: + edema, LUE discolored, able to move her fingers, LLE dressing intact  SKIN: No rashes or lesions    LABS:                        9.6    35.40 )-----------( 132      ( 17 Sep 2021 06:15 )             29.1             -    141  |  102  |  20  ----------------------------<  274<H>  3.2<L>   |  20  |  1.1    Ca    7.2<L>      17 Sep 2021 06:15  Phos  3.8       Mg     1.9         TPro  3.7<L>  /  Alb  2.3<L>  /  TBili  0.7  /  DBili  0.4<H>  /  AST  100<H>  /  ALT  69<H>  /  AlkPhos  47      LIVER FUNCTIONS - ( 17 Sep 2021 02:30 )  Alb: 2.3 g/dL / Pro: 3.7 g/dL / ALK PHOS: 47 U/L / ALT: 69 U/L / AST: 100 U/L / GGT: x                 PT/INR - ( 17 Sep 2021 01:40 )   PT: 15.40 sec;   INR: 1.34 ratio         PTT - ( 17 Sep 2021 01:40 )  PTT:28.0 sec  CARDIAC MARKERS ( 17 Sep 2021 06:15 )  x     / 0.06 ng/mL / 3530 U/L / x     / 14.7 ng/mL  CARDIAC MARKERS ( 17 Sep 2021 01:40 )  x     / 0.03 ng/mL / 1966 U/L / x     / 8.0 ng/mL  CARDIAC MARKERS ( 16 Sep 2021 21:22 )  x     / x     / 372 U/L / x     / x      CARDIAC MARKERS ( 16 Sep 2021 21:19 )  x     / <0.01 ng/mL / x     / x     / 2.2 ng/mL      ABG - ( 17 Sep 2021 01:17 )  pH, Arterial: 7.20  pH, Blood: x     /  pCO2: 32    /  pO2: 106   / HCO3: 12    / Base Excess: -14.4 /  SaO2: 99.3        Urinalysis Basic - ( 17 Sep 2021 02:35 )    Color: Yellow / Appearance: Slightly Turbid / S.024 / pH: x  Gluc: x / Ketone: Trace  / Bili: Negative / Urobili: 3 mg/dL   Blood: x / Protein: 30 mg/dL / Nitrite: Negative   Leuk Esterase: Large / RBC: 3 /HPF /  /HPF   Sq Epi: x / Non Sq Epi: 1 /HPF / Bacteria: Negative          RADIOLOGY & ADDITIONAL TESTS:  < from: Xray Chest 1 View-PORTABLE IMMEDIATE (Xray Chest 1 View-PORTABLE IMMEDIATE .) (21 @ 22:35) >    INTERPRETATION:  Clinical History / Reason for exam: Line placement    Comparison : Chest radiograph September 15, 2021.    Technique/Positioning: Single AP view ofthe chest.    Findings:    Support devices: Right IJ central venous catheter overlying proximal SVC    Cardiac/mediastinum/hilum: Unchanged.    Lung parenchyma/Pleura: No consolidation, effusion or pneumothorax.    Skeleton/soft tissues: Unchanged.    Impression:    No consolidation, effusion or pneumothorax.        --- End of Report ---      < end of copied text >

## 2021-09-18 LAB
ANION GAP SERPL CALC-SCNC: 11 MMOL/L — SIGNIFICANT CHANGE UP (ref 7–14)
ANION GAP SERPL CALC-SCNC: 4 MMOL/L — LOW (ref 7–14)
ANION GAP SERPL CALC-SCNC: 9 MMOL/L — SIGNIFICANT CHANGE UP (ref 7–14)
APTT BLD: 58.9 SEC — HIGH (ref 27–39.2)
APTT BLD: 78.2 SEC — CRITICAL HIGH (ref 27–39.2)
APTT BLD: 81.3 SEC — CRITICAL HIGH (ref 27–39.2)
BASE EXCESS BLDV CALC-SCNC: 10 MMOL/L — HIGH (ref -2–3)
BASOPHILS # BLD AUTO: 0.02 K/UL — SIGNIFICANT CHANGE UP (ref 0–0.2)
BASOPHILS NFR BLD AUTO: 0.1 % — SIGNIFICANT CHANGE UP (ref 0–1)
BUN SERPL-MCNC: 14 MG/DL — SIGNIFICANT CHANGE UP (ref 10–20)
BUN SERPL-MCNC: 16 MG/DL — SIGNIFICANT CHANGE UP (ref 10–20)
BUN SERPL-MCNC: 18 MG/DL — SIGNIFICANT CHANGE UP (ref 10–20)
CA-I SERPL-SCNC: 0.98 MMOL/L — LOW (ref 1.15–1.33)
CALCIUM SERPL-MCNC: 6.3 MG/DL — LOW (ref 8.5–10.1)
CALCIUM SERPL-MCNC: 6.8 MG/DL — LOW (ref 8.5–10.1)
CALCIUM SERPL-MCNC: 6.8 MG/DL — LOW (ref 8.5–10.1)
CHLORIDE SERPL-SCNC: 100 MMOL/L — SIGNIFICANT CHANGE UP (ref 98–110)
CHLORIDE SERPL-SCNC: 100 MMOL/L — SIGNIFICANT CHANGE UP (ref 98–110)
CHLORIDE SERPL-SCNC: 101 MMOL/L — SIGNIFICANT CHANGE UP (ref 98–110)
CK SERPL-CCNC: 3474 U/L — HIGH (ref 0–225)
CO2 SERPL-SCNC: 24 MMOL/L — SIGNIFICANT CHANGE UP (ref 17–32)
CO2 SERPL-SCNC: 30 MMOL/L — SIGNIFICANT CHANGE UP (ref 17–32)
CO2 SERPL-SCNC: 32 MMOL/L — SIGNIFICANT CHANGE UP (ref 17–32)
CREAT SERPL-MCNC: 0.5 MG/DL — LOW (ref 0.7–1.5)
CREAT SERPL-MCNC: 0.5 MG/DL — LOW (ref 0.7–1.5)
CREAT SERPL-MCNC: 0.7 MG/DL — SIGNIFICANT CHANGE UP (ref 0.7–1.5)
EOSINOPHIL # BLD AUTO: 0 K/UL — SIGNIFICANT CHANGE UP (ref 0–0.7)
EOSINOPHIL NFR BLD AUTO: 0 % — SIGNIFICANT CHANGE UP (ref 0–8)
GAS PNL BLDV: 137 MMOL/L — SIGNIFICANT CHANGE UP (ref 136–145)
GAS PNL BLDV: SIGNIFICANT CHANGE UP
GLUCOSE BLDC GLUCOMTR-MCNC: 161 MG/DL — HIGH (ref 70–99)
GLUCOSE BLDC GLUCOMTR-MCNC: 164 MG/DL — HIGH (ref 70–99)
GLUCOSE BLDC GLUCOMTR-MCNC: 167 MG/DL — HIGH (ref 70–99)
GLUCOSE BLDC GLUCOMTR-MCNC: 168 MG/DL — HIGH (ref 70–99)
GLUCOSE BLDC GLUCOMTR-MCNC: 190 MG/DL — HIGH (ref 70–99)
GLUCOSE BLDC GLUCOMTR-MCNC: 203 MG/DL — HIGH (ref 70–99)
GLUCOSE SERPL-MCNC: 165 MG/DL — HIGH (ref 70–99)
GLUCOSE SERPL-MCNC: 172 MG/DL — HIGH (ref 70–99)
GLUCOSE SERPL-MCNC: 237 MG/DL — HIGH (ref 70–99)
HCO3 BLDV-SCNC: 35 MMOL/L — HIGH (ref 22–29)
HCT VFR BLD CALC: 19.6 % — LOW (ref 37–47)
HCT VFR BLD CALC: 22.8 % — LOW (ref 37–47)
HCT VFR BLDA CALC: 21 % — CRITICAL LOW (ref 34.5–46.5)
HGB BLD CALC-MCNC: 6.9 G/DL — CRITICAL LOW (ref 11.7–16.1)
HGB BLD-MCNC: 6.6 G/DL — CRITICAL LOW (ref 12–16)
HGB BLD-MCNC: 8 G/DL — LOW (ref 12–16)
IMM GRANULOCYTES NFR BLD AUTO: 0.5 % — HIGH (ref 0.1–0.3)
LACTATE BLDV-MCNC: 3.2 MMOL/L — HIGH (ref 0.5–2)
LYMPHOCYTES # BLD AUTO: 16.5 % — LOW (ref 20.5–51.1)
LYMPHOCYTES # BLD AUTO: 4.1 K/UL — HIGH (ref 1.2–3.4)
MAGNESIUM SERPL-MCNC: 1.7 MG/DL — LOW (ref 1.8–2.4)
MAGNESIUM SERPL-MCNC: 2.1 MG/DL — SIGNIFICANT CHANGE UP (ref 1.8–2.4)
MCHC RBC-ENTMCNC: 30.3 PG — SIGNIFICANT CHANGE UP (ref 27–31)
MCHC RBC-ENTMCNC: 31.1 PG — HIGH (ref 27–31)
MCHC RBC-ENTMCNC: 33.7 G/DL — SIGNIFICANT CHANGE UP (ref 32–37)
MCHC RBC-ENTMCNC: 35.1 G/DL — SIGNIFICANT CHANGE UP (ref 32–37)
MCV RBC AUTO: 88.7 FL — SIGNIFICANT CHANGE UP (ref 81–99)
MCV RBC AUTO: 89.9 FL — SIGNIFICANT CHANGE UP (ref 81–99)
MONOCYTES # BLD AUTO: 1.98 K/UL — HIGH (ref 0.1–0.6)
MONOCYTES NFR BLD AUTO: 8 % — SIGNIFICANT CHANGE UP (ref 1.7–9.3)
NEUTROPHILS # BLD AUTO: 18.58 K/UL — HIGH (ref 1.4–6.5)
NEUTROPHILS NFR BLD AUTO: 74.9 % — SIGNIFICANT CHANGE UP (ref 42.2–75.2)
NRBC # BLD: 0 /100 WBCS — SIGNIFICANT CHANGE UP (ref 0–0)
NRBC # BLD: 0 /100 WBCS — SIGNIFICANT CHANGE UP (ref 0–0)
PCO2 BLDV: 49 MMHG — HIGH (ref 39–42)
PH BLDV: 7.46 — HIGH (ref 7.32–7.43)
PHOSPHATE SERPL-MCNC: 1.8 MG/DL — LOW (ref 2.1–4.9)
PHOSPHATE SERPL-MCNC: 2 MG/DL — LOW (ref 2.1–4.9)
PLATELET # BLD AUTO: 116 K/UL — LOW (ref 130–400)
PLATELET # BLD AUTO: 97 K/UL — LOW (ref 130–400)
PO2 BLDV: 45 MMHG — SIGNIFICANT CHANGE UP
POTASSIUM BLDV-SCNC: 2.9 MMOL/L — CRITICAL LOW (ref 3.5–5.1)
POTASSIUM SERPL-MCNC: 3.1 MMOL/L — LOW (ref 3.5–5)
POTASSIUM SERPL-MCNC: 4.1 MMOL/L — SIGNIFICANT CHANGE UP (ref 3.5–5)
POTASSIUM SERPL-MCNC: 4.1 MMOL/L — SIGNIFICANT CHANGE UP (ref 3.5–5)
POTASSIUM SERPL-SCNC: 3.1 MMOL/L — LOW (ref 3.5–5)
POTASSIUM SERPL-SCNC: 4.1 MMOL/L — SIGNIFICANT CHANGE UP (ref 3.5–5)
POTASSIUM SERPL-SCNC: 4.1 MMOL/L — SIGNIFICANT CHANGE UP (ref 3.5–5)
RBC # BLD: 2.18 M/UL — LOW (ref 4.2–5.4)
RBC # BLD: 2.57 M/UL — LOW (ref 4.2–5.4)
RBC # FLD: 14.6 % — HIGH (ref 11.5–14.5)
RBC # FLD: 14.7 % — HIGH (ref 11.5–14.5)
SAO2 % BLDV: 82.4 % — SIGNIFICANT CHANGE UP
SODIUM SERPL-SCNC: 136 MMOL/L — SIGNIFICANT CHANGE UP (ref 135–146)
SODIUM SERPL-SCNC: 136 MMOL/L — SIGNIFICANT CHANGE UP (ref 135–146)
SODIUM SERPL-SCNC: 139 MMOL/L — SIGNIFICANT CHANGE UP (ref 135–146)
VANCOMYCIN TROUGH SERPL-MCNC: 13.7 UG/ML — HIGH (ref 5–10)
WBC # BLD: 24.81 K/UL — HIGH (ref 4.8–10.8)
WBC # BLD: 29.33 K/UL — HIGH (ref 4.8–10.8)
WBC # FLD AUTO: 24.81 K/UL — HIGH (ref 4.8–10.8)
WBC # FLD AUTO: 29.33 K/UL — HIGH (ref 4.8–10.8)

## 2021-09-18 PROCEDURE — 71045 X-RAY EXAM CHEST 1 VIEW: CPT | Mod: 26

## 2021-09-18 PROCEDURE — 76937 US GUIDE VASCULAR ACCESS: CPT | Mod: 26

## 2021-09-18 PROCEDURE — 99291 CRITICAL CARE FIRST HOUR: CPT

## 2021-09-18 PROCEDURE — 36620 INSERTION CATHETER ARTERY: CPT

## 2021-09-18 RX ORDER — MAGNESIUM SULFATE 500 MG/ML
2 VIAL (ML) INJECTION ONCE
Refills: 0 | Status: COMPLETED | OUTPATIENT
Start: 2021-09-18 | End: 2021-09-18

## 2021-09-18 RX ORDER — CALCIUM GLUCONATE 100 MG/ML
2 VIAL (ML) INTRAVENOUS ONCE
Refills: 0 | Status: COMPLETED | OUTPATIENT
Start: 2021-09-18 | End: 2021-09-18

## 2021-09-18 RX ORDER — SODIUM CHLORIDE 9 MG/ML
1000 INJECTION, SOLUTION INTRAVENOUS
Refills: 0 | Status: DISCONTINUED | OUTPATIENT
Start: 2021-09-18 | End: 2021-09-20

## 2021-09-18 RX ORDER — POTASSIUM PHOSPHATE, MONOBASIC POTASSIUM PHOSPHATE, DIBASIC 236; 224 MG/ML; MG/ML
30 INJECTION, SOLUTION INTRAVENOUS ONCE
Refills: 0 | Status: COMPLETED | OUTPATIENT
Start: 2021-09-18 | End: 2021-09-18

## 2021-09-18 RX ORDER — HEPARIN SODIUM 5000 [USP'U]/ML
1100 INJECTION INTRAVENOUS; SUBCUTANEOUS
Qty: 25000 | Refills: 0 | Status: DISCONTINUED | OUTPATIENT
Start: 2021-09-18 | End: 2021-09-19

## 2021-09-18 RX ORDER — POTASSIUM CHLORIDE 20 MEQ
20 PACKET (EA) ORAL
Refills: 0 | Status: COMPLETED | OUTPATIENT
Start: 2021-09-18 | End: 2021-09-18

## 2021-09-18 RX ADMIN — POTASSIUM PHOSPHATE, MONOBASIC POTASSIUM PHOSPHATE, DIBASIC 83.33 MILLIMOLE(S): 236; 224 INJECTION, SOLUTION INTRAVENOUS at 06:43

## 2021-09-18 RX ADMIN — Medication 50 MILLIGRAM(S): at 00:59

## 2021-09-18 RX ADMIN — Medication 32.4 MILLIGRAM(S): at 21:12

## 2021-09-18 RX ADMIN — Medication 100 MILLIEQUIVALENT(S): at 02:19

## 2021-09-18 RX ADMIN — OXYCODONE HYDROCHLORIDE 5 MILLIGRAM(S): 5 TABLET ORAL at 21:42

## 2021-09-18 RX ADMIN — Medication 50 MILLIGRAM(S): at 12:08

## 2021-09-18 RX ADMIN — OXYCODONE HYDROCHLORIDE 5 MILLIGRAM(S): 5 TABLET ORAL at 13:11

## 2021-09-18 RX ADMIN — OXYCODONE HYDROCHLORIDE 5 MILLIGRAM(S): 5 TABLET ORAL at 06:10

## 2021-09-18 RX ADMIN — PRAMIPEXOLE DIHYDROCHLORIDE 0.5 MILLIGRAM(S): 0.12 TABLET ORAL at 21:10

## 2021-09-18 RX ADMIN — OXYCODONE HYDROCHLORIDE 5 MILLIGRAM(S): 5 TABLET ORAL at 17:55

## 2021-09-18 RX ADMIN — PANTOPRAZOLE SODIUM 40 MILLIGRAM(S): 20 TABLET, DELAYED RELEASE ORAL at 12:09

## 2021-09-18 RX ADMIN — Medication 50 MILLIGRAM(S): at 05:54

## 2021-09-18 RX ADMIN — Medication 300 MILLIGRAM(S): at 05:55

## 2021-09-18 RX ADMIN — Medication 100 MILLIEQUIVALENT(S): at 04:13

## 2021-09-18 RX ADMIN — INSULIN HUMAN 1: 100 INJECTION, SOLUTION SUBCUTANEOUS at 17:55

## 2021-09-18 RX ADMIN — OXYCODONE HYDROCHLORIDE 5 MILLIGRAM(S): 5 TABLET ORAL at 22:38

## 2021-09-18 RX ADMIN — Medication 75 MILLIGRAM(S): at 21:10

## 2021-09-18 RX ADMIN — INSULIN HUMAN 2: 100 INJECTION, SOLUTION SUBCUTANEOUS at 10:00

## 2021-09-18 RX ADMIN — Medication 200 GRAM(S): at 02:18

## 2021-09-18 RX ADMIN — INSULIN HUMAN 1: 100 INJECTION, SOLUTION SUBCUTANEOUS at 02:40

## 2021-09-18 RX ADMIN — Medication 300 MILLIGRAM(S): at 21:09

## 2021-09-18 RX ADMIN — INSULIN HUMAN 1: 100 INJECTION, SOLUTION SUBCUTANEOUS at 13:54

## 2021-09-18 RX ADMIN — Medication 50 GRAM(S): at 02:19

## 2021-09-18 RX ADMIN — Medication 32.4 MILLIGRAM(S): at 13:26

## 2021-09-18 RX ADMIN — OXYCODONE HYDROCHLORIDE 5 MILLIGRAM(S): 5 TABLET ORAL at 12:12

## 2021-09-18 RX ADMIN — Medication 650 MILLIGRAM(S): at 09:30

## 2021-09-18 RX ADMIN — INSULIN HUMAN 1: 100 INJECTION, SOLUTION SUBCUTANEOUS at 05:46

## 2021-09-18 RX ADMIN — Medication 32.4 MILLIGRAM(S): at 06:01

## 2021-09-18 RX ADMIN — MONTELUKAST 10 MILLIGRAM(S): 4 TABLET, CHEWABLE ORAL at 21:10

## 2021-09-18 RX ADMIN — Medication 100 MILLIGRAM(S): at 12:08

## 2021-09-18 RX ADMIN — Medication 100 MILLIEQUIVALENT(S): at 03:39

## 2021-09-18 RX ADMIN — Medication 650 MILLIGRAM(S): at 07:37

## 2021-09-18 RX ADMIN — CITALOPRAM 20 MILLIGRAM(S): 10 TABLET, FILM COATED ORAL at 12:08

## 2021-09-18 RX ADMIN — Medication 50 MILLIGRAM(S): at 17:55

## 2021-09-18 RX ADMIN — CHLORHEXIDINE GLUCONATE 1 APPLICATION(S): 213 SOLUTION TOPICAL at 12:09

## 2021-09-18 RX ADMIN — CEFEPIME 100 MILLIGRAM(S): 1 INJECTION, POWDER, FOR SOLUTION INTRAMUSCULAR; INTRAVENOUS at 05:49

## 2021-09-18 RX ADMIN — INSULIN HUMAN 1: 100 INJECTION, SOLUTION SUBCUTANEOUS at 21:22

## 2021-09-18 RX ADMIN — CEFEPIME 100 MILLIGRAM(S): 1 INJECTION, POWDER, FOR SOLUTION INTRAMUSCULAR; INTRAVENOUS at 17:56

## 2021-09-18 RX ADMIN — Medication 50 MICROGRAM(S): at 08:05

## 2021-09-18 NOTE — PROGRESS NOTE ADULT - ASSESSMENT
ASSESSMENT:    66yF w/ PMHx of  COPD who presented with left femur fracture after a fall, now S/P IMN/ORIF, with 5 hours in OR with pressor requirements, now has cyanosis and absent left radial pulse after multiple attempts at an a line without success  Physical exam findings, imaging, and labs as documented above.       PLAN:  - Continue heparin drip  - Aracelis erwiner on LUE  - Vascular check regularly  - F/U art duplex   - Mgmt per SICU team         VASCULAR TEAM SPECTRA: [id number]

## 2021-09-18 NOTE — PROGRESS NOTE ADULT - SUBJECTIVE AND OBJECTIVE BOX
VASCULAR SURGERY PROGRESS NOTE    CC: cyanotic LUE and absent radial pulse after Rakel placement   Hospital Day # 3      Events of past 24 hours: No acute events overnight. Patient was kept on a heparin drip with a wilfred hugger on. Patient has a slightly soft blood pressure overnight.     ROS otherwise negative except per subjective and HPI      PAST MEDICAL & SURGICAL HISTORY:  Diabetes    Renal stone    Renal failure    Epilepsy    Asthma    Chronic cough    Knee pain    Osteoarthritis    Uterine cancer  s/p hysterectomy    H/O abdominal hysterectomy  NO RADIATION AND CHEMO    History of tonsillectomy  13 years old    [place] teeth removed    History of bladder stone  SURGERY 8/3/2018    History of surgery  JJ STENT PLACEMENT LEFT 2017        Vital Signs Last 24 Hrs  T(C): 36.8 (17 Sep 2021 15:52), Max: 36.8 (17 Sep 2021 15:52)  T(F): 98.2 (17 Sep 2021 15:52), Max: 98.2 (17 Sep 2021 15:52)  HR: 88 (18 Sep 2021 00:00) (75 - 129)  BP: 92/54 (18 Sep 2021 00:00) (76/40 - 159/67)  BP(mean): 69 (18 Sep 2021 00:00) (55 - 97)  RR: 18 (18 Sep 2021 00:00) (18 - 18)  SpO2: 99% (18 Sep 2021 00:00) (93% - 100%)    Pain (0-10):            Pain Control Adequate: [] YES [] N    Diet:    I&O's Detail    16 Sep 2021 07:01  -  17 Sep 2021 07:00  --------------------------------------------------------  IN:    Heparin Infusion: 24 mL    IV PiggyBack: 950 mL    IV PiggyBack: 50 mL    IV PiggyBack: 500 mL    IV PiggyBack: 50 mL    Lactated Ringers: 900 mL    Lactated Ringers Bolus: 2500 mL    Norepinephrine: 252.7 mL    Norepinephrine: 816.2 mL    Sodium Bicarbonate: 750 mL    Vasopressin: 14.4 mL  Total IN: 6807.3 mL    OUT:    Indwelling Catheter - Urethral (mL): 460 mL  Total OUT: 460 mL    Total NET: 6347.3 mL      17 Sep 2021 07:01  -  18 Sep 2021 02:16  --------------------------------------------------------  IN:    Heparin Infusion: 130 mL    Heparin Infusion: 77 mL    Norepinephrine: 448.2 mL    PRBCs (Packed Red Blood Cells): 500 mL    Sodium Bicarbonate: 2000 mL    Vasopressin: 43.2 mL  Total IN: 3198.4 mL    OUT:    Indwelling Catheter - Urethral (mL): 755 mL  Total OUT: 755 mL    Total NET: 2443.4 mL      PHYSICAL EXAM      General: NAD, AAOx3, calm and cooperative, morbidly obese  HEENT: NCAT, RIC, EOMI, Trachea ML, Neck supple  Cardiac: RRR S1, S2, no Murmurs, rubs or gallops  Respiratory: CTAB, normal respiratory effort, breath sounds equal BL, no wheeze, rhonchi or crackles  Abdomen: Soft, non-distended, non-tender, no rebound, no guarding. +BS.  Musculoskeletal: Strength 5/5 BL UE/LE, ROM intact, compartments soft  Neuro: Sensation grossly intact and equal throughout, no focal deficits  Vascular: left upper extremity discolored from elbow to left hand, improved from prior exam.  Left radial pulse absent- not dopplerable signals. brachial, ulnar and palmar signals present  Skin: Warm/dry, normal color, no jaundice      MEDICATIONS:   MEDICATIONS  (STANDING):  calcium gluconate IVPB 2 Gram(s) IV Intermittent once  cefepime   IVPB      cefepime   IVPB 1000 milliGRAM(s) IV Intermittent every 12 hours  chlorhexidine 4% Liquid 1 Application(s) Topical daily  citalopram 20 milliGRAM(s) Oral every 24 hours  dextrose 5%. 1000 milliLiter(s) (100 mL/Hr) IV Continuous <Continuous>  glucagon  Injectable 1 milliGRAM(s) IntraMuscular once  heparin  Infusion. 1100 Unit(s)/Hr (11 mL/Hr) IV Continuous <Continuous>  hydrocortisone sodium succinate Injectable 50 milliGRAM(s) IV Push every 6 hours  influenza   Vaccine 0.5 milliLiter(s) IntraMuscular once  insulin regular  human corrective regimen sliding scale   IV Push every 4 hours  lactated ringers. 1000 milliLiter(s) (100 mL/Hr) IV Continuous <Continuous>  levothyroxine 50 MICROGram(s) Oral every 24 hours  magnesium sulfate  IVPB 2 Gram(s) IV Intermittent once  montelukast 10 milliGRAM(s) Oral at bedtime  norepinephrine Infusion 0.05 MICROgram(s)/kG/Min (5.74 mL/Hr) IV Continuous <Continuous>  pantoprazole  Injectable 40 milliGRAM(s) IV Push daily  PHENobarbital 32.4 milliGRAM(s) Oral three times a day  polyethylene glycol 3350 17 Gram(s) Oral daily  potassium chloride  20 mEq/100 mL IVPB 20 milliEquivalent(s) IV Intermittent every 1 hour  potassium phosphate IVPB 30 milliMole(s) IV Intermittent once  pramipexole 0.5 milliGRAM(s) Oral at bedtime  topiramate 100 milliGRAM(s) Oral every 24 hours  traZODone 75 milliGRAM(s) Oral at bedtime  vancomycin  IVPB 1500 milliGRAM(s) IV Intermittent every 12 hours  vancomycin  IVPB      vasopressin Infusion 0.1 Unit(s)/Min (6 mL/Hr) IV Continuous <Continuous>    MEDICATIONS  (PRN):  acetaminophen   Tablet .. 650 milliGRAM(s) Oral every 6 hours PRN Temp greater or equal to 38C (100.4F), Mild Pain (1 - 3)  ALBUTerol    90 MICROgram(s) HFA Inhaler 2 Puff(s) Inhalation every 6 hours PRN Shortness of Breath and/or Wheezing  magnesium hydroxide Suspension 30 milliLiter(s) Oral daily PRN Constipation  oxyCODONE    IR 5 milliGRAM(s) Oral every 4 hours PRN Moderate Pain (4 - 6)      LAB/STUDIES:                        6.6    24.81 )-----------( 97       ( 17 Sep 2021 23:25 )             19.6     09-    139  |  100  |  18  ----------------------------<  237<H>  3.1<L>   |  30  |  0.7    Ca    6.3<L>      17 Sep 2021 23:25  Phos  2.0     09-  Mg     1.7     -    TPro  3.7<L>  /  Alb  2.3<L>  /  TBili  0.7  /  DBili  0.4<H>  /  AST  100<H>  /  ALT  69<H>  /  AlkPhos  47  -    PT/INR - ( 17 Sep 2021 01:40 )   PT: 15.40 sec;   INR: 1.34 ratio         PTT - ( 17 Sep 2021 23:25 )  PTT:78.2 sec  LIVER FUNCTIONS - ( 17 Sep 2021 02:30 )  Alb: 2.3 g/dL / Pro: 3.7 g/dL / ALK PHOS: 47 U/L / ALT: 69 U/L / AST: 100 U/L / GGT: x             Urinalysis Basic - ( 17 Sep 2021 02:35 )    Color: Yellow / Appearance: Slightly Turbid / S.024 / pH: x  Gluc: x / Ketone: Trace  / Bili: Negative / Urobili: 3 mg/dL   Blood: x / Protein: 30 mg/dL / Nitrite: Negative   Leuk Esterase: Large / RBC: 3 /HPF /  /HPF   Sq Epi: x / Non Sq Epi: 1 /HPF / Bacteria: Negative      CARDIAC MARKERS ( 17 Sep 2021 12:06 )  x     / 0.05 ng/mL / 3410 U/L / x     / 15.7 ng/mL  CARDIAC MARKERS ( 17 Sep 2021 06:15 )  x     / 0.06 ng/mL / 3530 U/L / x     / 14.7 ng/mL  CARDIAC MARKERS ( 17 Sep 2021 01:40 )  x     / 0.03 ng/mL / 1966 U/L / x     / 8.0 ng/mL  CARDIAC MARKERS ( 16 Sep 2021 21:22 )  x     / x     / 372 U/L / x     / x      CARDIAC MARKERS ( 16 Sep 2021 21:19 )  x     / <0.01 ng/mL / x     / x     / 2.2 ng/mL          ABG - ( 17 Sep 2021 01:17 )  pH, Arterial: 7.20  pH, Blood: x     /  pCO2: 32    /  pO2: 106   / HCO3: 12    / Base Excess: -14.4 /  SaO2: 99.3      IMAGING:    < from: VA Duplex Upper Extrem Arterial Limited, Left (21 @ 09:35) >  Left radial Artery is occluded.    Vascular surgery consultation recommended    < end of copied text >

## 2021-09-18 NOTE — PROGRESS NOTE ADULT - SUBJECTIVE AND OBJECTIVE BOX
JACOB KOTHARI  [id number]  66y Female    Indication for ICU admission: s/p L femur fracture ORIF & long gamma IM nail placement, EBL 1L, hypotensive on pressors  Admit Date:09-15-21  ICU Date: 9/16  OR Date: 9/16    Compazine (Unknown)  latex (Urticaria)  penicillins (Unknown)    PAST MEDICAL & SURGICAL HISTORY:  Diabetes    Renal stone    Renal failure    Epilepsy    Asthma    Chronic cough    Knee pain    Osteoarthritis    Uterine cancer  s/p hysterectomy    H/O abdominal hysterectomy  NO RADIATION AND CHEMO    History of tonsillectomy  13 years old    [place] teeth removed    History of bladder stone  SURGERY 8/3/2018    History of surgery  JJ STENT PLACEMENT LEFT OCTOBER 2017      Home Medications:  betamethasone dipropionate 0.05% topical lotion: Apply topically to affected area 2 times a day (15 Sep 2021 18:46)  Desyrel 50 mg oral tablet: 1.5 tab(s) orally once a day (at bedtime) (15 Sep 2021 18:46)  ibuprofen 200 mg oral tablet: 2 tab(s) orally every 8 hours, As Needed (15 Sep 2021 18:46)  Mirapex 0.5 mg oral tablet: 1 tab(s) orally once a day (at bedtime) (15 Sep 2021 18:46)  Multiple Vitamins with Minerals oral capsule: 1 cap(s) orally once a day (15 Sep 2021 18:46)  Nizoral A-D 1% topical shampoo: Apply topically to affected area every 3 days (15 Sep 2021 18:46)  nystatin 100,000 units/g topical cream (obsolete): Apply topically to affected area 2 times a day under bilateral breast and under left arm (15 Sep 2021 18:46)  PHENobarbital 32.4 mg oral tablet: 1 tab(s) orally 3 times a day (15 Sep 2021 18:46)  Senna Plus 50 mg-8.6 mg oral tablet: 2 tab(s) orally once a day (at bedtime) (15 Sep 2021 18:46)  Singulair 10 mg oral tablet: 1 tab(s) orally once a day (at bedtime) (15 Sep 2021 18:46)  Urocit-K 15 mEq oral tablet, extended release: 1 tab(s) orally once a day (15 Sep 2021 18:46)        24HRS EVENT:  Night  -added albuterol inhaler prn  -Hg 6.6 , 1uPRBC rpt Hg 8.8  -levo 0.25, vaso continued  -PTT therapeutic on 1100 last PTT 79, FU 5AM  -2g Mg, 3 K riders K 3.1, FU rpt BMP     DAY  -11am and 1600 labs  -f/u echo -> refused   -home meds restarted  -q4 abg  -UE arterial duplex--> L radial artery occluded  -PTT 97 - > 103 -> decreased hep drip to 1100  - Hb 7.4 from 8.5  - coming down on levo 0.1 and still on vaso   - Ortho okay for OOBTC, NWB LLE      DVT PTX: hep gtt    GI PTX:pantoprazole  Injectable 40 milliGRAM(s) IV Push daily      ***Tubes/Lines/Drains  ***  [x] Peripheral IV  [x] Central Venous Line     	[x] R	[] L	[x] IJ          Date Placed: 9/16  [x] Arterial Line	  L radial a line placed intra op by anesthesia, was DC in PACU due to clotting off  R radial a line place in PACU with 1 attempt, good blood return but shortly after no wave form   attempted R femoral a line without success  [X] Ardon   placed post op in PACU 9/16    REVIEW OF SYSTEMS    [x ] A ten-point review of systems was otherwise negative except as noted.  [ ] Due to altered mental status/intubation, subjective information were not able to be obtained from the patient. History was obtained, to the extent possible, from review of the chart and collateral sources of information.         JACOB KOTHARI  [id number]  66y Female    Indication for ICU admission: s/p L femur fracture ORIF & long gamma IM nail placement, EBL 1L, hypotensive on pressors  Admit Date:09-15-21  ICU Date:   OR Date:     Compazine (Unknown)  latex (Urticaria)  penicillins (Unknown)    PAST MEDICAL & SURGICAL HISTORY:  Diabetes    Renal stone    Renal failure    Epilepsy    Asthma    Chronic cough    Knee pain    Osteoarthritis    Uterine cancer  s/p hysterectomy    H/O abdominal hysterectomy  NO RADIATION AND CHEMO    History of tonsillectomy  13 years old    [place] teeth removed    History of bladder stone  SURGERY 8/3/2018    History of surgery  JJ STENT PLACEMENT LEFT 2017      Home Medications:  betamethasone dipropionate 0.05% topical lotion: Apply topically to affected area 2 times a day (15 Sep 2021 18:46)  Desyrel 50 mg oral tablet: 1.5 tab(s) orally once a day (at bedtime) (15 Sep 2021 18:46)  ibuprofen 200 mg oral tablet: 2 tab(s) orally every 8 hours, As Needed (15 Sep 2021 18:46)  Mirapex 0.5 mg oral tablet: 1 tab(s) orally once a day (at bedtime) (15 Sep 2021 18:46)  Multiple Vitamins with Minerals oral capsule: 1 cap(s) orally once a day (15 Sep 2021 18:46)  Nizoral A-D 1% topical shampoo: Apply topically to affected area every 3 days (15 Sep 2021 18:46)  nystatin 100,000 units/g topical cream (obsolete): Apply topically to affected area 2 times a day under bilateral breast and under left arm (15 Sep 2021 18:46)  PHENobarbital 32.4 mg oral tablet: 1 tab(s) orally 3 times a day (15 Sep 2021 18:46)  Senna Plus 50 mg-8.6 mg oral tablet: 2 tab(s) orally once a day (at bedtime) (15 Sep 2021 18:46)  Singulair 10 mg oral tablet: 1 tab(s) orally once a day (at bedtime) (15 Sep 2021 18:46)  Urocit-K 15 mEq oral tablet, extended release: 1 tab(s) orally once a day (15 Sep 2021 18:46)        24HRS EVENT:  Night  -added albuterol inhaler prn  -Hg 6.6 , 1uPRBC rpt Hg 8.8  -levo 0.25, vaso continued  -PTT therapeutic on 1100 last PTT 79, FU 5AM  -2g Mg, 3 K riders K 3.1, FU rpt BMP     DAY  -11am and 1600 labs  -f/u echo -> refused   -home meds restarted  -q4 abg  -UE arterial duplex--> L radial artery occluded  -PTT 97 - > 103 -> decreased hep drip to 1100  - Hb 7.4 from 8.5  - coming down on levo 0.1 and still on vaso   - Ortho okay for OOBTC, NWB LLE      DVT PTX: hep gtt    GI PTX:pantoprazole  Injectable 40 milliGRAM(s) IV Push daily      ***Tubes/Lines/Drains  ***  [x] Peripheral IV  [x] Central Venous Line     	[x] R	[] L	[x] IJ          Date Placed:   [x] Arterial Line	  L radial a line placed intra op by anesthesia, was DC in PACU due to clotting off  R radial a line place in PACU with 1 attempt, good blood return but shortly after no wave form   attempted R femoral a line without success  [X] Ardon   placed post op in PACU     REVIEW OF SYSTEMS    [x ] A ten-point review of systems was otherwise negative except as noted.  [ ] Due to altered mental status/intubation, subjective information were not able to be obtained from the patient. History was obtained, to the extent possible, from review of the chart and collateral sources of information.        Daily     Daily Weight in k.5 (18 Sep 2021 05:00)    Diet, NPO:   Except Medications  With Ice Chips/Sips of Water (21 @ 14:53)      CURRENT MEDS:  Neurologic Medications  acetaminophen   Tablet .. 650 milliGRAM(s) Oral every 6 hours PRN Temp greater or equal to 38C (100.4F), Mild Pain (1 - 3)  citalopram 20 milliGRAM(s) Oral every 24 hours  oxyCODONE    IR 5 milliGRAM(s) Oral every 4 hours PRN Moderate Pain (4 - 6)  PHENobarbital 32.4 milliGRAM(s) Oral three times a day  pramipexole 0.5 milliGRAM(s) Oral at bedtime  topiramate 100 milliGRAM(s) Oral every 24 hours  traZODone 75 milliGRAM(s) Oral at bedtime    Respiratory Medications  ALBUTerol    90 MICROgram(s) HFA Inhaler 2 Puff(s) Inhalation every 6 hours PRN Shortness of Breath and/or Wheezing  montelukast 10 milliGRAM(s) Oral at bedtime    Cardiovascular Medications  norepinephrine Infusion 0.05 MICROgram(s)/kG/Min IV Continuous <Continuous>    Gastrointestinal Medications  lactated ringers. 1000 milliLiter(s) IV Continuous <Continuous>  magnesium hydroxide Suspension 30 milliLiter(s) Oral daily PRN Constipation  pantoprazole  Injectable 40 milliGRAM(s) IV Push daily  polyethylene glycol 3350 17 Gram(s) Oral daily    Genitourinary Medications    Hematologic/Oncologic Medications  heparin  Infusion 1100 Unit(s)/Hr IV Continuous <Continuous>  influenza   Vaccine 0.5 milliLiter(s) IntraMuscular once    Antimicrobial/Immunologic Medications  cefepime   IVPB      cefepime   IVPB 1000 milliGRAM(s) IV Intermittent every 12 hours  vancomycin  IVPB 1500 milliGRAM(s) IV Intermittent every 12 hours  vancomycin  IVPB        Endocrine/Metabolic Medications  hydrocortisone sodium succinate Injectable 50 milliGRAM(s) IV Push every 6 hours  insulin regular  human corrective regimen sliding scale   IV Push every 4 hours  levothyroxine 50 MICROGram(s) Oral every 24 hours  vasopressin Infusion 0.1 Unit(s)/Min IV Continuous <Continuous>    Topical/Other Medications  chlorhexidine 4% Liquid 1 Application(s) Topical daily      ICU Vital Signs Last 24 Hrs  T(C): 37.9 (18 Sep 2021 09:30), Max: 38.4 (18 Sep 2021 07:00)  T(F): 100.2 (18 Sep 2021 09:30), Max: 101.2 (18 Sep 2021 07:00)  HR: 74 (18 Sep 2021 10:30) (66 - 114)  BP: 92/44 (18 Sep 2021 10:30) (76/40 - 150/55)  BP(mean): 63 (18 Sep 2021 10:30) (55 - 92)  ABP: --  ABP(mean): --  RR: 18 (18 Sep 2021 04:00) (18 - 18)  SpO2: 100% (18 Sep 2021 10:30) (93% - 100%)      Adult Advanced Hemodynamics Last 24 Hrs  CVP(mm Hg): --  CVP(cm H2O): --  CO: --  CI: --  PA: --  PA(mean): --  PCWP: --  SVR: --  SVRI: --  PVR: --  PVRI: --      ABG - ( 17 Sep 2021 01:17 )  pH, Arterial: 7.20  pH, Blood: x     /  pCO2: 32    /  pO2: 106   / HCO3: 12    / Base Excess: -14.4 /  SaO2: 99.3                I&O's Summary    17 Sep 2021 07:01  -  18 Sep 2021 07:00  --------------------------------------------------------  IN: 5568.4 mL / OUT: 1230 mL / NET: 4338.4 mL    18 Sep 2021 07:01  -  18 Sep 2021 11:23  --------------------------------------------------------  IN: 682 mL / OUT: 250 mL / NET: 432 mL      I&O's Detail    17 Sep 2021 07:01  -  18 Sep 2021 07:00  --------------------------------------------------------  IN:    Heparin Infusion: 143 mL    Heparin Infusion: 130 mL    IV PiggyBack: 1300 mL    IV PiggyBack: 100 mL    Lactated Ringers: 700 mL    Norepinephrine: 637.8 mL    PRBCs (Packed Red Blood Cells): 500 mL    Sodium Bicarbonate: 2000 mL    Vasopressin: 57.6 mL  Total IN: 5568.4 mL    OUT:    Indwelling Catheter - Urethral (mL): 1230 mL  Total OUT: 1230 mL    Total NET: 4338.4 mL      18 Sep 2021 07:01  -  18 Sep 2021 11:23  --------------------------------------------------------  IN:    Heparin: 11 mL    Heparin Infusion: 22 mL    IV PiggyBack: 249.9 mL    Lactated Ringers: 300 mL    Norepinephrine: 91.9 mL    Vasopressin: 7.2 mL  Total IN: 682 mL    OUT:    Indwelling Catheter - Urethral (mL): 250 mL  Total OUT: 250 mL    Total NET: 432 mL    LABS:  CAPILLARY BLOOD GLUCOSE      POCT Blood Glucose.: 203 mg/dL (18 Sep 2021 09:55)  POCT Blood Glucose.: 167 mg/dL (18 Sep 2021 05:43)  POCT Blood Glucose.: 190 mg/dL (18 Sep 2021 02:37)  POCT Blood Glucose.: 263 mg/dL (17 Sep 2021 22:45)  POCT Blood Glucose.: 160 mg/dL (17 Sep 2021 17:32)  POCT Blood Glucose.: 190 mg/dL (17 Sep 2021 15:58)                          8.0    29.33 )-----------( 116      ( 18 Sep 2021 05:15 )             22.8       09-18    136  |  100  |  16  ----------------------------<  172<H>  4.1   |  32  |  0.5<L>    Ca    6.8<L>      18 Sep 2021 05:15  Phos  1.8     18  Mg     2.1     -18    TPro  3.7<L>  /  Alb  2.3<L>  /  TBili  0.7  /  DBili  0.4<H>  /  AST  100<H>  /  ALT  69<H>  /  AlkPhos  47  09-17      PT/INR - ( 17 Sep 2021 01:40 )   PT: 15.40 sec;   INR: 1.34 ratio         PTT - ( 18 Sep 2021 05:15 )  PTT:81.3 sec  CARDIAC MARKERS ( 18 Sep 2021 02:00 )  x     / x     / 3474 U/L / x     / x      CARDIAC MARKERS ( 17 Sep 2021 12:06 )  x     / 0.05 ng/mL / 3410 U/L / x     / 15.7 ng/mL  CARDIAC MARKERS ( 17 Sep 2021 06:15 )  x     / 0.06 ng/mL / 3530 U/L / x     / 14.7 ng/mL  CARDIAC MARKERS ( 17 Sep 2021 01:40 )  x     / 0.03 ng/mL / 1966 U/L / x     / 8.0 ng/mL  CARDIAC MARKERS ( 16 Sep 2021 21:22 )  x     / x     / 372 U/L / x     / x      CARDIAC MARKERS ( 16 Sep 2021 21:19 )  x     / <0.01 ng/mL / x     / x     / 2.2 ng/mL      Urinalysis Basic - ( 17 Sep 2021 02:35 )    Color: Yellow / Appearance: Slightly Turbid / S.024 / pH: x  Gluc: x / Ketone: Trace  / Bili: Negative / Urobili: 3 mg/dL   Blood: x / Protein: 30 mg/dL / Nitrite: Negative   Leuk Esterase: Large / RBC: 3 /HPF /  /HPF   Sq Epi: x / Non Sq Epi: 1 /HPF / Bacteria: Negative

## 2021-09-18 NOTE — PROGRESS NOTE ADULT - ASSESSMENT
A 62 y/o female with pmhx of asthma, copd, obesity, epilepsy, hypothyroidism , osteoarthritis , kidney stone  s/p slip and fall at [facility] [place] [facility]  admitted for left femoral fracture.    Acute comminuted, displaced fracture of the left proximal femur with avulsion of the lesser trochanter  - POD #2, in SICU post left femur IMN  - hospital course complicated by absence of left radial pulse  - vascular evaluation noted  - on Heparin drip  - remains on Vasopresin, & NE  - plan as per surgical team  - patient on Cefepime    Hypothyroidism   - continue Synthroid     Seizure Disorder  - continue Phenobarbital     Morbid Obesity   - resume low olga dash diet when able  - provide incentive spirometer    DM  - on oral agent in SNF  - FS noted   - insulin coverage    hx of Depression  - continue home rx    hx of Asthma  - resp treatment as needed prn    Patient remains acute  Patient in SICU, case discussed with RN staff.  Emotional support rendered at bedside to the patient who is upset about NPO status.      A 64 y/o female with pmhx of asthma, copd, obesity, epilepsy, hypothyroidism , osteoarthritis , kidney stone  s/p slip and fall at [facility] [place] [facility]  admitted for left femoral fracture.    Acute comminuted, displaced fracture of the left proximal femur with avulsion of the lesser trochanter  - POD #2, in SICU post left femur IMN  - hospital course complicated by absence of left radial pulse; and hemorrhage w hypotension/shock  - vascular evaluation noted  - on Heparin drip  - remains on Vasopresin, & NE- rying to taper off;      P/S her BP runs low normally- eg 100 systolic  - plan as per surgical team  - patient on Cefepime    Hypothyroidism   - continue Synthroid     Seizure Disorder  - continue Phenobarbital     Morbid Obesity   - resume low olga dash diet when able; surg allowing only ice chips for now       see lower prot/albumin; consider PPN if no PO Nutrition  - provide incentive spirometer    DM  - on oral agent in SNF  - FS noted   - insulin coverage    hx of Depression  - continue home rx    hx of Asthma  - resp treatment as needed prn    Patient remains acute  Patient in SICU, case discussed with RN staff.  Emotional support rendered at bedside to the patient who is upset about NPO status.

## 2021-09-18 NOTE — PROGRESS NOTE ADULT - SUBJECTIVE AND OBJECTIVE BOX
Chart reviewed, patient examined. Pertinent results reviewed.  Case discussed with HO; specialist f/u reviewed  HD#3; POD#2  JACOB KOTHARI    HPI:  A 62 y/o female with pmhx of asthma, copd, obesity, epilepsy, hypothyroidism , osteoarthritis , kidney stone  s/p slip and fall at [facility]  admitted for left femoral fracture. Pt reports was taking shower the DOA, her shower chair slipped and she fell on her buttocks. PT reports left hip pain. PT  denies neck pain, hitting head or loc. Pt reports was not able to stand or straighten her left leg. Pt reports usually ambulates with a walker. Pt denies numbness or tingling on her left leg. Pt denies urinary or fecal incontinence. PT reports last seizure was 15 yrs ago. Pt denies taking any blood thinners. Pt denies fever, chills, chest pain, shortness of breath, burning with urination, diarrhea.  (15 Sep 2021 18:29)     ED evaluation revealed a L Hip FX; She had ORIF w pinning by Vivien; She's in the SICU on pressors since after surgery.    MEDICATIONS  (STANDING):  cefepime   IVPB      cefepime   IVPB 1000 milliGRAM(s) IV Intermittent every 12 hours  chlorhexidine 4% Liquid 1 Application(s) Topical daily  citalopram 20 milliGRAM(s) Oral every 24 hours  dextrose 5%. 1000 milliLiter(s) (100 mL/Hr) IV Continuous <Continuous>  glucagon  Injectable 1 milliGRAM(s) IntraMuscular once  heparin  Infusion. 1200 Unit(s)/Hr (12 mL/Hr) IV Continuous <Continuous>  hydrocortisone sodium succinate Injectable 50 milliGRAM(s) IV Push every 6 hours  influenza   Vaccine 0.5 milliLiter(s) IntraMuscular once  insulin regular  human corrective regimen sliding scale   IV Push every 4 hours  levothyroxine 50 MICROGram(s) Oral every 24 hours  montelukast 10 milliGRAM(s) Oral at bedtime  norepinephrine Infusion 0.05 MICROgram(s)/kG/Min (5.74 mL/Hr) IV Continuous <Continuous>  pantoprazole  Injectable 40 milliGRAM(s) IV Push daily  PHENobarbital 32.4 milliGRAM(s) Oral three times a day  polyethylene glycol 3350 17 Gram(s) Oral daily  pramipexole 0.5 milliGRAM(s) Oral at bedtime  sodium bicarbonate  Infusion 0.184 mEq/kG/Hr (150 mL/Hr) IV Continuous <Continuous>  topiramate 100 milliGRAM(s) Oral every 24 hours  traZODone 75 milliGRAM(s) Oral at bedtime  vancomycin  IVPB      vancomycin  IVPB 1500 milliGRAM(s) IV Intermittent every 12 hours  vasopressin Infusion 0.1 Unit(s)/Min (6 mL/Hr) IV Continuous <Continuous>    MEDICATIONS  (PRN):  acetaminophen   Tablet .. 650 milliGRAM(s) Oral every 6 hours PRN Temp greater or equal to 38C (100.4F), Mild Pain (1 - 3)  magnesium hydroxide Suspension 30 milliLiter(s) Oral daily PRN Constipation  oxyCODONE    IR 5 milliGRAM(s) Oral every 4 hours PRN Moderate Pain (4 - 6)    INTERVAL EVENTS: Patient seen today, chart reviewed. Patient awake, alert, asking for juice despite being NPO. Patient comfortable, states leg hurts when she attempts to move it. L[facility] in [facility].      ICU Vital Signs Last 24 Hrs  T(C): 37.9 (18 Sep 2021 09:30), Max: 38.4 (18 Sep 2021 07:00)  T(F): 100.2 (18 Sep 2021 09:30), Max: 101.2 (18 Sep 2021 07:00)  HR: 74 (18 Sep 2021 10:30) (66 - 114)  BP: 92/44 (18 Sep 2021 10:30) (76/40 - 150/55)  BP(mean): 63 (18 Sep 2021 10:30) (55 - 92)  ABP: --  ABP(mean): --  RR: 18 (18 Sep 2021 04:00) (18 - 18)  SpO2: 100% (18 Sep 2021 10:30) (93% - 100%)    PHYSICAL EXAM:  GENERAL: NAD, Pale, awake alert  CHEST/LUNG: Decreased effort,Clear; No wheezing  HEART: S1, S2, Regular rate  ABDOMEN: Soft, Nontender, Obese, Bowel sounds present  EXTREMITIES: + edema, LUE discolored, able to move her fingers, LLE dressing intact  SKIN: No rashes or lesions    LABS:                         8.0    29.33 )-----------( 116      ( 18 Sep 2021 05:15 )             22.8                        9.6    35.40 )-----------( 132      ( 17 Sep 2021 06:15 )             29.1     09-18    136  |  100  |  16  ----------------------------<  172<H>  4.1   |  32  |  0.5<L>    Ca    6.8<L>      18 Sep 2021 05:15  Phos  1.8       Mg     2.1         TPro  3.7<L>  /  Alb  2.3<L>  /  TBili  0.7  /  DBili  0.4<H>  /  AST  100<H>  /  ALT  69<H>  /  AlkPhos  47      141  |  102  |  20  ----------------------------<  274<H>  3.2<L>   |  20  |  1.1    Ca    7.2<L>      17 Sep 2021 06:15  Phos  3.8       Mg     1.9         TPro  3.7<L>  /  Alb  2.3<L>  /  TBili  0.7  /  DBili  0.4<H>  /  AST  100<H>  /  ALT  69<H>  /  AlkPhos  47      LIVER FUNCTIONS - ( 17 Sep 2021 02:30 )  Alb: 2.3 g/dL / Pro: 3.7 g/dL / ALK PHOS: 47 U/L / ALT: 69 U/L / AST: 100 U/L / GGT: x                 PT/INR - ( 17 Sep 2021 01:40 )   PT: 15.40 sec;   INR: 1.34 ratio         PTT - ( 17 Sep 2021 01:40 )  PTT:28.0 sec  CARDIAC MARKERS ( 17 Sep 2021 06:15 )  x     / 0.06 ng/mL / 3530 U/L / x     / 14.7 ng/mL  CARDIAC MARKERS ( 17 Sep 2021 01:40 )  x     / 0.03 ng/mL / 1966 U/L / x     / 8.0 ng/mL  CARDIAC MARKERS ( 16 Sep 2021 21:22 )  x     / x     / 372 U/L / x     / x      CARDIAC MARKERS ( 16 Sep 2021 21:19 )  x     / <0.01 ng/mL / x     / x     / 2.2 ng/mL      ABG - ( 17 Sep 2021 01:17 )  pH, Arterial: 7.20  pH, Blood: x     /  pCO2: 32    /  pO2: 106   / HCO3: 12    / Base Excess: -14.4 /  SaO2: 99.3        Urinalysis Basic - ( 17 Sep 2021 02:35 )    Color: Yellow / Appearance: Slightly Turbid / S.024 / pH: x  Gluc: x / Ketone: Trace  / Bili: Negative / Urobili: 3 mg/dL   Blood: x / Protein: 30 mg/dL / Nitrite: Negative   Leuk Esterase: Large / RBC: 3 /HPF /  /HPF   Sq Epi: x / Non Sq Epi: 1 /HPF / Bacteria: Negative          RADIOLOGY & ADDITIONAL TESTS:  < from: Xray Chest 1 View-PORTABLE IMMEDIATE (Xray Chest 1 View-PORTABLE IMMEDIATE .) (21 @ 22:35) >    INTERPRETATION:  Clinical History / Reason for exam: Line placement    Comparison : Chest radiograph September 15, 2021.    Technique/Positioning: Single AP view ofthe chest.    Findings:    Support devices: Right IJ central venous catheter overlying proximal SVC    Cardiac/mediastinum/hilum: Unchanged.    Lung parenchyma/Pleura: No consolidation, effusion or pneumothorax.    Skeleton/soft tissues: Unchanged.    Impression:    No consolidation, effusion or pneumothorax.        --- End of Report ---      < end of copied text >   Chart reviewed, patient examined. Pertinent results reviewed.  Case discussed with HO; specialist f/u reviewed  HD#3; POD#2  JACOB KOTHARI    HPI:  A 64 y/o female with pmhx of asthma, copd, obesity, epilepsy, hypothyroidism , osteoarthritis , kidney stone  s/p slip and fall at [facility]  admitted for left femoral fracture. Pt reports was taking shower the DOA, her shower chair slipped and she fell on her buttocks. PT reports left hip pain. PT  denies neck pain, hitting head or loc. Pt reports was not able to stand or straighten her left leg. Pt reports usually ambulates with a walker. Pt denies numbness or tingling on her left leg. Pt denies urinary or fecal incontinence. PT reports last seizure was 15 yrs ago. Pt denies taking any blood thinners. Pt denies fever, chills, chest pain, shortness of breath, burning with urination, diarrhea.  (15 Sep 2021 18:29)     ED evaluation revealed a L Hip FX; She had ORIF w pinning by Vivien; She's in the SICU on pressors since after surgery.    MEDICATIONS  (STANDING):  cefepime   IVPB      cefepime   IVPB 1000 milliGRAM(s) IV Intermittent every 12 hours  chlorhexidine 4% Liquid 1 Application(s) Topical daily  citalopram 20 milliGRAM(s) Oral every 24 hours  dextrose 5%. 1000 milliLiter(s) (100 mL/Hr) IV Continuous <Continuous>  glucagon  Injectable 1 milliGRAM(s) IntraMuscular once  heparin  Infusion. 1200 Unit(s)/Hr (12 mL/Hr) IV Continuous <Continuous>  hydrocortisone sodium succinate Injectable 50 milliGRAM(s) IV Push every 6 hours  influenza   Vaccine 0.5 milliLiter(s) IntraMuscular once  insulin regular  human corrective regimen sliding scale   IV Push every 4 hours  levothyroxine 50 MICROGram(s) Oral every 24 hours  montelukast 10 milliGRAM(s) Oral at bedtime  norepinephrine Infusion 0.05 MICROgram(s)/kG/Min (5.74 mL/Hr) IV Continuous <Continuous>  pantoprazole  Injectable 40 milliGRAM(s) IV Push daily  PHENobarbital 32.4 milliGRAM(s) Oral three times a day  polyethylene glycol 3350 17 Gram(s) Oral daily  pramipexole 0.5 milliGRAM(s) Oral at bedtime  sodium bicarbonate  Infusion 0.184 mEq/kG/Hr (150 mL/Hr) IV Continuous <Continuous>  topiramate 100 milliGRAM(s) Oral every 24 hours  traZODone 75 milliGRAM(s) Oral at bedtime  vancomycin  IVPB      vancomycin  IVPB 1500 milliGRAM(s) IV Intermittent every 12 hours  vasopressin Infusion 0.1 Unit(s)/Min (6 mL/Hr) IV Continuous <Continuous>    MEDICATIONS  (PRN):  acetaminophen   Tablet .. 650 milliGRAM(s) Oral every 6 hours PRN Temp greater or equal to 38C (100.4F), Mild Pain (1 - 3)  magnesium hydroxide Suspension 30 milliLiter(s) Oral daily PRN Constipation  oxyCODONE    IR 5 milliGRAM(s) Oral every 4 hours PRN Moderate Pain (4 - 6)    INTERVAL EVENTS: Patient seen today, chart reviewed. Patient awake, alert, asking for juice despite being NPO. Patient comfortable, states leg hurts when she attempts to move it. LUE in [facility].      ICU Vital Signs Last 24 Hrs  T(C): 37.9 (18 Sep 2021 09:30), Max: 38.4 (18 Sep 2021 07:00)  T(F): 100.2 (18 Sep 2021 09:30), Max: 101.2 (18 Sep 2021 07:00)  HR: 74 (18 Sep 2021 10:30) (66 - 114)  BP: 92/44 (18 Sep 2021 10:30) (76/40 - 150/55)  BP(mean): 63 (18 Sep 2021 10:30) (55 - 92)  ABP: --  ABP(mean): --  RR: 18 (18 Sep 2021 04:00) (18 - 18)  SpO2: 100% (18 Sep 2021 10:30) (93% - 100%)    PHYSICAL EXAM:  GENERAL: NAD, Pale, awake alert; very obese;   CHEST/LUNG: Decreased effort,Clear; No wheezing  HEART: RRR, no MRG  ABDOMEN: Soft, Nontender, Obese, Bowel sounds present  EXTREMITIES: + edema, LUE discolored, able to move her fingers, LLE dressing intact  SKIN: No rashes or lesions    LABS:                         8.0    29.33 )-----------( 116      ( 18 Sep 2021 05:15 )             22.8                        9.6    35.40 )-----------( 132      ( 17 Sep 2021 06:15 )             29.1     09-18    136  |  100  |  16  ----------------------------<  172<H>  4.1   |  32  |  0.5<L>    Ca    6.8<L>      18 Sep 2021 05:15  Phos  1.8     18  Mg     2.1     18    TPro  3.7<L>  /  Alb  2.3<L>  /  TBili  0.7  /  DBili  0.4<H>  /  AST  100<H>  /  ALT  69<H>  /  AlkPhos  47      141  |  102  |  20  ----------------------------<  274<H>  3.2<L>   |  20  |  1.1    Ca    7.2<L>      17 Sep 2021 06:15  Phos  3.8     17  Mg     1.9     17    TPro  3.7<L>  /  Alb  2.3<L>  /  TBili  0.7  /  DBili  0.4<H>  /  AST  100<H>  /  ALT  69<H>  /  AlkPhos  47  17    LIVER FUNCTIONS - ( 17 Sep 2021 02:30 )  Alb: 2.3 g/dL / Pro: 3.7 g/dL / ALK PHOS: 47 U/L / ALT: 69 U/L / AST: 100 U/L / GGT: x                 PT/INR - ( 17 Sep 2021 01:40 )   PT: 15.40 sec;   INR: 1.34 ratio         PTT - ( 17 Sep 2021 01:40 )  PTT:28.0 sec  CARDIAC MARKERS ( 17 Sep 2021 06:15 )  x     / 0.06 ng/mL / 3530 U/L / x     / 14.7 ng/mL  CARDIAC MARKERS ( 17 Sep 2021 01:40 )  x     / 0.03 ng/mL / 1966 U/L / x     / 8.0 ng/mL  CARDIAC MARKERS ( 16 Sep 2021 21:22 )  x     / x     / 372 U/L / x     / x      CARDIAC MARKERS ( 16 Sep 2021 21:19 )  x     / <0.01 ng/mL / x     / x     / 2.2 ng/mL      ABG - ( 17 Sep 2021 01:17 )  pH, Arterial: 7.20  pH, Blood: x     /  pCO2: 32    /  pO2: 106   / HCO3: 12    / Base Excess: -14.4 /  SaO2: 99.3        Urinalysis Basic - ( 17 Sep 2021 02:35 )    Color: Yellow / Appearance: Slightly Turbid / S.024 / pH: x  Gluc: x / Ketone: Trace  / Bili: Negative / Urobili: 3 mg/dL   Blood: x / Protein: 30 mg/dL / Nitrite: Negative   Leuk Esterase: Large / RBC: 3 /HPF /  /HPF   Sq Epi: x / Non Sq Epi: 1 /HPF / Bacteria: Negative          RADIOLOGY & ADDITIONAL TESTS:  < from: Xray Chest 1 View-PORTABLE IMMEDIATE (Xray Chest 1 View-PORTABLE IMMEDIATE .) (21 @ 22:35) >    INTERPRETATION:  Clinical History / Reason for exam: Line placement    Comparison : Chest radiograph September 15, 2021.    Technique/Positioning: Single AP view ofthe chest.    Findings:    Support devices: Right IJ central venous catheter overlying proximal SVC    Cardiac/mediastinum/hilum: Unchanged.    Lung parenchyma/Pleura: No consolidation, effusion or pneumothorax.    Skeleton/soft tissues: Unchanged.    Impression:    No consolidation, effusion or pneumothorax.        --- End of Report ---      < end of copied text >

## 2021-09-18 NOTE — OCCUPATIONAL THERAPY INITIAL EVALUATION ADULT - SPECIFY REASON(S)
Hold at this time per Dr Blanc 2/2 pt on pressors. OT to cont when appropriate.
Attempted to see pt for OT evaluation, pt on multiple pressors, s/p blood transfusion last night, per RN hold OT today pt not currently stable

## 2021-09-18 NOTE — PROGRESS NOTE ADULT - ASSESSMENT
Assessment & Plan  66y Female 1d s/p ORIF of L femur fracture with long IM gamma nail     NEURO:  Acute post op pain  - controlled with prn Tylenol & oxycodone 5mg  HX epilepsy  - per patient last seizures 20 years ago  - c/w home regimen phenobarbital, Mirapex, Topiramatef  HX sleep disorder/depression  - c/w Trazadone 75mg at bedtime    RESP:   HX asthma, THOM (does not use CPAP at NH)    Oxygen insufficiency- BiPAP 40% 12/6, weaned to 4L NC    Activity- bedrest, NWB  LLE  Current Rx: montelukast 10 at bedtime  Home Rx: Singulair 10 mg oral tablet    CARDS:   Hypotension postop , hemorrhagic shock  - levo & vaso  - wean levo, keep MAP > 65  - s/p 2uPRBC, fluid resusitation via nicom  Sinus tachycardia, resolved  - EKG sinus tachycardia, no sign so ischemia  - cards consult, r/o tamponade, cardiogenic shock on bedside echo  [ ] ECHO: rpt pending - refused  - prior 2018, normal systolic function    VASC:  - LUE poorly perfused, first 2 digits appear dusky, on bedside exam diminish radial pulse  - brachial, ulnar and palmar pulses are strong and dopperable  - q1 neurovascular checks of LUE  - urgent vascular consult place, recs to start heparin gtt, cleared by Ortho team  [ ] monitor PTT goal 60-80  LUE arterial duplex ordered -> L radial occluded    GI/NUTR:   Diet, NPO except meds given pressor requirements    GI Prophylaxis- PPI IV    Bowel regimen- senna  magnesium hydroxide Suspension 30 milliLiter(s) Oral daily PRN  senna 2 Tablet(s) Oral at bedtime PRN    /RENAL:   -Strict I&Os, UOP 40-50cc/hr  -saravia placed in PACU  MONSTER, resolved  -BUN/Cr 19/0.9 -->1.2-->0.7  Lactic acidosis  - improving   - lactate 13--> 7-->>5.7---> 4.7-->3.2  -bicarb gtt changed to LR @ 100 due to hypokalemia and normal Bicarb on serum  IVF: 100 LR  Labs:          BUN/Cr- 20/1.1  -->,  20/1.0  -->,  18/0.7  -->          Electrolytes-Na 139 // K 3.1 // Mg 1.7 //  Phos 2.0 (09-17 @ 23:25)  -FU rpt labs, recieved 2g Mg, 20 KCL x3    HEME/ONC:     DVT prophylaxis-, SCDs , hep gtt    Labs: Hb/Hct:  6.6/19.6  -->,  8.0/22.8  -->                      Plts:  116  -->,  97  -->                 PTT/INR:  103.4/--  (09-17 @ 16:30) --->,  78.2/--  (09-17 @ 23:25) --->                 T&S: (09-16)    ID:  WBC- 23->25->29  afebrile  Antibiotics-cefepime / vanc  [ ] bld cx, UA neg  [ ] FU VANC trough 5pm    ENDO:     HA1C pending    finger sticks q4hr while npo    LINES/DRAINS:  PIV, Duglas R IJ TLC    CODE STATUS: DNR/DNI    DISPO:    SICU d/w Dr Ireland

## 2021-09-18 NOTE — PROGRESS NOTE ADULT - ASSESSMENT
Orthopaedic Surgery Progress Note    S&E at bedside this AM. Pain well controlled. Transfused 1U PRBC overnight, Hb increased 6.6 > 8.0 posttransfusion. Patient still on pressor support. Otherwise no acute events overnight, patient with no active complaints at this time.      T(C): 36.8 (09-17-21 @ 15:52), Max: 36.8 (09-17-21 @ 15:52)  HR: 76 (09-18-21 @ 04:00) (72 - 114)  BP: 128/53 (09-18-21 @ 04:00) (76/40 - 151/65)  RR: 18 (09-18-21 @ 04:00) (18 - 18)  SpO2: 99% (09-18-21 @ 04:00) (93% - 100%)    P/E:  AOx3, NAD  Nonlabored breathing    LLE:  Dressing C/D/I   Compartments soft and compressible  Motor intact distally  SILT distally  CR<2sec    Labs                        8.0    29.33 )-----------( 116      ( 18 Sep 2021 05:15 )             22.8     09-18    136  |  100  |  16  ----------------------------<  172<H>  4.1   |  32  |  0.5<L>    Ca    6.8<L>      18 Sep 2021 05:15  Phos  1.8     09-18  Mg     2.1     09-18    TPro  3.7<L>  /  Alb  2.3<L>  /  TBili  0.7  /  DBili  0.4<H>  /  AST  100<H>  /  ALT  69<H>  /  AlkPhos  47  09-17    LIVER FUNCTIONS - ( 17 Sep 2021 02:30 )  Alb: 2.3 g/dL / Pro: 3.7 g/dL / ALK PHOS: 47 U/L / ALT: 69 U/L / AST: 100 U/L / GGT: x           PT/INR - ( 17 Sep 2021 01:40 )   PT: 15.40 sec;   INR: 1.34 ratio         PTT - ( 18 Sep 2021 05:15 )  PTT:81.3 sec      A/P:   A/P: 66yFemale POD #2 s/p left femur IMN, doing well. Found to have left radial artery occlusion postoperatively. Remains in SICU on pressor support.    NWB LLE  PT/OT: mobilize patient OOB when stable  Pain control   Incentive Spirometry   Hb 6.6 > 8.0 s/p 1U prbc overnight  Abx: cefepime, vanc  DVT Prophylaxis: currently on heparin ggt for left radial artery occlusion; please transition to LVX or HSQ when possible as a heparin gtt significantly increases her risk for wound complications, hematoma formation  Remainder of care per SICU

## 2021-09-19 LAB
ANION GAP SERPL CALC-SCNC: 4 MMOL/L — LOW (ref 7–14)
ANION GAP SERPL CALC-SCNC: 5 MMOL/L — LOW (ref 7–14)
APTT BLD: 41.9 SEC — HIGH (ref 27–39.2)
APTT BLD: 51.5 SEC — HIGH (ref 27–39.2)
BASOPHILS # BLD AUTO: 0.02 K/UL — SIGNIFICANT CHANGE UP (ref 0–0.2)
BASOPHILS NFR BLD AUTO: 0.1 % — SIGNIFICANT CHANGE UP (ref 0–1)
BLD GP AB SCN SERPL QL: SIGNIFICANT CHANGE UP
BUN SERPL-MCNC: 13 MG/DL — SIGNIFICANT CHANGE UP (ref 10–20)
BUN SERPL-MCNC: 13 MG/DL — SIGNIFICANT CHANGE UP (ref 10–20)
CALCIUM SERPL-MCNC: 6.5 MG/DL — LOW (ref 8.5–10.1)
CALCIUM SERPL-MCNC: 6.6 MG/DL — LOW (ref 8.5–10.1)
CHLORIDE SERPL-SCNC: 100 MMOL/L — SIGNIFICANT CHANGE UP (ref 98–110)
CHLORIDE SERPL-SCNC: 100 MMOL/L — SIGNIFICANT CHANGE UP (ref 98–110)
CO2 SERPL-SCNC: 28 MMOL/L — SIGNIFICANT CHANGE UP (ref 17–32)
CO2 SERPL-SCNC: 29 MMOL/L — SIGNIFICANT CHANGE UP (ref 17–32)
CREAT SERPL-MCNC: <0.5 MG/DL — LOW (ref 0.7–1.5)
CREAT SERPL-MCNC: <0.5 MG/DL — LOW (ref 0.7–1.5)
EOSINOPHIL # BLD AUTO: 0.01 K/UL — SIGNIFICANT CHANGE UP (ref 0–0.7)
EOSINOPHIL NFR BLD AUTO: 0 % — SIGNIFICANT CHANGE UP (ref 0–8)
GLUCOSE BLDC GLUCOMTR-MCNC: 125 MG/DL — HIGH (ref 70–99)
GLUCOSE BLDC GLUCOMTR-MCNC: 137 MG/DL — HIGH (ref 70–99)
GLUCOSE BLDC GLUCOMTR-MCNC: 155 MG/DL — HIGH (ref 70–99)
GLUCOSE BLDC GLUCOMTR-MCNC: 165 MG/DL — HIGH (ref 70–99)
GLUCOSE BLDC GLUCOMTR-MCNC: 182 MG/DL — HIGH (ref 70–99)
GLUCOSE BLDC GLUCOMTR-MCNC: 192 MG/DL — HIGH (ref 70–99)
GLUCOSE SERPL-MCNC: 171 MG/DL — HIGH (ref 70–99)
GLUCOSE SERPL-MCNC: 179 MG/DL — HIGH (ref 70–99)
HCT VFR BLD CALC: 19 % — LOW (ref 37–47)
HCT VFR BLD CALC: 19.2 % — LOW (ref 37–47)
HCT VFR BLD CALC: 20.5 % — LOW (ref 37–47)
HCT VFR BLD CALC: 22.6 % — LOW (ref 37–47)
HCT VFR BLD CALC: 23 % — LOW (ref 37–47)
HGB BLD-MCNC: 6.2 G/DL — CRITICAL LOW (ref 12–16)
HGB BLD-MCNC: 6.4 G/DL — CRITICAL LOW (ref 12–16)
HGB BLD-MCNC: 6.8 G/DL — CRITICAL LOW (ref 12–16)
HGB BLD-MCNC: 7.7 G/DL — LOW (ref 12–16)
HGB BLD-MCNC: 7.7 G/DL — LOW (ref 12–16)
IMM GRANULOCYTES NFR BLD AUTO: 0.9 % — HIGH (ref 0.1–0.3)
LACTATE SERPL-SCNC: 1.2 MMOL/L — SIGNIFICANT CHANGE UP (ref 0.7–2)
LYMPHOCYTES # BLD AUTO: 15.4 % — LOW (ref 20.5–51.1)
LYMPHOCYTES # BLD AUTO: 3.9 K/UL — HIGH (ref 1.2–3.4)
MAGNESIUM SERPL-MCNC: 1.8 MG/DL — SIGNIFICANT CHANGE UP (ref 1.8–2.4)
MAGNESIUM SERPL-MCNC: 1.9 MG/DL — SIGNIFICANT CHANGE UP (ref 1.8–2.4)
MCHC RBC-ENTMCNC: 30.4 PG — SIGNIFICANT CHANGE UP (ref 27–31)
MCHC RBC-ENTMCNC: 30.8 PG — SIGNIFICANT CHANGE UP (ref 27–31)
MCHC RBC-ENTMCNC: 30.8 PG — SIGNIFICANT CHANGE UP (ref 27–31)
MCHC RBC-ENTMCNC: 30.9 PG — SIGNIFICANT CHANGE UP (ref 27–31)
MCHC RBC-ENTMCNC: 31.2 PG — HIGH (ref 27–31)
MCHC RBC-ENTMCNC: 32.6 G/DL — SIGNIFICANT CHANGE UP (ref 32–37)
MCHC RBC-ENTMCNC: 33.2 G/DL — SIGNIFICANT CHANGE UP (ref 32–37)
MCHC RBC-ENTMCNC: 33.3 G/DL — SIGNIFICANT CHANGE UP (ref 32–37)
MCHC RBC-ENTMCNC: 33.5 G/DL — SIGNIFICANT CHANGE UP (ref 32–37)
MCHC RBC-ENTMCNC: 34.1 G/DL — SIGNIFICANT CHANGE UP (ref 32–37)
MCV RBC AUTO: 91.5 FL — SIGNIFICANT CHANGE UP (ref 81–99)
MCV RBC AUTO: 92 FL — SIGNIFICANT CHANGE UP (ref 81–99)
MCV RBC AUTO: 92.8 FL — SIGNIFICANT CHANGE UP (ref 81–99)
MCV RBC AUTO: 92.8 FL — SIGNIFICANT CHANGE UP (ref 81–99)
MCV RBC AUTO: 93.1 FL — SIGNIFICANT CHANGE UP (ref 81–99)
MONOCYTES # BLD AUTO: 1.73 K/UL — HIGH (ref 0.1–0.6)
MONOCYTES NFR BLD AUTO: 6.8 % — SIGNIFICANT CHANGE UP (ref 1.7–9.3)
NEUTROPHILS # BLD AUTO: 19.44 K/UL — HIGH (ref 1.4–6.5)
NEUTROPHILS NFR BLD AUTO: 76.8 % — HIGH (ref 42.2–75.2)
NRBC # BLD: 0 /100 WBCS — SIGNIFICANT CHANGE UP (ref 0–0)
PHOSPHATE SERPL-MCNC: 1.6 MG/DL — LOW (ref 2.1–4.9)
PHOSPHATE SERPL-MCNC: 1.6 MG/DL — LOW (ref 2.1–4.9)
PLATELET # BLD AUTO: 106 K/UL — LOW (ref 130–400)
PLATELET # BLD AUTO: 109 K/UL — LOW (ref 130–400)
PLATELET # BLD AUTO: 129 K/UL — LOW (ref 130–400)
PLATELET # BLD AUTO: 131 K/UL — SIGNIFICANT CHANGE UP (ref 130–400)
PLATELET # BLD AUTO: 98 K/UL — LOW (ref 130–400)
POTASSIUM SERPL-MCNC: 3.6 MMOL/L — SIGNIFICANT CHANGE UP (ref 3.5–5)
POTASSIUM SERPL-MCNC: 3.8 MMOL/L — SIGNIFICANT CHANGE UP (ref 3.5–5)
POTASSIUM SERPL-SCNC: 3.6 MMOL/L — SIGNIFICANT CHANGE UP (ref 3.5–5)
POTASSIUM SERPL-SCNC: 3.8 MMOL/L — SIGNIFICANT CHANGE UP (ref 3.5–5)
RBC # BLD: 2.04 M/UL — LOW (ref 4.2–5.4)
RBC # BLD: 2.07 M/UL — LOW (ref 4.2–5.4)
RBC # BLD: 2.21 M/UL — LOW (ref 4.2–5.4)
RBC # BLD: 2.47 M/UL — LOW (ref 4.2–5.4)
RBC # BLD: 2.5 M/UL — LOW (ref 4.2–5.4)
RBC # FLD: 14.3 % — SIGNIFICANT CHANGE UP (ref 11.5–14.5)
RBC # FLD: 14.5 % — SIGNIFICANT CHANGE UP (ref 11.5–14.5)
RBC # FLD: 14.6 % — HIGH (ref 11.5–14.5)
RBC # FLD: 14.8 % — HIGH (ref 11.5–14.5)
RBC # FLD: 14.9 % — HIGH (ref 11.5–14.5)
SODIUM SERPL-SCNC: 132 MMOL/L — LOW (ref 135–146)
SODIUM SERPL-SCNC: 134 MMOL/L — LOW (ref 135–146)
WBC # BLD: 18.11 K/UL — HIGH (ref 4.8–10.8)
WBC # BLD: 19.1 K/UL — HIGH (ref 4.8–10.8)
WBC # BLD: 20.88 K/UL — HIGH (ref 4.8–10.8)
WBC # BLD: 24.63 K/UL — HIGH (ref 4.8–10.8)
WBC # BLD: 25.34 K/UL — HIGH (ref 4.8–10.8)
WBC # FLD AUTO: 18.11 K/UL — HIGH (ref 4.8–10.8)
WBC # FLD AUTO: 19.1 K/UL — HIGH (ref 4.8–10.8)
WBC # FLD AUTO: 20.88 K/UL — HIGH (ref 4.8–10.8)
WBC # FLD AUTO: 24.63 K/UL — HIGH (ref 4.8–10.8)
WBC # FLD AUTO: 25.34 K/UL — HIGH (ref 4.8–10.8)

## 2021-09-19 PROCEDURE — 99291 CRITICAL CARE FIRST HOUR: CPT

## 2021-09-19 PROCEDURE — 71045 X-RAY EXAM CHEST 1 VIEW: CPT | Mod: 26

## 2021-09-19 RX ORDER — INSULIN HUMAN 100 [IU]/ML
INJECTION, SOLUTION SUBCUTANEOUS EVERY 4 HOURS
Refills: 0 | Status: DISCONTINUED | OUTPATIENT
Start: 2021-09-19 | End: 2021-09-21

## 2021-09-19 RX ORDER — POTASSIUM PHOSPHATE, MONOBASIC POTASSIUM PHOSPHATE, DIBASIC 236; 224 MG/ML; MG/ML
30 INJECTION, SOLUTION INTRAVENOUS ONCE
Refills: 0 | Status: COMPLETED | OUTPATIENT
Start: 2021-09-19 | End: 2021-09-19

## 2021-09-19 RX ORDER — FUROSEMIDE 40 MG
10 TABLET ORAL ONCE
Refills: 0 | Status: COMPLETED | OUTPATIENT
Start: 2021-09-19 | End: 2021-09-19

## 2021-09-19 RX ORDER — MIDODRINE HYDROCHLORIDE 2.5 MG/1
10 TABLET ORAL EVERY 8 HOURS
Refills: 0 | Status: DISCONTINUED | OUTPATIENT
Start: 2021-09-19 | End: 2021-09-20

## 2021-09-19 RX ORDER — MAGNESIUM SULFATE 500 MG/ML
2 VIAL (ML) INJECTION ONCE
Refills: 0 | Status: COMPLETED | OUTPATIENT
Start: 2021-09-19 | End: 2021-09-19

## 2021-09-19 RX ORDER — POTASSIUM CHLORIDE 20 MEQ
20 PACKET (EA) ORAL ONCE
Refills: 0 | Status: COMPLETED | OUTPATIENT
Start: 2021-09-19 | End: 2021-09-19

## 2021-09-19 RX ORDER — FUROSEMIDE 40 MG
10 TABLET ORAL ONCE
Refills: 0 | Status: DISCONTINUED | OUTPATIENT
Start: 2021-09-19 | End: 2021-09-19

## 2021-09-19 RX ORDER — HEPARIN SODIUM 5000 [USP'U]/ML
1200 INJECTION INTRAVENOUS; SUBCUTANEOUS
Qty: 25000 | Refills: 0 | Status: DISCONTINUED | OUTPATIENT
Start: 2021-09-19 | End: 2021-09-20

## 2021-09-19 RX ORDER — HEPARIN SODIUM 5000 [USP'U]/ML
1200 INJECTION INTRAVENOUS; SUBCUTANEOUS
Qty: 25000 | Refills: 0 | Status: DISCONTINUED | OUTPATIENT
Start: 2021-09-19 | End: 2021-09-19

## 2021-09-19 RX ORDER — POTASSIUM PHOSPHATE, MONOBASIC POTASSIUM PHOSPHATE, DIBASIC 236; 224 MG/ML; MG/ML
15 INJECTION, SOLUTION INTRAVENOUS ONCE
Refills: 0 | Status: COMPLETED | OUTPATIENT
Start: 2021-09-19 | End: 2021-09-19

## 2021-09-19 RX ADMIN — Medication 25 GRAM(S): at 02:42

## 2021-09-19 RX ADMIN — Medication 32.4 MILLIGRAM(S): at 21:03

## 2021-09-19 RX ADMIN — OXYCODONE HYDROCHLORIDE 5 MILLIGRAM(S): 5 TABLET ORAL at 17:52

## 2021-09-19 RX ADMIN — CEFEPIME 100 MILLIGRAM(S): 1 INJECTION, POWDER, FOR SOLUTION INTRAMUSCULAR; INTRAVENOUS at 17:53

## 2021-09-19 RX ADMIN — CHLORHEXIDINE GLUCONATE 1 APPLICATION(S): 213 SOLUTION TOPICAL at 13:16

## 2021-09-19 RX ADMIN — VASOPRESSIN 6 UNIT(S)/MIN: 20 INJECTION INTRAVENOUS at 11:39

## 2021-09-19 RX ADMIN — INSULIN HUMAN 1: 100 INJECTION, SOLUTION SUBCUTANEOUS at 02:42

## 2021-09-19 RX ADMIN — CEFEPIME 100 MILLIGRAM(S): 1 INJECTION, POWDER, FOR SOLUTION INTRAMUSCULAR; INTRAVENOUS at 05:08

## 2021-09-19 RX ADMIN — Medication 300 MILLIGRAM(S): at 17:55

## 2021-09-19 RX ADMIN — INSULIN HUMAN 1: 100 INJECTION, SOLUTION SUBCUTANEOUS at 05:42

## 2021-09-19 RX ADMIN — Medication 50 MILLIGRAM(S): at 11:19

## 2021-09-19 RX ADMIN — OXYCODONE HYDROCHLORIDE 5 MILLIGRAM(S): 5 TABLET ORAL at 11:31

## 2021-09-19 RX ADMIN — SODIUM CHLORIDE 100 MILLILITER(S): 9 INJECTION, SOLUTION INTRAVENOUS at 04:24

## 2021-09-19 RX ADMIN — OXYCODONE HYDROCHLORIDE 5 MILLIGRAM(S): 5 TABLET ORAL at 11:29

## 2021-09-19 RX ADMIN — OXYCODONE HYDROCHLORIDE 5 MILLIGRAM(S): 5 TABLET ORAL at 21:07

## 2021-09-19 RX ADMIN — Medication 32.4 MILLIGRAM(S): at 05:12

## 2021-09-19 RX ADMIN — Medication 100 MILLIEQUIVALENT(S): at 13:17

## 2021-09-19 RX ADMIN — Medication 50 MILLIGRAM(S): at 17:55

## 2021-09-19 RX ADMIN — Medication 50 MILLIGRAM(S): at 05:13

## 2021-09-19 RX ADMIN — Medication 50 MICROGRAM(S): at 11:19

## 2021-09-19 RX ADMIN — INSULIN HUMAN 2: 100 INJECTION, SOLUTION SUBCUTANEOUS at 23:06

## 2021-09-19 RX ADMIN — INSULIN HUMAN 1: 100 INJECTION, SOLUTION SUBCUTANEOUS at 11:19

## 2021-09-19 RX ADMIN — OXYCODONE HYDROCHLORIDE 5 MILLIGRAM(S): 5 TABLET ORAL at 22:00

## 2021-09-19 RX ADMIN — OXYCODONE HYDROCHLORIDE 5 MILLIGRAM(S): 5 TABLET ORAL at 16:20

## 2021-09-19 RX ADMIN — PRAMIPEXOLE DIHYDROCHLORIDE 0.5 MILLIGRAM(S): 0.12 TABLET ORAL at 21:03

## 2021-09-19 RX ADMIN — Medication 50 MILLIGRAM(S): at 23:35

## 2021-09-19 RX ADMIN — POTASSIUM PHOSPHATE, MONOBASIC POTASSIUM PHOSPHATE, DIBASIC 83.33 MILLIMOLE(S): 236; 224 INJECTION, SOLUTION INTRAVENOUS at 13:17

## 2021-09-19 RX ADMIN — PANTOPRAZOLE SODIUM 40 MILLIGRAM(S): 20 TABLET, DELAYED RELEASE ORAL at 13:16

## 2021-09-19 RX ADMIN — Medication 300 MILLIGRAM(S): at 05:13

## 2021-09-19 RX ADMIN — POTASSIUM PHOSPHATE, MONOBASIC POTASSIUM PHOSPHATE, DIBASIC 62.5 MILLIMOLE(S): 236; 224 INJECTION, SOLUTION INTRAVENOUS at 03:20

## 2021-09-19 RX ADMIN — Medication 75 MILLIGRAM(S): at 21:03

## 2021-09-19 RX ADMIN — MONTELUKAST 10 MILLIGRAM(S): 4 TABLET, CHEWABLE ORAL at 21:03

## 2021-09-19 RX ADMIN — Medication 10 MILLIGRAM(S): at 11:18

## 2021-09-19 RX ADMIN — CITALOPRAM 20 MILLIGRAM(S): 10 TABLET, FILM COATED ORAL at 11:19

## 2021-09-19 RX ADMIN — Medication 50 MILLIGRAM(S): at 00:35

## 2021-09-19 RX ADMIN — Medication 32.4 MILLIGRAM(S): at 16:20

## 2021-09-19 RX ADMIN — Medication 100 MILLIGRAM(S): at 11:19

## 2021-09-19 RX ADMIN — MIDODRINE HYDROCHLORIDE 10 MILLIGRAM(S): 2.5 TABLET ORAL at 23:38

## 2021-09-19 NOTE — CHART NOTE - NSCHARTNOTEFT_GEN_A_CORE
Spoke with Curt for Orthopedic service regarding evaluation of patient's surgical site in setting of continued anemia requiring transfusions. Patient's dressing c/d/i. Confirmed that he will come to evaluate the patient.

## 2021-09-19 NOTE — PROGRESS NOTE ADULT - SUBJECTIVE AND OBJECTIVE BOX
JACOB KOTHARI  [id number]  66y Female    Indication for ICU admission:  Admit Date:09-15-21  ICU Date: 09-17-21  OR Date:09-16-21    Compazine (Unknown)  latex (Urticaria)  penicillins (Unknown)    PAST MEDICAL & SURGICAL HISTORY:  Diabetes    Renal stone    Renal failure    Epilepsy    Asthma    Chronic cough    Knee pain    Osteoarthritis    Uterine cancer  s/p hysterectomy    H/O abdominal hysterectomy  NO RADIATION AND CHEMO    History of tonsillectomy  13 years old    [place] teeth removed    History of bladder stone  SURGERY 8/3/2018    History of surgery  JJ STENT PLACEMENT LEFT OCTOBER 2017      Home Medications:  betamethasone dipropionate 0.05% topical lotion: Apply topically to affected area 2 times a day (15 Sep 2021 18:46)  Desyrel 50 mg oral tablet: 1.5 tab(s) orally once a day (at bedtime) (15 Sep 2021 18:46)  ibuprofen 200 mg oral tablet: 2 tab(s) orally every 8 hours, As Needed (15 Sep 2021 18:46)  Mirapex 0.5 mg oral tablet: 1 tab(s) orally once a day (at bedtime) (15 Sep 2021 18:46)  Multiple Vitamins with Minerals oral capsule: 1 cap(s) orally once a day (15 Sep 2021 18:46)  Nizoral A-D 1% topical shampoo: Apply topically to affected area every 3 days (15 Sep 2021 18:46)  nystatin 100,000 units/g topical cream (obsolete): Apply topically to affected area 2 times a day under bilateral breast and under left arm (15 Sep 2021 18:46)  PHENobarbital 32.4 mg oral tablet: 1 tab(s) orally 3 times a day (15 Sep 2021 18:46)  Senna Plus 50 mg-8.6 mg oral tablet: 2 tab(s) orally once a day (at bedtime) (15 Sep 2021 18:46)  Singulair 10 mg oral tablet: 1 tab(s) orally once a day (at bedtime) (15 Sep 2021 18:46)  Urocit-K 15 mEq oral tablet, extended release: 1 tab(s) orally once a day (15 Sep 2021 18:46)        24HRS EVENT:  4d            DVT Prophylaxis: heparin  Infusion 1100 Unit(s)/Hr IV Continuous <Continuous>      GI Prophylaxis:pantoprazole  Injectable 40 milliGRAM(s) IV Push daily      ***Tubes/Lines/Drains  ***  Peripheral IV  Arterial Line		                  Central Line                            Urinary Catheter		Indication: Strict I&O          [X] A ten-point review of systems was otherwise negative except as noted above.  [  ] Due to altered mental status/intubation, subjective information was not attained from the patient. History was obtained, to the extent possible, from review of the chart and collateral sources of information.   JACOB KOTHARI  [id number]  66y Female    Indication for ICU admission: s/p L femur fracture ORIF & long gamma IM nail placement, EBL 1L, hypotensive on pressors  Admit Date:09-15-21  ICU Date: 9/16  OR Date: 9/16    Compazine (Unknown)  latex (Urticaria)  penicillins (Unknown)    PAST MEDICAL & SURGICAL HISTORY:  Diabetes    Renal stone    Renal failure    Epilepsy    Asthma    Chronic cough    Knee pain    Osteoarthritis    Uterine cancer  s/p hysterectomy    H/O abdominal hysterectomy  NO RADIATION AND CHEMO    History of tonsillectomy  13 years old    [place] teeth removed    History of bladder stone  SURGERY 8/3/2018    History of surgery  JJ STENT PLACEMENT LEFT OCTOBER 2017      Home Medications:  betamethasone dipropionate 0.05% topical lotion: Apply topically to affected area 2 times a day (15 Sep 2021 18:46)  Desyrel 50 mg oral tablet: 1.5 tab(s) orally once a day (at bedtime) (15 Sep 2021 18:46)  ibuprofen 200 mg oral tablet: 2 tab(s) orally every 8 hours, As Needed (15 Sep 2021 18:46)  Mirapex 0.5 mg oral tablet: 1 tab(s) orally once a day (at bedtime) (15 Sep 2021 18:46)  Multiple Vitamins with Minerals oral capsule: 1 cap(s) orally once a day (15 Sep 2021 18:46)  Nizoral A-D 1% topical shampoo: Apply topically to affected area every 3 days (15 Sep 2021 18:46)  nystatin 100,000 units/g topical cream (obsolete): Apply topically to affected area 2 times a day under bilateral breast and under left arm (15 Sep 2021 18:46)  PHENobarbital 32.4 mg oral tablet: 1 tab(s) orally 3 times a day (15 Sep 2021 18:46)  Senna Plus 50 mg-8.6 mg oral tablet: 2 tab(s) orally once a day (at bedtime) (15 Sep 2021 18:46)  Singulair 10 mg oral tablet: 1 tab(s) orally once a day (at bedtime) (15 Sep 2021 18:46)  Urocit-K 15 mEq oral tablet, extended release: 1 tab(s) orally once a day (15 Sep 2021 18:46)        24HRS EVENT:  Night  -added albuterol inhaler prn  -Hg 6.6 , 1uPRBC rpt Hg 8.8  -levo 0.25, vaso continued  -PTT therapeutic on 1100 last PTT 79, FU 5AM  -2g Mg, 3 K riders K 3.1, FU rpt BMP     DAY  -11am and 1600 labs  -f/u echo -> refused   -home meds restarted  -q4 abg  -UE arterial duplex--> L radial artery occluded  -PTT 97 - > 103 -> decreased hep drip to 1100  - Hb 7.4 from 8.5  - coming down on levo 0.1 and still on vaso   - Ortho okay for OOBTC, NWB LLE      DVT PTX: hep gtt    GI PTX:pantoprazole  Injectable 40 milliGRAM(s) IV Push daily      ***Tubes/Lines/Drains  ***  [x] Peripheral IV  [x] Central Venous Line     	[x] R	[] L	[x] IJ          Date Placed: 9/16  [x] Arterial Line	  L radial a line placed intra op by anesthesia, was DC in PACU due to clotting off  R radial a line place in PACU with 1 attempt, good blood return but shortly after no wave form   attempted R femoral a line without success  [X] Saravia   placed post op in PACU 9/16    REVIEW OF SYSTEMS    [x ] A ten-point review of systems was otherwise negative except as noted.  [ ] Due to altered mental status/intubation, subjective information were not able to be obtained from the patient. History was obtained, to the extent possible, from review of the chart and collateral sources of information.    Assessment & Plan  66y Female 1d s/p ORIF of L femur fracture with long IM gamma nail     NEURO:  Acute post op pain  - controlled with prn Tylenol & oxycodone 5mg  HX epilepsy  - per patient last seizures 20 years ago  - c/w home regimen phenobarbital, Mirapex, Topiramatef  HX sleep disorder/depression  - c/w Trazadone 75mg at bedtime    RESP:   HX asthma, THOM (does not use CPAP at NH)    Oxygen insufficiency- BiPAP 40% 12/6, weaned to 4L NC    Activity- bedrest, NWB  LLE  Current Rx: montelukast 10 at bedtime  Home Rx: Singulair 10 mg oral tablet    CARDS:   Hypotension postop , hemorrhagic shock  - levo & vaso  - wean levo, keep MAP > 65  - s/p 2uPRBC, fluid resusitation via nicom  Sinus tachycardia, resolved  - EKG sinus tachycardia, no sign so ischemia  - cards consult, r/o tamponade, cardiogenic shock on bedside echo  [ ] ECHO: rpt pending - refused  - prior 2018, normal systolic function    VASC:  - LUE poorly perfused, first 2 digits appear dusky, on bedside exam diminish radial pulse  - brachial, ulnar and palmar pulses are strong and dopperable  - q1 neurovascular checks of LUE  - urgent vascular consult place, recs to start heparin gtt, cleared by Ortho team  [ ] monitor PTT goal 60-80  LUE arterial duplex ordered -> L radial occluded    GI/NUTR:   Diet, NPO except meds given pressor requirements    GI Prophylaxis- PPI IV    Bowel regimen- senna  magnesium hydroxide Suspension 30 milliLiter(s) Oral daily PRN  senna 2 Tablet(s) Oral at bedtime PRN    /RENAL:   -Strict I&Os, UOP 40-50cc/hr  -saravia placed in PACU  MONSTER, resolved  -BUN/Cr 19/0.9 -->1.2-->0.7  Lactic acidosis  - improving   - lactate 13--> 7-->>5.7---> 4.7-->3.2  -bicarb gtt changed to LR @ 100 due to hypokalemia and normal Bicarb on serum  IVF: 100 LR  Labs:          BUN/Cr- 20/1.0  -->,  18/0.7  -->,  16/0.5  -->          Electrolytes-Na 136 // K 4.1 // Mg 2.1 //  Phos 1.8 (09-18 @ 05:15)  -FU rpt labs, recieved 2g Mg, 20 KCL x3    HEME/ONC:     DVT prophylaxis-, SCDs , hep gtt  Labs: Hb/Hct:  6.6/19.6  -->,  8.0/22.8  -->                      Plts:  116  -->,  97  -->                 PTT/INR:  78.2/--  (09-17 @ 23:25) --->,  81.3/--  (09-18 @ 05:15) --->                 T&S: (09-16)      ID:  WBC- 23.57  --->>,  24.81  --->>,  29.33  --->>  Temp trend- 24hrs T(F): 101.2 (09-18 @ 07:00), Max: 101.2 (09-18 @ 07:00)  Antibiotics-cefepime / vanc  [ ] bld cx, UA neg  [ ] FU VANC trough 5pm    ENDO:     HA1C 5.2    finger sticks q4hr while npo    LINES/DRAINS:  DESHAWN, SHERIN Saravia TLC    CODE STATUS: DNR/DNI    DISPO: SICU       JACOB KOTHARI  [id number]  66y Female    Indication for ICU admission: s/p L femur fracture ORIF & long gamma IM nail placement, EBL 1L, hypotensive on pressors  Admit Date:09-15-21  ICU Date: 9/16  OR Date: 9/16    Compazine (Unknown)  latex (Urticaria)  penicillins (Unknown)    PAST MEDICAL & SURGICAL HISTORY:  Diabetes    Renal stone    Renal failure    Epilepsy    Asthma    Chronic cough    Knee pain    Osteoarthritis    Uterine cancer  s/p hysterectomy    H/O abdominal hysterectomy  NO RADIATION AND CHEMO    History of tonsillectomy  13 years old    [place] teeth removed    History of bladder stone  SURGERY 8/3/2018    History of surgery  JJ STENT PLACEMENT LEFT OCTOBER 2017      Home Medications:  betamethasone dipropionate 0.05% topical lotion: Apply topically to affected area 2 times a day (15 Sep 2021 18:46)  Desyrel 50 mg oral tablet: 1.5 tab(s) orally once a day (at bedtime) (15 Sep 2021 18:46)  ibuprofen 200 mg oral tablet: 2 tab(s) orally every 8 hours, As Needed (15 Sep 2021 18:46)  Mirapex 0.5 mg oral tablet: 1 tab(s) orally once a day (at bedtime) (15 Sep 2021 18:46)  Multiple Vitamins with Minerals oral capsule: 1 cap(s) orally once a day (15 Sep 2021 18:46)  Nizoral A-D 1% topical shampoo: Apply topically to affected area every 3 days (15 Sep 2021 18:46)  nystatin 100,000 units/g topical cream (obsolete): Apply topically to affected area 2 times a day under bilateral breast and under left arm (15 Sep 2021 18:46)  PHENobarbital 32.4 mg oral tablet: 1 tab(s) orally 3 times a day (15 Sep 2021 18:46)  Senna Plus 50 mg-8.6 mg oral tablet: 2 tab(s) orally once a day (at bedtime) (15 Sep 2021 18:46)  Singulair 10 mg oral tablet: 1 tab(s) orally once a day (at bedtime) (15 Sep 2021 18:46)  Urocit-K 15 mEq oral tablet, extended release: 1 tab(s) orally once a day (15 Sep 2021 18:46)        24HRS EVENT:  Night  -added albuterol inhaler prn  -Hg 6.6 , 1uPRBC rpt Hg 8.8  -levo 0.25, vaso continued  -PTT therapeutic on 1100 last PTT 79, FU 5AM  -2g Mg, 3 K riders K 3.1, FU rpt BMP     DAY  -11am and 1600 labs  -f/u echo -> refused   -home meds restarted  -q4 abg  -UE arterial duplex--> L radial artery occluded  -PTT 97 - > 103 -> decreased hep drip to 1100  - Hb 7.4 from 8.5  - coming down on levo 0.1 and still on vaso   - Ortho okay for OOBTC, NWB LLE      DVT PTX: hep gtt    GI PTX:pantoprazole  Injectable 40 milliGRAM(s) IV Push daily      ***Tubes/Lines/Drains  ***  [x] Peripheral IV  [x] Central Venous Line     	[x] R	[] L	[x] IJ          Date Placed: 9/16  [x] Arterial Line	  L radial a line placed intra op by anesthesia, was DC in PACU due to clotting off  R radial a line place in PACU with 1 attempt, good blood return but shortly after no wave form   attempted R femoral a line without success  [X] Saravia   placed post op in PACU 9/16    REVIEW OF SYSTEMS    [x ] A ten-point review of systems was otherwise negative except as noted.  [ ] Due to altered mental status/intubation, subjective information were not able to be obtained from the patient. History was obtained, to the extent possible, from review of the chart and collateral sources of information.    Assessment & Plan  66y Female 1d s/p ORIF of L femur fracture with long IM gamma nail     NEURO:  Acute post op pain  - controlled with prn Tylenol & oxycodone 5mg  HX epilepsy  - per patient last seizures 20 years ago  - c/w home regimen phenobarbital, Mirapex, Topiramatef  HX sleep disorder/depression  - c/w Trazadone 75mg at bedtime    RESP:   HX asthma, THOM (does not use CPAP at NH)    Oxygen insufficiency- BiPAP 40% 12/6, weaned to 4L NC    Activity- bedrest, NWB  LLE  Current Rx: montelukast 10 at bedtime  Home Rx: Singulair 10 mg oral tablet    CARDS:   Hypotension postop , hemorrhagic shock  - levo & vaso  - wean levo, keep MAP > 65  - s/p 2uPRBC, fluid resusitation via nicom  Sinus tachycardia, resolved  - EKG sinus tachycardia, no sign so ischemia  - cards consult, r/o tamponade, cardiogenic shock on bedside echo  [ ] ECHO: rpt pending - refused  - prior 2018, normal systolic function    VASC:  - LUE poorly perfused, first 2 digits appear dusky, on bedside exam diminish radial pulse  - brachial, ulnar and palmar pulses are strong and dopperable  - q1 neurovascular checks of LUE  - urgent vascular consult place, recs to start heparin gtt, cleared by Ortho team  [ ] monitor PTT goal 60-80  LUE arterial duplex ordered -> L radial occluded    GI/NUTR:   Diet, NPO except meds given pressor requirements    GI Prophylaxis- PPI IV    Bowel regimen- senna  magnesium hydroxide Suspension 30 milliLiter(s) Oral daily PRN  senna 2 Tablet(s) Oral at bedtime PRN    /RENAL:   -Strict I&Os, UOP 40-50cc/hr  -saravia placed in PACU  MONSTER, resolved  -BUN/Cr 19/0.9 -->1.2-->0.7->0.5  Lactic acidosis  - improving   - lactate 13--> 7-->>5.7---> 4.7-->3.2  -bicarb gtt changed to LR @ 100 due to hypokalemia and normal Bicarb on serum  IVF: 100 LR  Labs:  134 | 100 | 13  ---------------<179  3.8 |  29 |  <0.5  Mg 1.8 Phos 1.6            BUN/Cr- 20/1.0  -->,  18/0.7  -->,  16/0.5  -->          Electrolytes-Na 136 // K 4.1 // Mg 2.1 //  Phos 1.8 (09-18 @ 05:15)  -FU rpt labs, recieved 2g Mg, 20 KCL x3    HEME/ONC:     DVT prophylaxis-, SCDs , hep gtt  Labs: Hb/Hct:  6.6/19.6  -->,  8.0/22.8  -->->6.2/19                      Plts:  116  -->,  97  -->                 PTT/INR:  78.2/--  (09-17 @ 23:25) --->,  81.3/--  (09-18 @ 05:15) --->                 T&S: (09-16)                        6.2    24.63 )-----------( 131      ( 19 Sep 2021 01:47 )             19.0         ID:  WBC- 23.57  --->>,  24.81  --->>,  29.33  --->>24  Temp trend- 24hrs T(F): 101.2 (09-18 @ 07:00), Max: 101.2 (09-18 @ 07:00)  Antibiotics-cefepime / vanc  [ ] bld cx, UA neg  [ ] FU VANC trough 5pm    ENDO:     HA1C 5.2    finger sticks q4hr while npo    LINES/DRAINS:  Duglas HESS R IJ TLC    CODE STATUS: DNR/DNI    DISPO: SICU       JACOB KOTHARI  [id number]  66y Female    Indication for ICU admission: s/p L femur fracture ORIF & long gamma IM nail placement, EBL 1L, hypotensive on pressors  Admit Date:09-15-21  ICU Date:   OR Date:     Compazine (Unknown)  latex (Urticaria)  penicillins (Unknown)    PAST MEDICAL & SURGICAL HISTORY:  Diabetes    Renal stone    Renal failure    Epilepsy    Asthma    Chronic cough    Knee pain    Osteoarthritis    Uterine cancer  s/p hysterectomy    H/O abdominal hysterectomy  NO RADIATION AND CHEMO    History of tonsillectomy  13 years old    [place] teeth removed    History of bladder stone  SURGERY 8/3/2018    History of surgery  JJ STENT PLACEMENT LEFT 2017      Home Medications:  betamethasone dipropionate 0.05% topical lotion: Apply topically to affected area 2 times a day (15 Sep 2021 18:46)  Desyrel 50 mg oral tablet: 1.5 tab(s) orally once a day (at bedtime) (15 Sep 2021 18:46)  ibuprofen 200 mg oral tablet: 2 tab(s) orally every 8 hours, As Needed (15 Sep 2021 18:46)  Mirapex 0.5 mg oral tablet: 1 tab(s) orally once a day (at bedtime) (15 Sep 2021 18:46)  Multiple Vitamins with Minerals oral capsule: 1 cap(s) orally once a day (15 Sep 2021 18:46)  Nizoral A-D 1% topical shampoo: Apply topically to affected area every 3 days (15 Sep 2021 18:46)  nystatin 100,000 units/g topical cream (obsolete): Apply topically to affected area 2 times a day under bilateral breast and under left arm (15 Sep 2021 18:46)  PHENobarbital 32.4 mg oral tablet: 1 tab(s) orally 3 times a day (15 Sep 2021 18:46)  Senna Plus 50 mg-8.6 mg oral tablet: 2 tab(s) orally once a day (at bedtime) (15 Sep 2021 18:46)  Singulair 10 mg oral tablet: 1 tab(s) orally once a day (at bedtime) (15 Sep 2021 18:46)  Urocit-K 15 mEq oral tablet, extended release: 1 tab(s) orally once a day (15 Sep 2021 18:46)        24HRS EVENT:    NIGHT:  Having multiple watery BMs  Dignishield placed, dc miralax and mag hydroxide  KPhos 15 Mg 2    DAY:  PTT - 58.7 @1100 units  Vanco level 5 pm 13.7  refused Echo  wean Leveo as tolerated        DVT PTX: hep gtt    GI PTX:pantoprazole  Injectable 40 milliGRAM(s) IV Push daily      ***Tubes/Lines/Drains  ***  [x] Peripheral IV  [x] Central Venous Line     	[x] R	[] L	[x] IJ          Date Placed:   L radial a line placed intra op by anesthesia, was DC in PACU due to clotting off  R radial a line place in PACU with 1 attempt, good blood return but shortly after no wave form   attempted R femoral a line without success  [X] Ardon   placed post op in PACU   Digni     REVIEW OF SYSTEMS    [x ] A ten-point review of systems was otherwise negative except as noted.  [ ] Due to altered mental status/intubation, subjective information were not able to be obtained from the patient. History was obtained, to the extent possible, from review of the chart and collateral sources of information.        Daily     Daily Weight in k.3 (19 Sep 2021 06:45)    Diet, Clear Liquid (21 @ 09:10)      CURRENT MEDS:  Neurologic Medications  acetaminophen   Tablet .. 650 milliGRAM(s) Oral every 6 hours PRN Temp greater or equal to 38C (100.4F), Mild Pain (1 - 3)  citalopram 20 milliGRAM(s) Oral every 24 hours  oxyCODONE    IR 5 milliGRAM(s) Oral every 4 hours PRN Moderate Pain (4 - 6)  PHENobarbital 32.4 milliGRAM(s) Oral three times a day  pramipexole 0.5 milliGRAM(s) Oral at bedtime  topiramate 100 milliGRAM(s) Oral every 24 hours  traZODone 75 milliGRAM(s) Oral at bedtime    Respiratory Medications  ALBUTerol    90 MICROgram(s) HFA Inhaler 2 Puff(s) Inhalation every 6 hours PRN Shortness of Breath and/or Wheezing  montelukast 10 milliGRAM(s) Oral at bedtime    Cardiovascular Medications  furosemide   Injectable 10 milliGRAM(s) IV Push once  norepinephrine Infusion 0.05 MICROgram(s)/kG/Min IV Continuous <Continuous>    Gastrointestinal Medications  lactated ringers. 1000 milliLiter(s) IV Continuous <Continuous>  pantoprazole  Injectable 40 milliGRAM(s) IV Push daily    Genitourinary Medications    Hematologic/Oncologic Medications  heparin  Infusion 1100 Unit(s)/Hr IV Continuous <Continuous>  influenza   Vaccine 0.5 milliLiter(s) IntraMuscular once    Antimicrobial/Immunologic Medications  cefepime   IVPB      cefepime   IVPB 1000 milliGRAM(s) IV Intermittent every 12 hours  vancomycin  IVPB 1500 milliGRAM(s) IV Intermittent every 12 hours  vancomycin  IVPB        Endocrine/Metabolic Medications  hydrocortisone sodium succinate Injectable 50 milliGRAM(s) IV Push every 6 hours  insulin regular  human corrective regimen sliding scale   IV Push every 4 hours  levothyroxine 50 MICROGram(s) Oral every 24 hours  vasopressin Infusion 0.1 Unit(s)/Min IV Continuous <Continuous>    Topical/Other Medications  chlorhexidine 4% Liquid 1 Application(s) Topical daily      ICU Vital Signs Last 24 Hrs  T(C): 36.5 (19 Sep 2021 09:00), Max: 37.3 (18 Sep 2021 12:00)  T(F): 97.7 (19 Sep 2021 09:00), Max: 99.2 (18 Sep 2021 12:00)  HR: 59 (19 Sep 2021 09:00) (54 - 99)  BP: 118/56 (19 Sep 2021 09:00) (86/53 - 137/56)  BP(mean): 79 (19 Sep 2021 09:00) (60 - 89)  RR: 20 (19 Sep 2021 08:00) (18 - 20)  SpO2: 100% (19 Sep 2021 09:00) (93% - 100%)            I&O's Summary    18 Sep 2021 07:01  -  19 Sep 2021 07:00  --------------------------------------------------------  IN: 5458.1 mL / OUT: 1575 mL / NET: 3883.1 mL      I&O's Detail    18 Sep 2021 07:01  -  19 Sep 2021 07:00  --------------------------------------------------------  IN:    Heparin: 242 mL    Heparin Infusion: 22 mL    IV PiggyBack: 383.2 mL    IV PiggyBack: 322 mL    IV PiggyBack: 1000 mL    IV PiggyBack: 250 mL    Lactated Ringers: 2400 mL    Norepinephrine: 661.3 mL    Oral Fluid: 120 mL    Vasopressin: 57.6 mL  Total IN: 5458.1 mL    OUT:    Indwelling Catheter - Urethral (mL): 1575 mL  Total OUT: 1575 mL    Total NET: 3883.1 mL          PHYSICAL EXAM:    General/Neuro  A&O x3, no focal deficits    Lungs:  clear to auscultation, Normal expansion/effort.   NC @ 3L, sat 100%    Cardiovascular : S1, S2.  Bradycardic w/ HR 60, regular rhythm.  +Peripheral edema   Cardiac Rhythm: Normal Sinus Rhythm    GI: Abdomen soft, Non-tender, Non-distended.    Digni shield in place    Extremities: LUE cool to touch; wrist & digits #1 & 2 mottled; RUE warm, pink well-perfused  b/l LE warm, pink, well-perfused  LLE soft compartments, dressing c/d/i    Derm: Good skin turgor, no skin breakdown.      :   Ardon catheter in place.          LABS:  CAPILLARY BLOOD GLUCOSE      POCT Blood Glucose.: 165 mg/dL (19 Sep 2021 05:38)  POCT Blood Glucose.: 182 mg/dL (19 Sep 2021 02:33)  POCT Blood Glucose.: 168 mg/dL (18 Sep 2021 21:20)  POCT Blood Glucose.: 161 mg/dL (18 Sep 2021 17:34)  POCT Blood Glucose.: 164 mg/dL (18 Sep 2021 13:29)                          6.8    20.88 )-----------( 109      ( 19 Sep 2021 06:50 )             20.5             134<L>  |  100  |  13  ----------------------------<  179<H>  3.8   |  29  |  <0.5<L>    Ca    6.6<L>      19 Sep 2021 00:42  Phos  1.6       Mg     1.8     -        PTT - ( 19 Sep 2021 02:45 )  PTT:51.5 sec  CARDIAC MARKERS ( 18 Sep 2021 02:00 )  x     / x     / 3474 U/L / x     / x      CARDIAC MARKERS ( 17 Sep 2021 12:06 )  x     / 0.05 ng/mL / 3410 U/L / x     / 15.7 ng/mL          Culture - Blood (collected 17 Sep 2021 02:30)  Source: .Blood Blood-Peripheral  Preliminary Report (18 Sep 2021 13:02):    No growth to date.

## 2021-09-19 NOTE — PROGRESS NOTE ADULT - ASSESSMENT
ORTHO PROGRESS NOTE     66yFemale POD #3 s/p left femur IMN.    Patient seen and examined at bedside in the SICU. The patient is awake and alert in NAD. No complaints of chest pain, SOB, N/V. Endorses pain in L hip/thigh, controlled. Per nursing, patient has remained on pressors overnight; SICU tried to wean her off, however she did not tolerate it. She is currently NPO in the setting of significant hypotension requiring pressors.     ICU Vital Signs Last 24 Hrs  T(C): 37.3 (18 Sep 2021 15:05), Max: 37.9 (18 Sep 2021 09:30)  T(F): 99.2 (18 Sep 2021 15:05), Max: 100.2 (18 Sep 2021 09:30)  HR: 63 (19 Sep 2021 07:00) (56 - 99)  BP: 132/58 (19 Sep 2021 07:00) (86/53 - 137/56)  BP(mean): 83 (19 Sep 2021 07:00) (58 - 89)  RR: 18 (18 Sep 2021 20:00) (18 - 20)  SpO2: 100% (19 Sep 2021 07:00) (93% - 100%)      PE:     LUE  Ecchymosis present on volar and dorsal aspects of the forearm  Ecchymotic/dusky color of thumb and IF on L hand   Significant swelling of LUE with inability to make composite fist due to swelling  AIN/PIN/ulnar intact  SILT m/r/u  Hand wwp     LLE  Dressing C/D/I   Compartments soft and compressible  Motor intact distally  SILT distally  CR<2sec         LABS:                        6.2    24.63 )-----------( 131      ( 19 Sep 2021 01:47 )             19.0       09-19    134<L>  |  100  |  13  ----------------------------<  179<H>  3.8   |  29  |  <0.5<L>    Ca    6.6<L>      19 Sep 2021 00:42  Phos  1.6     09-19  Mg     1.8     09-19    PTT - ( 19 Sep 2021 02:45 )  PTT:51.5 sec    CARDIAC MARKERS ( 18 Sep 2021 02:00 )  x     / x     / 3474 U/L / x     / x      CARDIAC MARKERS ( 17 Sep 2021 12:06 )  x     / 0.05 ng/mL / 3410 U/L / x     / 15.7 ng/mL  POCT Blood Glucose.: 165 mg/dL (19 Sep 2021 05:38)          A/P: 66yFemale POD #3 s/p left femur IMN, being managed in ICU at this time in setting of dependency on pressors to maintain BP. Discussed with SICU team need for 2U PRBC in setting of Hgb 6.4. Per SICU, they will transfuse 1U and check Hgb post-transfusion to determine need for second unit. Additionally, patient now displaying dusky color of L thumb and IF concerning for possible digital necrosis secondary to pressors versus radial artery occlusion; will continue to closely monitor this development. Patient is currently critical but stable. Ortho will continue to closely monitor.  -OOB to Chair   -NWB LLE  -PT/OT  -Pain control   -Incentive Spirometry   -DVT Prophylaxis; please transition to LVX or HSQ when possible as a heparin gtt significantly increases her risk for wound complications, hematoma formation  care per SICU    Please page ortho with any questions or concerns.

## 2021-09-19 NOTE — PROGRESS NOTE ADULT - SUBJECTIVE AND OBJECTIVE BOX
GENERAL SURGERY PROGRESS NOTE    Patient: JACOB KOTHARI , 66y ([birth year])Female   MRN: [id number]  Location: [facility]  A  Visit: 09-15-21 Inpatient  Date: 09-19-21 @ 04:29    Hospital Day #: 5  Post-Op Day #:    Procedure/Dx/Injuries: left radial artery occlusion    Events of past 24 hours: HGB 6.9 transfusing 1 unit prbc, no vascular events overnight.     PAST MEDICAL & SURGICAL HISTORY:  Diabetes    Renal stone    Renal failure    Epilepsy    Asthma    Chronic cough    Knee pain    Osteoarthritis    Uterine cancer  s/p hysterectomy    H/O abdominal hysterectomy  NO RADIATION AND CHEMO    History of tonsillectomy  13 years old    [place] teeth removed    History of bladder stone  SURGERY 8/3/2018    History of surgery  JJ STENT PLACEMENT LEFT OCTOBER 2017        Vitals:   T(F): 99.2 (09-18-21 @ 15:05), Max: 101.2 (09-18-21 @ 07:00)  HR: 61 (09-19-21 @ 03:00)  BP: 130/61 (09-19-21 @ 03:00)  RR: 18 (09-18-21 @ 20:00)  SpO2: 100% (09-19-21 @ 03:00)      Diet, NPO:   Except Medications  With Ice Chips/Sips of Water      Fluids:     I & O's:    09-17-21 @ 07:01  -  09-18-21 @ 07:00  --------------------------------------------------------  IN:    Heparin Infusion: 143 mL    Heparin Infusion: 130 mL    IV PiggyBack: 1300 mL    IV PiggyBack: 100 mL    Lactated Ringers: 700 mL    Norepinephrine: 637.8 mL    PRBCs (Packed Red Blood Cells): 500 mL    Sodium Bicarbonate: 2000 mL    Vasopressin: 57.6 mL  Total IN: 5568.4 mL    OUT:    Indwelling Catheter - Urethral (mL): 1230 mL  Total OUT: 1230 mL    Total NET: 4338.4 mL        MEDICATIONS  (STANDING):  cefepime   IVPB      cefepime   IVPB 1000 milliGRAM(s) IV Intermittent every 12 hours  chlorhexidine 4% Liquid 1 Application(s) Topical daily  citalopram 20 milliGRAM(s) Oral every 24 hours  heparin  Infusion 1100 Unit(s)/Hr (11 mL/Hr) IV Continuous <Continuous>  hydrocortisone sodium succinate Injectable 50 milliGRAM(s) IV Push every 6 hours  influenza   Vaccine 0.5 milliLiter(s) IntraMuscular once  insulin regular  human corrective regimen sliding scale   IV Push every 4 hours  lactated ringers. 1000 milliLiter(s) (100 mL/Hr) IV Continuous <Continuous>  levothyroxine 50 MICROGram(s) Oral every 24 hours  montelukast 10 milliGRAM(s) Oral at bedtime  norepinephrine Infusion 0.05 MICROgram(s)/kG/Min (5.74 mL/Hr) IV Continuous <Continuous>  pantoprazole  Injectable 40 milliGRAM(s) IV Push daily  PHENobarbital 32.4 milliGRAM(s) Oral three times a day  pramipexole 0.5 milliGRAM(s) Oral at bedtime  topiramate 100 milliGRAM(s) Oral every 24 hours  traZODone 75 milliGRAM(s) Oral at bedtime  vancomycin  IVPB      vancomycin  IVPB 1500 milliGRAM(s) IV Intermittent every 12 hours  vasopressin Infusion 0.1 Unit(s)/Min (6 mL/Hr) IV Continuous <Continuous>    MEDICATIONS  (PRN):  acetaminophen   Tablet .. 650 milliGRAM(s) Oral every 6 hours PRN Temp greater or equal to 38C (100.4F), Mild Pain (1 - 3)  ALBUTerol    90 MICROgram(s) HFA Inhaler 2 Puff(s) Inhalation every 6 hours PRN Shortness of Breath and/or Wheezing  oxyCODONE    IR 5 milliGRAM(s) Oral every 4 hours PRN Moderate Pain (4 - 6)      DVT PROPHYLAXIS: heparin  Infusion 1100 Unit(s)/Hr IV Continuous <Continuous>    GI PROPHYLAXIS: pantoprazole  Injectable 40 milliGRAM(s) IV Push daily    ANTICOAGULATION:   ANTIBIOTICS:  cefepime   IVPB    cefepime   IVPB 1000 milliGRAM(s)  vancomycin  IVPB 1500 milliGRAM(s)  vancomycin  IVPB        LAB/STUDIES:  Labs:  CAPILLARY BLOOD GLUCOSE      POCT Blood Glucose.: 182 mg/dL (19 Sep 2021 02:33)  POCT Blood Glucose.: 168 mg/dL (18 Sep 2021 21:20)  POCT Blood Glucose.: 161 mg/dL (18 Sep 2021 17:34)  POCT Blood Glucose.: 164 mg/dL (18 Sep 2021 13:29)  POCT Blood Glucose.: 203 mg/dL (18 Sep 2021 09:55)  POCT Blood Glucose.: 167 mg/dL (18 Sep 2021 05:43)                          6.2    24.63 )-----------( 131      ( 19 Sep 2021 01:47 )             19.0       Auto Neutrophil %: 76.8 % (09-19-21 @ 00:42)  Auto Immature Granulocyte %: 0.9 % (09-19-21 @ 00:42)    09-19    134<L>  |  100  |  13  ----------------------------<  179<H>  3.8   |  29  |  <0.5<L>      Calcium, Total Serum: 6.6 mg/dL (09-19-21 @ 00:42)      LFTs:     Lactate, Blood: 1.2 mmol/L (09-19-21 @ 02:45)  Blood Gas Venous - Lactate: 3.20 mmol/L (09-18-21 @ 00:25)  Blood Gas Venous - Lactate: 4.70 mmol/L (09-17-21 @ 16:00)  Blood Gas Venous - Lactate: 6.20 mmol/L (09-17-21 @ 13:27)  Blood Gas Venous - Lactate: 8.20 mmol/L (09-17-21 @ 09:33)  Lactate, Blood: 10.5 mmol/L (09-17-21 @ 06:15)  Blood Gas Arterial, Lactate: 14.00 mmol/L (09-17-21 @ 01:17)  Blood Gas Arterial, Lactate: 7.80 mmol/L (09-16-21 @ 22:13)  Blood Gas Arterial, Lactate: 5.70 mmol/L (09-16-21 @ 21:05)  Blood Gas Arterial, Lactate: 3.70 mmol/L (09-16-21 @ 17:52)    ABG - ( 17 Sep 2021 01:17 )  pH: 7.20  /  pCO2: 32    /  pO2: 106   / HCO3: 12    / Base Excess: -14.4 /  SaO2: 99.3            ABG - ( 16 Sep 2021 22:13 )  pH: 7.21  /  pCO2: 31    /  pO2: 167   / HCO3: 12    / Base Excess: -14.2 /  SaO2: 100.0           ABG - ( 16 Sep 2021 21:05 )  pH: 7.23  /  pCO2: 32    /  pO2: 174   / HCO3: 13    / Base Excess: -13.0 /  SaO2: 100.0             Coags:     x      ----< x       ( 19 Sep 2021 02:45 )     51.5        CARDIAC MARKERS ( 18 Sep 2021 02:00 )  x     / x     / 3474 U/L / x     / x      CARDIAC MARKERS ( 17 Sep 2021 12:06 )  x     / 0.05 ng/mL / 3410 U/L / x     / 15.7 ng/mL  CARDIAC MARKERS ( 17 Sep 2021 06:15 )  x     / 0.06 ng/mL / 3530 U/L / x     / 14.7 ng/mL      Serum Pro-Brain Natriuretic Peptide: 161 pg/mL (09-17-21 @ 01:40)          Culture - Blood (collected 17 Sep 2021 02:30)  Source: .Blood Blood-Peripheral  Preliminary Report (18 Sep 2021 13:02):    No growth to date.      IMAGING:       ACCESS/ DEVICES:  [ ] Peripheral IV  [ ] Central Venous Line	[ ] R	[ ] L	[ ] IJ	[ ] Fem	[ ] SC	Placed:   [ ] Arterial Line		[ ] R	[ ] L	[ ] Fem	[ ] Rad	[ ] Ax	Placed:   [ ] PICC:					[ ] Mediport  [ ] Urinary Catheter,  Date Placed:   [ ] Chest tube: [ ] Right, [ ] Left  [ ] ALEN/Reinaldo Drains

## 2021-09-19 NOTE — PROGRESS NOTE ADULT - ASSESSMENT
66yF w/ PMHx of  COPD who presented with left femur fracture after a fall, now S/P IMN/ORIF, with 5 hours in OR with pressor requirements, now has cyanosis and absent left radial pulse after multiple attempts at an a line without success  Physical exam findings, imaging, and labs as documented above.       PLAN:  - Continue heparin drip  - Aracelis hernández on LUE  - Vascular check regularly  - F/U art duplex   - Mgmt per SICU team         VASCULAR TEAM SPECTRA: [id number]

## 2021-09-19 NOTE — PROGRESS NOTE ADULT - ASSESSMENT
Assessment & Plan  66y Female 1d s/p ORIF of L femur fracture with long IM gamma nail     NEURO:  Acute post op pain  - controlled with prn Tylenol & oxycodone 5mg  HX epilepsy  - per patient last seizures 20 years ago  - c/w home regimen phenobarbital, Mirapex, Topiramate  HX sleep disorder/depression  - c/w Trazadone 75mg at bedtime    RESP:   HX asthma, THOM (does not use CPAP at NH)    Oxygen insufficiency- NC @ 3L - maintain sat > 92%  Current Rx: montelukast 10 at bedtime  Home Rx: Singulair 10 mg oral tablet    CARDS:   Hemorrhagic shock post-op, requiring pressors  - Levophed, Vasopressin  - maintain MAP > 65  - fluid resuscitation prn  Sinus tachycardia, resolved - HR now in 60s  - EKG sinus tachycardia, no sign so ischemia  - cards consult, r/o tamponade, cardiogenic shock on bedside ECHO  [ ] ECHO: rpt pending - refused - will reattempt  - prior 2018, normal systolic function    GI/NUTR:   Diet: NPO 2/2 high pressor requirements - advance to clears    GI Prophylaxis- PPI IV    Bowel regimen- d/c'ed 2/2 diarrhea - digni shield placed 9/18pm    /RENAL:   -Strict I&Os - Ardon    - MONSTER, resolved - BUN/Cr 19/0.9 -->1.2-->0.7->0.5  Lactic acidosis  - improving   - lactate 13--> 7-->>5.7---> 4.7-->3.2  -bicarb gtt changed to LR @ 100 due to hypokalemia and normal Bicarb on serum  IVF: 100 LR  Labs:  134 | 100 | 13  ---------------<179  3.8 |  29 |  <0.5  Mg 1.8 Phos 1.6            BUN/Cr- 20/1.0  -->,  18/0.7  -->,  16/0.5  -->          Electrolytes-Na 136 // K 4.1 // Mg 2.1 //  Phos 1.8 (09-18 @ 05:15)  -FU rpt labs, recieved 2g Mg, 20 KCL x3      VASC:  - FILOMENAE art. duplex - L radial artery occluded  - L digits 1& 2 appear mottled  - q1 neurovascular checks of LUE  - vascular recommendation: Hep gtt (cleared w/ Ortho)  - monitor PTT (goal 50)      HEME/ONC:     DVT prophylaxis-, SCDs , hep gtt  Labs: Hb/Hct:  6.6/19.6  -->,  8.0/22.8  -->->6.2/19                      Plts:  116  -->,  97  -->                 PTT/INR:  78.2/--  (09-17 @ 23:25) --->,  81.3/--  (09-18 @ 05:15) --->                 T&S: (09-16)                        6.2    24.63 )-----------( 131      ( 19 Sep 2021 01:47 )             19.0         ID:  WBC- 23.57  --->>,  24.81  --->>,  29.33  --->>24  Temp trend- 24hrs T(F): 101.2 (09-18 @ 07:00), Max: 101.2 (09-18 @ 07:00)  Antibiotics-cefepime / vanc  [ ] bld cx, UA neg  [ ] FU VANC trough 5pm    ENDO:     HA1C 5.2    finger sticks q4hr while npo    LINES/DRAINS:  Duglas HESS R IJ TLC    CODE STATUS: DNR/DNI    DISPO: SICU   Assessment & Plan  66y Female 1d s/p ORIF of L femur fracture with long IM gamma nail     NEURO:  Acute post op pain  - controlled with prn Tylenol & oxycodone 5mg  HX epilepsy  - per patient last seizures 20 years ago  - c/w home regimen phenobarbital, Mirapex, Topiramate  HX sleep disorder/depression  - c/w Trazadone 75mg at bedtime    RESP:   HX asthma, THOM (does not use CPAP at NH)    Oxygen insufficiency- NC @ 3L - maintain sat > 92%  Current Rx: montelukast 10 at bedtime  Home Rx: Singulair 10 mg oral tablet    CARDS:   Hemorrhagic shock post-op, requiring pressors  - Levophed, Vasopressin  - maintain MAP > 65  - fluid resuscitation prn  Sinus tachycardia, resolved - HR now in 60s  - EKG sinus tachycardia, no sign so ischemia  - cards consult, r/o tamponade, cardiogenic shock on bedside ECHO  [ ] ECHO: rpt pending - refused - will reattempt  - prior 2018, normal systolic function    GI/NUTR:   Diet: NPO 2/2 high pressor requirements - advance to clears    GI Prophylaxis- PPI IV    Bowel regimen- d/c'ed 2/2 diarrhea - digni shield placed 9/18pm    /RENAL:   -Strict I&Os - Ardon    - LR @ 100cc/hr  - MONSTER, resolved - BUN/Cr 13/0.5  - fluid overloaded, +3.8L (24hrs) - Lasix 10mg IV x1  Lactic acidosis, resolved   - lactate (max 13) >> 3.2 > 1.2    Labs:  134 | 100 | 13  ---------------<179  3.8 |  29 |  <0.5  Mg 1.8 Phos 1.6    -hypokalemia, hypomagnesemia, hypophophatemia - repleted  - f/u repeat labs @ 11am      VASC:  - LUE art. duplex - L radial artery occluded  - L digits 1& 2 appear mottled  - q1 neurovascular checks of LUE  - vascular recommendation: Hep gtt (cleared w/ Ortho)  - monitor PTT (goal 50)      HEME/ONC:     DVT prophylaxis- Hep gtt, SCDs  Acute anemia post-op requiring daily transfusions  -Hgb 8 > 6.4 > 6.2 > 1u pRBC overnight > 6.8 > 1u PRBC today > f/u 11am CBC                  Plts:  109              PTT/INR:  81.3 > 59 > 51 - f/u @ 11am              T&S: (09-16)                 ID:  Acute leukocytosis, improving      WBC-  29 > 24 > 21  Temp trend- 24hrs T(F): 101.2 (09-18 @ 07:00), Max: 101.2 (09-18 @ 07:00)  Antibiotics-cefepime / vanco   -Vanco level 13 (9/17 @ 5pm) - continue to monitor  [ ] bld cx, UA neg    ENDO:     HA1C 5.2    finger sticks q4hr while npo        CODE STATUS: DNR/DNI    DISPO: SICU

## 2021-09-19 NOTE — PROGRESS NOTE ADULT - SUBJECTIVE AND OBJECTIVE BOX
Chart reviewed, patient examined. Pertinent results reviewed.  Case discussed with HO; specialist f/u reviewed  HD#4; POD#3  JACOB KOTHARI    HPI:  A 62 y/o female with pmhx of asthma, copd, obesity, epilepsy, hypothyroidism , osteoarthritis , kidney stone  s/p slip and fall at [facility]  admitted for left femoral fracture. Pt reports was taking shower the DOA, her shower chair slipped and she fell on her buttocks. PT reports left hip pain. PT  denies neck pain, hitting head or loc. Pt reports was not able to stand or straighten her left leg. Pt reports usually ambulates with a walker. Pt denies numbness or tingling on her left leg. Pt denies urinary or fecal incontinence. PT reports last seizure was 15 yrs ago. Pt denies taking any blood thinners. Pt denies fever, chills, chest pain, shortness of breath, burning with urination, diarrhea.  (15 Sep 2021 18:29)     ED evaluation revealed a L Hip FX; She had ORIF w pinning by Vivien; She's still in the SICU on pressors since after surgery.      INTERVAL EVENTS: Patient seen today, chart reviewed. Patient awake, alert, asking for food despite being NPO. Patient comfortable, states leg hurts when she attempts to move it. LUE in Aracelis hugger.    MEDICATIONS  (STANDING):  cefepime   IVPB      cefepime   IVPB 1000 milliGRAM(s) IV Intermittent every 12 hours  chlorhexidine 4% Liquid 1 Application(s) Topical daily  citalopram 20 milliGRAM(s) Oral every 24 hours  furosemide   Injectable 10 milliGRAM(s) IV Push once  heparin  Infusion 1100 Unit(s)/Hr (11 mL/Hr) IV Continuous <Continuous>  hydrocortisone sodium succinate Injectable 50 milliGRAM(s) IV Push every 6 hours  influenza   Vaccine 0.5 milliLiter(s) IntraMuscular once  insulin regular  human corrective regimen sliding scale   IV Push every 4 hours  lactated ringers. 1000 milliLiter(s) (100 mL/Hr) IV Continuous <Continuous>  levothyroxine 50 MICROGram(s) Oral every 24 hours  montelukast 10 milliGRAM(s) Oral at bedtime  norepinephrine Infusion 0.05 MICROgram(s)/kG/Min (5.74 mL/Hr) IV Continuous <Continuous>  pantoprazole  Injectable 40 milliGRAM(s) IV Push daily  PHENobarbital 32.4 milliGRAM(s) Oral three times a day  pramipexole 0.5 milliGRAM(s) Oral at bedtime  topiramate 100 milliGRAM(s) Oral every 24 hours  traZODone 75 milliGRAM(s) Oral at bedtime  vancomycin  IVPB      vancomycin  IVPB 1500 milliGRAM(s) IV Intermittent every 12 hours  vasopressin Infusion 0.1 Unit(s)/Min (6 mL/Hr) IV Continuous <Continuous>    MEDICATIONS  (PRN):  acetaminophen   Tablet .. 650 milliGRAM(s) Oral every 6 hours PRN Temp greater or equal to 38C (100.4F), Mild Pain (1 - 3)  ALBUTerol    90 MICROgram(s) HFA Inhaler 2 Puff(s) Inhalation every 6 hours PRN Shortness of Breath and/or Wheezing  oxyCODONE    IR 5 milliGRAM(s) Oral every 4 hours PRN Moderate Pain (4 - 6)      Vital Signs Last 24 Hrs  T(C): 36.5 (19 Sep 2021 09:00), Max: 37.3 (18 Sep 2021 12:00)  T(F): 97.7 (19 Sep 2021 09:00), Max: 99.2 (18 Sep 2021 12:00)  HR: 59 (19 Sep 2021 09:00) (54 - 99)  BP: 118/56 (19 Sep 2021 09:00) (86/53 - 137/56)  BP(mean): 79 (19 Sep 2021 09:00) (60 - 89)  RR: 20 (19 Sep 2021 08:00) (18 - 20)  SpO2: 100% (19 Sep 2021 09:00) (93% - 100%)  PHYSICAL EXAM:  GENERAL: NAD, Pale, awake alert; very obese;   CHEST/LUNG: Decreased effort,Clear; No wheezing  HEART: RRR, no MRG  ABDOMEN: Soft, Nontender, Obese, Bowel sounds present  EXTREMITIES: + edema, LUE discolored, able to move her fingers, LLE dressing intact  SKIN: No rashes or lesions    LABS:                          6.8    20.88 )-----------( 109      ( 19 Sep 2021 06:50 )             20.5                        8.0    29.33 )-----------( 116      ( 18 Sep 2021 05:15 )             22.8                        9.6    35.40 )-----------( 132      ( 17 Sep 2021 06:15 )             29.1   09-19    134<L>  |  100  |  13  ----------------------------<  179<H>  3.8   |  29  |  <0.5<L>    Ca    6.6<L>      19 Sep 2021 00:42  Phos  1.6     -19  Mg     1.8     18    136  |  100  |  16  ----------------------------<  172<H>  4.1   |  32  |  0.5<L>    Ca    6.8<L>      18 Sep 2021 05:15  Phos  1.8       Mg     2.1         TPro  3.7<L>  /  Alb  2.3<L>  /  TBili  0.7  /  DBili  0.4<H>  /  AST  100<H>  /  ALT  69<H>  /  AlkPhos  47      141  |  102  |  20  ----------------------------<  274<H>  3.2<L>   |  20  |  1.1    Ca    7.2<L>      17 Sep 2021 06:15  Phos  3.8       Mg     1.9         TPro  3.7<L>  /  Alb  2.3<L>  /  TBili  0.7  /  DBili  0.4<H>  /  AST  100<H>  /  ALT  69<H>  /  AlkPhos  47      LIVER FUNCTIONS - ( 17 Sep 2021 02:30 )  Alb: 2.3 g/dL / Pro: 3.7 g/dL / ALK PHOS: 47 U/L / ALT: 69 U/L / AST: 100 U/L / GGT: x                 PT/INR - ( 17 Sep 2021 01:40 )   PT: 15.40 sec;   INR: 1.34 ratio         PTT - ( 17 Sep 2021 01:40 )  PTT:28.0 sec  CARDIAC MARKERS ( 17 Sep 2021 06:15 )  x     / 0.06 ng/mL / 3530 U/L / x     / 14.7 ng/mL  CARDIAC MARKERS ( 17 Sep 2021 01:40 )  x     / 0.03 ng/mL / 1966 U/L / x     / 8.0 ng/mL  CARDIAC MARKERS ( 16 Sep 2021 21:22 )  x     / x     / 372 U/L / x     / x      CARDIAC MARKERS ( 16 Sep 2021 21:19 )  x     / <0.01 ng/mL / x     / x     / 2.2 ng/mL      ABG - ( 17 Sep 2021 01:17 )  pH, Arterial: 7.20  pH, Blood: x     /  pCO2: 32    /  pO2: 106   / HCO3: 12    / Base Excess: -14.4 /  SaO2: 99.3        Urinalysis Basic - ( 17 Sep 2021 02:35 )    Color: Yellow / Appearance: Slightly Turbid / S.024 / pH: x  Gluc: x / Ketone: Trace  / Bili: Negative / Urobili: 3 mg/dL   Blood: x / Protein: 30 mg/dL / Nitrite: Negative   Leuk Esterase: Large / RBC: 3 /HPF /  /HPF   Sq Epi: x / Non Sq Epi: 1 /HPF / Bacteria: Negative          RADIOLOGY & ADDITIONAL TESTS:  < from: Xray Chest 1 View-PORTABLE IMMEDIATE (Xray Chest 1 View-PORTABLE IMMEDIATE .) (21 @ 22:35) >    INTERPRETATION:  Clinical History / Reason for exam: Line placement    Comparison : Chest radiograph September 15, 2021.    Technique/Positioning: Single AP view ofthe chest.    Findings:    Support devices: Right IJ central venous catheter overlying proximal SVC    Cardiac/mediastinum/hilum: Unchanged.    Lung parenchyma/Pleura: No consolidation, effusion or pneumothorax.    Skeleton/soft tissues: Unchanged.    Impression:    No consolidation, effusion or pneumothorax.        --- End of Report ---      < end of copied text >   Chart reviewed, patient examined. Pertinent results reviewed.  Case discussed with HO; specialist f/u reviewed  HD#4; POD#3  JACOB KOTHARI    HPI:  A 62 y/o female with pmhx of asthma, copd, obesity, epilepsy, hypothyroidism , osteoarthritis , kidney stone  s/p slip and fall at [facility]  admitted for left femoral fracture. Pt reports was taking shower the DOA, her shower chair slipped and she fell on her buttocks. PT reports left hip pain. PT  denies neck pain, hitting head or loc. Pt reports was not able to stand or straighten her left leg. Pt reports usually ambulates with a walker. Pt denies numbness or tingling on her left leg. Pt denies urinary or fecal incontinence. PT reports last seizure was 15 yrs ago. Pt denies taking any blood thinners. Pt denies fever, chills, chest pain, shortness of breath, burning with urination, diarrhea.  (15 Sep 2021 18:29)     ED evaluation revealed a L Hip FX; She had ORIF w pinning by Vivien; She's still in the SICU on pressors since after surgery.      INTERVAL EVENTS: Patient seen today, chart reviewed. Patient awake, alert, asking for food despite being NPO. Patient comfortable, states leg hurts when she attempts to move it. LUE in Aracelis hugger.    MEDICATIONS  (STANDING):  cefepime   IVPB      cefepime   IVPB 1000 milliGRAM(s) IV Intermittent every 12 hours  chlorhexidine 4% Liquid 1 Application(s) Topical daily  citalopram 20 milliGRAM(s) Oral every 24 hours  furosemide   Injectable 10 milliGRAM(s) IV Push once  heparin  Infusion 1100 Unit(s)/Hr (11 mL/Hr) IV Continuous <Continuous>  hydrocortisone sodium succinate Injectable 50 milliGRAM(s) IV Push every 6 hours  influenza   Vaccine 0.5 milliLiter(s) IntraMuscular once  insulin regular  human corrective regimen sliding scale   IV Push every 4 hours  lactated ringers. 1000 milliLiter(s) (100 mL/Hr) IV Continuous <Continuous>  levothyroxine 50 MICROGram(s) Oral every 24 hours  montelukast 10 milliGRAM(s) Oral at bedtime  norepinephrine Infusion 0.05 MICROgram(s)/kG/Min (5.74 mL/Hr) IV Continuous <Continuous>  pantoprazole  Injectable 40 milliGRAM(s) IV Push daily  PHENobarbital 32.4 milliGRAM(s) Oral three times a day  pramipexole 0.5 milliGRAM(s) Oral at bedtime  topiramate 100 milliGRAM(s) Oral every 24 hours  traZODone 75 milliGRAM(s) Oral at bedtime  vancomycin  IVPB      vancomycin  IVPB 1500 milliGRAM(s) IV Intermittent every 12 hours  vasopressin Infusion 0.1 Unit(s)/Min (6 mL/Hr) IV Continuous <Continuous>    MEDICATIONS  (PRN):  acetaminophen   Tablet .. 650 milliGRAM(s) Oral every 6 hours PRN Temp greater or equal to 38C (100.4F), Mild Pain (1 - 3)  ALBUTerol    90 MICROgram(s) HFA Inhaler 2 Puff(s) Inhalation every 6 hours PRN Shortness of Breath and/or Wheezing  oxyCODONE    IR 5 milliGRAM(s) Oral every 4 hours PRN Moderate Pain (4 - 6)      Vital Signs Last 24 Hrs  T(C): 36.5 (19 Sep 2021 09:00), Max: 37.3 (18 Sep 2021 12:00)  T(F): 97.7 (19 Sep 2021 09:00), Max: 99.2 (18 Sep 2021 12:00)  HR: 59 (19 Sep 2021 09:00) (54 - 99)  BP: 118/56 (19 Sep 2021 09:00) (86/53 - 137/56)  BP(mean): 79 (19 Sep 2021 09:00) (60 - 89)  RR: 20 (19 Sep 2021 08:00) (18 - 20)  SpO2: 100% (19 Sep 2021 09:00) (93% - 100%)  PHYSICAL EXAM:  GENERAL: NAD, Pale, awake alert; very obese;   CHEST/LUNG: Decreased effort,Clear; No wheezing  HEART: RRR, no MRG  ABDOMEN: Soft, Nontender, Obese, Bowel sounds present  EXTREMITIES: + edema, LUE discolored, able to move her fingers, LLE/Hip dressing intact  SKIN: No rashes or lesions; edema in hands and feet; dusky fingertips 1-2 on L; M/S grossly intact    LABS:                          6.8    20.88 )-----------( 109      ( 19 Sep 2021 06:50 )             20.5                        8.0    29.33 )-----------( 116      ( 18 Sep 2021 05:15 )             22.8                        9.6    35.40 )-----------( 132      ( 17 Sep 2021 06:15 )             29.1   09-19    134<L>  |  100  |  13  ----------------------------<  179<H>  3.8   |  29  |  <0.5<L>    Ca    6.6<L>      19 Sep 2021 00:42  Phos  1.6       Mg     1.8         136  |  100  |  16  ----------------------------<  172<H>  4.1   |  32  |  0.5<L>    Ca    6.8<L>      18 Sep 2021 05:15  Phos  1.8       Mg     2.1         TPro  3.7<L>  /  Alb  2.3<L>  /  TBili  0.7  /  DBili  0.4<H>  /  AST  100<H>  /  ALT  69<H>  /  AlkPhos  47      141  |  102  |  20  ----------------------------<  274<H>  3.2<L>   |  20  |  1.1    Ca    7.2<L>      17 Sep 2021 06:15  Phos  3.8       Mg     1.9         TPro  3.7<L>  /  Alb  2.3<L>  /  TBili  0.7  /  DBili  0.4<H>  /  AST  100<H>  /  ALT  69<H>  /  AlkPhos  47      LIVER FUNCTIONS - ( 17 Sep 2021 02:30 )  Alb: 2.3 g/dL / Pro: 3.7 g/dL / ALK PHOS: 47 U/L / ALT: 69 U/L / AST: 100 U/L / GGT: x                 PT/INR - ( 17 Sep 2021 01:40 )   PT: 15.40 sec;   INR: 1.34 ratio         PTT - ( 17 Sep 2021 01:40 )  PTT:28.0 sec  CARDIAC MARKERS ( 17 Sep 2021 06:15 )  x     / 0.06 ng/mL / 3530 U/L / x     / 14.7 ng/mL  CARDIAC MARKERS ( 17 Sep 2021 01:40 )  x     / 0.03 ng/mL / 1966 U/L / x     / 8.0 ng/mL  CARDIAC MARKERS ( 16 Sep 2021 21:22 )  x     / x     / 372 U/L / x     / x      CARDIAC MARKERS ( 16 Sep 2021 21:19 )  x     / <0.01 ng/mL / x     / x     / 2.2 ng/mL      ABG - ( 17 Sep 2021 01:17 )  pH, Arterial: 7.20  pH, Blood: x     /  pCO2: 32    /  pO2: 106   / HCO3: 12    / Base Excess: -14.4 /  SaO2: 99.3        Urinalysis Basic - ( 17 Sep 2021 02:35 )    Color: Yellow / Appearance: Slightly Turbid / S.024 / pH: x  Gluc: x / Ketone: Trace  / Bili: Negative / Urobili: 3 mg/dL   Blood: x / Protein: 30 mg/dL / Nitrite: Negative   Leuk Esterase: Large / RBC: 3 /HPF /  /HPF   Sq Epi: x / Non Sq Epi: 1 /HPF / Bacteria: Negative          RADIOLOGY & ADDITIONAL TESTS:  < from: Xray Chest 1 View-PORTABLE IMMEDIATE (Xray Chest 1 View-PORTABLE IMMEDIATE .) (21 @ 22:35) >    INTERPRETATION:  Clinical History / Reason for exam: Line placement    Comparison : Chest radiograph September 15, 2021.    Technique/Positioning: Single AP view ofthe chest.    Findings:    Support devices: Right IJ central venous catheter overlying proximal SVC    Cardiac/mediastinum/hilum: Unchanged.    Lung parenchyma/Pleura: No consolidation, effusion or pneumothorax.    Skeleton/soft tissues: Unchanged.    Impression:    No consolidation, effusion or pneumothorax.        --- End of Report ---      < end of copied text >

## 2021-09-19 NOTE — PROGRESS NOTE ADULT - ASSESSMENT
A 62 y/o female with pmhx of asthma, copd, obesity, epilepsy, hypothyroidism , osteoarthritis , kidney stone  s/p slip and fall at [facility]  admitted for left femoral fracture.    Acute comminuted, displaced fracture of the left proximal femur with avulsion of the lesser trochanter  - POD #2, in SICU post left femur IMN  - hospital course complicated by absence of left radial pulse; and hemorrhage w hypotension/shock  - vascular evaluation noted- following the LUE ischemia  - on Heparin drip  - remains on Vasopresin, & NE- rying to taper off;      P/S her BP runs low normally- eg 100 systolic  - plan as per surgical teams  - patient on Cefepime    Hypothyroidism   - continue Synthroid     Seizure Disorder  - continue Phenobarbital     Morbid Obesity   - resume low olga dash diet when able; surg allowing only ice chips for now       see lower prot/albumin; consider PPN if no PO Nutrition  - provide incentive spirometer    DM  - on oral agent in SNF  - FS noted   - insulin coverage    hx of Depression  - continue home rx    hx of Asthma  - resp treatment as needed prn    Patient remains acute  Patient in SICU, case discussed with RN staff.  Emotional support rendered at bedside to the patient who is upset about NPO status.      A 62 y/o female with pmhx of asthma, copd, obesity, epilepsy, hypothyroidism , osteoarthritis , kidney stone  s/p slip and fall at [facility]  admitted for left femoral fracture.    Acute comminuted, displaced fracture of the left proximal femur with avulsion of the lesser trochanter  - POD #2, in SICU post left femur IMN  - hospital course complicated by absence of left radial pulse; and hemorrhage w hypotension/shock  - vascular evaluation noted- following the LUE ischemia  - on Heparin drip  - remains on Vasopresin, & NE- rying to taper off;      P/S her BP runs low normally- eg 100 systolic  - plan as per surgical teams  - patient on Cefepime  - OK'd for clear fluids  - OOB to chair when cleared by surgery  - f/u lytes and replete PRN    Hypothyroidism   - continue Synthroid     Seizure Disorder  - continue Phenobarbital     Morbid Obesity   - resume low olga dash diet when able; surg allowing only ice chips for now       see lower prot/albumin; consider PPN if no PO Nutrition  - provide incentive spirometer    DM  - on oral agent in SNF  - FS noted   - insulin coverage    hx of Depression  - continue home rx    hx of Asthma  - resp treatment as needed prn    Patient remains acute  Patient in SICU, case discussed with RN staff.  Emotional support rendered at bedside to the patient who is upset about not being fed

## 2021-09-20 LAB
ANION GAP SERPL CALC-SCNC: 7 MMOL/L — SIGNIFICANT CHANGE UP (ref 7–14)
APTT BLD: 22.5 SEC — CRITICAL LOW (ref 27–39.2)
APTT BLD: 30 SEC — SIGNIFICANT CHANGE UP (ref 27–39.2)
APTT BLD: 39.1 SEC — SIGNIFICANT CHANGE UP (ref 27–39.2)
BUN SERPL-MCNC: 13 MG/DL — SIGNIFICANT CHANGE UP (ref 10–20)
CALCIUM SERPL-MCNC: 7 MG/DL — LOW (ref 8.5–10.1)
CHLORIDE SERPL-SCNC: 96 MMOL/L — LOW (ref 98–110)
CO2 SERPL-SCNC: 26 MMOL/L — SIGNIFICANT CHANGE UP (ref 17–32)
CREAT SERPL-MCNC: <0.5 MG/DL — LOW (ref 0.7–1.5)
GLUCOSE BLDC GLUCOMTR-MCNC: 110 MG/DL — HIGH (ref 70–99)
GLUCOSE BLDC GLUCOMTR-MCNC: 111 MG/DL — HIGH (ref 70–99)
GLUCOSE BLDC GLUCOMTR-MCNC: 134 MG/DL — HIGH (ref 70–99)
GLUCOSE BLDC GLUCOMTR-MCNC: 147 MG/DL — HIGH (ref 70–99)
GLUCOSE BLDC GLUCOMTR-MCNC: 152 MG/DL — HIGH (ref 70–99)
GLUCOSE BLDC GLUCOMTR-MCNC: 85 MG/DL — SIGNIFICANT CHANGE UP (ref 70–99)
GLUCOSE BLDC GLUCOMTR-MCNC: 96 MG/DL — SIGNIFICANT CHANGE UP (ref 70–99)
GLUCOSE BLDC GLUCOMTR-MCNC: >600 MG/DL — CRITICAL HIGH (ref 70–99)
GLUCOSE SERPL-MCNC: 129 MG/DL — HIGH (ref 70–99)
INR BLD: 1.2 RATIO — SIGNIFICANT CHANGE UP (ref 0.65–1.3)
INR BLD: 1.25 RATIO — SIGNIFICANT CHANGE UP (ref 0.65–1.3)
MAGNESIUM SERPL-MCNC: 1.8 MG/DL — SIGNIFICANT CHANGE UP (ref 1.8–2.4)
PHOSPHATE SERPL-MCNC: 2.1 MG/DL — SIGNIFICANT CHANGE UP (ref 2.1–4.9)
POTASSIUM SERPL-MCNC: 4 MMOL/L — SIGNIFICANT CHANGE UP (ref 3.5–5)
POTASSIUM SERPL-SCNC: 4 MMOL/L — SIGNIFICANT CHANGE UP (ref 3.5–5)
PROTHROM AB SERPL-ACNC: 13.8 SEC — HIGH (ref 9.95–12.87)
PROTHROM AB SERPL-ACNC: 14.4 SEC — HIGH (ref 9.95–12.87)
SODIUM SERPL-SCNC: 129 MMOL/L — LOW (ref 135–146)
VANCOMYCIN TROUGH SERPL-MCNC: 14.8 UG/ML — HIGH (ref 5–10)

## 2021-09-20 PROCEDURE — 71045 X-RAY EXAM CHEST 1 VIEW: CPT | Mod: 26

## 2021-09-20 PROCEDURE — 99291 CRITICAL CARE FIRST HOUR: CPT

## 2021-09-20 RX ORDER — ALBUTEROL 90 UG/1
2 AEROSOL, METERED ORAL EVERY 6 HOURS
Refills: 0 | Status: DISCONTINUED | OUTPATIENT
Start: 2021-09-20 | End: 2021-09-24

## 2021-09-20 RX ORDER — IPRATROPIUM/ALBUTEROL SULFATE 18-103MCG
3 AEROSOL WITH ADAPTER (GRAM) INHALATION EVERY 6 HOURS
Refills: 0 | Status: DISCONTINUED | OUTPATIENT
Start: 2021-09-20 | End: 2021-09-24

## 2021-09-20 RX ORDER — SODIUM CHLORIDE 9 MG/ML
250 INJECTION, SOLUTION INTRAVENOUS ONCE
Refills: 0 | Status: COMPLETED | OUTPATIENT
Start: 2021-09-20 | End: 2021-09-20

## 2021-09-20 RX ORDER — PANTOPRAZOLE SODIUM 20 MG/1
40 TABLET, DELAYED RELEASE ORAL
Refills: 0 | Status: DISCONTINUED | OUTPATIENT
Start: 2021-09-20 | End: 2021-09-24

## 2021-09-20 RX ORDER — APIXABAN 2.5 MG/1
5 TABLET, FILM COATED ORAL EVERY 12 HOURS
Refills: 0 | Status: DISCONTINUED | OUTPATIENT
Start: 2021-09-20 | End: 2021-09-24

## 2021-09-20 RX ORDER — MAGNESIUM SULFATE 500 MG/ML
1 VIAL (ML) INJECTION ONCE
Refills: 0 | Status: COMPLETED | OUTPATIENT
Start: 2021-09-20 | End: 2021-09-20

## 2021-09-20 RX ORDER — HEPARIN SODIUM 5000 [USP'U]/ML
1400 INJECTION INTRAVENOUS; SUBCUTANEOUS
Qty: 25000 | Refills: 0 | Status: DISCONTINUED | OUTPATIENT
Start: 2021-09-20 | End: 2021-09-20

## 2021-09-20 RX ORDER — SODIUM CHLORIDE 9 MG/ML
500 INJECTION INTRAMUSCULAR; INTRAVENOUS; SUBCUTANEOUS ONCE
Refills: 0 | Status: COMPLETED | OUTPATIENT
Start: 2021-09-20 | End: 2021-09-20

## 2021-09-20 RX ADMIN — Medication 85 MILLIMOLE(S): at 03:30

## 2021-09-20 RX ADMIN — Medication 3 MILLILITER(S): at 12:49

## 2021-09-20 RX ADMIN — Medication 100 MILLIGRAM(S): at 11:06

## 2021-09-20 RX ADMIN — INSULIN HUMAN 2: 100 INJECTION, SOLUTION SUBCUTANEOUS at 05:04

## 2021-09-20 RX ADMIN — Medication 32.4 MILLIGRAM(S): at 05:06

## 2021-09-20 RX ADMIN — CHLORHEXIDINE GLUCONATE 1 APPLICATION(S): 213 SOLUTION TOPICAL at 11:07

## 2021-09-20 RX ADMIN — APIXABAN 5 MILLIGRAM(S): 2.5 TABLET, FILM COATED ORAL at 13:43

## 2021-09-20 RX ADMIN — SODIUM CHLORIDE 1500 MILLILITER(S): 9 INJECTION, SOLUTION INTRAVENOUS at 12:29

## 2021-09-20 RX ADMIN — Medication 50 MICROGRAM(S): at 10:02

## 2021-09-20 RX ADMIN — Medication 75 MILLIGRAM(S): at 21:21

## 2021-09-20 RX ADMIN — Medication 50 MILLIGRAM(S): at 05:03

## 2021-09-20 RX ADMIN — PRAMIPEXOLE DIHYDROCHLORIDE 0.5 MILLIGRAM(S): 0.12 TABLET ORAL at 21:22

## 2021-09-20 RX ADMIN — SODIUM CHLORIDE 6000 MILLILITER(S): 9 INJECTION INTRAMUSCULAR; INTRAVENOUS; SUBCUTANEOUS at 12:30

## 2021-09-20 RX ADMIN — Medication 300 MILLIGRAM(S): at 05:04

## 2021-09-20 RX ADMIN — PANTOPRAZOLE SODIUM 40 MILLIGRAM(S): 20 TABLET, DELAYED RELEASE ORAL at 12:43

## 2021-09-20 RX ADMIN — CITALOPRAM 20 MILLIGRAM(S): 10 TABLET, FILM COATED ORAL at 11:06

## 2021-09-20 RX ADMIN — OXYCODONE HYDROCHLORIDE 5 MILLIGRAM(S): 5 TABLET ORAL at 21:58

## 2021-09-20 RX ADMIN — APIXABAN 5 MILLIGRAM(S): 2.5 TABLET, FILM COATED ORAL at 18:24

## 2021-09-20 RX ADMIN — CEFEPIME 100 MILLIGRAM(S): 1 INJECTION, POWDER, FOR SOLUTION INTRAMUSCULAR; INTRAVENOUS at 05:04

## 2021-09-20 RX ADMIN — MIDODRINE HYDROCHLORIDE 10 MILLIGRAM(S): 2.5 TABLET ORAL at 05:04

## 2021-09-20 RX ADMIN — Medication 32.4 MILLIGRAM(S): at 15:46

## 2021-09-20 RX ADMIN — MONTELUKAST 10 MILLIGRAM(S): 4 TABLET, CHEWABLE ORAL at 21:21

## 2021-09-20 RX ADMIN — Medication 3 MILLILITER(S): at 21:33

## 2021-09-20 RX ADMIN — Medication 32.4 MILLIGRAM(S): at 21:28

## 2021-09-20 RX ADMIN — Medication 50 GRAM(S): at 03:30

## 2021-09-20 NOTE — PROGRESS NOTE ADULT - ASSESSMENT
A 64 y/o female with pmhx of asthma, copd, obesity, epilepsy, hypothyroidism , osteoarthritis , kidney stone  s/p slip and fall at [facility]  admitted for left femoral fracture.    Acute comminuted, displaced fracture of the left proximal femur with avulsion of the lesser trochanter  - POD #2, in SICU post left femur IMN  - hospital course complicated by absence of left radial pulse; and hemorrhage w hypotension/shock  - vascular f/u for LUE ischemia noted  - transitioned to Eliquis  - plan as per surgical teams  - encourage use of incentive spirometer  - OOB to chair when cleared by surgery    Hypothyroidism   - continue Synthroid     Seizure Disorder  - continue Phenobarbital     Morbid Obesity   - resume low olga dash diet when able  - unsure of PO intake  - check albumin    DM  - on oral agent in SNF  - FS noted   - insulin coverage    hx of Depression  - continue home rx    hx of Asthma  - resp treatment as needed prn    Patient remains acute  Patient in SICU, case discussed with RN staff.  Emotional support rendered at bedside to the patient

## 2021-09-20 NOTE — OCCUPATIONAL THERAPY INITIAL EVALUATION ADULT - ADL RETRAINING, OT EVAL
Patient will perform upper body dressing with maximum assistance by discharge. ; Patient will perform lower body dressing with maximum assistance with use of appropriate adaptive equipment as needed by discharge.

## 2021-09-20 NOTE — OCCUPATIONAL THERAPY INITIAL EVALUATION ADULT - PATIENT PROFILE REVIEW, REHAB EVAL
Evaluation Attempted: 09:00am; pt chart thoroughly reviewed prior to OT evaluation/yes
Evaluation Time: 13:30-14:10; pt chart thoroughly reviewed prior to OT evaluation/yes

## 2021-09-20 NOTE — OCCUPATIONAL THERAPY INITIAL EVALUATION ADULT - PLANNED THERAPY INTERVENTIONS, OT EVAL
ADL retraining/balance training/bed mobility training/fine motor coordination training/neuromuscular re-education/parent/caregiver training.../ROM/strengthening/stretching/transfer training

## 2021-09-20 NOTE — PROGRESS NOTE ADULT - ASSESSMENT
Assessment & Plan  66y Female 1d s/p ORIF of L femur fracture with long IM gamma nail     NEURO:  Acute pain  - controlled with prn Tylenol & oxycodone 5mg  HX epilepsy  - per patient last seizures 20 years ago  - c/w home regimen Phenobarbital, Mirapex, Topiramate  HX sleep disorder/depression  - c/w Trazadone 75mg at bedtime    RESP:   HX asthma, THOM (does not use CPAP at NH)   Oxygen insufficiency- NC @ 3L - maintain sat > 92%  Current Rx: montelukast 10 at bedtime  Home Rx: Singulair 10 mg oral tablet    CARDS:   Hemorrhagic shock post-op, requiring pressors  - Levophed, Vasopressin  - Midodrine 10mg q8 started 9/19 night  - Levophed decreasing s/p strting Midodrine  - maintain MAP > 65  - fluid resuscitation prn  Sinus tachycardia, resolved - HR now in 60s  - EKG sinus tachycardia, no sign so ischemia  [ ] ECHO: rpt pending - continues to refuse  - prior 2018, normal systolic function    GI/NUTR:   Diet: NPO 2/2 high pressor requirements - advance to clears    GI Prophylaxis- PPI IV    Bowel regimen- d/c'ed 2/2 diarrhea - digni shield placed 9/18pm    /RENAL:   - Strict I&Os - Ardon    - LR @ 100cc/hr  - MONSTER, resolved - BUN/Cr 13/0.5  - Labs: Na 129, K 4.0, Mag 1.8, Phos 2.1  - Fluid overloaded, +3.8L (24hrs) - Lasix 10mg IV x1 on 9/19 day -- monitor AM CXR  - Lactic acidosis, resolved: 13 -> 1.2    VASC:  - LUE art. duplex - L radial artery occluded  - L digits 1& 2 appear mottled  - q1 neurovascular checks of LUE  - vascular recommendation: Hep gtt (cleared w/ Ortho)  - monitor PTT (goal 50)     HEME/ONC:   DVT prophylaxis- Hep gtt at 1400, SCDs   -Monitor PTT  Acute anemia post-op requiring frequent transfusions              Hgb/Hct: 7.7/22.6              Plts:  106              PTT/INR:  51               T&S: (09-16)    ID:  Acute leukocytosis, improving      WBC- 21 > 19  Temp trend- 24hrs T(F): afebrile  Antibiotics: cefepime / vanco   -Vanco level 13 - continue to monitor    ENDO:     HA1C 5.2    finger sticks q4hr while npo    CODE STATUS: DNR/DNI    DISPO: SICU

## 2021-09-20 NOTE — PROGRESS NOTE ADULT - SUBJECTIVE AND OBJECTIVE BOX
KOTHARI, JACOB  66y  Female  HPI:  A 62 y/o female with pmhx of asthma, copd, obesity, epilepsy, hypothyroidism , osteoarthritis , kidney stone  s/p slip and fall at [facility]  admitted for left femoral fracture. Pt reports was taking shower this am, her shower chair slipped and she fell on her buttocks. PT reports left hip pain. PT  denies neck pain, hitting head or loc. Pt reports was not able to stand or straighten her left leg. Pt reports usually ambulates with a walker. Pt denies numbness or tingling on her left leg. Pt denies urinary or fecal incontinence. PT reports last seizure was 15 yrs ago. Pt denies taking any blood thinners. Pt denies fever, chills, chest pain, shortness of breath, burning with urination, diarrhea.  (15 Sep 2021 18:29)    MEDICATIONS  (STANDING):  ALBUTerol    90 MICROgram(s) HFA Inhaler 2 Puff(s) Inhalation every 6 hours  albuterol/ipratropium for Nebulization 3 milliLiter(s) Nebulizer every 6 hours  apixaban 5 milliGRAM(s) Oral every 12 hours  chlorhexidine 4% Liquid 1 Application(s) Topical daily  citalopram 20 milliGRAM(s) Oral every 24 hours  influenza   Vaccine 0.5 milliLiter(s) IntraMuscular once  insulin regular  human corrective regimen sliding scale   IV Push every 4 hours  levothyroxine 50 MICROGram(s) Oral every 24 hours  montelukast 10 milliGRAM(s) Oral at bedtime  pantoprazole    Tablet 40 milliGRAM(s) Oral before breakfast  PHENobarbital 32.4 milliGRAM(s) Oral three times a day  pramipexole 0.5 milliGRAM(s) Oral at bedtime  topiramate 100 milliGRAM(s) Oral every 24 hours  traZODone 75 milliGRAM(s) Oral at bedtime    MEDICATIONS  (PRN):  acetaminophen   Tablet .. 650 milliGRAM(s) Oral every 6 hours PRN Temp greater or equal to 38C (100.4F), Mild Pain (1 - 3)  ALBUTerol    90 MICROgram(s) HFA Inhaler 2 Puff(s) Inhalation every 6 hours PRN Shortness of Breath and/or Wheezing  oxyCODONE    IR 5 milliGRAM(s) Oral every 4 hours PRN Moderate Pain (4 - 6)    INTERVAL EVENTS: Patient seen today receiving respiratory treatment, has cough and congestion. Chart reviewed    T(C): 36 (09-20-21 @ 07:46), Max: 36.6 (09-19-21 @ 23:00)  HR: 82 (09-20-21 @ 15:00) (45 - 82)  BP: 102/47 (09-20-21 @ 13:30) (86/44 - 131/60)  RR: 30 (09-20-21 @ 15:00) (18 - 31)  SpO2: 99% (09-20-21 @ 15:00) (74% - 99%)  Wt(kg): --Vital Signs Last 24 Hrs  T(C): 36 (20 Sep 2021 07:46), Max: 36.6 (19 Sep 2021 23:00)  T(F): 96.8 (20 Sep 2021 07:46), Max: 97.9 (19 Sep 2021 23:00)  HR: 82 (20 Sep 2021 15:00) (45 - 82)  BP: 102/47 (20 Sep 2021 13:30) (86/44 - 131/60)  BP(mean): 68 (20 Sep 2021 13:30) (63 - 88)  RR: 30 (20 Sep 2021 15:00) (18 - 31)  SpO2: 99% (20 Sep 2021 15:00) (74% - 99%)    PHYSICAL EXAM:  GENERAL: Pale, Obese  NECK: Right TLC  CHEST/LUNG: Crackles right > left  HEART: S1, S2, Regular rate and rhythm  ABDOMEN: Soft, Obese, Bowel sounds present  EXTREMITIES: ++ edema  SKIN: left hand with patches of discoloration    LABS:                          7.7    19.10 )-----------( 106      ( 19 Sep 2021 21:17 )             22.6             09-19    129<L>  |  96<L>  |  13  ----------------------------<  129<H>  4.0   |  26  |  <0.5<L>    Ca    7.0<L>      19 Sep 2021 21:17  Phos  2.1     09-19  Mg     1.8     09-19                PT/INR - ( 20 Sep 2021 10:48 )   PT: 14.40 sec;   INR: 1.25 ratio         PTT - ( 20 Sep 2021 10:48 )  PTT:39.1 sec      ABG - ( 20 Sep 2021 12:39 )  pH, Arterial: 7.34  pH, Blood: x     /  pCO2: 48    /  pO2: 99    / HCO3: 26    / Base Excess: -0.1  /  SaO2: 98.5          RADIOLOGY & ADDITIONAL TESTS:  < from: Xray Chest 1 View- PORTABLE-Routine (Xray Chest 1 View- PORTABLE-Routine in AM.) (09.20.21 @ 06:12) >  PROCEDURE DATE:  09/20/2021            INTERPRETATION:  Clinical History / Reason for exam: ICU    Comparison : Chest radiograph September 19, 2021.    Technique/Positioning: Frontal chest radiograph.    Findings:    Support devices: Stable right IJ central venous catheter.    Cardiac/mediastinum/hilum: Unchanged.    Lung parenchyma/Pleura: Unchanged left basilar opacity/atelectasis. No significant pleural effusions or pneumothorax. Low lung volumes.    Skeleton/soft tissues: Unchanged.    Impression:    Unchanged left basilar opacity/atelectasis.    < end of copied text >

## 2021-09-20 NOTE — PROGRESS NOTE ADULT - ASSESSMENT
ORTHO PROGRESS NOTE     66yFemale POD #4 s/p left femur IMN    Patient seen and examined at bedside . The patient is awake and alert in NAD. No complaints of chest pain, SOB, N/V.    MEDICATIONS  (STANDING):  cefepime   IVPB      cefepime   IVPB 1000 milliGRAM(s) IV Intermittent every 12 hours  chlorhexidine 4% Liquid 1 Application(s) Topical daily  citalopram 20 milliGRAM(s) Oral every 24 hours  heparin  Infusion. 1400 Unit(s)/Hr (14 mL/Hr) IV Continuous <Continuous>  hydrocortisone sodium succinate Injectable 50 milliGRAM(s) IV Push every 6 hours  influenza   Vaccine 0.5 milliLiter(s) IntraMuscular once  insulin regular  human corrective regimen sliding scale   IV Push every 4 hours  lactated ringers. 1000 milliLiter(s) (100 mL/Hr) IV Continuous <Continuous>  levothyroxine 50 MICROGram(s) Oral every 24 hours  midodrine 10 milliGRAM(s) Oral every 8 hours  montelukast 10 milliGRAM(s) Oral at bedtime  norepinephrine Infusion 0.05 MICROgram(s)/kG/Min (5.74 mL/Hr) IV Continuous <Continuous>  pantoprazole  Injectable 40 milliGRAM(s) IV Push daily  PHENobarbital 32.4 milliGRAM(s) Oral three times a day  pramipexole 0.5 milliGRAM(s) Oral at bedtime  topiramate 100 milliGRAM(s) Oral every 24 hours  traZODone 75 milliGRAM(s) Oral at bedtime  vancomycin  IVPB      vancomycin  IVPB 1500 milliGRAM(s) IV Intermittent every 12 hours  vasopressin Infusion 0.1 Unit(s)/Min (6 mL/Hr) IV Continuous <Continuous>    MEDICATIONS  (PRN):  acetaminophen   Tablet .. 650 milliGRAM(s) Oral every 6 hours PRN Temp greater or equal to 38C (100.4F), Mild Pain (1 - 3)  ALBUTerol    90 MICROgram(s) HFA Inhaler 2 Puff(s) Inhalation every 6 hours PRN Shortness of Breath and/or Wheezing  oxyCODONE    IR 5 milliGRAM(s) Oral every 4 hours PRN Moderate Pain (4 - 6)      Vital Signs Last 24 Hrs  T(C): 36.6 (19 Sep 2021 23:00), Max: 36.7 (19 Sep 2021 12:00)  T(F): 97.9 (19 Sep 2021 23:00), Max: 98 (19 Sep 2021 12:00)  HR: 48 (20 Sep 2021 06:30) (45 - 79)  BP: 113/56 (20 Sep 2021 06:15) (86/44 - 131/60)  BP(mean): 80 (20 Sep 2021 06:15) (63 - 88)  RR: 18 (19 Sep 2021 17:00) (18 - 20)  SpO2: 97% (20 Sep 2021 06:30) (94% - 100%)    PE:   Dressing C/D/I   Compartments soft and compressible  Motor intact distally  SILT distally  CR<2sec                               7.7    19.10 )-----------( 106      ( 19 Sep 2021 21:17 )             22.6     09-19    129<L>  |  96<L>  |  13  ----------------------------<  129<H>  4.0   |  26  |  <0.5<L>    Ca    7.0<L>      19 Sep 2021 21:17  Phos  2.1     09-19  Mg     1.8     09-19      PT/INR - ( 20 Sep 2021 02:00 )   PT: 13.80 sec;   INR: 1.20 ratio         PTT - ( 20 Sep 2021 02:00 )  PTT:30.0 sec      09-19-21 @ 07:01  -  09-20-21 @ 07:00  --------------------------------------------------------  IN: 5511.8 mL / OUT: 970 mL / NET: 4541.8 mL          A/P: 66yFemale POD #4 s/p left femur IMN. Hb drop likely secondary to expected fracture bleeding into thigh compartments. However dressing is dry and thigh compartments are soft, so there is no concern for active process or further need to investigate. Please continue to monitor hb as it should level off by POD 4 or 5. If not, consider alternate source of hb drop.  -OOB to Chair   -NWB LLE  -PT/OT  -Pain control   -Incentive Spirometry   -DVT Prophylaxis; please transition to LVX or HSQ when possible as a heparin gtt significantly increases her risk for wound complications, hematoma formation  care per SICU    Please page ortho with any questions or concerns.

## 2021-09-20 NOTE — PROGRESS NOTE ADULT - SUBJECTIVE AND OBJECTIVE BOX
VASCULAR SURGERY PROGRESS NOTE    CC: cyanotic LUE, absent radial pulse after A line placement   Hospital Day # 6    Events of past 24 hours: Patient has dopplerable pulses in radial, ulnar, and brachial veins. Patient 's fingers are less dusky than previous days and are returning to normal.     ROS otherwise negative except per subjective and HPI      PAST MEDICAL & SURGICAL HISTORY:  Diabetes    Renal stone    Renal failure    Epilepsy    Asthma    Chronic cough    Knee pain    Osteoarthritis    Uterine cancer  s/p hysterectomy    H/O abdominal hysterectomy  NO RADIATION AND CHEMO    History of tonsillectomy  13 years old    [place] teeth removed    History of bladder stone  SURGERY 8/3/2018    History of surgery  JJ STENT PLACEMENT LEFT OCTOBER 2017        Vital Signs Last 24 Hrs  T(C): 36 (20 Sep 2021 07:46), Max: 36.6 (19 Sep 2021 23:00)  T(F): 96.8 (20 Sep 2021 07:46), Max: 97.9 (19 Sep 2021 23:00)  HR: 81 (20 Sep 2021 13:30) (45 - 81)  BP: 102/47 (20 Sep 2021 13:30) (86/44 - 131/60)  BP(mean): 68 (20 Sep 2021 13:30) (63 - 88)  RR: 31 (20 Sep 2021 13:30) (18 - 31)  SpO2: 94% (20 Sep 2021 13:30) (74% - 99%)    Pain (0-10):            Pain Control Adequate: [] YES [] N    Diet:    I&O's Detail    19 Sep 2021 07:01  -  20 Sep 2021 07:00  --------------------------------------------------------  IN:    Heparin: 55 mL    Heparin: 120 mL    Heparin Infusion: 70 mL    Heparin Infusion: 48 mL    IV PiggyBack: 1000 mL    IV PiggyBack: 500 mL    IV PiggyBack: 250 mL    Lactated Ringers: 2400 mL    Norepinephrine: 233.6 mL    Oral Fluid: 480 mL    PRBCs (Packed Red Blood Cells): 300 mL    Vasopressin: 55.2 mL  Total IN: 5511.8 mL    OUT:    Indwelling Catheter - Urethral (mL): 970 mL  Total OUT: 970 mL    Total NET: 4541.8 mL      20 Sep 2021 07:01  -  20 Sep 2021 13:42  --------------------------------------------------------  IN:    Heparin Infusion: 98 mL    Lactated Ringers: 300 mL    Oral Fluid: 120 mL    Vasopressin: 7.2 mL  Total IN: 525.2 mL    OUT:    Indwelling Catheter - Urethral (mL): 360 mL  Total OUT: 360 mL    Total NET: 165.2 mL    PHYSICAL EXAM    Appearance: Normal	  HEENT:   Normal oral mucosa, PERRL, EOMI	  Neck: Supple, - JVD; Carotid Bruit   Cardiovascular: Normal S1 S2, No JVD, No murmurs,   Respiratory: Lungs clear to auscultation/Decreased Breath Sounds/No Rales, Rhonchi, Wheezing	  Gastrointestinal:  Soft, Non-tender, + BS	  Skin: No rashes, No ecchymoses, No cyanosis  Extremities: Normal range of motion, No clubbing, cyanosis or edema. Dopplerable pulses in radial, brachial, and ulnar in LUE. Left hand returning to normal color.   Psychiatry: A & O x 3, Mood & affect appropriate      MEDICATIONS:   MEDICATIONS  (STANDING):  ALBUTerol    90 MICROgram(s) HFA Inhaler 2 Puff(s) Inhalation every 6 hours  albuterol/ipratropium for Nebulization 3 milliLiter(s) Nebulizer every 6 hours  apixaban 5 milliGRAM(s) Oral every 12 hours  chlorhexidine 4% Liquid 1 Application(s) Topical daily  citalopram 20 milliGRAM(s) Oral every 24 hours  heparin  Infusion. 1400 Unit(s)/Hr (14 mL/Hr) IV Continuous <Continuous>  influenza   Vaccine 0.5 milliLiter(s) IntraMuscular once  insulin regular  human corrective regimen sliding scale   IV Push every 4 hours  levothyroxine 50 MICROGram(s) Oral every 24 hours  montelukast 10 milliGRAM(s) Oral at bedtime  pantoprazole    Tablet 40 milliGRAM(s) Oral before breakfast  PHENobarbital 32.4 milliGRAM(s) Oral three times a day  pramipexole 0.5 milliGRAM(s) Oral at bedtime  topiramate 100 milliGRAM(s) Oral every 24 hours  traZODone 75 milliGRAM(s) Oral at bedtime    MEDICATIONS  (PRN):  acetaminophen   Tablet .. 650 milliGRAM(s) Oral every 6 hours PRN Temp greater or equal to 38C (100.4F), Mild Pain (1 - 3)  ALBUTerol    90 MICROgram(s) HFA Inhaler 2 Puff(s) Inhalation every 6 hours PRN Shortness of Breath and/or Wheezing  oxyCODONE    IR 5 milliGRAM(s) Oral every 4 hours PRN Moderate Pain (4 - 6)    LAB/STUDIES:                        7.7    19.10 )-----------( 106      ( 19 Sep 2021 21:17 )             22.6     09-19    129<L>  |  96<L>  |  13  ----------------------------<  129<H>  4.0   |  26  |  <0.5<L>    Ca    7.0<L>      19 Sep 2021 21:17  Phos  2.1     09-19  Mg     1.8     09-19      PT/INR - ( 20 Sep 2021 10:48 )   PT: 14.40 sec;   INR: 1.25 ratio         PTT - ( 20 Sep 2021 10:48 )  PTT:39.1 sec    ABG - ( 20 Sep 2021 12:39 )  pH, Arterial: 7.34  pH, Blood: x     /  pCO2: 48    /  pO2: 99    / HCO3: 26    / Base Excess: -0.1  /  SaO2: 98.5

## 2021-09-20 NOTE — PROGRESS NOTE ADULT - SUBJECTIVE AND OBJECTIVE BOX
JACOB KOTHARI  [id number]  66y Female    Indication for ICU admission: s/p L femur fracture ORIF & long gamma IM nail placement, EBL 1L, hypotensive on pressors  Admit Date:09-15-21  ICU Date: 9/16  OR Date: 9/16    Compazine (Unknown)  latex (Urticaria)  penicillins (Unknown)    PAST MEDICAL & SURGICAL HISTORY:  Diabetes    Renal stone    Renal failure    Epilepsy    Asthma    Chronic cough    Knee pain    Osteoarthritis    Uterine cancer  s/p hysterectomy    H/O abdominal hysterectomy  NO RADIATION AND CHEMO    History of tonsillectomy  13 years old    [place] teeth removed    History of bladder stone  SURGERY 8/3/2018    History of surgery  JJ STENT PLACEMENT LEFT OCTOBER 2017      Home Medications:  betamethasone dipropionate 0.05% topical lotion: Apply topically to affected area 2 times a day (15 Sep 2021 18:46)  Desyrel 50 mg oral tablet: 1.5 tab(s) orally once a day (at bedtime) (15 Sep 2021 18:46)  ibuprofen 200 mg oral tablet: 2 tab(s) orally every 8 hours, As Needed (15 Sep 2021 18:46)  Mirapex 0.5 mg oral tablet: 1 tab(s) orally once a day (at bedtime) (15 Sep 2021 18:46)  Multiple Vitamins with Minerals oral capsule: 1 cap(s) orally once a day (15 Sep 2021 18:46)  Nizoral A-D 1% topical shampoo: Apply topically to affected area every 3 days (15 Sep 2021 18:46)  nystatin 100,000 units/g topical cream (obsolete): Apply topically to affected area 2 times a day under bilateral breast and under left arm (15 Sep 2021 18:46)  PHENobarbital 32.4 mg oral tablet: 1 tab(s) orally 3 times a day (15 Sep 2021 18:46)  Senna Plus 50 mg-8.6 mg oral tablet: 2 tab(s) orally once a day (at bedtime) (15 Sep 2021 18:46)  Singulair 10 mg oral tablet: 1 tab(s) orally once a day (at bedtime) (15 Sep 2021 18:46)  Urocit-K 15 mEq oral tablet, extended release: 1 tab(s) orally once a day (15 Sep 2021 18:46)    24HRS EVENT:      DVT PTX: hep gtt    GI PTX:pantoprazole  Injectable 40 milliGRAM(s) IV Push daily      ***Tubes/Lines/Drains  ***  [x] Peripheral IV  [x] Central Venous Line     	[x] R	[] L	[x] IJ          Date Placed: 9/16  [x] Arterial Line	  L radial a line placed intra op by anesthesia, was DC in PACU due to clotting off  R radial a line place in PACU with 1 attempt, good blood return but shortly after no wave form   attempted R femoral a line without success  [X] Ardon   placed post op in PACU 9/16    REVIEW OF SYSTEMS    [x ] A ten-point review of systems was otherwise negative except as noted.  [ ] Due to altered mental status/intubation, subjective information were not able to be obtained from the patient. History was obtained, to the extent possible, from review of the chart and collateral sources of information.

## 2021-09-20 NOTE — PROGRESS NOTE ADULT - ASSESSMENT
ASSESSMENT:    66yF w/ PMHx of  COPD who presented with left femur fracture after a fall, now S/P IMN/ORIF, with 5 hours in OR with pressor requirements, now has cyanosis and absent left radial pulse after multiple attempts at an a line without success  Physical exam findings, imaging, and labs as documented above.     PLAN:  - Patient can be switched to Eliquis 5mg BID   - no further vascular intervention at the current time, please recall as needed   - Please followup with Dr. Knowles 2 weeks after discharge at vascular clinic         VASCULAR TEAM SPECTRA: [id number]

## 2021-09-20 NOTE — OCCUPATIONAL THERAPY INITIAL EVALUATION ADULT - LEVEL OF INDEPENDENCE: BED TO CHAIR, REHAB EVAL
Pt placed in bed in chair mode during session to improve sitting tolerance and in prep for OOB to chair, left in bed in chair RN aware, pt will require full body lift for OOB to chair

## 2021-09-20 NOTE — OCCUPATIONAL THERAPY INITIAL EVALUATION ADULT - ADDITIONAL COMMENTS
Pt states she required assistance with bed mobility, bathing, dressing, and all functional transfers which she performed with a RW, however was primarily utilizing a w/c for mobility

## 2021-09-20 NOTE — PHYSICAL THERAPY INITIAL EVALUATION ADULT - GENERAL OBSERVATIONS, REHAB EVAL
Pt was approached for PT IE. as per rounds, Pt is on hold today, Pt on pressors. PT will follow up.
13:25-14:10 pt encountered in bed, cooperative, +tele rectal tube, saravia, O2via NC, central line, morbidly obese. Pt is very dependent for bed mobility, was placed in chair mode and perform AAROM x 4 extremities.  Continue PT as tolerated.

## 2021-09-20 NOTE — OCCUPATIONAL THERAPY INITIAL EVALUATION ADULT - RANGE OF MOTION EXAMINATION, UPPER EXTREMITY
RUE shoulder ~ 3/4 AROM, elbow/wrist/digits WFL, FILOMENAE husamler ~1/4 AROM, elbow/wrist/digits WFL, PROM shoulder ~1/2

## 2021-09-20 NOTE — OCCUPATIONAL THERAPY INITIAL EVALUATION ADULT - GENERAL OBSERVATIONS, REHAB EVAL
Pt received semi muse in bed in NAD, +tele, +BP cuff, +pulse oxi, +O2 via NC, +R multilumen IJ, +dignishield, +saravia, left in bed in chair mode in NAD, all lines intact, vitals stable, PT Rich present throughout for optimal pt performance.

## 2021-09-21 LAB
ANION GAP SERPL CALC-SCNC: 5 MMOL/L — LOW (ref 7–14)
ANION GAP SERPL CALC-SCNC: 6 MMOL/L — LOW (ref 7–14)
ANION GAP SERPL CALC-SCNC: 6 MMOL/L — LOW (ref 7–14)
BASOPHILS # BLD AUTO: 0.02 K/UL — SIGNIFICANT CHANGE UP (ref 0–0.2)
BASOPHILS NFR BLD AUTO: 0.1 % — SIGNIFICANT CHANGE UP (ref 0–1)
BUN SERPL-MCNC: 10 MG/DL — SIGNIFICANT CHANGE UP (ref 10–20)
BUN SERPL-MCNC: 12 MG/DL — SIGNIFICANT CHANGE UP (ref 10–20)
BUN SERPL-MCNC: 9 MG/DL — LOW (ref 10–20)
CALCIUM SERPL-MCNC: 7.2 MG/DL — LOW (ref 8.5–10.1)
CALCIUM SERPL-MCNC: 7.5 MG/DL — LOW (ref 8.5–10.1)
CALCIUM SERPL-MCNC: 7.5 MG/DL — LOW (ref 8.5–10.1)
CHLORIDE SERPL-SCNC: 102 MMOL/L — SIGNIFICANT CHANGE UP (ref 98–110)
CHLORIDE SERPL-SCNC: 109 MMOL/L — SIGNIFICANT CHANGE UP (ref 98–110)
CHLORIDE SERPL-SCNC: 111 MMOL/L — HIGH (ref 98–110)
CO2 SERPL-SCNC: 27 MMOL/L — SIGNIFICANT CHANGE UP (ref 17–32)
CO2 SERPL-SCNC: 28 MMOL/L — SIGNIFICANT CHANGE UP (ref 17–32)
CO2 SERPL-SCNC: 30 MMOL/L — SIGNIFICANT CHANGE UP (ref 17–32)
CREAT SERPL-MCNC: 0.6 MG/DL — LOW (ref 0.7–1.5)
CREAT SERPL-MCNC: <0.5 MG/DL — LOW (ref 0.7–1.5)
CREAT SERPL-MCNC: <0.5 MG/DL — LOW (ref 0.7–1.5)
EOSINOPHIL # BLD AUTO: 0.38 K/UL — SIGNIFICANT CHANGE UP (ref 0–0.7)
EOSINOPHIL NFR BLD AUTO: 2.1 % — SIGNIFICANT CHANGE UP (ref 0–8)
GLUCOSE BLDC GLUCOMTR-MCNC: 104 MG/DL — HIGH (ref 70–99)
GLUCOSE BLDC GLUCOMTR-MCNC: 122 MG/DL — HIGH (ref 70–99)
GLUCOSE BLDC GLUCOMTR-MCNC: 82 MG/DL — SIGNIFICANT CHANGE UP (ref 70–99)
GLUCOSE BLDC GLUCOMTR-MCNC: 86 MG/DL — SIGNIFICANT CHANGE UP (ref 70–99)
GLUCOSE SERPL-MCNC: 111 MG/DL — HIGH (ref 70–99)
GLUCOSE SERPL-MCNC: 114 MG/DL — HIGH (ref 70–99)
GLUCOSE SERPL-MCNC: 83 MG/DL — SIGNIFICANT CHANGE UP (ref 70–99)
HCT VFR BLD CALC: 24.1 % — LOW (ref 37–47)
HGB BLD-MCNC: 8 G/DL — LOW (ref 12–16)
IMM GRANULOCYTES NFR BLD AUTO: 2 % — HIGH (ref 0.1–0.3)
LYMPHOCYTES # BLD AUTO: 19.6 % — LOW (ref 20.5–51.1)
LYMPHOCYTES # BLD AUTO: 3.58 K/UL — HIGH (ref 1.2–3.4)
MAGNESIUM SERPL-MCNC: 2 MG/DL — SIGNIFICANT CHANGE UP (ref 1.8–2.4)
MAGNESIUM SERPL-MCNC: 2.1 MG/DL — SIGNIFICANT CHANGE UP (ref 1.8–2.4)
MCHC RBC-ENTMCNC: 30.9 PG — SIGNIFICANT CHANGE UP (ref 27–31)
MCHC RBC-ENTMCNC: 33.2 G/DL — SIGNIFICANT CHANGE UP (ref 32–37)
MCV RBC AUTO: 93.1 FL — SIGNIFICANT CHANGE UP (ref 81–99)
MONOCYTES # BLD AUTO: 1.36 K/UL — HIGH (ref 0.1–0.6)
MONOCYTES NFR BLD AUTO: 7.5 % — SIGNIFICANT CHANGE UP (ref 1.7–9.3)
NEUTROPHILS # BLD AUTO: 12.53 K/UL — HIGH (ref 1.4–6.5)
NEUTROPHILS NFR BLD AUTO: 68.7 % — SIGNIFICANT CHANGE UP (ref 42.2–75.2)
NRBC # BLD: 0 /100 WBCS — SIGNIFICANT CHANGE UP (ref 0–0)
PHOSPHATE SERPL-MCNC: 2.6 MG/DL — SIGNIFICANT CHANGE UP (ref 2.1–4.9)
PHOSPHATE SERPL-MCNC: 3.5 MG/DL — SIGNIFICANT CHANGE UP (ref 2.1–4.9)
PLATELET # BLD AUTO: 110 K/UL — LOW (ref 130–400)
POTASSIUM SERPL-MCNC: 3.3 MMOL/L — LOW (ref 3.5–5)
POTASSIUM SERPL-MCNC: 3.4 MMOL/L — LOW (ref 3.5–5)
POTASSIUM SERPL-MCNC: 4.3 MMOL/L — SIGNIFICANT CHANGE UP (ref 3.5–5)
POTASSIUM SERPL-SCNC: 3.3 MMOL/L — LOW (ref 3.5–5)
POTASSIUM SERPL-SCNC: 3.4 MMOL/L — LOW (ref 3.5–5)
POTASSIUM SERPL-SCNC: 4.3 MMOL/L — SIGNIFICANT CHANGE UP (ref 3.5–5)
RBC # BLD: 2.59 M/UL — LOW (ref 4.2–5.4)
RBC # FLD: 14.9 % — HIGH (ref 11.5–14.5)
SODIUM SERPL-SCNC: 135 MMOL/L — SIGNIFICANT CHANGE UP (ref 135–146)
SODIUM SERPL-SCNC: 143 MMOL/L — SIGNIFICANT CHANGE UP (ref 135–146)
SODIUM SERPL-SCNC: 146 MMOL/L — SIGNIFICANT CHANGE UP (ref 135–146)
WBC # BLD: 18.24 K/UL — HIGH (ref 4.8–10.8)
WBC # FLD AUTO: 18.24 K/UL — HIGH (ref 4.8–10.8)

## 2021-09-21 PROCEDURE — 99291 CRITICAL CARE FIRST HOUR: CPT

## 2021-09-21 PROCEDURE — 71045 X-RAY EXAM CHEST 1 VIEW: CPT | Mod: 26

## 2021-09-21 RX ORDER — POTASSIUM CHLORIDE 20 MEQ
20 PACKET (EA) ORAL
Refills: 0 | Status: COMPLETED | OUTPATIENT
Start: 2021-09-21 | End: 2021-09-21

## 2021-09-21 RX ORDER — INSULIN HUMAN 100 [IU]/ML
INJECTION, SOLUTION SUBCUTANEOUS
Refills: 0 | Status: DISCONTINUED | OUTPATIENT
Start: 2021-09-21 | End: 2021-09-24

## 2021-09-21 RX ADMIN — Medication 100 MILLIEQUIVALENT(S): at 15:32

## 2021-09-21 RX ADMIN — Medication 50 MILLIEQUIVALENT(S): at 02:23

## 2021-09-21 RX ADMIN — Medication 3 MILLILITER(S): at 21:48

## 2021-09-21 RX ADMIN — Medication 100 MILLIGRAM(S): at 10:35

## 2021-09-21 RX ADMIN — Medication 100 MILLIEQUIVALENT(S): at 14:29

## 2021-09-21 RX ADMIN — OXYCODONE HYDROCHLORIDE 5 MILLIGRAM(S): 5 TABLET ORAL at 11:34

## 2021-09-21 RX ADMIN — Medication 32.4 MILLIGRAM(S): at 06:06

## 2021-09-21 RX ADMIN — APIXABAN 5 MILLIGRAM(S): 2.5 TABLET, FILM COATED ORAL at 17:18

## 2021-09-21 RX ADMIN — Medication 50 MILLIEQUIVALENT(S): at 05:53

## 2021-09-21 RX ADMIN — OXYCODONE HYDROCHLORIDE 5 MILLIGRAM(S): 5 TABLET ORAL at 22:54

## 2021-09-21 RX ADMIN — CITALOPRAM 20 MILLIGRAM(S): 10 TABLET, FILM COATED ORAL at 10:35

## 2021-09-21 RX ADMIN — Medication 100 MILLIEQUIVALENT(S): at 16:37

## 2021-09-21 RX ADMIN — MONTELUKAST 10 MILLIGRAM(S): 4 TABLET, CHEWABLE ORAL at 21:42

## 2021-09-21 RX ADMIN — OXYCODONE HYDROCHLORIDE 5 MILLIGRAM(S): 5 TABLET ORAL at 16:37

## 2021-09-21 RX ADMIN — Medication 3 MILLILITER(S): at 08:07

## 2021-09-21 RX ADMIN — Medication 75 MILLIGRAM(S): at 21:41

## 2021-09-21 RX ADMIN — OXYCODONE HYDROCHLORIDE 5 MILLIGRAM(S): 5 TABLET ORAL at 17:16

## 2021-09-21 RX ADMIN — PRAMIPEXOLE DIHYDROCHLORIDE 0.5 MILLIGRAM(S): 0.12 TABLET ORAL at 21:42

## 2021-09-21 RX ADMIN — OXYCODONE HYDROCHLORIDE 5 MILLIGRAM(S): 5 TABLET ORAL at 06:11

## 2021-09-21 RX ADMIN — PANTOPRAZOLE SODIUM 40 MILLIGRAM(S): 20 TABLET, DELAYED RELEASE ORAL at 06:06

## 2021-09-21 RX ADMIN — CHLORHEXIDINE GLUCONATE 1 APPLICATION(S): 213 SOLUTION TOPICAL at 11:27

## 2021-09-21 RX ADMIN — Medication 32.4 MILLIGRAM(S): at 13:08

## 2021-09-21 RX ADMIN — Medication 50 MILLIEQUIVALENT(S): at 01:37

## 2021-09-21 RX ADMIN — Medication 3 MILLILITER(S): at 14:08

## 2021-09-21 RX ADMIN — OXYCODONE HYDROCHLORIDE 5 MILLIGRAM(S): 5 TABLET ORAL at 12:46

## 2021-09-21 RX ADMIN — Medication 85 MILLIMOLE(S): at 01:26

## 2021-09-21 RX ADMIN — Medication 32.4 MILLIGRAM(S): at 21:41

## 2021-09-21 RX ADMIN — APIXABAN 5 MILLIGRAM(S): 2.5 TABLET, FILM COATED ORAL at 06:06

## 2021-09-21 RX ADMIN — Medication 50 MICROGRAM(S): at 07:38

## 2021-09-21 NOTE — PROGRESS NOTE ADULT - SUBJECTIVE AND OBJECTIVE BOX
KOTHARI JACOB  66y  Female  HPI:  A 64 y/o female with pmhx of asthma, copd, obesity, epilepsy, hypothyroidism , osteoarthritis , kidney stone  s/p slip and fall at [facility]  admitted for left femoral fracture. Pt reports was taking shower this am, her shower chair slipped and she fell on her buttocks. PT reports left hip pain. PT  denies neck pain, hitting head or loc. Pt reports was not able to stand or straighten her left leg. Pt reports usually ambulates with a walker. Pt denies numbness or tingling on her left leg. Pt denies urinary or fecal incontinence. PT reports last seizure was 15 yrs ago. Pt denies taking any blood thinners. Pt denies fever, chills, chest pain, shortness of breath, burning with urination, diarrhea.  (15 Sep 2021 18:29)    MEDICATIONS  (STANDING):  ALBUTerol    90 MICROgram(s) HFA Inhaler 2 Puff(s) Inhalation every 6 hours  albuterol/ipratropium for Nebulization 3 milliLiter(s) Nebulizer every 6 hours  apixaban 5 milliGRAM(s) Oral every 12 hours  chlorhexidine 4% Liquid 1 Application(s) Topical daily  citalopram 20 milliGRAM(s) Oral every 24 hours  influenza   Vaccine 0.5 milliLiter(s) IntraMuscular once  insulin regular  human corrective regimen sliding scale   SubCutaneous Before meals and at bedtime  levothyroxine 50 MICROGram(s) Oral every 24 hours  montelukast 10 milliGRAM(s) Oral at bedtime  pantoprazole    Tablet 40 milliGRAM(s) Oral before breakfast  PHENobarbital 32.4 milliGRAM(s) Oral three times a day  pramipexole 0.5 milliGRAM(s) Oral at bedtime  topiramate 100 milliGRAM(s) Oral every 24 hours  traZODone 75 milliGRAM(s) Oral at bedtime    MEDICATIONS  (PRN):  acetaminophen   Tablet .. 650 milliGRAM(s) Oral every 6 hours PRN Temp greater or equal to 38C (100.4F), Mild Pain (1 - 3)  ALBUTerol    90 MICROgram(s) HFA Inhaler 2 Puff(s) Inhalation every 6 hours PRN Shortness of Breath and/or Wheezing  oxyCODONE    IR 5 milliGRAM(s) Oral every 4 hours PRN Moderate Pain (4 - 6)    INTERVAL EVENTS: Patient seen today, more alert, resp less labored. Patient's PO intake improved    T(C): 36.1 (09-21-21 @ 15:24), Max: 37.1 (09-21-21 @ 00:00)  HR: 75 (09-21-21 @ 18:00) (63 - 92)  BP: 108/50 (09-21-21 @ 18:00) (65/50 - 144/71)  RR: 30 (09-21-21 @ 18:00) (25 - 33)  SpO2: 100% (09-21-21 @ 18:00) (97% - 100%)  Wt(kg): --Vital Signs Last 24 Hrs  T(C): 36.1 (21 Sep 2021 15:24), Max: 37.1 (21 Sep 2021 00:00)  T(F): 97 (21 Sep 2021 15:24), Max: 98.8 (21 Sep 2021 00:00)  HR: 75 (21 Sep 2021 18:00) (63 - 92)  BP: 108/50 (21 Sep 2021 18:00) (65/50 - 144/71)  BP(mean): 72 (21 Sep 2021 18:00) (55 - 102)  RR: 30 (21 Sep 2021 18:00) (25 - 33)  SpO2: 100% (21 Sep 2021 18:00) (97% - 100%)    PHYSICAL EXAM:  GENERAL: Morbidly Obese, awake alert states left leg hurts today  CHEST/LUNG: Few Crackles  HEART: S1, S2, Regular rate and rhythm  ABDOMEN: Soft, Obese, Bowel sounds present  EXTREMITIES: ++ edema  SKIN: Left arm thumb and second finger discolored, non tender    LABS:                        8.0    18.24 )-----------( 110      ( 20 Sep 2021 23:47 )             24.1             09-21    146  |  111<H>  |  9<L>  ----------------------------<  111<H>  4.3   |  30  |  0.6<L>    Ca    7.5<L>      21 Sep 2021 18:00  Phos  3.5     09-21  Mg     2.1     09-21            PT/INR - ( 20 Sep 2021 10:48 )   PT: 14.40 sec;   INR: 1.25 ratio         PTT - ( 20 Sep 2021 10:48 )  PTT:39.1 sec      ABG - ( 20 Sep 2021 12:39 )  pH, Arterial: 7.34  pH, Blood: x     /  pCO2: 48    /  pO2: 99    / HCO3: 26    / Base Excess: -0.1  /  SaO2: 98.5            RADIOLOGY & ADDITIONAL TESTS:  < from: Xray Chest 1 View- PORTABLE-Routine (Xray Chest 1 View- PORTABLE-Routine in AM.) (09.21.21 @ 05:11) >  INTERPRETATION:  Clinical History / Reason for exam: ICU    Comparison : Chest radiograph September 20, 2021.    Technique/Positioning: Frontal chest radiograph.    Findings:    Support devices: Stable right IJ central venous catheter.    Cardiac/mediastinum/hilum: Unchanged.    Lung parenchyma/Pleura: Worsening bilateral opacities. No pneumothorax.    Skeleton/soft tissues: Unchanged.    Impression:    Worsening bilateral opacities.    --- End of Report ---    < end of copied text >

## 2021-09-21 NOTE — PROCEDURE NOTE - NSPROCDETAILS_GEN_ALL_CORE
location identified, draped/prepped, sterile technique used, needle inserted/introduced/positive blood return obtained via catheter/connected to a pressurized flush line/sutured in place/hemostasis with direct pressure, dressing applied/Seldinger technique/all materials/supplies accounted for at end of procedure
via Ultrasound guidance/location identified, draped/prepped, sterile technique used/blood seen on insertion/dressing applied/flushes easily/secured in place
guidewire recovered/lumen(s) aspirated and flushed/sterile dressing applied/sterile technique, catheter placed/ultrasound guidance with use of sterile gel and probe cove

## 2021-09-21 NOTE — PROCEDURE NOTE - NSINDICATIONS_GEN_A_CORE
arterial puncture to obtain ABG's/blood sampling/critical patient/monitoring purposes
Difficult access
critical illness/emergency venous access/volume resuscitation

## 2021-09-21 NOTE — PROGRESS NOTE ADULT - ATTENDING COMMENTS
agree with above
awake  and alert   wean all pressors   advance diet as tolerated   OOB and ambulate   d/c Ardon   possible transfer to floor later today.
Critical Care: [id number]   This patient has a high probability of sudden, clinically significant deterioration, which requires the highest level of physician preparedness to intervene urgently. I managed/supervised life or organ supporting interventions that required frequent physician assessment. I devoted my full attention in the ICU to the direct care of this patient for the period of time indicated below. Time I spent with the family or surrogate(s) is included only if the patient was incapable of providing the necessary information or participating in medical decision making. Time devoted to teaching and to any procedures I billed separately is not included.     JACOB KOTHARI 66y Female admitted to [x ] SICU / [ ] SDU with hemodynamic instability due to hemorrhagic shock, lactic acidosis, airway at risk, severe leukocytosis S/P ORIF Hip  Patient is examined and evaluated at the bedside with SICU team. Treatment plan discussed with SICU team, nurses and primary team.   Chest X-ray and all relevant studies reviewed during rounds.  Will continue hemodynamic monitoring as per protocol in SICU.    Neuro:  GCS [ 15]   [x ]  Neuro checks as per SICU protocol                 [ ] 3% NaCl    Paralysis [ ] Yes  [ x]  No  Sedated/Pain control with                 [ ] Dilaudid drip, [ ]  Ketamine, [ ] Fentanyl, [ ] Propofol, [ ] Precedex, [ ] Versed, [ ] Ativan,                           [ ] OxyContin standing,  [ ] OxyContin PRN, [x ] Dilaudid PRN pushes, [ ] Fentanyl PRN pushes, [ ] PCA,                [x ] Tylenol, [x ] Gabapentin, [ ]  Ketorolac,  [ ] Lidoderm Patch   Other Medications               [ ] Seroquel, [ ] Haldol, [ ] Zyprexa,  [ ] Clonazepam [ ] Xanax, [ ] Versed/Ativan PRN, [ ] Valium [x ] None               [ ] Keppra  [ ] Lamictal  [ ] Depakote  [ ] Dilantin    CV: continue pressors and wean as tolerated  On pressors [x ]  Yes  [ ]  No          [x ]  Levophed, [ ] Flaco-Synephrine, [x ] Vasopressin, [ ]  Epinephrine          [ ] Dobutamine, [ ] Milrinone, [ ]  Midodrine,  [ ] Others    Other Cardiac Meds          [ ] Amiodarone drip, [ ] Digoxin, [ ] Cardizem drip, [ ] Cleviprex drip, [ ] Esmolol drip  VBG - ( 17 Sep 2021 16:00 )  pH: 7.44  /  pCO2: 45    /  pO2: 43    / HCO3: 31    / Base Excess: 5.8   /  SaO2: 78.0   Lactate: 4.70   Respiratory: Acute respiratory insufficiency ->continue to monitor                        None Invasive Support  [x ] Incentive Spirometer                                  [ ] BiPAP   [ ] CPAP   [ ] HFNC   [ ] NR Face Mask   [x ] NC  [ ] Trach Caller                        Ventilatory support  [ ] Yes [x ] No   [ ] SBT                                  [ ] PC    [ ] VC   [ ] AC   [ ] BiVent/APRV   [ ]CPAP     GI  [x ]  bowel regiment  [x ] BM none [ x] Flatus none  Nutrition: continue    [x ] Diet NPO   [ ] TPN/PPN     [ ]  Tube Feeds       Renal: Continue I&Os monitoring, Ardon catheter  [x ] Yes,  [ ]   No ,  [ ] Consideration for discontinuation  Lytes/Acid-base: replete hypokalemia, hypomagnesemia, hypocalcemia, hypophosphatemia   IV Fluids   [x ] LR,  [ ] NS  [ ] Albumin     ID: leukocytosis -> continue to monitor:         IV Abx [x ] Yes, [ ] No;  ID consulted [ ] Yes, [x ] No        Cultures send  [ ] Respiratory     [ ] Blood     [ ] Urine    [ ] Fluids  [ ] Tissue  [x ]  None    Lines:   [x ] Right   [ ] Left  [ ] Bilateral                     [ ] Subclavian TLC        [ x]  Internal Jugular TLC     [ ]  Femoral TLC                     [ ] Subclavian Cordis    [ ] Internal Jugular Cordis   [ ] Femoral Cordis                     [ ] HD catheter                     [ ] PICC     [ ]  Midline   [ ] Peripheral IVs                                  [ ] Right   [ ] Left   [x ] None                     [ ] Radial A-Line           [ ] Femoral A-Line            [ ] Axillary A-Line                  Heme: continue to evaluate for acute blood loss anemia- trend Hg/Hct                     Transfused  indicated  [ ] Yes, [x ] No    [ ] PRBCs   [ ] Platelets   [ ] FFPs   [ ] Cryoprecipitate                    Should be started or continued following  [x ] Yes,  [ ] No                               [ ] Lovenox  [ ] Coumadin  [x ] Heparin drip due to peripheral arterial thrombosis   [ ] NOVACs  [ ] ASA  [ ] Antiplatelets   Endocrine: Prevent and treat hyperglycemia with insulin as needed,                        Insuline drip [ ] Yes, [ x] No     PV: follow pulse exam  Skin: decub precautions  DVT Prophylaxis:  [x ] SCDs  [ ] Heparin SQ  [ ] Lovenox  SQ  Stress Gastritis Prophylaxis: PPI/H2 Blockers if indicated  Mobility: patient is evaluated at the bedside with mobility team and the goals for today are discussed with PT [x ]    PATIENT/FAMILY/SURROGATE CONFERENCE:  [x ] Yes with patient at the bedside. [ ] No  PURPOSE: To obtain necessary information, To discuss treatment options under consideration today.    I saw and evaluated the patient personally. I have reviewed and agree with note above. Treatment plan discussed with SICU team, nurses and primary team at the time of the multidisciplinary rounds. The above note is NOT written at the time of rounds and will reflect all changes throughout management of the patient for the day note is written for.    Bethany Pickett MD, FACS  Trauma/ACS/SCC Attending
Critical Care: [id number]   This patient has a high probability of sudden, clinically significant deterioration, which requires the highest level of physician preparedness to intervene urgently. I managed/supervised life or organ supporting interventions that required frequent physician assessment. I devoted my full attention in the ICU to the direct care of this patient for the period of time indicated below. Time I spent with the family or surrogate(s) is included only if the patient was incapable of providing the necessary information or participating in medical decision making. Time devoted to teaching and to any procedures I billed separately is not included.     JACOB KOTHARI 66y Female admitted to [x ] SICU / [ ] SDU with hemodynamic instability due to hemorrhagic shock, lactic acidosis, airway at risk, severe leukocytosis S/P ORIF Hip  Patient is examined and evaluated at the bedside with SICU team. Treatment plan discussed with SICU team, nurses and primary team.   Chest X-ray and all relevant studies reviewed during rounds.  Will continue hemodynamic monitoring as per protocol in SICU.    Neuro:  GCS [ 15]   [x ]  Neuro checks as per SICU protocol                 [ ] 3% NaCl    Paralysis [ ] Yes  [ x]  No  Sedated/Pain control with                 [ ] Dilaudid drip, [ ]  Ketamine, [ ] Fentanyl, [ ] Propofol, [ ] Precedex, [ ] Versed, [ ] Ativan,                           [ ] OxyContin standing,  [ ] OxyContin PRN, [x ] Dilaudid PRN pushes, [ ] Fentanyl PRN pushes, [ ] PCA,                [x ] Tylenol, [x ] Gabapentin, [ ]  Ketorolac,  [ ] Lidoderm Patch   Other Medications               [ ] Seroquel, [ ] Haldol, [ ] Zyprexa,  [ ] Clonazepam [ ] Xanax, [ ] Versed/Ativan PRN, [ ] Valium [x ] None               [ ] Keppra  [ ] Lamictal  [ ] Depakote  [ ] Dilantin    CV: continue pressors and wean as tolerated  On pressors [x ]  Yes  [ ]  No          [x ]  Levophed 2 0.3mcg/hr, [ ] Flaco-Synephrine, [x ] Vasopressin @0.04 units/hr, [ ]  Epinephrine          [ ] Dobutamine, [ ] Milrinone, [ ]  Midodrine,  [ ] Others    Other Cardiac Meds          [ ] Amiodarone drip, [ ] Digoxin, [ ] Cardizem drip, [ ] Cleviprex drip, [ ] Esmolol drip  LA 3.2  Respiratory: Acute respiratory insufficiency ->continue to monitor                        None Invasive Support  [x ] Incentive Spirometer                                  [ ] BiPAP   [ ] CPAP   [ ] HFNC   [ ] NR Face Mask   [x ] NC @2L [ ] Trach Caller                        Ventilatory support  [ ] Yes [x ] No   [ ] SBT                                  [ ] PC    [ ] VC   [ ] AC   [ ] BiVent/APRV   [ ]CPAP     GI  [x ]  bowel regiment  [x ] BM + [ x] Flatus +  Nutrition: continue    [x ] Diet NPO   [ ] TPN/PPN     [ ]  Tube Feeds       Renal: Continue I&Os monitoring, Ardon catheter  [x ] Yes,  [ ]   No ,  [ ] Consideration for discontinuation  Lytes/Acid-base: replete hypokalemia, hypomagnesemia, hypocalcemia, hypophosphatemia   IV Fluids   [x ] LR,  [ ] NS  [ ] Albumin     ID: leukocytosis -> continue to monitor:         IV Abx [x ] Yes, [ ] No;  ID consulted [ ] Yes, [x ] No        Cultures send  [ ] Respiratory     [ ] Blood     [ ] Urine    [ ] Fluids  [ ] Tissue  [x ]  None    Lines:   [x ] Right   [ ] Left  [ ] Bilateral                     [ ] Subclavian TLC        [ x]  Internal Jugular TLC     [ ]  Femoral TLC                     [ ] Subclavian Cordis    [ ] Internal Jugular Cordis   [ ] Femoral Cordis                     [ ] HD catheter                     [ ] PICC     [ ]  Midline   [ ] Peripheral IVs                                  [ ] Right   [ ] Left   [x ] None                     [ ] Radial A-Line           [ ] Femoral A-Line            [ ] Axillary A-Line                  Heme: continue to evaluate for acute blood loss anemia- trend Hg/Hct                     Transfused  indicated  [x ] Yes, [ ] No    [x ] PRBCs 1 unit  [ ] Platelets   [ ] FFPs   [ ] Cryoprecipitate                    Should be started or continued following  [x ] Yes,  [ ] No                               [ ] Lovenox  [ ] Coumadin  [x ] Heparin drip due to peripheral arterial thrombosis   [ ] NOVACs  [ ] ASA  [ ] Antiplatelets   Endocrine: Prevent and treat hyperglycemia with insulin as needed,                        Insuline drip [ ] Yes, [ x] No     PV: follow pulse exam  Skin: decub precautions  DVT Prophylaxis:  [x ] SCDs  [ ] Heparin SQ  [ ] Lovenox  SQ  Stress Gastritis Prophylaxis: PPI/H2 Blockers if indicated  Mobility: patient is evaluated at the bedside with mobility team and the goals for today are discussed with PT [x ]    PATIENT/FAMILY/SURROGATE CONFERENCE:  [x ] Yes with patient at the bedside. [ ] No  PURPOSE: To obtain necessary information, To discuss treatment options under consideration today.    I saw and evaluated the patient personally. I have reviewed and agree with note above. Treatment plan discussed with SICU team, nurses and primary team at the time of the multidisciplinary rounds. The above note is NOT written at the time of rounds and will reflect all changes throughout management of the patient for the day note is written for.    Bethany Pickett MD, FACS  Trauma/ACS/SCC Attending
doing well   off all pressors   tolerating diet   OOB and ambulate with PT   Transfer to floor.
Critical Care: [id number]   This patient has a high probability of sudden, clinically significant deterioration, which requires the highest level of physician preparedness to intervene urgently. I managed/supervised life or organ supporting interventions that required frequent physician assessment. I devoted my full attention in the ICU to the direct care of this patient for the period of time indicated below. Time I spent with the family or surrogate(s) is included only if the patient was incapable of providing the necessary information or participating in medical decision making. Time devoted to teaching and to any procedures I billed separately is not included.     JACOB KOTHARI 66y Female admitted to [x ] SICU / [ ] SDU with hemodynamic instability due to hemorrhagic shock, lactic acidosis, airway at risk, severe leukocytosis S/P ORIF Hip  Patient is examined and evaluated at the bedside with SICU team. Treatment plan discussed with SICU team, nurses and primary team.   Chest X-ray and all relevant studies reviewed during rounds.  Will continue hemodynamic monitoring as per protocol in SICU.    Neuro:  GCS [ 15]   [x ]  Neuro checks as per SICU protocol                 [ ] 3% NaCl    Paralysis [ ] Yes  [ x]  No  Sedated/Pain control with                 [ ] Dilaudid drip, [ ]  Ketamine, [ ] Fentanyl, [ ] Propofol, [ ] Precedex, [ ] Versed, [ ] Ativan,                           [ ] OxyContin standing,  [ ] OxyContin PRN, [x ] Dilaudid PRN pushes, [ ] Fentanyl PRN pushes, [ ] PCA,                [x ] Tylenol, [x ] Gabapentin, [ ]  Ketorolac,  [ ] Lidoderm Patch   Other Medications               [ ] Seroquel, [ ] Haldol, [ ] Zyprexa,  [ ] Clonazepam [ ] Xanax, [ ] Versed/Ativan PRN, [ ] Valium [x ] None               [ ] Keppra  [ ] Lamictal  [ ] Depakote  [ ] Dilantin    CV: continue pressors and wean as tolerated  On pressors [x ]  Yes  [ ]  No          [x ]  Levophed @ 0.15 mcg/hr, [ ] Flaco-Synephrine, [x ] Vasopressin @0.04 units/hr, [ ]  Epinephrine          [ ] Dobutamine, [ ] Milrinone, [ ]  Midodrine,  [ ] Others    Other Cardiac Meds          [ ] Amiodarone drip, [ ] Digoxin, [ ] Cardizem drip, [ ] Cleviprex drip, [ ] Esmolol drip  LA 1.2  Respiratory: Acute respiratory insufficiency ->continue to monitor                        None Invasive Support  [x ] Incentive Spirometer                                  [ ] BiPAP   [ ] CPAP   [ ] HFNC   [ ] NR Face Mask   [x ] NC @2L [ ] Trach Caller                        Ventilatory support  [ ] Yes [x ] No   [ ] SBT                                  [ ] PC    [ ] VC   [ ] AC   [ ] BiVent/APRV   [ ]CPAP     GI  [x ]  bowel regiment on hold [x ] BM + [ x] Flatus +  Nutrition: continue    [x ] Diet clears   [ ] TPN/PPN     [ ]  Tube Feeds       Renal: Continue I&Os monitoring, Ardon catheter  [x ] Yes,  [ ]   No ,  [ ] Consideration for discontinuation  Lytes/Acid-base: replete hypokalemia, hypomagnesemia, hypocalcemia, hypophosphatemia   IV Fluids   [x ] LR,  [ ] NS  [ ] Albumin     ID: leukocytosis -> continue to monitor:         IV Abx [x ] Yes, [ ] No;  ID consulted [ ] Yes, [x ] No        Cultures send  [ ] Respiratory     [ ] Blood     [ ] Urine    [ ] Fluids  [ ] Tissue  [x ]  None    Lines:   [x ] Right   [ ] Left  [ ] Bilateral                     [ ] Subclavian TLC        [ x]  Internal Jugular TLC     [ ]  Femoral TLC                     [ ] Subclavian Cordis    [ ] Internal Jugular Cordis   [ ] Femoral Cordis                     [ ] HD catheter                     [ ] PICC     [ ]  Midline   [ ] Peripheral IVs                                  [ ] Right   [ ] Left   [x ] None                     [ ] Radial A-Line           [ ] Femoral A-Line            [ ] Axillary A-Line                  Heme: continue to evaluate for acute blood loss anemia- trend Hg/Hct                     Transfused  indicated  [x ] Yes, [ ] No    [x ] PRBCs 1 unit  [ ] Platelets   [ ] FFPs   [ ] Cryoprecipitate                    Should be started or continued following  [x ] Yes,  [ ] No                               [ ] Lovenox  [ ] Coumadin  [x ] Heparin drip due to peripheral arterial thrombosis   [ ] NOVACs  [ ] ASA  [ ] Antiplatelets   Endocrine: Prevent and treat hyperglycemia with insulin as needed,                        Insuline drip [ ] Yes, [ x] No     PV: follow pulse exam  Skin: decub precautions  DVT Prophylaxis:  [x ] SCDs  [ ] Heparin SQ  [ ] Lovenox  SQ  Stress Gastritis Prophylaxis: PPI/H2 Blockers if indicated  Mobility: patient is evaluated at the bedside with mobility team and the goals for today are discussed with PT [x ]    PATIENT/FAMILY/SURROGATE CONFERENCE:  [x ] Yes with patient at the bedside. [ ] No  PURPOSE: To obtain necessary information, To discuss treatment options under consideration today.    I saw and evaluated the patient personally. I have reviewed and agree with note above. Treatment plan discussed with SICU team, nurses and primary team at the time of the multidisciplinary rounds. The above note is NOT written at the time of rounds and will reflect all changes throughout management of the patient for the day note is written for.    Bethany Pickett MD, FACS  Trauma/ACS/SCC Attending

## 2021-09-21 NOTE — PROGRESS NOTE ADULT - ASSESSMENT
Assessment & Plan  66y Female 1d s/p ORIF of L femur fracture with long IM gamma nail     NEURO:  Acute pain  - controlled with prn Tylenol & oxycodone 5mg  HX epilepsy  - per patient last seizures 20 years ago  - c/w home regimen Phenobarbital, Mirapex, Topiramate  HX sleep disorder/depression  - c/w Trazadone 75mg at bedtime    RESP:   HX asthma, THOM (does not use CPAP at NH)   Oxygen insufficiency- NC @ 3L - maintain sat > 92%  Current Rx: montelukast 10 at bedtime  Home Rx: Singulair 10 mg oral tablet  9/20 CXR: Unchanged left basilar opacity/atelectasis.    CARDS:   Hemorrhagic shock post-op, requiring pressors  - Levophed d/c (9/19), d/c Vasopressin (9/20)  - Midodrine 10mg q8 started 9/19 night, d/c 9/20  - maintain MAP > 65  - fluid resuscitation prn  Sinus tachycardia, resolved - HR now in 60s  - EKG sinus tachycardia, no sign so ischemia  [ ] ECHO: rpt pending - continues to refuse  - prior 2018, normal systolic function    GI/NUTR:   Diet: NPO 2/2 high pressor requirements - advance to regular 9/20    GI Prophylaxis- PPI 40mg oral    Bowel regimen- d/c'ed 2/2 diarrhea - digni shield placed 9/18pm      - saravia removed, IV lock -> passed TOV  Labs:          BUN/Cr- 13/<0.5  -->,  13/<0.5  -->,  12/<0.5  -->          Electrolytes-Na 135 // K 3.3 // Mg 2.0 //  Phos 2.6 (09-20 @ 23:47)  - Fluid overloaded, +3.8L (24hrs) - Lasix 10mg IV x1 on 9/19 day   - no lasix 9/20  - Lactic acidosis, resolved: 13 -> 1.2    VASC:  - LUE art. duplex - L radial artery occluded  - L digits 1& 2 appear mottled  - q1 neurovascular checks of LUE  - vascular recommendation: start eliquis and d/c with it    HEME/ONC:   DVT prophylaxis- started elequis d/c'ed Hep gtt, SCDs  Acute anemia post-op requiring frequent transfusions    DVT prophylaxis-, SCDs    Labs: Hb/Hct:  7.7/22.6  -->,  8.0/24.1  -->                      Plts:  110  -->,  106  -->                 PTT/INR:  39.1/1.25  (09-20 @ 10:48) --->                 T&S: (09-16)      ID:  Acute leukocytosis, improving  WBC- 18.11  --->>,  19.10  --->>,  18.24  --->>  Temp trend- 24hrs T(F): 98.7 (09-21 @ 04:00), Max: 98.8 (09-21 @ 00:00)  Antibiotics: cefepime / vanco   -Vanco level 13 - d/c  - d/c abx's 9/20    ENDO:     HA1C 5.2    finger sticks q4hr while npo    CODE STATUS: DNR/DNI    DISPO: SICU           Assessment & Plan  66y Female 1d s/p ORIF of L femur fracture with long IM gamma nail     NEURO:  Acute pain  - controlled with prn Tylenol & oxycodone 5mg  HX epilepsy  - per patient last seizures 20 years ago  - c/w home regimen Phenobarbital, Mirapex, Topiramate  HX sleep disorder/depression  - c/w Trazadone 75mg at bedtime    RESP:   HX asthma, THOM (does not use CPAP at NH)   Oxygen insufficiency- NC @ 3L - maintain sat > 92%  Current Rx: montelukast 10 at bedtime  Home Rx: Singulair 10 mg oral tablet  9/20 CXR: Unchanged left basilar opacity/atelectasis.    CARDS:   Hemorrhagic shock post-op, requiring pressors  - Levophed d/c (9/19), d/c Vasopressin (9/20)  - Midodrine 10mg q8 started 9/19 night, d/c 9/20  - maintain MAP > 65  - fluid resuscitation prn  Sinus tachycardia, resolved - HR now in 60s  - EKG sinus tachycardia, no sign so ischemia  [ ] ECHO: rpt pending - continues to refuse  - prior 2018, normal systolic function    GI/NUTR:   Diet: regular 9/20    GI Prophylaxis- PPI 40mg oral    Bowel regimen- d/c'ed 2/2 diarrhea - digni shield placed 9/18pm      - saravia removed, IV lock -> passed TOV  Labs:          BUN/Cr- 13/<0.5  -->,  13/<0.5  -->,  12/<0.5  -->          Electrolytes-Na 135 // K 3.3 // Mg 2.0 //  Phos 2.6 (09-20 @ 23:47)  [] FU rpt BMP Phos at 11am  [ ] fu rpt lasix today  - Fluid overloaded, +3.8L (24hrs) - Lasix 10mg IV x1 on 9/19 day   - no lasix 9/20  - Lactic acidosis, resolved: 13 -> 1.2    VASC:  - LUE art. duplex - L radial artery occluded  - L digits 1& 2 appear mottled  - q1 neurovascular checks of LUE  - vascular recommendation: started eliquis and d/c home with it    HEME/ONC:   DVT prophylaxis- started elequis d/c'ed Hep gtt, SCDs  Acute anemia post-op requiring frequent transfusions    DVT prophylaxis-, SCDs    Labs: Hb/Hct:  7.7/22.6  -->,  8.0/24.1  -->                      Plts:  110  -->,  106  -->                 PTT/INR:  39.1/1.25  (09-20 @ 10:48) --->                 T&S: (09-19)      ID:  Acute leukocytosis, improving  WBC- 18.11  --->>,  19.10  --->>,  18.24  --->>  -continue to monitor WBC  Temp trend- 24hrs T(F): 98.7 (09-21 @ 04:00), Max: 98.8 (09-21 @ 00:00)  Antibiotics: cefepime / vanco   -Vanco level 13 - d/c  - d/c abx's 9/20    ENDO:     HA1C 5.2    finger sticks q4hr while npo    CODE STATUS: DNR/DNI    DISPO: SICU           Assessment & Plan  66y Female s/p ORIF of L femur fracture with long IM gamma nail     NEURO:  Acute pain  - controlled with prn Tylenol & oxycodone 5mg  HX epilepsy  - per patient last seizures 20 years ago  - c/w home regimen Phenobarbital, Mirapex, Topiramate  HX sleep disorder/depression  - c/w Trazadone 75mg at bedtime    RESP:   HX asthma, THOM (does not use CPAP at NH)   Oxygen insufficiency- NC @ 3L - maintain sat > 92%  Current Rx: montelukast 10 at bedtime  Home Rx: Singulair 10 mg oral tablet  9/20 CXR: Unchanged left basilar opacity/atelectasis.    CARDS:   Hemorrhagic/hypovolemic shock post-op, requiring vasopressors  - Levophed d/c (9/19), d/c Vasopressin (9/20)  - Midodrine 10mg q8 started 9/19 night, d/c 9/20  - maintain MAP > 65  Sinus tachycardia, resolved - HR now in 60s  - EKG sinus tachycardia, no sign so ischemia  [ ] ECHO: rpt pending - continues to refuse  - prior 2018, normal systolic function    GI/NUTR:   Diet: regular 9/20    GI Prophylaxis- PPI 40mg oral    Bowel regimen- d/c'ed 2/2 diarrhea - digni shield placed 9/18pm      - saravia removed, IV lock -> passed TOV  Labs:          BUN/Cr- 13/<0.5  -->,  13/<0.5  -->,  12/<0.5  -->          Electrolytes-Na 135 // K 3.3 // Mg 2.0 //  Phos 2.6 (09-20 @ 23:47)  [] FU rpt BMP Phos at 11am  [ ] fu rpt lasix today  - Fluid overloaded, +3.8L (24hrs) - Lasix 10mg IV x1 on 9/19 day   - no lasix 9/20  - Lactic acidosis, resolved: 13 -> 1.2    VASC:  - LUE art. duplex - L radial artery occluded  - L digits 1& 2 appear mottled  - q1 neurovascular checks of LUE  - vascular recommendation: started eliquis and d/c home with it    HEME/ONC:   DVT prophylaxis- started elequis d/c'ed Hep gtt, SCDs  Acute anemia post-op requiring frequent transfusions    DVT prophylaxis-, SCDs    Labs: Hb/Hct:  7.7/22.6  -->,  8.0/24.1  -->                      Plts:  110  -->,  106  -->                 PTT/INR:  39.1/1.25  (09-20 @ 10:48) --->                 T&S: (09-19)      ID:  Acute leukocytosis, improving  WBC- 18.11  --->>,  19.10  --->>,  18.24  --->>  -continue to monitor WBC  Temp trend- 24hrs T(F): 98.7 (09-21 @ 04:00), Max: 98.8 (09-21 @ 00:00)  Antibiotics: previously on cefe/vanc , d/c'd all ABX on 9/20, continue to monitor off antibotics    ENDO:     HA1C 5.2    finger sticks q4hr while npo    CODE STATUS: DNR/DNI    DISPO: downgrade to floor, discussed with Dr Vaz

## 2021-09-21 NOTE — CHART NOTE - NSCHARTNOTEFT_GEN_A_CORE
SICU Transfer Note    JACOB KOTHARI  66y (1955)  [id number]      Transfer from: SICU  Transfer to: Floor under Orthopedics      SICU COURSE:  66y Female HD# 6d    s/p Intramedullary rodding, femur    Patient is 62 y/o female with pmhx of asthma, copd, obesity, epilepsy, hypothyroidism , osteoarthritis , kidney stone  s/p slip and fall at [facility] [facility]  admitted for left femoral fracture s/p fall on 9/15. Patient went to the OR with orthopedics on 9/16 and underwent ORIF of left femur and long IM gamma nail placement. Patient hypotensive during the case and required chloe gtt. Patient extubated to bipap. In the PACU, patient hypotensive to 60s/40s via L radial a line. Patient received 1L bolus, chloe gtt started via PIV. RIJ central line placed by ICU team. Levo was started and bolused another 1L NS. L radial a line d/c'ed and R radial a line placed (wave form diminished shortly after). Patient brought to SICU for hypovolemic shock. SICU course complicated by elevated lactic acidosis, MONSTER, L radial a-line occlusion (treated with heparin gtt).     DETAILED SICU COURSE:  On arrival to [facility], patient had pH of 7.2 and lactate of 14. Patient was started on a bicarb gtt. Bedside echo which had a questionable pericardial effusion.  LUE with signs of cyanosis and vascular consult was placed who recommended heparin gtt. LUE duplex performed- left radial artery occluded. Patient started on vancomycin and cefepime empirically. 9/17, patient restarted on home meds. Ortho cleared for OOBTC and NWB LLE. Patient remained on levo and vaso. Patient received 1u prbc for hgb of 6.6. 9/19- patient received another unit of prbc for hgb of 6.8. 9/20- Vaso discontinued, diet advanced, saravia removed and passed TOV. Patient started on Eliquis per vascular recs and d/c'ed hep gtt. Antibiotics discontinued.     Patient seen and examined on SICU AM rounds. Patient is hemodynamically stable ready for downgrade.      PAST MEDICAL & SURGICAL HISTORY:  Diabetes  Renal stone  Renal failure  Epilepsy  Asthma  Chronic cough  Knee pain  Osteoarthritis  Uterine cancer  s/p hysterectomy  H/O abdominal hysterectomy  NO RADIATION AND CHEMO  History of tonsillectomy  13 years old  [place] teeth removed  History of bladder stone  SURGERY 8/3/2018  History of surgery  JJ STENT PLACEMENT LEFT OCTOBER 2017      Allergies    Compazine (Unknown)  latex (Urticaria)  penicillins (Unknown)    Intolerances      MEDICATIONS  (STANDING):  ALBUTerol    90 MICROgram(s) HFA Inhaler 2 Puff(s) Inhalation every 6 hours  albuterol/ipratropium for Nebulization 3 milliLiter(s) Nebulizer every 6 hours  apixaban 5 milliGRAM(s) Oral every 12 hours  chlorhexidine 4% Liquid 1 Application(s) Topical daily  citalopram 20 milliGRAM(s) Oral every 24 hours  influenza   Vaccine 0.5 milliLiter(s) IntraMuscular once  insulin regular  human corrective regimen sliding scale   IV Push every 4 hours  levothyroxine 50 MICROGram(s) Oral every 24 hours  montelukast 10 milliGRAM(s) Oral at bedtime  pantoprazole    Tablet 40 milliGRAM(s) Oral before breakfast  PHENobarbital 32.4 milliGRAM(s) Oral three times a day  pramipexole 0.5 milliGRAM(s) Oral at bedtime  topiramate 100 milliGRAM(s) Oral every 24 hours  traZODone 75 milliGRAM(s) Oral at bedtime    MEDICATIONS  (PRN):  acetaminophen   Tablet .. 650 milliGRAM(s) Oral every 6 hours PRN Temp greater or equal to 38C (100.4F), Mild Pain (1 - 3)  ALBUTerol    90 MICROgram(s) HFA Inhaler 2 Puff(s) Inhalation every 6 hours PRN Shortness of Breath and/or Wheezing  oxyCODONE    IR 5 milliGRAM(s) Oral every 4 hours PRN Moderate Pain (4 - 6)      Vital Signs Last 24 Hrs  T(C): 36.1 (21 Sep 2021 08:06), Max: 37.1 (21 Sep 2021 00:00)  T(F): 96.9 (21 Sep 2021 08:06), Max: 98.8 (21 Sep 2021 00:00)  HR: 74 (21 Sep 2021 10:00) (58 - 98)  BP: 125/59 (21 Sep 2021 10:00) (65/50 - 127/58)  BP(mean): 85 (21 Sep 2021 10:00) (55 - 85)  RR: 31 (21 Sep 2021 10:00) (18 - 33)  SpO2: 99% (21 Sep 2021 10:00) (74% - 100%)  I&O's Summary    20 Sep 2021 07:01  -  21 Sep 2021 07:00  --------------------------------------------------------  IN: 1555.2 mL / OUT: 690 mL / NET: 865.2 mL    21 Sep 2021 07:01  -  21 Sep 2021 11:16  --------------------------------------------------------  IN: 0 mL / OUT: 1200 mL / NET: -1200 mL      LABS:                        8.0    18.24 )-----------( 110      ( 20 Sep 2021 23:47 )             24.1       09-20    135  |  102  |  12  ----------------------------<  83  3.3<L>   |  27  |  <0.5<L>    Ca    7.2<L>      20 Sep 2021 23:47  Phos  2.6     09-20  Mg     2.0     09-20        PT/INR - ( 20 Sep 2021 10:48 )   PT: 14.40 sec;   INR: 1.25 ratio         PTT - ( 20 Sep 2021 10:48 )  PTT:39.1 sec    Assessment & Plan  66y Female s/p ORIF of L femur fracture with long IM gamma nail     NEURO:  Acute pain  - controlled with prn Tylenol & oxycodone 5mg  HX epilepsy  - per patient last seizures 20 years ago  - c/w home regimen Phenobarbital, Mirapex, Topiramate  HX sleep disorder/depression  - c/w Trazadone 75mg at bedtime    RESP:   HX asthma, THMO (does not use CPAP at NH)   Oxygen insufficiency- NC @ 3L - maintain sat > 92%  Current Rx: montelukast 10 at bedtime  Home Rx: Singulair 10 mg oral tablet  9/20 CXR: Unchanged left basilar opacity/atelectasis.    CARDS:   Hemorrhagic/hypovolemic shock post-op, requiring vasopressors  - Levophed d/c (9/19), d/c Vasopressin (9/20)  - Midodrine 10mg q8 started 9/19 night, d/c 9/20  - maintain MAP > 65  Sinus tachycardia, resolved - HR now in 60s  - EKG sinus tachycardia, no sign so ischemia  [ ] ECHO: rpt pending - continues to refuse  - prior 2018, normal systolic function    GI/NUTR:   Diet: regular, carb consistent 9/20    GI Prophylaxis- PPI 40mg oral    Bowel regimen- d/c'ed 2/2 diarrhea - digni shield placed 9/18pm      - saravia removed, IV lock -> passed TOV, currently has prima fit  Labs:          BUN/Cr- 13/<0.5  -->,  13/<0.5  -->,  12/<0.5  -->          Electrolytes-Na 135 // K 3.3 // Mg 2.0 //  Phos 2.6 (09-20 @ 23:47)  [] FU rpt BMP Phos at 11am  [ ] fu rpt lasix today  - Fluid overloaded, +3.8L (24hrs) - Lasix 10mg IV x1 on 9/19 day   - no lasix 9/20  - Lactic acidosis, resolved: 13 -> 1.2    VASC:  - LUE art. duplex - L radial artery occluded  - L digits 1& 2 appear mottled  - q1 neurovascular checks of LUE--> q4   - vascular recommendation: started eliquis and d/c home with it    HEME/ONC:   DVT prophylaxis- started elequis d/c'ed Hep gtt, SCDs  Acute anemia post-op requiring frequent transfusions    DVT prophylaxis-, SCDs    Labs: Hb/Hct:  7.7/22.6  -->,  8.0/24.1  -->                      Plts:  110  -->,  106  -->                 PTT/INR:  39.1/1.25  (09-20 @ 10:48) --->                 T&S: (09-19)      ID:  Acute leukocytosis, improving  WBC- 18.11  --->>,  19.10  --->>,  18.24  --->>  -continue to monitor WBC  Temp trend- 24hrs T(F): 98.7 (09-21 @ 04:00), Max: 98.8 (09-21 @ 00:00)  Antibiotics: previously on cefe/vanc , d/c'd all ABX on 9/20, continue to monitor off antibotics    ENDO:     HA1C 5.2    finger sticks q4hr while npo    CODE STATUS: DNR/DNI    DISPO: downgrade to floor, discussed with Dr Vaz      Follow Up:  - OOBTC/ physical therapy/ Occupational therapy  - AM CXR after pulmonary toilet  - encourage IS  - vasc surgery- recommend to follow up outpatient in 2 weeks at clinic  - f/u 11 AM BMP, Mg, Phos  -2330 labs      Signed out to:  Date:  Time: SICU Transfer Note    JACOB KOTHARI  66y (1955)  [id number]      Transfer from: SICU  Transfer to: Floor under Orthopedics      SICU COURSE:  66y Female HD# 6d    s/p Intramedullary rodding, femur    Patient is 62 y/o female with pmhx of asthma, copd, obesity, epilepsy, hypothyroidism , osteoarthritis , kidney stone  s/p slip and fall at [facility] [facility]  admitted for left femoral fracture s/p fall on 9/15. Patient went to the OR with orthopedics on 9/16 and underwent ORIF of left femur and long IM gamma nail placement. Patient hypotensive during the case and required chloe gtt. Patient extubated to bipap. In the PACU, patient hypotensive to 60s/40s via L radial a line. Patient received 1L bolus, chloe gtt started via PIV. RIJ central line placed by ICU team. Levo was started and bolused another 1L NS. L radial a line d/c'ed and R radial a line placed (wave form diminished shortly after). Patient brought to SICU for hypovolemic shock. SICU course complicated by elevated lactic acidosis, MONSTER, L radial a-line occlusion (treated with heparin gtt).     DETAILED SICU COURSE:  On arrival to [facility], patient had pH of 7.2 and lactate of 14. Patient was started on a bicarb gtt. Bedside echo which had a questionable pericardial effusion.  LUE with signs of cyanosis and vascular consult was placed who recommended heparin gtt. LUE duplex performed- left radial artery occluded. Patient started on vancomycin and cefepime empirically. 9/17, patient restarted on home meds. Ortho cleared for OOBTC and NWB LLE. Patient remained on levo and vaso. Patient received 1u prbc for hgb of 6.6. 9/19- patient received another unit of prbc for hgb of 6.8. 9/20- Vaso discontinued, diet advanced, saravia removed and passed TOV. Patient started on Eliquis per vascular recs and d/c'ed hep gtt. Antibiotics discontinued.     Patient seen and examined on SICU AM rounds. Patient is hemodynamically stable ready for downgrade.      PAST MEDICAL & SURGICAL HISTORY:  Diabetes  Renal stone  Renal failure  Epilepsy  Asthma  Chronic cough  Knee pain  Osteoarthritis  Uterine cancer  s/p hysterectomy  H/O abdominal hysterectomy  NO RADIATION AND CHEMO  History of tonsillectomy  13 years old  [place] teeth removed  History of bladder stone  SURGERY 8/3/2018  History of surgery  JJ STENT PLACEMENT LEFT OCTOBER 2017      Allergies    Compazine (Unknown)  latex (Urticaria)  penicillins (Unknown)    Intolerances      MEDICATIONS  (STANDING):  ALBUTerol    90 MICROgram(s) HFA Inhaler 2 Puff(s) Inhalation every 6 hours  albuterol/ipratropium for Nebulization 3 milliLiter(s) Nebulizer every 6 hours  apixaban 5 milliGRAM(s) Oral every 12 hours  chlorhexidine 4% Liquid 1 Application(s) Topical daily  citalopram 20 milliGRAM(s) Oral every 24 hours  influenza   Vaccine 0.5 milliLiter(s) IntraMuscular once  insulin regular  human corrective regimen sliding scale   IV Push every 4 hours  levothyroxine 50 MICROGram(s) Oral every 24 hours  montelukast 10 milliGRAM(s) Oral at bedtime  pantoprazole    Tablet 40 milliGRAM(s) Oral before breakfast  PHENobarbital 32.4 milliGRAM(s) Oral three times a day  pramipexole 0.5 milliGRAM(s) Oral at bedtime  topiramate 100 milliGRAM(s) Oral every 24 hours  traZODone 75 milliGRAM(s) Oral at bedtime    MEDICATIONS  (PRN):  acetaminophen   Tablet .. 650 milliGRAM(s) Oral every 6 hours PRN Temp greater or equal to 38C (100.4F), Mild Pain (1 - 3)  ALBUTerol    90 MICROgram(s) HFA Inhaler 2 Puff(s) Inhalation every 6 hours PRN Shortness of Breath and/or Wheezing  oxyCODONE    IR 5 milliGRAM(s) Oral every 4 hours PRN Moderate Pain (4 - 6)      Vital Signs Last 24 Hrs  T(C): 36.1 (21 Sep 2021 08:06), Max: 37.1 (21 Sep 2021 00:00)  T(F): 96.9 (21 Sep 2021 08:06), Max: 98.8 (21 Sep 2021 00:00)  HR: 74 (21 Sep 2021 10:00) (58 - 98)  BP: 125/59 (21 Sep 2021 10:00) (65/50 - 127/58)  BP(mean): 85 (21 Sep 2021 10:00) (55 - 85)  RR: 31 (21 Sep 2021 10:00) (18 - 33)  SpO2: 99% (21 Sep 2021 10:00) (74% - 100%)  I&O's Summary    20 Sep 2021 07:01  -  21 Sep 2021 07:00  --------------------------------------------------------  IN: 1555.2 mL / OUT: 690 mL / NET: 865.2 mL    21 Sep 2021 07:01  -  21 Sep 2021 11:16  --------------------------------------------------------  IN: 0 mL / OUT: 1200 mL / NET: -1200 mL      LABS:                        8.0    18.24 )-----------( 110      ( 20 Sep 2021 23:47 )             24.1       09-20    135  |  102  |  12  ----------------------------<  83  3.3<L>   |  27  |  <0.5<L>    Ca    7.2<L>      20 Sep 2021 23:47  Phos  2.6     09-20  Mg     2.0     09-20        PT/INR - ( 20 Sep 2021 10:48 )   PT: 14.40 sec;   INR: 1.25 ratio         PTT - ( 20 Sep 2021 10:48 )  PTT:39.1 sec    Assessment & Plan  66y Female s/p ORIF of L femur fracture with long IM gamma nail     NEURO:  Acute pain  - controlled with prn Tylenol & oxycodone 5mg  HX epilepsy  - per patient last seizures 20 years ago  - c/w home regimen Phenobarbital, Mirapex, Topiramate  HX sleep disorder/depression  - c/w Trazadone 75mg at bedtime    RESP:   HX asthma, THOM (does not use CPAP at NH)   Oxygen insufficiency- NC @ 3L - maintain sat > 92%  Current Rx: montelukast 10 at bedtime  Home Rx: Singulair 10 mg oral tablet  9/20 CXR: Unchanged left basilar opacity/atelectasis.    CARDS:   Hemorrhagic/hypovolemic shock post-op, requiring vasopressors  - Levophed d/c (9/19), d/c Vasopressin (9/20)  - Midodrine 10mg q8 started 9/19 night, d/c 9/20  - maintain MAP > 65  Sinus tachycardia, resolved - HR now in 60s  - EKG sinus tachycardia, no sign so ischemia  [ ] ECHO: rpt pending - continues to refuse  - prior 2018, normal systolic function    GI/NUTR:   Diet: regular, carb consistent 9/20    GI Prophylaxis- PPI 40mg oral    Bowel regimen- d/c'ed 2/2 diarrhea - digni shield placed 9/18pm      - saravia removed, IV lock -> passed TOV, currently has prima fit  Labs:          BUN/Cr- 13/<0.5  -->,  13/<0.5  -->,  12/<0.5  -->          Electrolytes-Na 135 // K 3.3 // Mg 2.0 //  Phos 2.6 (09-20 @ 23:47)  [] FU rpt BMP Phos at 11am  [ ] fu rpt lasix today  - Fluid overloaded, +3.8L (24hrs) - Lasix 10mg IV x1 on 9/19 day   - no lasix 9/20  - Lactic acidosis, resolved: 13 -> 1.2    VASC:  - LUE art. duplex - L radial artery occluded  - L digits 1& 2 appear mottled  - q1 neurovascular checks of LUE--> q4   - vascular recommendation: started eliquis and d/c home with it    HEME/ONC:   DVT prophylaxis- started elequis d/c'ed Hep gtt, SCDs  Acute anemia post-op requiring frequent transfusions    DVT prophylaxis-, SCDs    Labs: Hb/Hct:  7.7/22.6  -->,  8.0/24.1  -->                      Plts:  110  -->,  106  -->                 PTT/INR:  39.1/1.25  (09-20 @ 10:48) --->                 T&S: (09-19)      ID:  Acute leukocytosis, improving  WBC- 18.11  --->>,  19.10  --->>,  18.24  --->>  -continue to monitor WBC  Temp trend- 24hrs T(F): 98.7 (09-21 @ 04:00), Max: 98.8 (09-21 @ 00:00)  Antibiotics: previously on cefe/vanc , d/c'd all ABX on 9/20, continue to monitor off antibotics    ENDO:     HA1C 5.2    finger sticks q4hr while npo    CODE STATUS: DNR/DNI    DISPO: downgrade to floor, discussed with Dr Vaz      Follow Up:  - OOBTC/ physical therapy/ Occupational therapy  - AM CXR after pulmonary toilet  - encourage IS  - vasc surgery- recommend to follow up outpatient in 2 weeks at clinic  - f/u 11 AM BMP, Mg, Phos  -2330 labs      Signed out to: Dr. Green  Date: 9/21/21  Time: 12:05

## 2021-09-21 NOTE — PROCEDURE NOTE - NSPOSTCAREGUIDE_GEN_A_CORE
Verbal/written post procedure instructions were given to patient/caregiver/Instructed patient/caregiver to follow-up with primary care physician/Instructed patient/caregiver regarding signs and symptoms of infection/Keep the cast/splint/dressing clean and dry
Verbal/written post procedure instructions were given to patient/caregiver
Care for catheter as per unit/ICU protocols

## 2021-09-21 NOTE — PROGRESS NOTE ADULT - ASSESSMENT
A 62 y/o female with pmhx of asthma, copd, obesity, epilepsy, hypothyroidism , osteoarthritis , kidney stone  s/p slip and fall at [facility] [facility]  admitted for left femoral fracture.    Acute comminuted, displaced fracture of the left proximal femur with avulsion of the lesser trochanter  - POD #5, in SICU post left femur IMN  - hospital course complicated by absence of left radial pulse; and hypotension/shock  - vascular f/u for LUE ischemia noted  - remains on Eliquis  - plan as per surgical teams  - encourage use of incentive spirometer  - OOB to chair when cleared by surgery    Volume Overload  - CXR noted  - given dose of diuretic today  - would continue daily for few more doses, monitor renal function    Hypothyroidism   - continue Synthroid     Seizure Disorder  - continue Phenobarbital     Morbid Obesity   - low olga dash diet when able  - check albumin    DM  - on oral agent in SNF  - FS noted   - insulin coverage    hx of Depression  - continue home rx    hx of Asthma  - resp treatment as needed prn    Patient remains acute  Patient in SICU, case discussed with RN staff.  Emotional support rendered at bedside to the patient

## 2021-09-21 NOTE — PROGRESS NOTE ADULT - ASSESSMENT
Orthopaedic Surgery Progress Note    SUBJECTIVE  No acute events overnight.   Pt was seen and examined by the orthopedic surgery team during morning rounds. Doing well. Normal Vitals. Tolerating diet. Making adequate urine.   Denies n/v, abdominal pain, diarrhea or any other major complaints.    MEDS  MEDICATIONS  (STANDING):  ALBUTerol    90 MICROgram(s) HFA Inhaler 2 Puff(s) Inhalation every 6 hours  albuterol/ipratropium for Nebulization 3 milliLiter(s) Nebulizer every 6 hours  apixaban 5 milliGRAM(s) Oral every 12 hours  chlorhexidine 4% Liquid 1 Application(s) Topical daily  citalopram 20 milliGRAM(s) Oral every 24 hours  influenza   Vaccine 0.5 milliLiter(s) IntraMuscular once  insulin regular  human corrective regimen sliding scale   SubCutaneous Before meals and at bedtime  levothyroxine 50 MICROGram(s) Oral every 24 hours  montelukast 10 milliGRAM(s) Oral at bedtime  pantoprazole    Tablet 40 milliGRAM(s) Oral before breakfast  PHENobarbital 32.4 milliGRAM(s) Oral three times a day  potassium chloride  20 mEq/100 mL IVPB 20 milliEquivalent(s) IV Intermittent every 1 hour  pramipexole 0.5 milliGRAM(s) Oral at bedtime  topiramate 100 milliGRAM(s) Oral every 24 hours  traZODone 75 milliGRAM(s) Oral at bedtime    MEDICATIONS  (PRN):  acetaminophen   Tablet 650 milliGRAM(s) Oral every 6 hours PRN Temp greater or equal to 38C (100.4F), Mild Pain (1 - 3)  ALBUTerol    90 MICROgram(s) HFA Inhaler 2 Puff(s) Inhalation every 6 hours PRN Shortness of Breath and/or Wheezing  oxyCODONE    IR 5 milliGRAM(s) Oral every 4 hours PRN Moderate Pain (4 - 6)    --------------------------------------    T(C): 36.1 (09-21-21 @ 08:06), Max: 37.1 (09-21-21 @ 00:00)  HR: 67 (09-21-21 @ 13:00) (63 - 98)  BP: 111/54 (09-21-21 @ 13:00) (65/50 - 127/58)  RR: 27 (09-21-21 @ 13:00) (26 - 33)  SpO2: 100% (09-21-21 @ 13:00) (95% - 100%)    P/E:  AOx3, NAD  Nonlabored breathing    LLE  Dressing C/D/I - changed yesterday    Compartments soft and compressible  Motor intact distally  SILT distally  CR<2sec     --------------------------------------    Labs                        8.0    18.24 )-----------( 110      ( 20 Sep 2021 23:47 )             24.1     09-21    143  |  109  |  10  ----------------------------<  114<H>  3.4<L>   |  28  |  <0.5<L>    Ca    7.5<L>      21 Sep 2021 12:30  Phos  3.5     09-21  Mg     2.1     09-21        PT/INR - ( 20 Sep 2021 10:48 )   PT: 14.40 sec;   INR: 1.25 ratio    PTT - ( 20 Sep 2021 10:48 )  PTT:39.1 sec    --------------------------------------    A/P:   67yo Female s/p left femur IMN on 10/16/21, POD5.   Stable from SICU perspective for downgrade today.   Planned for transfer to the medical service under Dr. Bynum today.       -NWB LLE  -PT/OT  -Pain control     -Incentive Spirometry   - recent CXR demonstrating mild congestions   - lasix given w/ mild improvement   - continue to monitor     -DVT Prophylaxis;   transitioned to LVX per vascular surgery team recs        Please page ortho with any questions or concerns.

## 2021-09-21 NOTE — PROGRESS NOTE ADULT - SUBJECTIVE AND OBJECTIVE BOX
JACOB KOTHARI  [id number]  66y Female    Indication for ICU admission: s/p L femur fracture ORIF & long gamma IM nail placement, EBL 1L, hypotensive on pressors  Admit Date:09-15-21  ICU Date: 9/16  OR Date: 9/16    Compazine (Unknown)  latex (Urticaria)  penicillins (Unknown)    PAST MEDICAL & SURGICAL HISTORY:  Diabetes    Renal stone    Renal failure    Epilepsy    Asthma    Chronic cough    Knee pain    Osteoarthritis    Uterine cancer  s/p hysterectomy    H/O abdominal hysterectomy  NO RADIATION AND CHEMO    History of tonsillectomy  13 years old    [place] teeth removed    History of bladder stone  SURGERY 8/3/2018    History of surgery  JJ STENT PLACEMENT LEFT OCTOBER 2017      Home Medications:  betamethasone dipropionate 0.05% topical lotion: Apply topically to affected area 2 times a day (15 Sep 2021 18:46)  Desyrel 50 mg oral tablet: 1.5 tab(s) orally once a day (at bedtime) (15 Sep 2021 18:46)  ibuprofen 200 mg oral tablet: 2 tab(s) orally every 8 hours, As Needed (15 Sep 2021 18:46)  Mirapex 0.5 mg oral tablet: 1 tab(s) orally once a day (at bedtime) (15 Sep 2021 18:46)  Multiple Vitamins with Minerals oral capsule: 1 cap(s) orally once a day (15 Sep 2021 18:46)  Nizoral A-D 1% topical shampoo: Apply topically to affected area every 3 days (15 Sep 2021 18:46)  nystatin 100,000 units/g topical cream (obsolete): Apply topically to affected area 2 times a day under bilateral breast and under left arm (15 Sep 2021 18:46)  PHENobarbital 32.4 mg oral tablet: 1 tab(s) orally 3 times a day (15 Sep 2021 18:46)  Senna Plus 50 mg-8.6 mg oral tablet: 2 tab(s) orally once a day (at bedtime) (15 Sep 2021 18:46)  Singulair 10 mg oral tablet: 1 tab(s) orally once a day (at bedtime) (15 Sep 2021 18:46)  Urocit-K 15 mEq oral tablet, extended release: 1 tab(s) orally once a day (15 Sep 2021 18:46)    24HRS EVENT:  9/20  Day   - NICOM responsive, 500cc  - d/c midodrine, vaso  - d/c steroids  - IV lock  - diet advanced to reg  - PPI, bowel regimen  - saravia removed  - seen by PT  - d/c abx's  - elequis started       DVT PTX: on eliquis    GI PTX:pantoprazole  oral 40 milliGRAM(s) daily      ***Tubes/Lines/Drains  ***  [x] Peripheral IV  [x] Central Venous Line     	[x] R	[] L	[x] IJ          Date Placed: 9/16  [x] Arterial Line	  L radial a line placed intra op by anesthesia, was DC in PACU due to clotting off  R radial a line place in PACU with 1 attempt, good blood return but shortly after no wave form   attempted R femoral a line without success  [X] Saravia   placed post op in PACU 9/16 -> removed 9/20    REVIEW OF SYSTEMS    [x ] A ten-point review of systems was otherwise negative except as noted.  [ ] Due to altered mental status/intubation, subjective information were not able to be obtained from the patient. History was obtained, to the extent possible, from review of the chart and collateral sources of information.   JACOB KOTHARI  [id number]  66y Female    Indication for ICU admission: s/p L femur fracture ORIF & long gamma IM nail placement, EBL 1L, hypotensive on pressors  Admit Date:09-15-21  ICU Date:   OR Date:     Compazine (Unknown)  latex (Urticaria)  penicillins (Unknown)    PAST MEDICAL & SURGICAL HISTORY:  Diabetes    Renal stone    Renal failure    Epilepsy    Asthma    Chronic cough    Knee pain    Osteoarthritis    Uterine cancer  s/p hysterectomy    H/O abdominal hysterectomy  NO RADIATION AND CHEMO    History of tonsillectomy  13 years old    [place] teeth removed    History of bladder stone  SURGERY 8/3/2018    History of surgery  JJ STENT PLACEMENT LEFT 2017      Home Medications:  betamethasone dipropionate 0.05% topical lotion: Apply topically to affected area 2 times a day (15 Sep 2021 18:46)  Desyrel 50 mg oral tablet: 1.5 tab(s) orally once a day (at bedtime) (15 Sep 2021 18:46)  ibuprofen 200 mg oral tablet: 2 tab(s) orally every 8 hours, As Needed (15 Sep 2021 18:46)  Mirapex 0.5 mg oral tablet: 1 tab(s) orally once a day (at bedtime) (15 Sep 2021 18:46)  Multiple Vitamins with Minerals oral capsule: 1 cap(s) orally once a day (15 Sep 2021 18:46)  Nizoral A-D 1% topical shampoo: Apply topically to affected area every 3 days (15 Sep 2021 18:46)  nystatin 100,000 units/g topical cream (obsolete): Apply topically to affected area 2 times a day under bilateral breast and under left arm (15 Sep 2021 18:46)  PHENobarbital 32.4 mg oral tablet: 1 tab(s) orally 3 times a day (15 Sep 2021 18:46)  Senna Plus 50 mg-8.6 mg oral tablet: 2 tab(s) orally once a day (at bedtime) (15 Sep 2021 18:46)  Singulair 10 mg oral tablet: 1 tab(s) orally once a day (at bedtime) (15 Sep 2021 18:46)  Urocit-K 15 mEq oral tablet, extended release: 1 tab(s) orally once a day (15 Sep 2021 18:46)    24HRS EVENT:    Night  -K and phos repleted  -Repeat 11AM labs  -Passed TOV  -250 LR bolus    Day   - NICOM responsive, 500cc  - d/c midodrine, vaso  - d/c steroids  - IV lock  - diet advanced to reg  - PPI, bowel regimen  - saravia removed  - seen by PT  - d/c abx's  - elequis started       DVT PTX: on eliquis    GI PTX:pantoprazole  oral 40 milliGRAM(s) daily      ***Tubes/Lines/Drains  ***  [x] Peripheral IV  [x] Central Venous Line     	[x] R	[] L	[x] IJ          Date Placed:   [x] Arterial Line	  L radial a line placed intra op by anesthesia, was DC in PACU due to clotting off  R radial a line place in PACU with 1 attempt, good blood return but shortly after no wave form   attempted R femoral a line without success  [X] Saravia   placed post op in PACU  -> removed     REVIEW OF SYSTEMS    [x ] A ten-point review of systems was otherwise negative except as noted.  [ ] Due to altered mental status/intubation, subjective information were not able to be obtained from the patient. History was obtained, to the extent possible, from review of the chart and collateral sources of information.    Daily     Daily Weight in k.7 (21 Sep 2021 01:25)    Diet, Regular (21 @ 10:12)      CURRENT MEDS:  Neurologic Medications  acetaminophen   Tablet .. 650 milliGRAM(s) Oral every 6 hours PRN Temp greater or equal to 38C (100.4F), Mild Pain (1 - 3)  citalopram 20 milliGRAM(s) Oral every 24 hours  oxyCODONE    IR 5 milliGRAM(s) Oral every 4 hours PRN Moderate Pain (4 - 6)  PHENobarbital 32.4 milliGRAM(s) Oral three times a day  pramipexole 0.5 milliGRAM(s) Oral at bedtime  topiramate 100 milliGRAM(s) Oral every 24 hours  traZODone 75 milliGRAM(s) Oral at bedtime    Respiratory Medications  ALBUTerol    90 MICROgram(s) HFA Inhaler 2 Puff(s) Inhalation every 6 hours PRN Shortness of Breath and/or Wheezing  ALBUTerol    90 MICROgram(s) HFA Inhaler 2 Puff(s) Inhalation every 6 hours  albuterol/ipratropium for Nebulization 3 milliLiter(s) Nebulizer every 6 hours  montelukast 10 milliGRAM(s) Oral at bedtime    Cardiovascular Medications    Gastrointestinal Medications  pantoprazole    Tablet 40 milliGRAM(s) Oral before breakfast    Genitourinary Medications    Hematologic/Oncologic Medications  apixaban 5 milliGRAM(s) Oral every 12 hours  influenza   Vaccine 0.5 milliLiter(s) IntraMuscular once    Antimicrobial/Immunologic Medications    Endocrine/Metabolic Medications  insulin regular  human corrective regimen sliding scale   IV Push every 4 hours  levothyroxine 50 MICROGram(s) Oral every 24 hours    Topical/Other Medications  chlorhexidine 4% Liquid 1 Application(s) Topical daily      ICU Vital Signs Last 24 Hrs  T(C): 36.1 (21 Sep 2021 08:06), Max: 37.1 (21 Sep 2021 00:00)  T(F): 96.9 (21 Sep 2021 08:06), Max: 98.8 (21 Sep 2021 00:00)  HR: 68 (21 Sep 2021 09:00) (50 - 98)  BP: 97/55 (21 Sep 2021 09:00) (65/50 - 127/58)  BP(mean): 70 (21 Sep 2021 09:00) (55 - 84)  ABP: --  ABP(mean): --  RR: 30 (21 Sep 2021 09:00) (18 - 33)  SpO2: 100% (21 Sep 2021 09:00) (74% - 100%)      Adult Advanced Hemodynamics Last 24 Hrs  CVP(mm Hg): --  CVP(cm H2O): --  CO: --  CI: --  PA: --  PA(mean): --  PCWP: --  SVR: --  SVRI: --  PVR: --  PVRI: --      ABG - ( 20 Sep 2021 12:39 )  pH, Arterial: 7.34  pH, Blood: x     /  pCO2: 48    /  pO2: 99    / HCO3: 26    / Base Excess: -0.1  /  SaO2: 98.5                I&O's Summary    20 Sep 2021 07:01  -  21 Sep 2021 07:00  --------------------------------------------------------  IN: 1555.2 mL / OUT: 690 mL / NET: 865.2 mL    21 Sep 2021 07:  -  21 Sep 2021 09:47  --------------------------------------------------------  IN: 0 mL / OUT: 1200 mL / NET: -1200 mL      I&O's Detail    20 Sep 2021 07:  -  21 Sep 2021 07:00  --------------------------------------------------------  IN:    Heparin Infusion: 98 mL    Lactated Ringers: 300 mL    Lactated Ringers Bolus: 250 mL    Oral Fluid: 400 mL    Sodium Chloride 0.9% Bolus: 500 mL    Vasopressin: 7.2 mL  Total IN: 1555.2 mL    OUT:    Indwelling Catheter - Urethral (mL): 690 mL  Total OUT: 690 mL    Total NET: 865.2 mL      21 Sep 2021 07:  -  21 Sep 2021 09:47  --------------------------------------------------------  IN:  Total IN: 0 mL    OUT:    Voided (mL): 1200 mL  Total OUT: 1200 mL    Total NET: -1200 mL          PHYSICAL EXAM:    General/Neuro   GCS:     = 15  Deficits:                             alert & oriented x 3, no focal deficits    Lungs:      clear to auscultation, Normal expansion/effort.     Cardiovascular : S1, S2.  Regular rate and rhythm.  +++Peripheral edema   Cardiac Rhythm: Normal Sinus Rhythm    GI: Abdomen soft, Non-tender, Non-distended.        Extremities: Extremities warm, pink, well-perfused. Pulses + dopperable L ulnar, R radial , DP bilaterally     Derm: edamtous, RLE blistering    :   no saravia, prima fit in place, clear yuellow urine      CXR:     LABS:  CAPILLARY BLOOD GLUCOSE      POCT Blood Glucose.: 82 mg/dL (21 Sep 2021 09:13)  POCT Blood Glucose.: 86 mg/dL (21 Sep 2021 06:19)  POCT Blood Glucose.: 110 mg/dL (20 Sep 2021 21:03)  POCT Blood Glucose.: 96 mg/dL (20 Sep 2021 18:19)  POCT Blood Glucose.: 111 mg/dL (20 Sep 2021 14:29)  POCT Blood Glucose.: >600 mg/dL (20 Sep 2021 14:27)  POCT Blood Glucose.: 134 mg/dL (20 Sep 2021 10:52)                          8.0    18.24 )-----------( 110      ( 20 Sep 2021 23:47 )             24.1       09-20    135  |  102  |  12  ----------------------------<  83  3.3<L>   |  27  |  <0.5<L>    Ca    7.2<L>      20 Sep 2021 23:47  Phos  2.6       Mg     2.0     -20        PT/INR - ( 20 Sep 2021 10:48 )   PT: 14.40 sec;   INR: 1.25 ratio         PTT - ( 20 Sep 2021 10:48 )  PTT:39.1 sec

## 2021-09-22 LAB
CULTURE RESULTS: SIGNIFICANT CHANGE UP
GLUCOSE BLDC GLUCOMTR-MCNC: 105 MG/DL — HIGH (ref 70–99)
GLUCOSE BLDC GLUCOMTR-MCNC: 106 MG/DL — HIGH (ref 70–99)
GLUCOSE BLDC GLUCOMTR-MCNC: 109 MG/DL — HIGH (ref 70–99)
GLUCOSE BLDC GLUCOMTR-MCNC: 85 MG/DL — SIGNIFICANT CHANGE UP (ref 70–99)
SPECIMEN SOURCE: SIGNIFICANT CHANGE UP

## 2021-09-22 PROCEDURE — 71045 X-RAY EXAM CHEST 1 VIEW: CPT | Mod: 26

## 2021-09-22 RX ORDER — FUROSEMIDE 40 MG
40 TABLET ORAL DAILY
Refills: 0 | Status: DISCONTINUED | OUTPATIENT
Start: 2021-09-22 | End: 2021-09-24

## 2021-09-22 RX ADMIN — Medication 40 MILLIGRAM(S): at 17:12

## 2021-09-22 RX ADMIN — Medication 32.4 MILLIGRAM(S): at 14:15

## 2021-09-22 RX ADMIN — Medication 3 MILLILITER(S): at 13:15

## 2021-09-22 RX ADMIN — PANTOPRAZOLE SODIUM 40 MILLIGRAM(S): 20 TABLET, DELAYED RELEASE ORAL at 06:11

## 2021-09-22 RX ADMIN — Medication 32.4 MILLIGRAM(S): at 21:04

## 2021-09-22 RX ADMIN — PRAMIPEXOLE DIHYDROCHLORIDE 0.5 MILLIGRAM(S): 0.12 TABLET ORAL at 21:03

## 2021-09-22 RX ADMIN — Medication 100 MILLIGRAM(S): at 10:59

## 2021-09-22 RX ADMIN — CITALOPRAM 20 MILLIGRAM(S): 10 TABLET, FILM COATED ORAL at 10:59

## 2021-09-22 RX ADMIN — Medication 75 MILLIGRAM(S): at 21:05

## 2021-09-22 RX ADMIN — APIXABAN 5 MILLIGRAM(S): 2.5 TABLET, FILM COATED ORAL at 06:11

## 2021-09-22 RX ADMIN — MONTELUKAST 10 MILLIGRAM(S): 4 TABLET, CHEWABLE ORAL at 21:04

## 2021-09-22 RX ADMIN — Medication 32.4 MILLIGRAM(S): at 06:11

## 2021-09-22 RX ADMIN — Medication 50 MICROGRAM(S): at 06:11

## 2021-09-22 RX ADMIN — OXYCODONE HYDROCHLORIDE 5 MILLIGRAM(S): 5 TABLET ORAL at 00:03

## 2021-09-22 RX ADMIN — Medication 3 MILLILITER(S): at 21:29

## 2021-09-22 RX ADMIN — OXYCODONE HYDROCHLORIDE 5 MILLIGRAM(S): 5 TABLET ORAL at 10:58

## 2021-09-22 RX ADMIN — OXYCODONE HYDROCHLORIDE 5 MILLIGRAM(S): 5 TABLET ORAL at 06:13

## 2021-09-22 RX ADMIN — APIXABAN 5 MILLIGRAM(S): 2.5 TABLET, FILM COATED ORAL at 17:12

## 2021-09-22 RX ADMIN — Medication 3 MILLILITER(S): at 08:10

## 2021-09-22 NOTE — PROGRESS NOTE ADULT - SUBJECTIVE AND OBJECTIVE BOX
JACOB KOTHARI  66y  Female      Patient is a 66y old  Female who presents with a chief complaint of left femoral fracture (22 Sep 2021 07:53)    s/p lt ORIF    REVIEW OF SYSTEMS:  CONSTITUTIONAL: No fever, weight loss, or fatigue  EYES: No eye pain, visual disturbances, or discharge  ENMT:  No difficulty hearing, tinnitus, vertigo; No sinus or throat pain  NECK: No pain or stiffness  BREASTS: No pain, masses, or nipple discharge  RESPIRATORY: No cough, wheezing, sob+  CARDIOVASCULAR: No chest pain, palpitations, dizziness, both  leg swelling+  GASTROINTESTINAL: No abdominal or epigastric pain. No nausea, vomiting, or hematemesis; No diarrhea or constipation. No melena or hematochezia.  GENITOURINARY: No dysuria, frequency, hematuria, or incontinenceSKIN: No itching, burning, rashes, or lesions   LYMPH NODES: No enlarged glands  MUSCULOSKELETAL: lt knee pain+  ALLERY AND IMMUNOLOGIC: No hives or eczema  FAMILY HISTORY:  No pertinent family history in first degree relatives      T(C): 36.4 (09-22-21 @ 04:37), Max: 36.8 (09-21-21 @ 20:00)  HR: 80 (09-22-21 @ 04:37) (64 - 87)  BP: 123/60 (09-22-21 @ 04:37) (101/54 - 144/71)  RR: 20 (09-22-21 @ 04:37) (20 - 33)  SpO2: 99% (09-22-21 @ 04:37) (93% - 100%)  Wt(kg): --Vital Signs Last 24 Hrs  T(C): 36.4 (22 Sep 2021 04:37), Max: 36.8 (21 Sep 2021 20:00)  T(F): 97.6 (22 Sep 2021 04:37), Max: 98.3 (21 Sep 2021 20:00)  HR: 80 (22 Sep 2021 04:37) (64 - 87)  BP: 123/60 (22 Sep 2021 04:37) (101/54 - 144/71)  BP(mean): 76 (21 Sep 2021 20:00) (71 - 102)  RR: 20 (22 Sep 2021 04:37) (20 - 33)  SpO2: 99% (22 Sep 2021 04:37) (93% - 100%)  Compazine (Unknown)  latex (Urticaria)  penicillins (Unknown)      PHYSICAL EXAM:  GENERAL: NAD,   HEAD:  Atraumatic, Normocephalic  EYES: EOMI, PERRLA, conjunctiva and sclera clear  ENMT: No tonsillar erythema, exudates, or enlargement;   NECK: Supple, No JVD, Normal thyroid  NERVOUS SYSTEM:  Alert & Oriented   CHEST/LUNG: vbs minimal rales+  HEART: Regular rate and rhythm; No murmurs, rubs, or gallops  ABDOMEN: Soft, Nontender, Nondistended; Bowel sounds present  EXTREMITIES: , No clubbing, cyanosis,both legs edema+  LYMPH: No lymphadenopathy noted  SKIN: No rashes or lesions      LABS:  09-21    146  |  111<H>  |  9<L>  ----------------------------<  111<H>  4.3   |  30  |  0.6<L>    Ca    7.5<L>      21 Sep 2021 18:00  Phos  3.5     09-21  Mg     2.1     09-21                          8.0    18.24 )-----------( 110      ( 20 Sep 2021 23:47 )             24.1           RADIOLOGY & ADDITIONAL TESTS:    MEDICATION:  acetaminophen   Tablet .. 650 milliGRAM(s) Oral every 6 hours PRN  ALBUTerol    90 MICROgram(s) HFA Inhaler 2 Puff(s) Inhalation every 6 hours PRN  ALBUTerol    90 MICROgram(s) HFA Inhaler 2 Puff(s) Inhalation every 6 hours  albuterol/ipratropium for Nebulization 3 milliLiter(s) Nebulizer every 6 hours  apixaban 5 milliGRAM(s) Oral every 12 hours  chlorhexidine 4% Liquid 1 Application(s) Topical daily  citalopram 20 milliGRAM(s) Oral every 24 hours  furosemide    Tablet 40 milliGRAM(s) Oral daily  influenza   Vaccine 0.5 milliLiter(s) IntraMuscular once  insulin regular  human corrective regimen sliding scale   SubCutaneous Before meals and at bedtime  levothyroxine 50 MICROGram(s) Oral every 24 hours  montelukast 10 milliGRAM(s) Oral at bedtime  oxyCODONE    IR 5 milliGRAM(s) Oral every 4 hours PRN  pantoprazole    Tablet 40 milliGRAM(s) Oral before breakfast  PHENobarbital 32.4 milliGRAM(s) Oral three times a day  pramipexole 0.5 milliGRAM(s) Oral at bedtime  topiramate 100 milliGRAM(s) Oral every 24 hours  traZODone 75 milliGRAM(s) Oral at bedtime      HEALTH ISSUES - PROBLEM Dx:  Subtrochanteric fracture of left femur s/p Lt hip ORIF   anemia acute on chronic due to blood loss with recent surgery s/p 2 units prbc  recent lt hand discoloration due to radial artery stenosis eliquis  lymphedema add on lasix   Hypothyroid on synthyroid  seizure disorder on phenobarbitol,topamax  COPD on duoneb nebulizer

## 2021-09-22 NOTE — PROGRESS NOTE ADULT - ASSESSMENT
Orthopaedic Surgery Progress Note    SUBJECTIVE  No acute events overnight.   Pt was seen and examined by the orthopedic surgery team during morning rounds. Doing well. Normal Vitals. Tolerating diet. Making adequate urine.   Denies n/v, abdominal pain, diarrhea or any other major complaints.    MEDS  MEDICATIONS  (STANDING):  ALBUTerol    90 MICROgram(s) HFA Inhaler 2 Puff(s) Inhalation every 6 hours  albuterol/ipratropium for Nebulization 3 milliLiter(s) Nebulizer every 6 hours  apixaban 5 milliGRAM(s) Oral every 12 hours  chlorhexidine 4% Liquid 1 Application(s) Topical daily  citalopram 20 milliGRAM(s) Oral every 24 hours  influenza   Vaccine 0.5 milliLiter(s) IntraMuscular once  insulin regular  human corrective regimen sliding scale   SubCutaneous Before meals and at bedtime  levothyroxine 50 MICROGram(s) Oral every 24 hours  montelukast 10 milliGRAM(s) Oral at bedtime  pantoprazole    Tablet 40 milliGRAM(s) Oral before breakfast  PHENobarbital 32.4 milliGRAM(s) Oral three times a day  pramipexole 0.5 milliGRAM(s) Oral at bedtime  topiramate 100 milliGRAM(s) Oral every 24 hours  traZODone 75 milliGRAM(s) Oral at bedtime    MEDICATIONS  (PRN):  acetaminophen   Tablet .. 650 milliGRAM(s) Oral every 6 hours PRN Temp greater or equal to 38C (100.4F), Mild Pain (1 - 3)  ALBUTerol    90 MICROgram(s) HFA Inhaler 2 Puff(s) Inhalation every 6 hours PRN Shortness of Breath and/or Wheezing  oxyCODONE    IR 5 milliGRAM(s) Oral every 4 hours PRN Moderate Pain (4 - 6)    --------------------------------------    T(C): 36.4 (09-22-21 @ 04:37), Max: 36.8 (09-21-21 @ 20:00)  HR: 80 (09-22-21 @ 04:37) (64 - 87)  BP: 123/60 (09-22-21 @ 04:37) (97/55 - 144/71)  RR: 20 (09-22-21 @ 04:37) (20 - 33)  SpO2: 99% (09-22-21 @ 04:37) (93% - 100%)    P/E:  AOx3, NAD  Nonlabored breathing      LLE  Dressing (gauze/tegaderm) - saturated - removed  surgical site c/d/i    Skin intact  Swelling/erythema/ecchymosis noted  No deformity/laceration/abrasion noted  Compartments soft and compressible  Motor intact distally  SILT distally  CR<2sec  --------------------------------------    Labs                        8.0    18.24 )-----------( 110      ( 20 Sep 2021 23:47 )             24.1     09-21    146  |  111<H>  |  9<L>  ----------------------------<  111<H>  4.3   |  30  |  0.6<L>    Ca    7.5<L>      21 Sep 2021 18:00  Phos  3.5     09-21  Mg     2.1     09-21        PT/INR - ( 20 Sep 2021 10:48 )   PT: 14.40 sec;   INR: 1.25 ratio    PTT - ( 20 Sep 2021 10:48 )  PTT:39.1 sec    --------------------------------------    A/P:   65yo Female s/p left femur IMN on 10/16/21, POD#6.   Transferred to the medical service under Dr. Bynum yesterday.       -NWB LLE  -PT/OT  -Pain control     -Incentive Spirometry   - recent CXR demonstrating mild congestions   - lasix given w/ mild improvement   - continue to monitor     -DVT Prophylaxis;   transitioned to ELIQUIS 5MG per vascular surgery team recs

## 2021-09-23 LAB
BLD GP AB SCN SERPL QL: SIGNIFICANT CHANGE UP
GLUCOSE BLDC GLUCOMTR-MCNC: 104 MG/DL — HIGH (ref 70–99)
GLUCOSE BLDC GLUCOMTR-MCNC: 106 MG/DL — HIGH (ref 70–99)
GLUCOSE BLDC GLUCOMTR-MCNC: 111 MG/DL — HIGH (ref 70–99)
GLUCOSE BLDC GLUCOMTR-MCNC: 113 MG/DL — HIGH (ref 70–99)
GLUCOSE BLDC GLUCOMTR-MCNC: 116 MG/DL — HIGH (ref 70–99)
HCT VFR BLD CALC: 28.6 % — LOW (ref 37–47)
HGB BLD-MCNC: 8.4 G/DL — LOW (ref 12–16)
MCHC RBC-ENTMCNC: 29.4 G/DL — LOW (ref 32–37)
MCHC RBC-ENTMCNC: 31.2 PG — HIGH (ref 27–31)
MCV RBC AUTO: 106.3 FL — HIGH (ref 81–99)
NRBC # BLD: 1 /100 WBCS — HIGH (ref 0–0)
PLATELET # BLD AUTO: 210 K/UL — SIGNIFICANT CHANGE UP (ref 130–400)
RBC # BLD: 2.69 M/UL — LOW (ref 4.2–5.4)
RBC # FLD: 16.9 % — HIGH (ref 11.5–14.5)
WBC # BLD: 13.34 K/UL — HIGH (ref 4.8–10.8)
WBC # FLD AUTO: 13.34 K/UL — HIGH (ref 4.8–10.8)

## 2021-09-23 PROCEDURE — 93306 TTE W/DOPPLER COMPLETE: CPT | Mod: 26

## 2021-09-23 PROCEDURE — 71045 X-RAY EXAM CHEST 1 VIEW: CPT | Mod: 26

## 2021-09-23 RX ADMIN — OXYCODONE HYDROCHLORIDE 5 MILLIGRAM(S): 5 TABLET ORAL at 20:19

## 2021-09-23 RX ADMIN — PANTOPRAZOLE SODIUM 40 MILLIGRAM(S): 20 TABLET, DELAYED RELEASE ORAL at 05:36

## 2021-09-23 RX ADMIN — Medication 50 MICROGRAM(S): at 05:36

## 2021-09-23 RX ADMIN — CHLORHEXIDINE GLUCONATE 1 APPLICATION(S): 213 SOLUTION TOPICAL at 12:05

## 2021-09-23 RX ADMIN — APIXABAN 5 MILLIGRAM(S): 2.5 TABLET, FILM COATED ORAL at 05:36

## 2021-09-23 RX ADMIN — Medication 40 MILLIGRAM(S): at 05:37

## 2021-09-23 RX ADMIN — MONTELUKAST 10 MILLIGRAM(S): 4 TABLET, CHEWABLE ORAL at 22:09

## 2021-09-23 RX ADMIN — CITALOPRAM 20 MILLIGRAM(S): 10 TABLET, FILM COATED ORAL at 12:05

## 2021-09-23 RX ADMIN — Medication 75 MILLIGRAM(S): at 22:09

## 2021-09-23 RX ADMIN — Medication 32.4 MILLIGRAM(S): at 22:10

## 2021-09-23 RX ADMIN — APIXABAN 5 MILLIGRAM(S): 2.5 TABLET, FILM COATED ORAL at 17:39

## 2021-09-23 RX ADMIN — Medication 3 MILLILITER(S): at 08:47

## 2021-09-23 RX ADMIN — Medication 100 MILLIGRAM(S): at 12:05

## 2021-09-23 RX ADMIN — Medication 32.4 MILLIGRAM(S): at 05:36

## 2021-09-23 RX ADMIN — Medication 32.4 MILLIGRAM(S): at 14:31

## 2021-09-23 RX ADMIN — PRAMIPEXOLE DIHYDROCHLORIDE 0.5 MILLIGRAM(S): 0.12 TABLET ORAL at 22:09

## 2021-09-23 NOTE — CONSULT NOTE ADULT - REASON FOR ADMISSION
left femoral fracture

## 2021-09-23 NOTE — CONSULT NOTE ADULT - SUBJECTIVE AND OBJECTIVE BOX
HPI:  A 64 y/o female with pmhx of asthma, copd, obesity, epilepsy, hypothyroidism , osteoarthritis , kidney stone  s/p slip and fall at [facility]  admitted for left femoral fracture. Pt reports was taking shower this am, her shower chair slipped and she fell on her buttocks. PT reports left hip pain. PT  denies neck pain, hitting head or loc. Pt reports was not able to stand or straighten her left leg. Pt reports usually ambulates with a walker. Pt denies numbness or tingling on her left leg. Pt denies urinary or fecal incontinence. PT reports last seizure was 15 yrs ago. Pt denies taking any blood thinners. Pt denies fever, chills, chest pain, shortness of breath, burning with urination, diarrhea.  (15 Sep 2021 18:29)      PAST MEDICAL & SURGICAL HISTORY:  Diabetes    Renal stone    Renal failure    Epilepsy    Asthma    Chronic cough    Knee pain    Osteoarthritis    Uterine cancer  s/p hysterectomy    H/O abdominal hysterectomy  NO RADIATION AND CHEMO    History of tonsillectomy  13 years old    [place] teeth removed    History of bladder stone  SURGERY 8/3/2018    History of surgery  JJ STENT PLACEMENT LEFT OCTOBER 2017        Hospital Course:  She has been a long term resident of a SNF for over a year. She walks with a RW indep. She needs help with ADL's. She has left more than right knee OA.    TODAY'S SUBJECTIVE & REVIEW OF SYMPTOMS:     Constitutional WNL   Cardio WNL   Resp WNL   GI WNL  Heme WNL  Endo WNL  Skin WNL  MSK WNL  Neuro WNL  Cognitive WNL  Psych WNL      MEDICATIONS  (STANDING):  ALBUTerol    90 MICROgram(s) HFA Inhaler 2 Puff(s) Inhalation every 6 hours  albuterol/ipratropium for Nebulization 3 milliLiter(s) Nebulizer every 6 hours  apixaban 5 milliGRAM(s) Oral every 12 hours  chlorhexidine 4% Liquid 1 Application(s) Topical daily  citalopram 20 milliGRAM(s) Oral every 24 hours  furosemide    Tablet 40 milliGRAM(s) Oral daily  influenza   Vaccine 0.5 milliLiter(s) IntraMuscular once  insulin regular  human corrective regimen sliding scale   SubCutaneous Before meals and at bedtime  levothyroxine 50 MICROGram(s) Oral every 24 hours  montelukast 10 milliGRAM(s) Oral at bedtime  pantoprazole    Tablet 40 milliGRAM(s) Oral before breakfast  PHENobarbital 32.4 milliGRAM(s) Oral three times a day  pramipexole 0.5 milliGRAM(s) Oral at bedtime  topiramate 100 milliGRAM(s) Oral every 24 hours  traZODone 75 milliGRAM(s) Oral at bedtime    MEDICATIONS  (PRN):  acetaminophen   Tablet .. 650 milliGRAM(s) Oral every 6 hours PRN Temp greater or equal to 38C (100.4F), Mild Pain (1 - 3)  ALBUTerol    90 MICROgram(s) HFA Inhaler 2 Puff(s) Inhalation every 6 hours PRN Shortness of Breath and/or Wheezing  oxyCODONE    IR 5 milliGRAM(s) Oral every 4 hours PRN Moderate Pain (4 - 6)      FAMILY HISTORY:  No pertinent family history in first degree relatives        Allergies    Compazine (Unknown)  latex (Urticaria)  penicillins (Unknown)    Intolerances        SOCIAL HISTORY:    [  ] Etoh  [  ] Smoking  [  ] Substance abuse     Home Environment:  [  ] Home Alone  [  ] Lives with Family  [  ] Home Health Aid    Dwelling:  [  ] Apartment  [  ] Private House  [  ] Adult Home  [x  ] Skilled Nursing Facility      [  ] Short Term  [x  ] Long Term  [  ] Stairs       Elevator [  ]    FUNCTIONAL STATUS PTA: (Check all that apply)  Ambulation: [x   ]Independent    [  ] Dependent     [  ] Non-Ambulatory  Assistive Device: [  ] SA Cane  [  ]  Q Cane  [x  ] Walker  [  ]  Wheelchair  ADL : [  ] Independent  [  ]  Dependent  + assist with dressing      Vital Signs Last 24 Hrs  T(C): 36.6 (23 Sep 2021 08:01), Max: 36.7 (22 Sep 2021 13:00)  T(F): 97.8 (23 Sep 2021 08:01), Max: 98 (22 Sep 2021 13:00)  HR: 66 (23 Sep 2021 08:01) (66 - 85)  BP: 115/57 (23 Sep 2021 08:01) (110/54 - 135/75)  BP(mean): --  RR: 18 (23 Sep 2021 08:01) (18 - 20)  SpO2: 99% (23 Sep 2021 06:40) (90% - 100%)      PHYSICAL EXAM: Alert & Oriented X3  GENERAL: NAD, well-groomed, well-developed  HEAD:  Atraumatic, Normocephalic  EYES: EOMI, PERRLA, conjunctiva and sclera clear  NECK: Supple, No JVD, Normal thyroid  CHEST/LUNG: Clear bilaterally; No rales, rhonchi, wheezing, or rubs  HEART: Regular rate and rhythm; No murmurs, rubs, or gallops  ABDOMEN: Soft, rectal tube  EXTREMITIES:  trace bipedal edema; left hip dressing was just changed this morning and appears dry    NERVOUS SYSTEM:  Cranial Nerves 2-12 intact [  ] Abnormal  [  ]  ROM: WFL all extremities [  ]  Abnormal [x  ] left knee crepitus  Motor Strength: WFL all extremities  [  ]  Abnormal [ x ] left hip flexor weakness and pain  Sensation: intact to light touch [ x ] Abnormal [  ]  Reflexes: Symmetric [  ]  Abnormal [  ]    FUNCTIONAL STATUS:  Bed Mobility: Independent [  ]  Supervision [  ]  Needs Assistance [  ]  N/A [  ]  Transfers: Independent [  ]  Supervision [  ]  Needs Assistance [  ]  N/A [  ]   Ambulation: Independent [  ]  Supervision [  ]  Needs Assistance [  ]  N/A [  ]  ADL: Independent [  ] Requires Assistance [  ] N/A [  ]      LABS:    09-21    146  |  111<H>  |  9<L>  ----------------------------<  111<H>  4.3   |  30  |  0.6<L>    Ca    7.5<L>      21 Sep 2021 18:00            RADIOLOGY & ADDITIONAL STUDIES:    Assesment:

## 2021-09-23 NOTE — PROGRESS NOTE ADULT - ASSESSMENT
A 64 y/o female with pmhx of asthma, copd, obesity, epilepsy, hypothyroidism , osteoarthritis , kidney stone  s/p slip [facility] fall at [facility]  admitted for left femoral fracture.    Acute comminuted, displaced fracture of the left proximal femur with avulsion of the lesser trochanter  - POD #7, in SICU post left femur IMN  - hospital course complicated by absence of left radial pulse; and hypotension/shock  - off pressors  - downgraded to floor  - remains on Eliquis  - encourage use of incentive spirometer  - OOB to chair     Left radial Ischemia  - condition improved   - continue Eliquis  - out pat vascular f/u    Volume Overload  - CXR noted  - continue Lasix  - monitor renal function    Hypothyroidism   - continue Synthroid     Seizure Disorder  - continue Phenobarbital     Morbid Obesity   - low olga dash diet when able  - check albumin    DM  - on oral agent in SNF  - FS noted   - insulin coverage    hx of Depression  - continue home rx    hx of Asthma  - resp treatment as needed prn    Patient remains acute  Patient requires aggressive PT    Patient anxious to get back to SNF

## 2021-09-23 NOTE — PROGRESS NOTE ADULT - SUBJECTIVE AND OBJECTIVE BOX
KOTHARI JACOB  66y  Female  HPI:  A 64 y/o female with pmhx of asthma, copd, obesity, epilepsy, hypothyroidism , osteoarthritis , kidney stone  s/p slip and fall at [facility]  admitted for left femoral fracture. Pt reports was taking shower this am, her shower chair slipped and she fell on her buttocks. PT reports left hip pain. PT  denies neck pain, hitting head or loc. Pt reports was not able to stand or straighten her left leg. Pt reports usually ambulates with a walker. Pt denies numbness or tingling on her left leg. Pt denies urinary or fecal incontinence. PT reports last seizure was 15 yrs ago. Pt denies taking any blood thinners. Pt denies fever, chills, chest pain, shortness of breath, burning with urination, diarrhea.  (15 Sep 2021 18:29)    MEDICATIONS  (STANDING):  ALBUTerol    90 MICROgram(s) HFA Inhaler 2 Puff(s) Inhalation every 6 hours  albuterol/ipratropium for Nebulization 3 milliLiter(s) Nebulizer every 6 hours  apixaban 5 milliGRAM(s) Oral every 12 hours  chlorhexidine 4% Liquid 1 Application(s) Topical daily  citalopram 20 milliGRAM(s) Oral every 24 hours  furosemide    Tablet 40 milliGRAM(s) Oral daily  influenza   Vaccine 0.5 milliLiter(s) IntraMuscular once  insulin regular  human corrective regimen sliding scale   SubCutaneous Before meals and at bedtime  levothyroxine 50 MICROGram(s) Oral every 24 hours  montelukast 10 milliGRAM(s) Oral at bedtime  pantoprazole    Tablet 40 milliGRAM(s) Oral before breakfast  PHENobarbital 32.4 milliGRAM(s) Oral three times a day  pramipexole 0.5 milliGRAM(s) Oral at bedtime  topiramate 100 milliGRAM(s) Oral every 24 hours  traZODone 75 milliGRAM(s) Oral at bedtime    MEDICATIONS  (PRN):  acetaminophen   Tablet .. 650 milliGRAM(s) Oral every 6 hours PRN Temp greater or equal to 38C (100.4F), Mild Pain (1 - 3)  ALBUTerol    90 MICROgram(s) HFA Inhaler 2 Puff(s) Inhalation every 6 hours PRN Shortness of Breath and/or Wheezing  oxyCODONE    IR 5 milliGRAM(s) Oral every 4 hours PRN Moderate Pain (4 - 6)    INTERVAL EVENTS: Patient seen today would like bandages removed from her neck. Patient states she has not worked with PT yet.    T(C): 36.6 (09-23-21 @ 08:01), Max: 36.7 (09-22-21 @ 13:00)  HR: 66 (09-23-21 @ 08:01) (66 - 85)  BP: 115/57 (09-23-21 @ 08:01) (110/54 - 135/75)  RR: 18 (09-23-21 @ 08:01) (18 - 20)  SpO2: 99% (09-23-21 @ 06:40) (90% - 100%)  Wt(kg): --Vital Signs Last 24 Hrs  T(C): 36.6 (23 Sep 2021 08:01), Max: 36.7 (22 Sep 2021 13:00)  T(F): 97.8 (23 Sep 2021 08:01), Max: 98 (22 Sep 2021 13:00)  HR: 66 (23 Sep 2021 08:01) (66 - 85)  BP: 115/57 (23 Sep 2021 08:01) (110/54 - 135/75)  BP(mean): --  RR: 18 (23 Sep 2021 08:01) (18 - 20)  SpO2: 99% (23 Sep 2021 06:40) (90% - 100%)    PHYSICAL EXAM:  GENERAL: NAD  CHEST/LUNG: Occ faint wheeze  HEART: S1, S2, Regular rate and rhythm  ABDOMEN: Soft, Nontender, Obese, Bowel sounds present  EXTREMITIES: +++ edema      LABS:              09-21    146  |  111<H>  |  9<L>  ----------------------------<  111<H>  4.3   |  30  |  0.6<L>    Ca    7.5<L>      21 Sep 2021 18:00  Phos  3.5     09-21  Mg     2.1     09-21                  RADIOLOGY & ADDITIONAL TESTS:  < from: Xray Chest 1 View- PORTABLE-Routine (Xray Chest 1 View- PORTABLE-Routine in AM.) (09.22.21 @ 10:34) >    INTERPRETATION:  STUDY INDICATION: icu    TECHNIQUE:  Portable frontal view of the chest obtained.    COMPARISON: 9/21/2021.    FINDINGS/  IMPRESSION:    Removal of right IJ venous catheter. Diffuse opacities without change. No pneumothorax.    Stable cardiomediastinal silhouette. Unchanged osseous structures.    --- End of Report ---        < end of copied text >

## 2021-09-23 NOTE — CONSULT NOTE ADULT - ASSESSMENT
IMPRESSION: Rehab of left hip FX, s/p ORIF, morbid obesity, left more than right bilateral knee OA, rectal tube    PRECAUTIONS: [ x ] Cardiac  [  ] Respiratory  [  ] Seizures [  ] Contact Isolation  [  ] Droplet Isolation  [  ] Other    Weight Bearing Status:     RECOMMENDATION:      Out of Bed to Chair     DVT/Decubiti Prophylaxis    REHAB PLAN:     [ x  ] Bedside P/T 3-5 times a week   [   ]   Bedside O/T  2-3 times a week             [   ] No Rehab Therapy Indicated                   [   ]  Speech Therapy   Conditioning/ROM                                    ADL  Bed Mobility                                               Conditioning/ROM  Transfers                                                     Bed Mobility  Sitting /Standing Balance                         Transfers                                        Gait Training                                               Sitting/Standing Balance  Stair Training [   ]Applicable                    Home equipment Eval                                                                        Splinting  [   ] Only      GOALS:   ADL   [   ]   Independent                    Transfers  [x   ] Independent                          Ambulation  [ x  ] Independent     [ x   ] With device                            [   ]  CG                                                         [x  ]  CG                                                                  [   ] CG                            [ x   ] Min A                                                   [   ] Min A                                                              [   ] Min  A          DISCHARGE PLAN:   [   ]  Good candidate for Intensive Rehabilitation/Hospital based-4A [facility]                                             Will tolerate 3hrs Intensive Rehab Daily                                       [ x   ]  Return to SNF for Short Term subacute  Rehab in Skilled Nursing Facility                                       [    ]  Home with Outpatient or VN services                                         [    ]  Possible Candidate for Intensive Hospital based Rehab

## 2021-09-23 NOTE — CONSULT NOTE ADULT - CONSULT REASON
left hip FX  bilat knee OA  morbid obesity
L hip fracture
absent left radial pulse and cyanosis after a line placement
s/p ORIF & IM nail of L femor
clearance for surgery

## 2021-09-23 NOTE — PROGRESS NOTE ADULT - SUBJECTIVE AND OBJECTIVE BOX
Orthopaedic Surgery Progress Note    SUBJECTIVE  No acute events overnight.   Pt was seen and examined by the orthopedic surgery team during morning rounds. Doing well. Normal Vitals. Tolerating diet.   Denies n/v, abdominal pain, diarrhea or any other major complaints.    MEDICATIONS  (STANDING):  ALBUTerol    90 MICROgram(s) HFA Inhaler 2 Puff(s) Inhalation every 6 hours  albuterol/ipratropium for Nebulization 3 milliLiter(s) Nebulizer every 6 hours  apixaban 5 milliGRAM(s) Oral every 12 hours  chlorhexidine 4% Liquid 1 Application(s) Topical daily  citalopram 20 milliGRAM(s) Oral every 24 hours  furosemide    Tablet 40 milliGRAM(s) Oral daily  influenza   Vaccine 0.5 milliLiter(s) IntraMuscular once  insulin regular  human corrective regimen sliding scale   SubCutaneous Before meals and at bedtime  levothyroxine 50 MICROGram(s) Oral every 24 hours  montelukast 10 milliGRAM(s) Oral at bedtime  pantoprazole    Tablet 40 milliGRAM(s) Oral before breakfast  PHENobarbital 32.4 milliGRAM(s) Oral three times a day  pramipexole 0.5 milliGRAM(s) Oral at bedtime  topiramate 100 milliGRAM(s) Oral every 24 hours  traZODone 75 milliGRAM(s) Oral at bedtime    MEDICATIONS  (PRN):  acetaminophen   Tablet .. 650 milliGRAM(s) Oral every 6 hours PRN Temp greater or equal to 38C (100.4F), Mild Pain (1 - 3)  ALBUTerol    90 MICROgram(s) HFA Inhaler 2 Puff(s) Inhalation every 6 hours PRN Shortness of Breath and/or Wheezing  oxyCODONE    IR 5 milliGRAM(s) Oral every 4 hours PRN Moderate Pain (4 - 6)    Vital Signs Last 24 Hrs  T(C): 36.6 (09-23-21 @ 08:01), Max: 36.7 (09-22-21 @ 13:00)  T(F): 97.8 (09-23-21 @ 08:01), Max: 98 (09-22-21 @ 13:00)  HR: 66 (09-23-21 @ 08:01) (66 - 85)  BP: 115/57 (09-23-21 @ 08:01) (110/54 - 135/75)  BP(mean): --  RR: 18 (09-23-21 @ 08:01) (18 - 20)  SpO2: 99% (09-23-21 @ 06:40) (90% - 100%)        P/E:  AOx3, NAD  Nonlabored breathing      LLE  Dressing (gauze/tegaderm) - saturated - removed and redressed  surgical site c/d/i  Skin intact  Swelling/erythema/ecchymosis noted  No deformity/laceration/abrasion noted  Compartments soft and compressible  Motor intact distally  SILT distally  CR<2sec  --------------------------------------    Labs             09-21    146  |  111<H>  |  9<L>  ----------------------------<  111<H>  4.3   |  30  |  0.6<L>    Ca    7.5<L>      21 Sep 2021 18:00  Phos  3.5     09-21  Mg     2.1     09-21        A/P:   67yo Female s/p left femur IMN on 10/16/21, POD#7.   -NWB LLE  -PT/OT  -Pain control   -incentive spirometry  -dvt ppx: eliquis   -ortho to perform dressing changes

## 2021-09-23 NOTE — CONSULT NOTE ADULT - CONSULT REQUESTED BY NAME
Dr Wan
Pt with left shoulder pain the past 2 days, denies known injury but states he has been in the gym recently. Tried lidocaine patch at home with minimal relief. Denies SOB or dizziness. ABC's intact, alert and oriented X3.   
Medicine team/ED
med
SICU
ortho

## 2021-09-24 ENCOUNTER — TRANSCRIPTION ENCOUNTER (OUTPATIENT)
Age: 66
End: 2021-09-24

## 2021-09-24 VITALS
HEART RATE: 76 BPM | DIASTOLIC BLOOD PRESSURE: 57 MMHG | SYSTOLIC BLOOD PRESSURE: 111 MMHG | TEMPERATURE: 96 F | RESPIRATION RATE: 18 BRPM

## 2021-09-24 LAB
ALBUMIN SERPL ELPH-MCNC: 2.8 G/DL — LOW (ref 3.5–5.2)
ALP SERPL-CCNC: 60 U/L — SIGNIFICANT CHANGE UP (ref 30–115)
ALT FLD-CCNC: 32 U/L — SIGNIFICANT CHANGE UP (ref 0–41)
ANION GAP SERPL CALC-SCNC: 12 MMOL/L — SIGNIFICANT CHANGE UP (ref 7–14)
AST SERPL-CCNC: 37 U/L — SIGNIFICANT CHANGE UP (ref 0–41)
BILIRUB SERPL-MCNC: 0.6 MG/DL — SIGNIFICANT CHANGE UP (ref 0.2–1.2)
BUN SERPL-MCNC: 12 MG/DL — SIGNIFICANT CHANGE UP (ref 10–20)
CALCIUM SERPL-MCNC: 7.8 MG/DL — LOW (ref 8.5–10.1)
CHLORIDE SERPL-SCNC: 103 MMOL/L — SIGNIFICANT CHANGE UP (ref 98–110)
CO2 SERPL-SCNC: 21 MMOL/L — SIGNIFICANT CHANGE UP (ref 17–32)
CREAT SERPL-MCNC: 0.5 MG/DL — LOW (ref 0.7–1.5)
GLUCOSE BLDC GLUCOMTR-MCNC: 107 MG/DL — HIGH (ref 70–99)
GLUCOSE BLDC GLUCOMTR-MCNC: 126 MG/DL — HIGH (ref 70–99)
GLUCOSE BLDC GLUCOMTR-MCNC: 95 MG/DL — SIGNIFICANT CHANGE UP (ref 70–99)
GLUCOSE SERPL-MCNC: 85 MG/DL — SIGNIFICANT CHANGE UP (ref 70–99)
HCT VFR BLD CALC: 28.5 % — LOW (ref 37–47)
HGB BLD-MCNC: 8.8 G/DL — LOW (ref 12–16)
MAGNESIUM SERPL-MCNC: 1.8 MG/DL — SIGNIFICANT CHANGE UP (ref 1.8–2.4)
MCHC RBC-ENTMCNC: 30.9 G/DL — LOW (ref 32–37)
MCHC RBC-ENTMCNC: 31 PG — SIGNIFICANT CHANGE UP (ref 27–31)
MCV RBC AUTO: 100.4 FL — HIGH (ref 81–99)
NRBC # BLD: 0 /100 WBCS — SIGNIFICANT CHANGE UP (ref 0–0)
PLATELET # BLD AUTO: 127 K/UL — LOW (ref 130–400)
POTASSIUM SERPL-MCNC: 4.8 MMOL/L — SIGNIFICANT CHANGE UP (ref 3.5–5)
POTASSIUM SERPL-SCNC: 4.8 MMOL/L — SIGNIFICANT CHANGE UP (ref 3.5–5)
PROT SERPL-MCNC: 4.8 G/DL — LOW (ref 6–8)
RBC # BLD: 2.84 M/UL — LOW (ref 4.2–5.4)
RBC # FLD: 17.2 % — HIGH (ref 11.5–14.5)
SODIUM SERPL-SCNC: 136 MMOL/L — SIGNIFICANT CHANGE UP (ref 135–146)
WBC # BLD: 11.15 K/UL — HIGH (ref 4.8–10.8)
WBC # FLD AUTO: 11.15 K/UL — HIGH (ref 4.8–10.8)

## 2021-09-24 RX ORDER — LEVOTHYROXINE SODIUM 125 MCG
1 TABLET ORAL
Qty: 0 | Refills: 0 | DISCHARGE
Start: 2021-09-24

## 2021-09-24 RX ORDER — PANTOPRAZOLE SODIUM 20 MG/1
1 TABLET, DELAYED RELEASE ORAL
Qty: 0 | Refills: 0 | DISCHARGE
Start: 2021-09-24

## 2021-09-24 RX ORDER — APIXABAN 2.5 MG/1
1 TABLET, FILM COATED ORAL
Qty: 0 | Refills: 0 | DISCHARGE
Start: 2021-09-24

## 2021-09-24 RX ORDER — CITALOPRAM 10 MG/1
1 TABLET, FILM COATED ORAL
Qty: 0 | Refills: 0 | DISCHARGE
Start: 2021-09-24

## 2021-09-24 RX ORDER — IBUPROFEN 200 MG
2 TABLET ORAL
Qty: 0 | Refills: 0 | DISCHARGE

## 2021-09-24 RX ORDER — FUROSEMIDE 40 MG
1 TABLET ORAL
Qty: 0 | Refills: 0 | DISCHARGE
Start: 2021-09-24

## 2021-09-24 RX ORDER — OXYCODONE AND ACETAMINOPHEN 5; 325 MG/1; MG/1
1 TABLET ORAL ONCE
Refills: 0 | Status: DISCONTINUED | OUTPATIENT
Start: 2021-09-24 | End: 2021-09-24

## 2021-09-24 RX ORDER — TOPIRAMATE 25 MG
1 TABLET ORAL
Qty: 0 | Refills: 0 | DISCHARGE
Start: 2021-09-24

## 2021-09-24 RX ADMIN — Medication 3 MILLILITER(S): at 14:52

## 2021-09-24 RX ADMIN — PANTOPRAZOLE SODIUM 40 MILLIGRAM(S): 20 TABLET, DELAYED RELEASE ORAL at 05:57

## 2021-09-24 RX ADMIN — CITALOPRAM 20 MILLIGRAM(S): 10 TABLET, FILM COATED ORAL at 11:52

## 2021-09-24 RX ADMIN — Medication 50 MICROGRAM(S): at 05:57

## 2021-09-24 RX ADMIN — APIXABAN 5 MILLIGRAM(S): 2.5 TABLET, FILM COATED ORAL at 05:57

## 2021-09-24 RX ADMIN — Medication 40 MILLIGRAM(S): at 05:57

## 2021-09-24 RX ADMIN — OXYCODONE AND ACETAMINOPHEN 1 TABLET(S): 5; 325 TABLET ORAL at 15:49

## 2021-09-24 RX ADMIN — Medication 32.4 MILLIGRAM(S): at 14:37

## 2021-09-24 RX ADMIN — Medication 32.4 MILLIGRAM(S): at 05:56

## 2021-09-24 RX ADMIN — Medication 100 MILLIGRAM(S): at 11:52

## 2021-09-24 RX ADMIN — CHLORHEXIDINE GLUCONATE 1 APPLICATION(S): 213 SOLUTION TOPICAL at 11:52

## 2021-09-24 RX ADMIN — Medication 3 MILLILITER(S): at 08:08

## 2021-09-24 NOTE — PROGRESS NOTE ADULT - ASSESSMENT
ORTHO PROGRESS NOTE     66yFemale POD #8 s/p left hip IMN    Patient seen and examined at bedside . The patient is awake and alert in NAD. No complaints of chest pain, SOB, N/V.    MEDICATIONS  (STANDING):  ALBUTerol    90 MICROgram(s) HFA Inhaler 2 Puff(s) Inhalation every 6 hours  albuterol/ipratropium for Nebulization 3 milliLiter(s) Nebulizer every 6 hours  apixaban 5 milliGRAM(s) Oral every 12 hours  chlorhexidine 4% Liquid 1 Application(s) Topical daily  citalopram 20 milliGRAM(s) Oral every 24 hours  furosemide    Tablet 40 milliGRAM(s) Oral daily  influenza   Vaccine 0.5 milliLiter(s) IntraMuscular once  insulin regular  human corrective regimen sliding scale   SubCutaneous Before meals and at bedtime  levothyroxine 50 MICROGram(s) Oral every 24 hours  montelukast 10 milliGRAM(s) Oral at bedtime  pantoprazole    Tablet 40 milliGRAM(s) Oral before breakfast  PHENobarbital 32.4 milliGRAM(s) Oral three times a day  pramipexole 0.5 milliGRAM(s) Oral at bedtime  topiramate 100 milliGRAM(s) Oral every 24 hours  traZODone 75 milliGRAM(s) Oral at bedtime    MEDICATIONS  (PRN):  acetaminophen   Tablet .. 650 milliGRAM(s) Oral every 6 hours PRN Temp greater or equal to 38C (100.4F), Mild Pain (1 - 3)  ALBUTerol    90 MICROgram(s) HFA Inhaler 2 Puff(s) Inhalation every 6 hours PRN Shortness of Breath and/or Wheezing      Vital Signs Last 24 Hrs  T(C): 36.1 (24 Sep 2021 07:51), Max: 36.4 (23 Sep 2021 15:27)  T(F): 97 (24 Sep 2021 07:51), Max: 97.6 (23 Sep 2021 15:27)  HR: 70 (24 Sep 2021 07:51) (70 - 83)  BP: 124/59 (24 Sep 2021 07:51) (109/52 - 130/62)  BP(mean): --  RR: 19 (24 Sep 2021 07:51) (17 - 19)  SpO2: --    PE:   Dressing C/D/I   Compartments soft and compressible  Motor intact distally  SILT distally  CR<2sec                               8.4    13.34 )-----------( 210      ( 23 Sep 2021 11:00 )             28.6                 09-23-21 @ 07:01  -  09-24-21 @ 07:00  --------------------------------------------------------  IN: 770 mL / OUT: 3200 mL / NET: -2430 mL          A/P: 66yFemale POD #8 s/p left hip IMN, doing well.   OOB to Chair   NWB  PT/OT  Pain control   Incentive Spirometry   DVT Prophylaxis   Discharge planning; stable for d/c from ortho standpoint. F/U with Dr. Mims in 2 weeks. Please discharge patient with 6 total weeks of aspirin 325 mg daily for DVT ppx

## 2021-09-24 NOTE — PROGRESS NOTE ADULT - PROVIDER SPECIALTY LIST ADULT
Orthopedics
SICU
SICU
Vascular Surgery
Internal Medicine
Orthopedics
SICU
SICU
Vascular Surgery
Internal Medicine
Orthopedics
SICU
Vascular Surgery
Vascular Surgery

## 2021-09-24 NOTE — PROGRESS NOTE ADULT - ASSESSMENT
A 62 y/o female with pmhx of asthma, copd, obesity, epilepsy, hypothyroidism , osteoarthritis , kidney stone s/p slip and fall at [facility] admitted for left femoral fracture.    Acute comminuted, displaced fracture of the left proximal femur with avulsion of the lesser trochanter  - Surgery done 9/16, in SICU post left femur IMN  - hospital course complicated by absence of left radial pulse; and hypotension/shock  - off pressors  - downgraded to floor  - remains on Eliquis  - encourage use of incentive spirometer  - OOB to chair     Left radial Ischemia  - condition improved   - continue Eliquis  - out pat vascular f/u    Volume Overload  - CXR 9/23: Diffuse prominence of the interstitial markings  - continue Lasix  - monitor renal function: F/u on labs    Hypothyroidism   - continue Synthroid     Seizure Disorder  - continue Phenobarbital     Morbid Obesity   - low olga dash diet when able  - check albumin    DM  - on oral agent in SNF  - FS noted   - insulin coverage    hx of Depression  - continue home rx    hx of Asthma  - resp treatment as needed prn    Patient remains acute  Patient requires aggressive PT    Patient anxious to get back to SNF

## 2021-09-24 NOTE — DISCHARGE NOTE PROVIDER - NSDCMRMEDTOKEN_GEN_ALL_CORE_FT
apixaban 5 mg oral tablet: 1 tab(s) orally every 12 hours, continue for 3 months  betamethasone dipropionate 0.05% topical lotion: Apply topically to affected area 2 times a day  citalopram 20 mg oral tablet: 1 tab(s) orally every 24 hours  Desyrel 50 mg oral tablet: 1.5 tab(s) orally once a day (at bedtime)  furosemide 40 mg oral tablet: 1 tab(s) orally once a day  levothyroxine 50 mcg (0.05 mg) oral tablet: 1 tab(s) orally every 24 hours  Mirapex 0.5 mg oral tablet: 1 tab(s) orally once a day (at bedtime)  Multiple Vitamins with Minerals oral capsule: 1 cap(s) orally once a day  Nizoral A-D 1% topical shampoo: Apply topically to affected area every 3 days  nystatin 100,000 units/g topical cream (obsolete): Apply topically to affected area 2 times a day under bilateral breast and under left arm  pantoprazole 40 mg oral delayed release tablet: 1 tab(s) orally once a day (before a meal)  PHENobarbital 32.4 mg oral tablet: 1 tab(s) orally 3 times a day  Senna Plus 50 mg-8.6 mg oral tablet: 2 tab(s) orally once a day (at bedtime)  Singulair 10 mg oral tablet: 1 tab(s) orally once a day (at bedtime)  topiramate 100 mg oral tablet: 1 tab(s) orally every 24 hours  Urocit-K 15 mEq oral tablet, extended release: 1 tab(s) orally once a day

## 2021-09-24 NOTE — DISCHARGE NOTE PROVIDER - CARE PROVIDER_API CALL
Ramona Bynum  [address] [address]  Phone: [phone]  Fax: [phone]  Follow Up Time: 1 week    Bonnie Mims)  Orthopaedic Surgery  [address] [place] [address]  Phone: [phone]  Fax: [phone]  Follow Up Time: 2 weeks    Declan Knowles)  Surgery; Vascular Surgery  [address] [place] [address]  Phone: [phone]  Fax: [phone]  Follow Up Time: 2 weeks

## 2021-09-24 NOTE — PROGRESS NOTE ADULT - REASON FOR ADMISSION
left femoral fracture

## 2021-09-24 NOTE — DISCHARGE NOTE PROVIDER - CARE PROVIDERS DIRECT ADDRESSES
[email or web address]

## 2021-09-24 NOTE — DISCHARGE NOTE PROVIDER - NSDCCPCAREPLAN_GEN_ALL_CORE_FT
PRINCIPAL DISCHARGE DIAGNOSIS  Diagnosis: Hip fracture  Assessment and Plan of Treatment: Subtrochanteric fracture of left femur and procedure of intermedullary rodding was performed.      SECONDARY DISCHARGE DIAGNOSES  Diagnosis: Fall  Assessment and Plan of Treatment:

## 2021-09-24 NOTE — PROGRESS NOTE ADULT - ASSESSMENT
A 64 y/o female with pmhx of asthma, copd, obesity, epilepsy, hypothyroidism , osteoarthritis , kidney stone  s/p slip and fall at [facility]  admitted for left femoral fracture.    Acute comminuted, displaced fracture of the left proximal femur with avulsion of the lesser trochanter  - POD #8  - hospital course complicated by absence of left radial pulse; and hypotension/shock  - off pressors  - remains on Eliquis  - encourage use of incentive spirometer  - OOB to chair   - requires aggressive PT      Left radial Ischemia  - condition improved   - continue Eliquis  - out pat vascular f/u    Volume Overload  - CXR and 2D echo noted  - continue Lasix  - respiratory status improved  - renal function stable    Acute Blood Loss Anemia  - H/H noted  - can add Fe replacement daily    Hypothyroidism   - continue Synthroid     Seizure Disorder  - continue Phenobarbital     Morbid Obesity   - low olga dash diet when able  - check albumin    DM  - on oral agent in SNF  - FS noted   - insulin coverage    hx of Depression  - continue home rx    hx of Asthma  - resp treatment as needed prn    Discharge planning back to SNF. Case discussed with house staff. Patient cleared for D/C by ortho and Vascular.

## 2021-09-24 NOTE — DISCHARGE NOTE NURSING/CASE MANAGEMENT/SOCIAL WORK - PATIENT PORTAL LINK FT
You can access the FollowMyHealth Patient Portal offered by [facility] by registering at the following website: [email or web address]. By joining [facility]’s FollowMyHealth portal, you will also be able to view your health information using other applications (apps) compatible with our system.

## 2021-09-24 NOTE — DISCHARGE NOTE PROVIDER - PROVIDER TOKENS
PROVIDER:[TOKEN:[[id number]],FOLLOWUP:[1 week]],PROVIDER:[TOKEN:[[id number]],FOLLOWUP:[2 weeks]],PROVIDER:[TOKEN:[[id number]],FOLLOWUP:[2 weeks]]

## 2021-09-24 NOTE — PROGRESS NOTE ADULT - SUBJECTIVE AND OBJECTIVE BOX
HPI  A 64 y/o female with pmhx of asthma, copd, obesity, epilepsy, hypothyroidism , osteoarthritis , kidney stone  s/p slip and fall at [facility]  admitted for left femoral fracture. Pt reports was taking shower this am, her shower chair slipped and she fell on her buttocks. PT reports left hip pain. PT  denies neck pain, hitting head or loc. Pt reports was not able to stand or straighten her left leg. Pt reports usually ambulates with a walker. Pt denies numbness or tingling on her left leg. Pt denies urinary or fecal incontinence. PT reports last seizure was 15 yrs ago. Pt denies taking any blood thinners. Pt denies fever, chills, chest pain, shortness of breath, burning with urination, diarrhea.  (15 Sep 2021 18:29)    Currently admitted to medicine with the primary diagnosis of Hip fracture       Today is hospital day 9d.     INTERVAL HPI / OVERNIGHT EVENTS:  Patient was examined and seen at bedside. This morning she is resting comfortably in bed and reports no new issues or overnight events.     ROS: All systems negative except as stated in HPI    PAST MEDICAL & SURGICAL HISTORY  Diabetes    Renal stone    Renal failure    Epilepsy    Asthma    Chronic cough    Knee pain    Osteoarthritis    Uterine cancer  s/p hysterectomy    H/O abdominal hysterectomy  NO RADIATION AND CHEMO    History of tonsillectomy  13 years old    [place] teeth removed    History of bladder stone  SURGERY 8/3/2018    History of surgery  JJ STENT PLACEMENT LEFT OCTOBER 2017      ALLERGIES  Compazine (Unknown)  latex (Urticaria)  penicillins (Unknown)    MEDICATIONS  STANDING MEDICATIONS  ALBUTerol    90 MICROgram(s) HFA Inhaler 2 Puff(s) Inhalation every 6 hours  albuterol/ipratropium for Nebulization 3 milliLiter(s) Nebulizer every 6 hours  apixaban 5 milliGRAM(s) Oral every 12 hours  chlorhexidine 4% Liquid 1 Application(s) Topical daily  citalopram 20 milliGRAM(s) Oral every 24 hours  furosemide    Tablet 40 milliGRAM(s) Oral daily  influenza   Vaccine 0.5 milliLiter(s) IntraMuscular once  insulin regular  human corrective regimen sliding scale   SubCutaneous Before meals and at bedtime  levothyroxine 50 MICROGram(s) Oral every 24 hours  montelukast 10 milliGRAM(s) Oral at bedtime  pantoprazole    Tablet 40 milliGRAM(s) Oral before breakfast  PHENobarbital 32.4 milliGRAM(s) Oral three times a day  pramipexole 0.5 milliGRAM(s) Oral at bedtime  topiramate 100 milliGRAM(s) Oral every 24 hours  traZODone 75 milliGRAM(s) Oral at bedtime    PRN MEDICATIONS  acetaminophen   Tablet .. 650 milliGRAM(s) Oral every 6 hours PRN  ALBUTerol    90 MICROgram(s) HFA Inhaler 2 Puff(s) Inhalation every 6 hours PRN    VITALS:  T(F): 97  HR: 70  BP: 124/59  RR: 19  SpO2: --    PHYSICAL EXAM  GEN: NAD, Resting comfortably in bed  PULM: Clear to auscultation bilaterally, No wheezes  CVS: Regular rate and rhythm, S1-S2, no murmurs  ABD: Soft, non-tender, non-distended, no guarding  EXT: No edema  NEURO: A&Ox3, no focal deficits    LABS                        8.4    13.34 )-----------( 210      ( 23 Sep 2021 11:00 )             28.6                         RADIOLOGY     HPI  A 64 y/o female with pmhx of asthma, copd, obesity, epilepsy, hypothyroidism , osteoarthritis , kidney stone  s/p slip and fall at [facility]  admitted for left femoral fracture. Pt reports was taking shower this am, her shower chair slipped and she fell on her buttocks. PT reports left hip pain. PT  denies neck pain, hitting head or loc. Pt reports was not able to stand or straighten her left leg. Pt reports usually ambulates with a walker. Pt denies numbness or tingling on her left leg. Pt denies urinary or fecal incontinence. PT reports last seizure was 15 yrs ago. Pt denies taking any blood thinners. Pt denies fever, chills, chest pain, shortness of breath, burning with urination, diarrhea.     Currently admitted to medicine with the primary diagnosis of Hip fracture    Today is hospital day 9d.     INTERVAL HPI / OVERNIGHT EVENTS:  Patient was examined and seen at bedside. This morning she is resting comfortably in bed and reports no new issues or overnight events.     ROS: All systems negative except as stated in HPI    PAST MEDICAL & SURGICAL HISTORY  Diabetes    Renal stone    Renal failure    Epilepsy    Asthma    Chronic cough    Knee pain    Osteoarthritis    Uterine cancer  s/p hysterectomy    H/O abdominal hysterectomy  NO RADIATION AND CHEMO    History of tonsillectomy  13 years old    [place] teeth removed    History of bladder stone  SURGERY 8/3/2018    History of surgery  JJ STENT PLACEMENT LEFT OCTOBER 2017      ALLERGIES  Compazine (Unknown)  latex (Urticaria)  penicillins (Unknown)    MEDICATIONS  STANDING MEDICATIONS  ALBUTerol    90 MICROgram(s) HFA Inhaler 2 Puff(s) Inhalation every 6 hours  albuterol/ipratropium for Nebulization 3 milliLiter(s) Nebulizer every 6 hours  apixaban 5 milliGRAM(s) Oral every 12 hours  chlorhexidine 4% Liquid 1 Application(s) Topical daily  citalopram 20 milliGRAM(s) Oral every 24 hours  furosemide    Tablet 40 milliGRAM(s) Oral daily  influenza   Vaccine 0.5 milliLiter(s) IntraMuscular once  insulin regular  human corrective regimen sliding scale   SubCutaneous Before meals and at bedtime  levothyroxine 50 MICROGram(s) Oral every 24 hours  montelukast 10 milliGRAM(s) Oral at bedtime  pantoprazole    Tablet 40 milliGRAM(s) Oral before breakfast  PHENobarbital 32.4 milliGRAM(s) Oral three times a day  pramipexole 0.5 milliGRAM(s) Oral at bedtime  topiramate 100 milliGRAM(s) Oral every 24 hours  traZODone 75 milliGRAM(s) Oral at bedtime    PRN MEDICATIONS  acetaminophen   Tablet .. 650 milliGRAM(s) Oral every 6 hours PRN  ALBUTerol    90 MICROgram(s) HFA Inhaler 2 Puff(s) Inhalation every 6 hours PRN    VITALS:  T(F): 97  HR: 70  BP: 124/59  RR: 19  SpO2: --    PHYSICAL EXAM  GEN: NAD, Resting comfortably in bed  PULM: Clear to auscultation bilaterally, No wheezes  CVS: Regular rate and rhythm, S1-S2, no murmurs  ABD: Soft, non-tender, non-distended, no guarding  EXT: No edema  NEURO: A&Ox3, no focal deficits    LABS                        8.4    13.34 )-----------( 210      ( 23 Sep 2021 11:00 )             28.6                         RADIOLOGY

## 2021-09-24 NOTE — PROGRESS NOTE ADULT - SUBJECTIVE AND OBJECTIVE BOX
KOTHARI, PATRICIA  66y  Female  HPI:  A 64 y/o female with pmhx of asthma, copd, obesity, epilepsy, hypothyroidism , osteoarthritis , kidney stone  s/p slip and fall at [facility]  admitted for left femoral fracture. Pt reports was taking shower this am, her shower chair slipped and she fell on her buttocks. PT reports left hip pain. PT  denies neck pain, hitting head or loc. Pt reports was not able to stand or straighten her left leg. Pt reports usually ambulates with a walker. Pt denies numbness or tingling on her left leg. Pt denies urinary or fecal incontinence. PT reports last seizure was 15 yrs ago. Pt denies taking any blood thinners. Pt denies fever, chills, chest pain, shortness of breath, burning with urination, diarrhea.  (15 Sep 2021 18:29)    MEDICATIONS  (STANDING):  ALBUTerol    90 MICROgram(s) HFA Inhaler 2 Puff(s) Inhalation every 6 hours  albuterol/ipratropium for Nebulization 3 milliLiter(s) Nebulizer every 6 hours  apixaban 5 milliGRAM(s) Oral every 12 hours  chlorhexidine 4% Liquid 1 Application(s) Topical daily  citalopram 20 milliGRAM(s) Oral every 24 hours  furosemide    Tablet 40 milliGRAM(s) Oral daily  influenza   Vaccine 0.5 milliLiter(s) IntraMuscular once  insulin regular  human corrective regimen sliding scale   SubCutaneous Before meals and at bedtime  levothyroxine 50 MICROGram(s) Oral every 24 hours  montelukast 10 milliGRAM(s) Oral at bedtime  pantoprazole    Tablet 40 milliGRAM(s) Oral before breakfast  PHENobarbital 32.4 milliGRAM(s) Oral three times a day  pramipexole 0.5 milliGRAM(s) Oral at bedtime  topiramate 100 milliGRAM(s) Oral every 24 hours  traZODone 75 milliGRAM(s) Oral at bedtime    MEDICATIONS  (PRN):  acetaminophen   Tablet .. 650 milliGRAM(s) Oral every 6 hours PRN Temp greater or equal to 38C (100.4F), Mild Pain (1 - 3)  ALBUTerol    90 MICROgram(s) HFA Inhaler 2 Puff(s) Inhalation every 6 hours PRN Shortness of Breath and/or Wheezing    INTERVAL EVENTS: Patient seen today without distress, feels better, no new complaints.    T(C): 36.1 (09-24-21 @ 07:51), Max: 36.4 (09-23-21 @ 15:27)  HR: 70 (09-24-21 @ 07:51) (70 - 83)  BP: 124/59 (09-24-21 @ 07:51) (109/52 - 130/62)  RR: 19 (09-24-21 @ 07:51) (17 - 19)  SpO2: --  Wt(kg): --Vital Signs Last 24 Hrs  T(C): 36.1 (24 Sep 2021 07:51), Max: 36.4 (23 Sep 2021 15:27)  T(F): 97 (24 Sep 2021 07:51), Max: 97.6 (23 Sep 2021 15:27)  HR: 70 (24 Sep 2021 07:51) (70 - 83)  BP: 124/59 (24 Sep 2021 07:51) (109/52 - 130/62)  BP(mean): --  RR: 19 (24 Sep 2021 07:51) (17 - 19)  SpO2: --    PHYSICAL EXAM:  GENERAL: NAD  NECK: Supple, No JVD  CHEST/LUNG: Decreased BS at bases  HEART: S1, S2, Regular rate and rhythm  ABDOMEN: Soft, Nontender, Obese Bowel sounds present  EXTREMITIES: ++ edema  SKIN: Lesions to the left hand and wrist improved    LABS:                        8.8    11.15 )-----------( 127      ( 24 Sep 2021 08:46 )             28.5             09-24    136  |  103  |  12  ----------------------------<  85  4.8   |  21  |  0.5<L>    Ca    7.8<L>      24 Sep 2021 08:46  Mg     1.8     09-24    TPro  4.8<L>  /  Alb  2.8<L>  /  TBili  0.6  /  DBili  x   /  AST  37  /  ALT  32  /  AlkPhos  60  09-24    LIVER FUNCTIONS - ( 24 Sep 2021 08:46 )  Alb: 2.8 g/dL / Pro: 4.8 g/dL / ALK PHOS: 60 U/L / ALT: 32 U/L / AST: 37 U/L / GGT: x                       RADIOLOGY & ADDITIONAL TESTS:  < from: Xray Chest 1 View- PORTABLE-Urgent (Xray Chest 1 View- PORTABLE-Urgent .) (09.23.21 @ 13:30) >  INTERPRETATION:  Clinical History / Reason for exam: Fluid overload, history of asthma.    Comparison : Chest radiograph frontal view dated September 22, 2021 time9:41 AM.    Technique/Positioning: Frontal view time 12:54 PM.    Findings:    Support devices: None.    Cardiac/mediastinum/hilum: The overall heart size appears mildly enlarged which may be in part related to projection. Widening of superior mediastinum may be secondary to projection prominent great vessels.    Lung parenchyma/Pleura: Diffuse prominence of the interstitial markings.    Impression:    Diffuse prominence of the interstitial markings.      < end of copied text >  < from: TTE Echo Complete w/o Contrast w/ Doppler (09.23.21 @ 10:47) >  PROCEDURE DATE:  09/23/2021      INTERPRETATION:   [place] [address]                Phone: [phone].   TRANSTHORACIC ECHOCARDIOGRAM REPORT        Patient Name:   JACOB KOTHARI Accession #: [id number]  Medical Rec #:  [id number]             Height:      62.0 in 157.5 cm  YOB: 1955            Weight:      268.0 lb 121.56 kg  PatientAge:    66 years             BSA:         2.16 m²  Patient Gender: F                    BP:          115/57 mmHg      Date of Exam:        9/23/2021 10:47:30 AM  Referring Physician: [id number] ED UNASSIGNED  Sonographer:         Andre Tavarez  Reading Physician:   Itz Ace MD, [facility].    Procedure:     2D Echo/Doppler/Color Doppler Complete.  Indications:   R57.9 - Shock, unspecified  Diagnosis:     Shock, unspecified - R57.9  Study Details: Technically fair study. Study quality was adverselyaffected due                 to patient obesity, lung interference and Restrictive mobility.        Summary:   1. LV Ejection Fraction by Bhakta's Method with a biplane EF of 60 %.   2. Moderately enlarged left atrium.   3. Normal right atrial size.   4. No evidence of mitral valve regurgitation.   5. Mild tricuspid regurgitation.   6. Mild pulmonic valve regurgitation.    < end of copied text >

## 2021-09-29 DIAGNOSIS — Z90.89 ACQUIRED ABSENCE OF OTHER ORGANS: ICD-10-CM

## 2021-09-29 DIAGNOSIS — D72.829 ELEVATED WHITE BLOOD CELL COUNT, UNSPECIFIED: ICD-10-CM

## 2021-09-29 DIAGNOSIS — J44.9 CHRONIC OBSTRUCTIVE PULMONARY DISEASE, UNSPECIFIED: ICD-10-CM

## 2021-09-29 DIAGNOSIS — T82.858A STENOSIS OF OTHER VASCULAR PROSTHETIC DEVICES, IMPLANTS AND GRAFTS, INITIAL ENCOUNTER: ICD-10-CM

## 2021-09-29 DIAGNOSIS — Z88.0 ALLERGY STATUS TO PENICILLIN: ICD-10-CM

## 2021-09-29 DIAGNOSIS — E11.9 TYPE 2 DIABETES MELLITUS WITHOUT COMPLICATIONS: ICD-10-CM
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
## 2021-09-29 DIAGNOSIS — I89.0 LYMPHEDEMA, NOT ELSEWHERE CLASSIFIED: ICD-10-CM

## 2021-09-29 DIAGNOSIS — I70.208 UNSPECIFIED ATHEROSCLEROSIS OF NATIVE ARTERIES OF EXTREMITIES, OTHER EXTREMITY: ICD-10-CM

## 2021-09-29 DIAGNOSIS — S72.22XA DISPLACED SUBTROCHANTERIC FRACTURE OF LEFT FEMUR, INITIAL ENCOUNTER FOR CLOSED FRACTURE: ICD-10-CM

## 2021-09-29 DIAGNOSIS — Z79.84 LONG TERM (CURRENT) USE OF ORAL HYPOGLYCEMIC DRUGS: ICD-10-CM

## 2021-09-29 DIAGNOSIS — S72.002A FRACTURE OF UNSPECIFIED PART OF NECK OF LEFT FEMUR, INITIAL ENCOUNTER FOR CLOSED FRACTURE: ICD-10-CM

## 2021-09-29 DIAGNOSIS — Z90.710 ACQUIRED ABSENCE OF BOTH CERVIX AND UTERUS: ICD-10-CM

## 2021-09-29 DIAGNOSIS — Z85.42 PERSONAL HISTORY OF MALIGNANT NEOPLASM OF OTHER PARTS OF UTERUS: ICD-10-CM

## 2021-09-29 DIAGNOSIS — G40.909 EPILEPSY, UNSPECIFIED, NOT INTRACTABLE, WITHOUT STATUS EPILEPTICUS: ICD-10-CM

## 2021-09-29 DIAGNOSIS — E66.01 MORBID (SEVERE) OBESITY DUE TO EXCESS CALORIES: ICD-10-CM

## 2021-09-29 DIAGNOSIS — D62 ACUTE POSTHEMORRHAGIC ANEMIA: ICD-10-CM

## 2021-09-29 DIAGNOSIS — G47.33 OBSTRUCTIVE SLEEP APNEA (ADULT) (PEDIATRIC): ICD-10-CM
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
## 2021-09-29 DIAGNOSIS — E03.9 HYPOTHYROIDISM, UNSPECIFIED: ICD-10-CM

## 2021-09-29 DIAGNOSIS — Z90.722 ACQUIRED ABSENCE OF OVARIES, BILATERAL: ICD-10-CM

## 2021-09-29 DIAGNOSIS — G47.00 INSOMNIA, UNSPECIFIED: ICD-10-CM

## 2021-09-29 DIAGNOSIS — N17.9 ACUTE KIDNEY FAILURE, UNSPECIFIED: ICD-10-CM

## 2021-09-29 DIAGNOSIS — M17.0 BILATERAL PRIMARY OSTEOARTHRITIS OF KNEE: ICD-10-CM

## 2021-09-29 DIAGNOSIS — T81.19XA OTHER POSTPROCEDURAL SHOCK, INITIAL ENCOUNTER: ICD-10-CM

## 2021-09-29 DIAGNOSIS — E87.2 ACIDOSIS: ICD-10-CM
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
## 2021-09-29 DIAGNOSIS — G25.81 RESTLESS LEGS SYNDROME: ICD-10-CM

## 2021-09-29 DIAGNOSIS — F32.9 MAJOR DEPRESSIVE DISORDER, SINGLE EPISODE, UNSPECIFIED: ICD-10-CM

## 2021-09-29 DIAGNOSIS — N20.0 CALCULUS OF KIDNEY: ICD-10-CM

## 2021-10-14 ENCOUNTER — APPOINTMENT (OUTPATIENT)
Dept: BURN CARE | Facility: CLINIC | Age: 66
End: 2021-10-14
Payer: MEDICARE

## 2021-10-14 ENCOUNTER — OUTPATIENT (OUTPATIENT)
Dept: OUTPATIENT SERVICES | Facility: HOSPITAL | Age: 66
LOS: 1 days | Discharge: HOME | End: 2021-10-14

## 2021-10-14 DIAGNOSIS — Z98.890 OTHER SPECIFIED POSTPROCEDURAL STATES: Chronic | ICD-10-CM

## 2021-10-14 DIAGNOSIS — Z87.448 PERSONAL HISTORY OF OTHER DISEASES OF URINARY SYSTEM: Chronic | ICD-10-CM

## 2021-10-14 DIAGNOSIS — K08.409 PARTIAL LOSS OF TEETH, UNSPECIFIED CAUSE, UNSPECIFIED CLASS: Chronic | ICD-10-CM

## 2021-10-14 DIAGNOSIS — Z90.89 ACQUIRED ABSENCE OF OTHER ORGANS: Chronic | ICD-10-CM

## 2021-10-14 PROCEDURE — 99202 OFFICE O/P NEW SF 15 MIN: CPT

## 2021-10-14 PROCEDURE — 97597 DBRDMT OPN WND 1ST 20 CM/<: CPT

## 2021-10-22 ENCOUNTER — APPOINTMENT (OUTPATIENT)
Dept: VASCULAR SURGERY | Facility: CLINIC | Age: 66
End: 2021-10-22
Payer: MEDICARE

## 2021-10-22 PROCEDURE — 99214 OFFICE O/P EST MOD 30 MIN: CPT

## 2021-10-28 ENCOUNTER — OUTPATIENT (OUTPATIENT)
Dept: OUTPATIENT SERVICES | Facility: HOSPITAL | Age: 66
LOS: 1 days | Discharge: HOME | End: 2021-10-28

## 2021-10-28 ENCOUNTER — APPOINTMENT (OUTPATIENT)
Dept: BURN CARE | Facility: CLINIC | Age: 66
End: 2021-10-28
Payer: COMMERCIAL

## 2021-10-28 DIAGNOSIS — Z98.890 OTHER SPECIFIED POSTPROCEDURAL STATES: Chronic | ICD-10-CM

## 2021-10-28 DIAGNOSIS — Z90.89 ACQUIRED ABSENCE OF OTHER ORGANS: Chronic | ICD-10-CM

## 2021-10-28 DIAGNOSIS — K08.409 PARTIAL LOSS OF TEETH, UNSPECIFIED CAUSE, UNSPECIFIED CLASS: Chronic | ICD-10-CM

## 2021-10-28 DIAGNOSIS — Z87.448 PERSONAL HISTORY OF OTHER DISEASES OF URINARY SYSTEM: Chronic | ICD-10-CM

## 2021-10-28 PROCEDURE — 97597 DBRDMT OPN WND 1ST 20 CM/<: CPT

## 2021-11-19 ENCOUNTER — APPOINTMENT (OUTPATIENT)
Dept: VASCULAR SURGERY | Facility: CLINIC | Age: 66
End: 2021-11-19
Payer: MEDICARE

## 2021-11-19 VITALS
WEIGHT: 267 LBS | SYSTOLIC BLOOD PRESSURE: 108 MMHG | OXYGEN SATURATION: 94 % | HEART RATE: 65 BPM | TEMPERATURE: 95.6 F | BODY MASS INDEX: 49.13 KG/M2 | DIASTOLIC BLOOD PRESSURE: 64 MMHG | HEIGHT: 62 IN

## 2021-11-19 DIAGNOSIS — S51.802S: ICD-10-CM

## 2021-11-19 PROCEDURE — 99214 OFFICE O/P EST MOD 30 MIN: CPT

## 2021-12-09 ENCOUNTER — OUTPATIENT (OUTPATIENT)
Dept: OUTPATIENT SERVICES | Facility: HOSPITAL | Age: 66
LOS: 1 days | Discharge: HOME | End: 2021-12-09

## 2021-12-09 ENCOUNTER — APPOINTMENT (OUTPATIENT)
Dept: BURN CARE | Facility: CLINIC | Age: 66
End: 2021-12-09
Payer: MEDICARE

## 2021-12-09 DIAGNOSIS — Z87.448 PERSONAL HISTORY OF OTHER DISEASES OF URINARY SYSTEM: Chronic | ICD-10-CM

## 2021-12-09 DIAGNOSIS — Z98.890 OTHER SPECIFIED POSTPROCEDURAL STATES: Chronic | ICD-10-CM

## 2021-12-09 DIAGNOSIS — Z90.89 ACQUIRED ABSENCE OF OTHER ORGANS: Chronic | ICD-10-CM

## 2021-12-09 DIAGNOSIS — K08.409 PARTIAL LOSS OF TEETH, UNSPECIFIED CAUSE, UNSPECIFIED CLASS: Chronic | ICD-10-CM

## 2021-12-09 PROCEDURE — 99213 OFFICE O/P EST LOW 20 MIN: CPT

## 2021-12-17 ENCOUNTER — APPOINTMENT (OUTPATIENT)
Dept: VASCULAR SURGERY | Facility: CLINIC | Age: 66
End: 2021-12-17

## 2022-01-05 NOTE — ASU PATIENT PROFILE, ADULT - FALL HARM RISK
LMOM requesting pt to call back at earliest convenience. bones(Osteoporosis,prev fx,steroid use,metastatic bone ca/surgery

## 2022-01-06 ENCOUNTER — OUTPATIENT (OUTPATIENT)
Dept: OUTPATIENT SERVICES | Facility: HOSPITAL | Age: 67
LOS: 1 days | Discharge: HOME | End: 2022-01-06

## 2022-01-06 ENCOUNTER — APPOINTMENT (OUTPATIENT)
Dept: BURN CARE | Facility: CLINIC | Age: 67
End: 2022-01-06
Payer: MEDICARE

## 2022-01-06 DIAGNOSIS — S41.102D UNSPECIFIED OPEN WOUND OF LEFT UPPER ARM, SUBSEQUENT ENCOUNTER: ICD-10-CM

## 2022-01-06 DIAGNOSIS — Z98.890 OTHER SPECIFIED POSTPROCEDURAL STATES: Chronic | ICD-10-CM

## 2022-01-06 DIAGNOSIS — S61.502D: ICD-10-CM

## 2022-01-06 DIAGNOSIS — Z87.448 PERSONAL HISTORY OF OTHER DISEASES OF URINARY SYSTEM: Chronic | ICD-10-CM

## 2022-01-06 DIAGNOSIS — K08.409 PARTIAL LOSS OF TEETH, UNSPECIFIED CAUSE, UNSPECIFIED CLASS: Chronic | ICD-10-CM

## 2022-01-06 DIAGNOSIS — Z90.89 ACQUIRED ABSENCE OF OTHER ORGANS: Chronic | ICD-10-CM

## 2022-01-06 DIAGNOSIS — S61.002D UNSPECIFIED OPEN WOUND OF LEFT THUMB WITHOUT DAMAGE TO NAIL, SUBSEQUENT ENCOUNTER: ICD-10-CM

## 2022-01-06 PROCEDURE — 99213 OFFICE O/P EST LOW 20 MIN: CPT

## 2022-01-10 NOTE — ED ADULT NURSE NOTE - NS ED NURSE LEVEL OF CONSCIOUSNESS AFFECT
ORTHOPEDIC DISCHARGE SUMMARY       Anna Sanderson,  1961, MRN [id number]    Admission Date: 2021  Admission Diagnoses: Non-healing surgical wound, subsequent encounter [T81.89XD]     Discharge Date: 2021     Post-operative Day:  4 Days Post-Op    Reason for Admission: The patient was admitted for the following:  Procedure(s):  IRRIGATION AND DEBRIDEMENT, LEFT LOWER EXTREMITY WITH WOUND VAC PLACEMENT      BRIEF HOSPITAL COURSE   This 60 year old female underwent the aforementioned procedure with Dr. Wiley on 12/10/21. There were no intraoperative complications and the patient was transferred to the recovery room and later the orthopedic unit in stable condition. Once the patient reached the orthopedic floor our orthopedic pain protocol was implemented along with the following:    Anticoagulation Medications: None  Therapy: None  Activity: NWB RLE in CAM boot  Bracing: None    Consultations during Admission: Hospitalist service for medical management     COMPLICATIONS/SIGNIFICANT FINDINGS    None    DISCHARGE INFORMATION   Condition at discharge: Good  Discharge destination: Home  Patient was seen by myself on the date of discharge.    FOLLOW UP CARE   Follow up with orthopedics in 7-10 days or sooner should the need arise. Ortho will continue to manage pain control, post op anticoagulation and incision care.     Follow up with your PCP for management of chronic medical problems and to evaluate for post op medical complications including constipation, nausea/vomiting, DVT/PE, anemia, changes in blood pressure, fevers/chills, urinary retention and atelectasis/pneumonia.     Subjective   Patient is doing well today. Patient has passed her therapies. She is using Tylenol for pain which she is tolerating well. She feels ready to discharge home. No other complaints. All questions answered.       Physical Exam   The patient is A&Ox3, resting comfortably in her chair.  Right foot is unwrapped  during exam, wound vac redone with wound care nurse present. Vac functioning properly.  Minimal erythema and edema surrounding her posterior right foot and ankle wound.  Sensation is intact in BLE.  Dorsiflexion and plantar flexion are intact in BLE.  Dorsalis pedis pulses intact.  Calves are soft and non-tender.         /70 (BP Location: Left arm)   Pulse 91   Temp 98.1  F (36.7  C) (Oral)   Resp 18   Wt 54.1 kg (119 lb 3.2 oz)   SpO2 100%   BMI 20.46 kg/m      Pertinent Results at Discharge     Hemoglobin   Date/Time Value Ref Range Status   01/06/2022 08:40 AM 9.4 (L) 11.7 - 15.7 g/dL Final   12/13/2021 06:05 AM 8.6 (L) 11.7 - 15.7 g/dL Final   12/12/2021 06:30 AM 8.1 (L) 11.7 - 15.7 g/dL Final   10/02/2020 08:45 AM 11.6 (L) 11.7 - 15.7 g/dL Final   04/11/2018 02:35 PM 9.9 (L) 11.7 - 15.7 g/dL Final   03/28/2018 09:25 AM 9.9 (L) 11.7 - 15.7 g/dL Final     INR   Date/Time Value Ref Range Status   12/10/2021 05:57 AM 1.09 0.90 - 1.15 Final   06/18/2021 07:01 AM 0.95 0.90 - 1.10 Final   06/17/2021 06:21 AM 0.97 0.90 - 1.10 Final   10/02/2020 08:45 AM 0.89 0.86 - 1.14 Final     Platelet Count   Date/Time Value Ref Range Status   01/06/2022 08:40  150 - 450 10e3/uL Final   12/13/2021 06:05  150 - 450 10e3/uL Final   12/12/2021 06:30  150 - 450 10e3/uL Final   10/02/2020 08:45  150 - 450 10e9/L Final   04/11/2018 02:35  150 - 450 10e9/L Final   03/28/2018 09:25  (H) 150 - 450 10e9/L Final       Problem List   Principal Problem:    Nonhealing surgical wound  Active Problems:    Achilles tendon infection    Osteoporosis of multiple sites without pathological fracture    Vitamin D deficiency    Subacute thyroiditis    PAD (peripheral artery disease) (H)        Ailyn Lambert PA-C  Date: 1/10/2022  Time: 8:38 AM     Calm

## 2022-01-28 PROBLEM — Z00.00 ENCOUNTER FOR PREVENTIVE HEALTH EXAMINATION: Status: ACTIVE | Noted: 2022-01-28

## 2022-04-06 NOTE — ED ADULT NURSE NOTE - CAS DISCH BELONGINGS RETURNED
[Takes medication as prescribed] : takes [None] : Patient does not have any barriers to medication adherence Not applicable

## 2022-06-01 NOTE — H&P PST ADULT - PMH
[facility] specialty pharmacy phone number   [phone]    Spoke with Izabel from [facility].   He gave me a number for a third party financial assistance information.     [phone]       He then placed me on hold to speak with [facility] financial assistance.     He spoke to the counseling center for financial assistance. They are due to call him soon.   
Asthma    Chronic cough    Diabetes    Epilepsy    Knee pain    Osteoarthritis    Renal failure    Renal stone    Uterine cancer

## 2022-10-18 NOTE — PROCEDURE NOTE - NSCOMPLICATION_GEN_A_CORE
no complications
Post-Care Instructions: I reviewed with the patient in detail post-care instructions. Patient is to keep the biopsy site dry overnight, and then apply bacitracin twice daily until healed. Patient may apply hydrogen peroxide soaks to remove any crusting.

## 2022-11-09 ENCOUNTER — APPOINTMENT (OUTPATIENT)
Age: 67
End: 2022-11-09

## 2022-11-21 ENCOUNTER — INPATIENT (INPATIENT)
Facility: HOSPITAL | Age: 67
LOS: 3 days | Discharge: NURSING, REHAB OR LONG-TERM CARE | End: 2022-11-25
Attending: INTERNAL MEDICINE | Admitting: INTERNAL MEDICINE

## 2022-11-21 VITALS
TEMPERATURE: 96 F | HEART RATE: 71 BPM | DIASTOLIC BLOOD PRESSURE: 74 MMHG | RESPIRATION RATE: 10 BRPM | SYSTOLIC BLOOD PRESSURE: 166 MMHG | OXYGEN SATURATION: 94 %

## 2022-11-21 DIAGNOSIS — K08.409 PARTIAL LOSS OF TEETH, UNSPECIFIED CAUSE, UNSPECIFIED CLASS: Chronic | ICD-10-CM

## 2022-11-21 DIAGNOSIS — Z98.890 OTHER SPECIFIED POSTPROCEDURAL STATES: Chronic | ICD-10-CM

## 2022-11-21 DIAGNOSIS — Z87.448 PERSONAL HISTORY OF OTHER DISEASES OF URINARY SYSTEM: Chronic | ICD-10-CM

## 2022-11-21 DIAGNOSIS — Z90.89 ACQUIRED ABSENCE OF OTHER ORGANS: Chronic | ICD-10-CM

## 2022-11-21 LAB
ALBUMIN SERPL ELPH-MCNC: 4.2 G/DL — SIGNIFICANT CHANGE UP (ref 3.5–5.2)
ALP SERPL-CCNC: 78 U/L — SIGNIFICANT CHANGE UP (ref 30–115)
ALT FLD-CCNC: 29 U/L — SIGNIFICANT CHANGE UP (ref 0–41)
ANION GAP SERPL CALC-SCNC: 4 MMOL/L — LOW (ref 7–14)
AST SERPL-CCNC: 33 U/L — SIGNIFICANT CHANGE UP (ref 0–41)
BASE EXCESS BLDV CALC-SCNC: 13 MMOL/L — HIGH (ref -2–3)
BASE EXCESS BLDV CALC-SCNC: 13.5 MMOL/L — HIGH (ref -2–3)
BASOPHILS # BLD AUTO: 0.03 K/UL — SIGNIFICANT CHANGE UP (ref 0–0.2)
BASOPHILS NFR BLD AUTO: 0.4 % — SIGNIFICANT CHANGE UP (ref 0–1)
BILIRUB SERPL-MCNC: 0.3 MG/DL — SIGNIFICANT CHANGE UP (ref 0.2–1.2)
BUN SERPL-MCNC: 16 MG/DL — SIGNIFICANT CHANGE UP (ref 10–20)
CA-I SERPL-SCNC: 1.08 MMOL/L — LOW (ref 1.15–1.33)
CA-I SERPL-SCNC: 1.22 MMOL/L — SIGNIFICANT CHANGE UP (ref 1.15–1.33)
CALCIUM SERPL-MCNC: 8.8 MG/DL — SIGNIFICANT CHANGE UP (ref 8.4–10.5)
CHLORIDE SERPL-SCNC: 99 MMOL/L — SIGNIFICANT CHANGE UP (ref 98–110)
CO2 SERPL-SCNC: 42 MMOL/L — CRITICAL HIGH (ref 17–32)
CREAT SERPL-MCNC: 0.6 MG/DL — LOW (ref 0.7–1.5)
EGFR: 99 ML/MIN/1.73M2 — SIGNIFICANT CHANGE UP
EOSINOPHIL # BLD AUTO: 0.02 K/UL — SIGNIFICANT CHANGE UP (ref 0–0.7)
EOSINOPHIL NFR BLD AUTO: 0.2 % — SIGNIFICANT CHANGE UP (ref 0–8)
GAS PNL BLDV: 133 MMOL/L — LOW (ref 136–145)
GAS PNL BLDV: 140 MMOL/L — SIGNIFICANT CHANGE UP (ref 136–145)
GAS PNL BLDV: SIGNIFICANT CHANGE UP
GLUCOSE SERPL-MCNC: 118 MG/DL — HIGH (ref 70–99)
HCO3 BLDV-SCNC: 47 MMOL/L — CRITICAL HIGH (ref 22–29)
HCO3 BLDV-SCNC: 48 MMOL/L — CRITICAL HIGH (ref 22–29)
HCT VFR BLD CALC: 51.6 % — HIGH (ref 37–47)
HCT VFR BLDA CALC: 46 % — SIGNIFICANT CHANGE UP (ref 39–51)
HCT VFR BLDA CALC: 47 % — SIGNIFICANT CHANGE UP (ref 39–51)
HGB BLD CALC-MCNC: 15.2 G/DL — SIGNIFICANT CHANGE UP (ref 12.6–17.4)
HGB BLD CALC-MCNC: 15.8 G/DL — SIGNIFICANT CHANGE UP (ref 12.6–17.4)
HGB BLD-MCNC: 15.2 G/DL — SIGNIFICANT CHANGE UP (ref 12–16)
IMM GRANULOCYTES NFR BLD AUTO: 0.4 % — HIGH (ref 0.1–0.3)
LACTATE BLDV-MCNC: 0.6 MMOL/L — SIGNIFICANT CHANGE UP (ref 0.5–2)
LACTATE BLDV-MCNC: 0.7 MMOL/L — SIGNIFICANT CHANGE UP (ref 0.5–2)
LYMPHOCYTES # BLD AUTO: 0.73 K/UL — LOW (ref 1.2–3.4)
LYMPHOCYTES # BLD AUTO: 8.7 % — LOW (ref 20.5–51.1)
MCHC RBC-ENTMCNC: 29.5 G/DL — LOW (ref 32–37)
MCHC RBC-ENTMCNC: 31.6 PG — HIGH (ref 27–31)
MCV RBC AUTO: 107.3 FL — HIGH (ref 81–99)
MONOCYTES # BLD AUTO: 0.73 K/UL — HIGH (ref 0.1–0.6)
MONOCYTES NFR BLD AUTO: 8.7 % — SIGNIFICANT CHANGE UP (ref 1.7–9.3)
NEUTROPHILS # BLD AUTO: 6.88 K/UL — HIGH (ref 1.4–6.5)
NEUTROPHILS NFR BLD AUTO: 81.6 % — HIGH (ref 42.2–75.2)
NRBC # BLD: 0 /100 WBCS — SIGNIFICANT CHANGE UP (ref 0–0)
NT-PROBNP SERPL-SCNC: 492 PG/ML — HIGH (ref 0–300)
PCO2 BLDV: 112 MMHG — HIGH (ref 39–42)
PCO2 BLDV: 134 MMHG — HIGH (ref 39–42)
PH BLDV: 7.17 — LOW (ref 7.32–7.43)
PH BLDV: 7.23 — LOW (ref 7.32–7.43)
PLATELET # BLD AUTO: 153 K/UL — SIGNIFICANT CHANGE UP (ref 130–400)
PO2 BLDV: 47 MMHG — SIGNIFICANT CHANGE UP
PO2 BLDV: 53 MMHG — SIGNIFICANT CHANGE UP
POTASSIUM BLDV-SCNC: 11.2 MMOL/L — CRITICAL HIGH (ref 3.5–5.1)
POTASSIUM BLDV-SCNC: 4.8 MMOL/L — SIGNIFICANT CHANGE UP (ref 3.5–5.1)
POTASSIUM SERPL-MCNC: 5.4 MMOL/L — HIGH (ref 3.5–5)
POTASSIUM SERPL-SCNC: 5.4 MMOL/L — HIGH (ref 3.5–5)
PROT SERPL-MCNC: 6.6 G/DL — SIGNIFICANT CHANGE UP (ref 6–8)
RBC # BLD: 4.81 M/UL — SIGNIFICANT CHANGE UP (ref 4.2–5.4)
RBC # FLD: 12.9 % — SIGNIFICANT CHANGE UP (ref 11.5–14.5)
SAO2 % BLDV: 81.1 % — SIGNIFICANT CHANGE UP
SAO2 % BLDV: 84 % — SIGNIFICANT CHANGE UP
SODIUM SERPL-SCNC: 145 MMOL/L — SIGNIFICANT CHANGE UP (ref 135–146)
TROPONIN T SERPL-MCNC: <0.01 NG/ML — SIGNIFICANT CHANGE UP
WBC # BLD: 8.42 K/UL — SIGNIFICANT CHANGE UP (ref 4.8–10.8)
WBC # FLD AUTO: 8.42 K/UL — SIGNIFICANT CHANGE UP (ref 4.8–10.8)

## 2022-11-21 PROCEDURE — 71045 X-RAY EXAM CHEST 1 VIEW: CPT | Mod: 26

## 2022-11-21 PROCEDURE — 99291 CRITICAL CARE FIRST HOUR: CPT | Mod: CS,GC

## 2022-11-21 PROCEDURE — 93010 ELECTROCARDIOGRAM REPORT: CPT

## 2022-11-21 RX ORDER — ENOXAPARIN SODIUM 100 MG/ML
40 INJECTION SUBCUTANEOUS EVERY 24 HOURS
Refills: 0 | Status: DISCONTINUED | OUTPATIENT
Start: 2022-11-21 | End: 2022-11-25

## 2022-11-21 RX ORDER — LEVOTHYROXINE SODIUM 125 MCG
50 TABLET ORAL DAILY
Refills: 0 | Status: DISCONTINUED | OUTPATIENT
Start: 2022-11-21 | End: 2022-11-25

## 2022-11-21 RX ORDER — POTASSIUM CITRATE MONOHYDRATE 100 %
1 POWDER (GRAM) MISCELLANEOUS
Qty: 0 | Refills: 0 | DISCHARGE

## 2022-11-21 RX ORDER — NYSTATIN CREAM 100000 [USP'U]/G
1 CREAM TOPICAL
Refills: 0 | Status: DISCONTINUED | OUTPATIENT
Start: 2022-11-21 | End: 2022-11-25

## 2022-11-21 RX ORDER — ACETAMINOPHEN 500 MG
650 TABLET ORAL EVERY 6 HOURS
Refills: 0 | Status: DISCONTINUED | OUTPATIENT
Start: 2022-11-21 | End: 2022-11-25

## 2022-11-21 RX ORDER — LORATADINE 10 MG/1
10 TABLET ORAL DAILY
Refills: 0 | Status: DISCONTINUED | OUTPATIENT
Start: 2022-11-21 | End: 2022-11-25

## 2022-11-21 RX ORDER — TRAZODONE HCL 50 MG
1.5 TABLET ORAL
Qty: 0 | Refills: 0 | DISCHARGE

## 2022-11-21 RX ORDER — MULTIVIT-MIN/FERROUS GLUCONATE 9 MG/15 ML
1 LIQUID (ML) ORAL DAILY
Refills: 0 | Status: DISCONTINUED | OUTPATIENT
Start: 2022-11-21 | End: 2022-11-25

## 2022-11-21 RX ORDER — SENNA PLUS 8.6 MG/1
1 TABLET ORAL
Qty: 0 | Refills: 0 | DISCHARGE

## 2022-11-21 RX ORDER — PHENOBARBITAL 60 MG
32.4 TABLET ORAL THREE TIMES A DAY
Refills: 0 | Status: DISCONTINUED | OUTPATIENT
Start: 2022-11-21 | End: 2022-11-22

## 2022-11-21 RX ORDER — IPRATROPIUM/ALBUTEROL SULFATE 18-103MCG
3 AEROSOL WITH ADAPTER (GRAM) INHALATION EVERY 6 HOURS
Refills: 0 | Status: DISCONTINUED | OUTPATIENT
Start: 2022-11-21 | End: 2022-11-25

## 2022-11-21 RX ORDER — TRAZODONE HCL 50 MG
100 TABLET ORAL AT BEDTIME
Refills: 0 | Status: DISCONTINUED | OUTPATIENT
Start: 2022-11-21 | End: 2022-11-25

## 2022-11-21 RX ORDER — NYSTATIN 500MM UNIT
1 POWDER (EA) MISCELLANEOUS
Qty: 0 | Refills: 0 | DISCHARGE

## 2022-11-21 RX ORDER — CITALOPRAM 10 MG/1
20 TABLET, FILM COATED ORAL DAILY
Refills: 0 | Status: DISCONTINUED | OUTPATIENT
Start: 2022-11-21 | End: 2022-11-25

## 2022-11-21 RX ORDER — LIDOCAINE 4 G/100G
1 CREAM TOPICAL DAILY
Refills: 0 | Status: DISCONTINUED | OUTPATIENT
Start: 2022-11-21 | End: 2022-11-25

## 2022-11-21 RX ORDER — PRAMIPEXOLE DIHYDROCHLORIDE 0.12 MG/1
0.5 TABLET ORAL AT BEDTIME
Refills: 0 | Status: DISCONTINUED | OUTPATIENT
Start: 2022-11-21 | End: 2022-11-25

## 2022-11-21 RX ORDER — TOPIRAMATE 25 MG
100 TABLET ORAL
Refills: 0 | Status: DISCONTINUED | OUTPATIENT
Start: 2022-11-21 | End: 2022-11-25

## 2022-11-21 RX ORDER — PRAMIPEXOLE DIHYDROCHLORIDE 0.12 MG/1
1 TABLET ORAL
Qty: 0 | Refills: 0 | DISCHARGE

## 2022-11-21 RX ORDER — MONTELUKAST 4 MG/1
10 TABLET, CHEWABLE ORAL AT BEDTIME
Refills: 0 | Status: DISCONTINUED | OUTPATIENT
Start: 2022-11-21 | End: 2022-11-25

## 2022-11-21 NOTE — ED ADULT NURSE REASSESSMENT NOTE - NS ED NURSE REASSESS COMMENT FT1
pt is aaox2, nad, respirations easy and regular, skin is warm, dry, and normal in color, pt is resting and comfortable

## 2022-11-21 NOTE — ED PROVIDER NOTE - PROGRESS NOTE DETAILS
Pt signed out to Dr. Patton pending repeat VBG and discharge. Pt is DNH, spoke with NH, agree with DC on bipap. Note authored by Dr. Patton: Signed out to me by Dr. Shah, patient is 67-year-old with morbid obesity diabetes COPD, depression seizure, DNR, DNI, DN H, now with hypoxia from nursing home.  Placed on BiPAP.  Tolerating it.  Labs reviewed.  No white count.  Hematocrit 51.  Macrocytic blood gas consistent with acute hypercarbia, respiratory acidosis.  Still on BiPAP.  Repeat blood gas repeated.  PCO2 going down.  We will communicate with PMD regarding further care for this patient. Authored by Dr. Patton: Case discussed with both Dr. Magdaleno Diallo and Dr. Phan.  Will admit to the hospital.  For pulmonary consultation.  BiPAP setting.  And improvement in respiratory and ventilatory status. Authored by Dr. Patton: d/w icu fellow

## 2022-11-21 NOTE — ED ADULT NURSE NOTE - NSICDXPASTSURGICALHX_GEN_ALL_CORE_FT
PAST SURGICAL HISTORY:  H/O abdominal hysterectomy NO RADIATION AND CHEMO    History of bladder stone SURGERY 8/3/2018    History of surgery JJ STENT PLACEMENT LEFT OCTOBER 2017    History of tonsillectomy 13 years old    [place] teeth removed

## 2022-11-21 NOTE — ED PROVIDER NOTE - NSICDXPASTSURGICALHX_GEN_ALL_CORE_FT
PAST SURGICAL HISTORY:  H/O abdominal hysterectomy NO RADIATION AND CHEMO    History of bladder stone SURGERY 8/3/2018    History of surgery JJ STENT PLACEMENT LEFT OCTOBER 2017    History of tonsillectomy 13 years old    [place] teeth removed

## 2022-11-21 NOTE — ED PROVIDER NOTE - ATTENDING CONTRIBUTION TO CARE
68 y/o female, PMH of asthma, COPD, obesity, epilepsy, hypothyroidism, osteoarthritis, kidney stone BIBA from nursing home for hypoxia to 68%RA. Pt was sent to r/o [facility] [place]. Pt is DNR/DNI/DNH. Pt appears sleepy and is not a good historian. On exam, pt in NAD, obese, sleepy but arousable, AAOx1, head NC/AT, PEERL, neck (-) midline tenderness, lungs CTA B/L, CV S1S2 regular, abdomen soft/NT/ND/(+)BS, ext (-) edema. Pt placed on BIPAP. Will do labs and reevaluate.

## 2022-11-21 NOTE — H&P ADULT - HISTORY OF PRESENT ILLNESS
66 yo female with pmhx of asthma, obesity, epilepsy, hypothyroidism , osteoarthritis, kidney stone, L radial artery occlusion 2021 (previously on Eliquis)presents from nursing home for evaluation of hypoxia. pt was hypoxic to 68% on RA and placed on bipap with improvement of O2 saturation to 92%. In ED pt was in respiratory depression RR 10, 94% on 15 ml NRB. Pt was placed on BIPAP in ED with improvement of her O2 saturation. During my encounter, pt is AOOx3, saturating 100% on 4 L NC. Pt admits to chronic dry cough and SOB and states that she use Intermittent O2 at NH and during sleep. She is Bedbound since L hip fracture s/p ORIF 2021. Pt otherwise denies CP, N/V, HA, Fever or chills or change/worsening cough.     Vitals in ED  · BP Systolic	166 mm Hg  · BP Diastolic	74 mm Hg  · Heart Rate	71 /min  · Respiration Rate (breaths/min)	 10 /min  · Temp (F)	 96.2 Degrees F  · SpO2 (%)	94 %	mask, nonrebreather 15 L/min    Labs significant for: K 5.4, Bicarb 42, ,   VBG: pH 7.23:  pCO2,: 112 pO2: 47  HCO3: 47 Oxygen Saturation: 81.1    CXR: Pulmonary vascular congestion with left basilar opacity, increased    s/p 1x Levaquin in ED

## 2022-11-21 NOTE — ED PROVIDER NOTE - CLINICAL SUMMARY MEDICAL DECISION MAKING FREE TEXT BOX
Attending Statement: I have personally provided the amount of critical care time documented below excluding time spent on separate procedures.     Critical Care Time Spent (min) Must be 30 or more minutes to qualify: 35.    Acute respiratory failure.  With hypercarbia.  Respiratory acidosis.  Labs and imaging reviewed.  BiPAP initiated.  Communicated with PMD in a nursing home.  Patient is DNR, DNI, DN H.

## 2022-11-21 NOTE — H&P ADULT - NSHPPHYSICALEXAM_GEN_ALL_CORE
GENERAL: NAD, Resting in bed, appears obese  HEENT: NCAT  CHEST/LUNG: + rales b/l  HEART: Regular rate and rhythm; No murmurs, rubs, or gallops  ABDOMEN: Bowel sounds present; Soft, Nontender, Nondistended.   EXTREMITIES:  No clubbing, cyanosis, or edema  NERVOUS SYSTEM:  Alert & Oriented X3  MSK: FROM all 4 extremities, full and equal strength  SKIN: No rashes or lesions

## 2022-11-21 NOTE — ED ADULT NURSE NOTE - OBJECTIVE STATEMENT
Pt sent from [facility], (+) shortness of breath w/ SPO2 on RA 68%, Respirations shallow and slow, hypothermic. Pt has DNR, DNI, DNH directives sent from nursing [place].

## 2022-11-21 NOTE — ED ADULT NURSE NOTE - PSH
H/O abdominal hysterectomy  NO RADIATION AND CHEMO  History of bladder stone  SURGERY 8/3/2018  History of surgery  JJ STENT PLACEMENT LEFT OCTOBER 2017  History of tonsillectomy  13 years old  [place] teeth removed
Statement Selected

## 2022-11-21 NOTE — ED ADULT TRIAGE NOTE - CHIEF COMPLAINT QUOTE
pt sent in from [facility] for hypoxia. sat was 66% on RA. pt on 100%NRB pox improved to 94% pt tired but arousable. answering questions

## 2022-11-21 NOTE — H&P ADULT - NSHPREVIEWOFSYSTEMS_GEN_ALL_CORE
REVIEW OF SYSTEMS:    CONSTITUTIONAL: No weakness, fevers or chills  EYES/ENT: No visual changes;  No vertigo or throat pain   NECK: No pain or stiffness  RESPIRATORY: + cough, no wheezing, hemoptysis; +shortness of breath  CARDIOVASCULAR: No chest pain or palpitations  GASTROINTESTINAL: No abdominal or epigastric pain. No nausea, vomiting, or hematemesis; No diarrhea or constipation. No melena or hematochezia.  GENITOURINARY: No dysuria, frequency or hematuria  NEUROLOGICAL: No numbness or weakness  SKIN: No itching, rashes

## 2022-11-21 NOTE — H&P ADULT - ASSESSMENT
68 yo female with pmhx of asthma, obesity, epilepsy, hypothyroidism , osteoarthritis, kidney stone, L radial artery occlusion 2021 (previously on Eliquis)presents from nursing home for evaluation of hypoxia. pt was hypoxic to 68% on RA and placed on bipap with improvement of O2 saturation to 92%. In ED pt was in respiratory depression RR 10, 94% on 15 ml NRB. Pt was placed on BIPAP in ED with improvement of her O2 saturation.       #Acute hypoxic hypercapnic respiratory failure likely secondary to underlying THOM in setting of Obesity   #R/o other etiologies including pulmonary HTN, CHF  #Hx of Asthma  - VBG: pH 7.23:  pCO2,: 112 pO2: 47  HCO3: 47 Oxygen Saturation: 81.1  - CXR: Pulmonary vascular congestion with left basilar opacity, increased  - s/p 15 ml of NRB and BIPAP in ED, now saturating 100% on 4 L NC  - s/p 1x Levaquin in ED  - c/w NIV at night  - f/u ABG  - TTE 2021: Mild KY, Mild TR, Normal EF  - Check TTE  - unlikely underlying PNA, no fever, no leucocytosis and no change in chronic cough  - check procal, MRSA, ur leginella, ur strep  - outpatient f/u with Pulm for sleep study and CPAP arrangements  - c/w Duonebs and Singular       #Hx Left radial Ischemia 2021  - no longer on Eliquis  - out pat vascular f/u    #Hypothyroidism   - continue Synthroid 50 mcg     #Seizure Disorder  - continue Home Phenobarbital 32.4 TID    #Morbid Obesity   - low olga dash diet when able    #Hx Depression  - continue home Citalopram 20 mg daily    DVT Ppx: Lovenox 40mg QD  GI Ppx: N/A  Diet: DASH  Activity: IAT  Dispo: From Nursing Home    Med rec confirmed with pt's NH chart

## 2022-11-21 NOTE — H&P ADULT - NSHPLABSRESULTS_GEN_ALL_CORE
15.2   8.42  )-----------( 153      ( 21 Nov 2022 15:41 )             51.6       11-21    145  |  99  |  16  ----------------------------<  118<H>  5.4<H>   |  42<HH>  |  0.6<L>    Ca    8.8      21 Nov 2022 15:41    TPro  6.6  /  Alb  4.2  /  TBili  0.3  /  DBili  x   /  AST  33  /  ALT  29  /  AlkPhos  78  11-21                      Lactate Trend      CARDIAC MARKERS ( 21 Nov 2022 15:41 )  x     / <0.01 ng/mL / x     / x     / x            CAPILLARY BLOOD GLUCOSE      POCT Blood Glucose.: 112 mg/dL (21 Nov 2022 14:48)                CXR: Pulmonary vascular congestion with left basilar opacity, increased

## 2022-11-21 NOTE — ED PROVIDER NOTE - PHYSICAL EXAMINATION
CONSTITUTIONAL:  in severe acute distress.   SKIN: warm, dry  HEAD: Normocephalic; atraumatic.  EYES: PERRL, EOMI, normal sclera and conjunctiva   ENT: No nasal discharge; airway clear.  NECK: Supple; non tender.  CARD:  Regular rate and rhythm.   RESP: +inc WOB , poor respiratory effort  ABD: soft ntnd  EXT: Normal ROM.    LYMPH: No acute cervical adenopathy.  NEURO: Alert, oriented, grossly unremarkable  PSYCH: Cooperative, appropriate.

## 2022-11-21 NOTE — ED PROVIDER NOTE - NS ED ROS FT
Constitutional: (-) fever  Eyes/ENT: (-) blurry vision, (-) epistaxis  Cardiovascular: (-) chest pain, (-) syncope  Respiratory: (-) cough, (+) shortness of breath  Gastrointestinal: (-) vomiting, (-) diarrhea  Musculoskeletal: (-) neck pain, (-) back pain, (-) joint pain  Integumentary: (-) rash, (-) edema  Neurological: (-) headache, (+) altered mental status  Psychiatric: (-) hallucinations  Allergic/Immunologic: (-) pruritus

## 2022-11-21 NOTE — ED PROVIDER NOTE - OBJECTIVE STATEMENT
67 year old female with phmx of asthma, dm, cough, oa, 67 year old female with phmx of asthma, dm, cough, oa, dnr, dni, dnh comes in from nursing home for "eval for sleep apnea". pt was hypoxic to the 70s and placed on bipap. pt here in respiratory distress and aoaX1. pt is unable to answer further questions. pt's family members were attempted to contact with no answer. nursing home was called and can take pt back on bipap.

## 2022-11-21 NOTE — H&P ADULT - TIME BILLING
68 yo female with pmhx of asthma, morbid obesity, epilepsy, hypothyroidism , osteoarthritis, kidney stone, L radial artery occlusion 2021 (previously on Eliquis)presents from nursing home for evaluation of hypoxia. pt was hypoxic to 68% on RA and placed on bipap with improvement of O2 saturation to 92%. In ED pt was in respiratory depression RR 10, 94% on 15 ml NRB. Pt was placed on BIPAP in ED with improvement of her O2 saturation.     Vital noted,     remains on Avap  Obtunded, non responsive to verbal stimuli  lungs decreased BS  heart S1S2  abdomen obese, BS+, soft and non tender  extremities + edema    labs noted    < from: Xray Chest 1 View-PORTABLE IMMEDIATE (11.21.22 @ 16:05) >      Impression:    Pulmonary vascular congestion with left basilar opacity, increased.          A/P  Acute Hypoxic and Hypercapneic Resp Failure  Asthma  - patient is a DNR/DNI from SNF  - DNH rescinded by proxy  - patient in the past has been non compliant with CPAP in SNF  - continue resp treatments  - pulm evaluation    Morbid Obesity  Patient does not ambulate, Bed to Chair Fast    Seizure Disorder  - contineu rx    Supportive measure, Prognosis guarded

## 2022-11-22 ENCOUNTER — APPOINTMENT (OUTPATIENT)
Age: 67
End: 2022-11-22

## 2022-11-22 LAB
BASE EXCESS BLDA CALC-SCNC: 13.8 MMOL/L — HIGH (ref -2–3)
GAS PNL BLDA: SIGNIFICANT CHANGE UP
HCO3 BLDA-SCNC: 48 MMOL/L — CRITICAL HIGH (ref 21–28)
HOROWITZ INDEX BLDA+IHG-RTO: 40 — SIGNIFICANT CHANGE UP
PCO2 BLDA: 128 MMHG — CRITICAL HIGH (ref 25–48)
PH BLDA: 7.18 — CRITICAL LOW (ref 7.35–7.45)
PO2 BLDA: 109 MMHG — HIGH (ref 83–108)
PROCALCITONIN SERPL-MCNC: 0.04 NG/ML — SIGNIFICANT CHANGE UP (ref 0.02–0.1)
SAO2 % BLDA: 99 % — HIGH (ref 94–98)

## 2022-11-22 PROCEDURE — 99291 CRITICAL CARE FIRST HOUR: CPT

## 2022-11-22 PROCEDURE — 93010 ELECTROCARDIOGRAM REPORT: CPT

## 2022-11-22 RX ORDER — PHENOBARBITAL 60 MG
32.4 TABLET ORAL THREE TIMES A DAY
Refills: 0 | Status: DISCONTINUED | OUTPATIENT
Start: 2022-11-22 | End: 2022-11-24

## 2022-11-22 RX ORDER — INFLUENZA VIRUS VACCINE 15; 15; 15; 15 UG/.5ML; UG/.5ML; UG/.5ML; UG/.5ML
0.7 SUSPENSION INTRAMUSCULAR ONCE
Refills: 0 | Status: DISCONTINUED | OUTPATIENT
Start: 2022-11-22 | End: 2022-11-25

## 2022-11-22 RX ADMIN — Medication 50 MICROGRAM(S): at 07:00

## 2022-11-22 RX ADMIN — LIDOCAINE 1 PATCH: 4 CREAM TOPICAL at 15:22

## 2022-11-22 RX ADMIN — Medication 32.4 MILLIGRAM(S): at 15:23

## 2022-11-22 RX ADMIN — Medication 3 MILLILITER(S): at 08:15

## 2022-11-22 RX ADMIN — Medication 32.4 MILLIGRAM(S): at 07:00

## 2022-11-22 RX ADMIN — Medication 3 MILLILITER(S): at 15:22

## 2022-11-22 RX ADMIN — NYSTATIN CREAM 1 APPLICATION(S): 100000 CREAM TOPICAL at 07:00

## 2022-11-22 RX ADMIN — Medication 3 MILLILITER(S): at 22:42

## 2022-11-22 RX ADMIN — NYSTATIN CREAM 1 APPLICATION(S): 100000 CREAM TOPICAL at 18:11

## 2022-11-22 RX ADMIN — Medication 3 MILLILITER(S): at 02:00

## 2022-11-22 RX ADMIN — Medication 60 MILLIGRAM(S): at 22:44

## 2022-11-22 RX ADMIN — ENOXAPARIN SODIUM 40 MILLIGRAM(S): 100 INJECTION SUBCUTANEOUS at 22:45

## 2022-11-22 RX ADMIN — Medication 60 MILLIGRAM(S): at 09:48

## 2022-11-22 RX ADMIN — Medication 32.4 MILLIGRAM(S): at 22:45

## 2022-11-22 NOTE — PATIENT PROFILE ADULT - FALL HARM RISK - HARM RISK INTERVENTIONS

## 2022-11-22 NOTE — CONSULT NOTE ADULT - SUBJECTIVE AND OBJECTIVE BOX
Patient is a 67y old  Female who presents with a chief complaint of SOB    66 yo female with pmhx of asthma, obesity, epilepsy, hypothyroidism , osteoarthritis, kidney stone, L radial artery occlusion 2021 (previously on Eliquis) presents from nursing home for evaluation of hypoxia. pt was hypoxic to 68% on RA and placed on bipap with improvement of O2 saturation to 92%. In ED pt was in respiratory depression RR 10, 94% on 15 ml NRB. Pt was placed on BIPAP in ED with improvement of her O2 saturation. During my encounter, pt is AOOx3, saturating 100% on 4 L NC. Pt admits to chronic dry cough and SOB and states that she use Intermittent O2 at NH and during sleep. She is Bedbound since L hip fracture s/p ORIF 2021. Pt otherwise denies CP, N/V, HA, Fever or chills or change/worsening cough.     Vitals in ED  · BP Systolic	166 mm Hg  · BP Diastolic	74 mm Hg  · Heart Rate	71 /min  · Respiration Rate (breaths/min)	 10 /min  · Temp (F)	 96.2 Degrees F  · SpO2 (%)	94 %	mask, nonrebreather 15 L/min    Labs significant for: K 5.4, Bicarb 42, ,   VBG: pH 7.23:  pCO2,: 112 pO2: 47  HCO3: 47 Oxygen Saturation: 81.1    CXR: Pulmonary vascular congestion with left basilar opacity, increased    s/p 1x Levaquin in Ed, admitted to SDU, called to evaluate this am on 4 L nc      PAST MEDICAL & SURGICAL HISTORY:  Diabetes      Renal stone      Renal failure      Epilepsy      Asthma      Chronic cough      Knee pain      Osteoarthritis      Uterine cancer  s/p hysterectomy      H/O abdominal hysterectomy  NO RADIATION AND CHEMO      History of tonsillectomy  13 years old      [place] teeth removed      History of bladder stone  SURGERY 8/3/2018      History of surgery  JJ STENT PLACEMENT LEFT OCTOBER 2017          SOCIAL HX:   Smoking   -    FAMILY HISTORY:  No pertinent family history in first degree relatives        REVIEW OF SYSTEMS see hpi    Allergies    Compazine (Unknown)  latex (Urticaria)  penicillins (Unknown)    Intolerances        acetaminophen     Tablet .. 650 milliGRAM(s) Oral every 6 hours PRN  albuterol/ipratropium for Nebulization 3 milliLiter(s) Nebulizer every 6 hours  citalopram 20 milliGRAM(s) Oral daily  enoxaparin Injectable 40 milliGRAM(s) SubCutaneous every 24 hours  levothyroxine 50 MICROGram(s) Oral daily  lidocaine   4% Patch 1 Patch Transdermal daily  loratadine 10 milliGRAM(s) Oral daily  montelukast 10 milliGRAM(s) Oral at bedtime  multivitamin/minerals 1 Tablet(s) Oral daily  nystatin Powder 1 Application(s) Topical two times a day  PHENobarbital 32.4 milliGRAM(s) Oral three times a day  pramipexole 0.5 milliGRAM(s) Oral at bedtime  topiramate 100 milliGRAM(s) Oral <User Schedule>  traZODone 100 milliGRAM(s) Oral at bedtime  : Home Meds:      PHYSICAL EXAM    ICU Vital Signs Last 24 Hrs  T(C): 35.7 (21 Nov 2022 14:39), Max: 35.7 (21 Nov 2022 14:39)  T(F): 96.2 (21 Nov 2022 14:39), Max: 96.2 (21 Nov 2022 14:39)  HR: 60 (22 Nov 2022 01:18) (60 - 72)  BP: 108/60 (22 Nov 2022 01:18) (108/60 - 166/74)  BP(mean): 87 (21 Nov 2022 15:10) (87 - 87)  RR: 18 (22 Nov 2022 01:18) (10 - 20)  SpO2: 99% (22 Nov 2022 01:18) (94% - 100%)    O2 Parameters below as of 22 Nov 2022 01:18  Patient On (Oxygen Delivery Method): nasal cannula  O2 Flow (L/min): 4          General: obese, chronically ill looking  Lungs: Bilateral rhonchi  Cardiovascular LISA 2.6  Abdomen: Soft, Positive BS  Extremities: No clubbing  Neurological: Non focal         LABS:                          15.2   8.42  )-----------( 153      ( 21 Nov 2022 15:41 )             51.6                                               11-21    145  |  99  |  16  ----------------------------<  118<H>  5.4<H>   |  42<HH>  |  0.6<L>    Ca    8.8      21 Nov 2022 15:41    TPro  6.6  /  Alb  4.2  /  TBili  0.3  /  DBili  x   /  AST  33  /  ALT  29  /  AlkPhos  78  11-21                                                 CARDIAC MARKERS ( 21 Nov 2022 15:41 )  x     / <0.01 ng/mL / x     / x     / x                                                LIVER FUNCTIONS - ( 21 Nov 2022 15:41 )  Alb: 4.2 g/dL / Pro: 6.6 g/dL / ALK PHOS: 78 U/L / ALT: 29 U/L / AST: 33 U/L / GGT: x                                                                                                                                    MEDICATIONS  (STANDING):  albuterol/ipratropium for Nebulization 3 milliLiter(s) Nebulizer every 6 hours  citalopram 20 milliGRAM(s) Oral daily  enoxaparin Injectable 40 milliGRAM(s) SubCutaneous every 24 hours  levothyroxine 50 MICROGram(s) Oral daily  lidocaine   4% Patch 1 Patch Transdermal daily  loratadine 10 milliGRAM(s) Oral daily  montelukast 10 milliGRAM(s) Oral at bedtime  multivitamin/minerals 1 Tablet(s) Oral daily  nystatin Powder 1 Application(s) Topical two times a day  PHENobarbital 32.4 milliGRAM(s) Oral three times a day  pramipexole 0.5 milliGRAM(s) Oral at bedtime  topiramate 100 milliGRAM(s) Oral <User Schedule>  traZODone 100 milliGRAM(s) Oral at bedtime    MEDICATIONS  (PRN):  acetaminophen     Tablet .. 650 milliGRAM(s) Oral every 6 hours PRN Temp greater or equal to 38C (100.4F), Mild Pain (1 - 3)        CXR reviewed

## 2022-11-22 NOTE — CONSULT NOTE ADULT - ASSESSMENT
IMPRESSION:    Acute on chronic hypercapnic/ hypoxemic resp failure was on NIV  THOM/ OHS  possible aspiration pneumonia  Asthma exacerbation  bedbound high risk for PE   HX of seizure      PLAN:    CNS: Avoid CNS depressant, AED level    HEENT:  Oral care    PULMONARY:  HOB @ 45 degrees, aspiration precaution, NIV at night and as needed, repeat ABG, solumedrol 60 q 12, Neb as needed, repeat CXR    CARDIOVASCULAR: avoid overload, echo, CE    GI: GI prophylaxis                                          Feeding speecn and swallow eval    RENAL:  F/u  lytes.  Correct as needed. accurate I/O    INFECTIOUS DISEASE: procal, unasyn, rvp    HEMATOLOGICAL:  DVT prophylaxis. LE doppler    ENDOCRINE:  Follow up FS.  Insulin protocol if needed.    SDU  Poor prognosis

## 2022-11-22 NOTE — CHART NOTE - NSCHARTNOTEFT_GEN_A_CORE
Pt's MOLST form is at [facility]. After contacting [facility], it was verified that pt is DNR/DNI/DNH/No feeding tube and asked NH to fax the MOLST form to [facility].

## 2022-11-22 NOTE — ED ADULT NURSE REASSESSMENT NOTE - NS ED NURSE REASSESS COMMENT FT1
Pt a&ox3. Denies pain at this time. Remains on BIPAP. Vitals stable, on continuous cardiac monitor. IV intact. Safety maintained. Call bell within reach.

## 2022-11-23 LAB
ALBUMIN SERPL ELPH-MCNC: 3.8 G/DL — SIGNIFICANT CHANGE UP (ref 3.5–5.2)
ALP SERPL-CCNC: 73 U/L — SIGNIFICANT CHANGE UP (ref 30–115)
ALT FLD-CCNC: 28 U/L — SIGNIFICANT CHANGE UP (ref 0–41)
ANION GAP SERPL CALC-SCNC: 8 MMOL/L — SIGNIFICANT CHANGE UP (ref 7–14)
AST SERPL-CCNC: 20 U/L — SIGNIFICANT CHANGE UP (ref 0–41)
BASE EXCESS BLDA CALC-SCNC: 16.6 MMOL/L — HIGH (ref -2–3)
BASOPHILS # BLD AUTO: 0.01 K/UL — SIGNIFICANT CHANGE UP (ref 0–0.2)
BASOPHILS NFR BLD AUTO: 0.1 % — SIGNIFICANT CHANGE UP (ref 0–1)
BILIRUB SERPL-MCNC: 0.4 MG/DL — SIGNIFICANT CHANGE UP (ref 0.2–1.2)
BUN SERPL-MCNC: 20 MG/DL — SIGNIFICANT CHANGE UP (ref 10–20)
CALCIUM SERPL-MCNC: 8.8 MG/DL — SIGNIFICANT CHANGE UP (ref 8.4–10.5)
CHLORIDE SERPL-SCNC: 98 MMOL/L — SIGNIFICANT CHANGE UP (ref 98–110)
CO2 SERPL-SCNC: 38 MMOL/L — HIGH (ref 17–32)
CREAT SERPL-MCNC: 0.5 MG/DL — LOW (ref 0.7–1.5)
EGFR: 103 ML/MIN/1.73M2 — SIGNIFICANT CHANGE UP
EOSINOPHIL # BLD AUTO: 0 K/UL — SIGNIFICANT CHANGE UP (ref 0–0.7)
EOSINOPHIL NFR BLD AUTO: 0 % — SIGNIFICANT CHANGE UP (ref 0–8)
GAS PNL BLDA: SIGNIFICANT CHANGE UP
GLUCOSE BLDC GLUCOMTR-MCNC: 107 MG/DL — HIGH (ref 70–99)
GLUCOSE BLDC GLUCOMTR-MCNC: 94 MG/DL — SIGNIFICANT CHANGE UP (ref 70–99)
GLUCOSE SERPL-MCNC: 183 MG/DL — HIGH (ref 70–99)
HCO3 BLDA-SCNC: 44 MMOL/L — HIGH (ref 21–28)
HCT VFR BLD CALC: 48.1 % — HIGH (ref 37–47)
HGB BLD-MCNC: 14.9 G/DL — SIGNIFICANT CHANGE UP (ref 12–16)
HOROWITZ INDEX BLDA+IHG-RTO: 40 — SIGNIFICANT CHANGE UP
IMM GRANULOCYTES NFR BLD AUTO: 0.4 % — HIGH (ref 0.1–0.3)
LYMPHOCYTES # BLD AUTO: 0.66 K/UL — LOW (ref 1.2–3.4)
LYMPHOCYTES # BLD AUTO: 9.5 % — LOW (ref 20.5–51.1)
MAGNESIUM SERPL-MCNC: 1.9 MG/DL — SIGNIFICANT CHANGE UP (ref 1.8–2.4)
MCHC RBC-ENTMCNC: 31 G/DL — LOW (ref 32–37)
MCHC RBC-ENTMCNC: 31.9 PG — HIGH (ref 27–31)
MCV RBC AUTO: 103 FL — HIGH (ref 81–99)
MONOCYTES # BLD AUTO: 0.41 K/UL — SIGNIFICANT CHANGE UP (ref 0.1–0.6)
MONOCYTES NFR BLD AUTO: 5.9 % — SIGNIFICANT CHANGE UP (ref 1.7–9.3)
NEUTROPHILS # BLD AUTO: 5.86 K/UL — SIGNIFICANT CHANGE UP (ref 1.4–6.5)
NEUTROPHILS NFR BLD AUTO: 84.1 % — HIGH (ref 42.2–75.2)
NRBC # BLD: 0 /100 WBCS — SIGNIFICANT CHANGE UP (ref 0–0)
PCO2 BLDA: 67 MMHG — HIGH (ref 25–48)
PH BLDA: 7.43 — SIGNIFICANT CHANGE UP (ref 7.35–7.45)
PLATELET # BLD AUTO: 125 K/UL — LOW (ref 130–400)
PO2 BLDA: 106 MMHG — SIGNIFICANT CHANGE UP (ref 83–108)
POTASSIUM SERPL-MCNC: 4.2 MMOL/L — SIGNIFICANT CHANGE UP (ref 3.5–5)
POTASSIUM SERPL-SCNC: 4.2 MMOL/L — SIGNIFICANT CHANGE UP (ref 3.5–5)
PROCALCITONIN SERPL-MCNC: 0.07 NG/ML — SIGNIFICANT CHANGE UP (ref 0.02–0.1)
PROT SERPL-MCNC: 6 G/DL — SIGNIFICANT CHANGE UP (ref 6–8)
RAPID RVP RESULT: DETECTED
RBC # BLD: 4.67 M/UL — SIGNIFICANT CHANGE UP (ref 4.2–5.4)
RBC # FLD: 13.2 % — SIGNIFICANT CHANGE UP (ref 11.5–14.5)
RV+EV RNA SPEC QL NAA+PROBE: DETECTED
SAO2 % BLDA: 99.9 % — HIGH (ref 94–98)
SARS-COV-2 RNA SPEC QL NAA+PROBE: SIGNIFICANT CHANGE UP
SODIUM SERPL-SCNC: 144 MMOL/L — SIGNIFICANT CHANGE UP (ref 135–146)
WBC # BLD: 6.97 K/UL — SIGNIFICANT CHANGE UP (ref 4.8–10.8)
WBC # FLD AUTO: 6.97 K/UL — SIGNIFICANT CHANGE UP (ref 4.8–10.8)

## 2022-11-23 PROCEDURE — 93970 EXTREMITY STUDY: CPT | Mod: 26

## 2022-11-23 PROCEDURE — 99233 SBSQ HOSP IP/OBS HIGH 50: CPT

## 2022-11-23 PROCEDURE — 71045 X-RAY EXAM CHEST 1 VIEW: CPT | Mod: 26

## 2022-11-23 RX ORDER — PANTOPRAZOLE SODIUM 20 MG/1
40 TABLET, DELAYED RELEASE ORAL
Refills: 0 | Status: DISCONTINUED | OUTPATIENT
Start: 2022-11-23 | End: 2022-11-25

## 2022-11-23 RX ADMIN — Medication 1 TABLET(S): at 11:26

## 2022-11-23 RX ADMIN — NYSTATIN CREAM 1 APPLICATION(S): 100000 CREAM TOPICAL at 06:08

## 2022-11-23 RX ADMIN — LIDOCAINE 1 PATCH: 4 CREAM TOPICAL at 08:38

## 2022-11-23 RX ADMIN — PRAMIPEXOLE DIHYDROCHLORIDE 0.5 MILLIGRAM(S): 0.12 TABLET ORAL at 21:55

## 2022-11-23 RX ADMIN — Medication 100 MILLIGRAM(S): at 17:02

## 2022-11-23 RX ADMIN — Medication 100 MILLIGRAM(S): at 13:18

## 2022-11-23 RX ADMIN — Medication 32.4 MILLIGRAM(S): at 06:08

## 2022-11-23 RX ADMIN — Medication 3 MILLILITER(S): at 07:53

## 2022-11-23 RX ADMIN — Medication 100 MILLIGRAM(S): at 21:54

## 2022-11-23 RX ADMIN — LIDOCAINE 1 PATCH: 4 CREAM TOPICAL at 11:26

## 2022-11-23 RX ADMIN — NYSTATIN CREAM 1 APPLICATION(S): 100000 CREAM TOPICAL at 17:08

## 2022-11-23 RX ADMIN — Medication 32.4 MILLIGRAM(S): at 14:23

## 2022-11-23 RX ADMIN — CITALOPRAM 20 MILLIGRAM(S): 10 TABLET, FILM COATED ORAL at 11:26

## 2022-11-23 RX ADMIN — Medication 60 MILLIGRAM(S): at 06:07

## 2022-11-23 RX ADMIN — LIDOCAINE 1 PATCH: 4 CREAM TOPICAL at 23:56

## 2022-11-23 RX ADMIN — Medication 60 MILLIGRAM(S): at 17:08

## 2022-11-23 RX ADMIN — LIDOCAINE 1 PATCH: 4 CREAM TOPICAL at 19:09

## 2022-11-23 RX ADMIN — Medication 32.4 MILLIGRAM(S): at 22:26

## 2022-11-23 RX ADMIN — ENOXAPARIN SODIUM 40 MILLIGRAM(S): 100 INJECTION SUBCUTANEOUS at 21:55

## 2022-11-23 RX ADMIN — Medication 100 MILLIGRAM(S): at 09:26

## 2022-11-23 RX ADMIN — LORATADINE 10 MILLIGRAM(S): 10 TABLET ORAL at 11:26

## 2022-11-23 RX ADMIN — Medication 3 MILLILITER(S): at 13:18

## 2022-11-23 RX ADMIN — MONTELUKAST 10 MILLIGRAM(S): 4 TABLET, CHEWABLE ORAL at 21:54

## 2022-11-23 RX ADMIN — Medication 50 MICROGRAM(S): at 06:05

## 2022-11-23 RX ADMIN — Medication 3 MILLILITER(S): at 22:01

## 2022-11-23 NOTE — PHYSICAL THERAPY INITIAL EVALUATION ADULT - PERTINENT HX OF CURRENT PROBLEM, REHAB EVAL
admitted with respiratory failure, 68% on RA.  per pt, she has been bed-bound at [facility] > 1 year following a fall and LT hip ORIF.  pt is 130 lbs; requires total A for most bed mobility. admitted from [facility] with respiratory failure, O2 sats 68% on RA.  per pt, she has been bed-bound at [facility] > 1 year following a fall and LT hip ORIF.  pt is 130 lbs; requires total A for most bed mobility.

## 2022-11-23 NOTE — PHYSICAL THERAPY INITIAL EVALUATION ADULT - NSACTIVITYREC_GEN_A_PT
bed mobility and balance/strengthening exercises. bed mobility, sitting balance at EOB, and balance/strengthening exercises.

## 2022-11-23 NOTE — PHYSICAL THERAPY INITIAL EVALUATION ADULT - LEVEL OF INDEPENDENCE: BED TO CHAIR, REHAB EVAL
NA at this time. pt has been bed-bound x 1 year. will require a Mitali lift for OOB transfers/unable to perform

## 2022-11-23 NOTE — PHYSICAL THERAPY INITIAL EVALUATION ADULT - GENERAL OBSERVATIONS, REHAB EVAL
66 y/o F rec'd/left in bed, nad, + tele, primafit, RN aware. pt is A+Ox4, cooperative, agreeable to PT evaluation. pt presents with generalized deconditioning, requires max/total A for bed mobility and sitting at EOB. vitals: 122/69 77 97% on 2.5L O2.

## 2022-11-23 NOTE — PHYSICAL THERAPY INITIAL EVALUATION ADULT - ADDITIONAL COMMENTS
prior to this admission, pt was max/total A for bed mobility;  prior to the fall and hip fx > 1 year ago, pt was independent

## 2022-11-23 NOTE — PHYSICAL THERAPY INITIAL EVALUATION ADULT - LEVEL OF INDEPENDENCE: SUPINE/SIT, REHAB EVAL
dependent (less than 25% patients effort) sat at EOB x 5 min with max A for balance, LT hand on bedrail; dangled her feet; not able to maintain seated position  without assistance. no complaints of dizziness while sitting; did c/o discomfort in bilat thighs in sitting./dependent (less than 25% patients effort)

## 2022-11-23 NOTE — PROGRESS NOTE ADULT - TIME BILLING
I met patient for the first time. Chart reviewed, patient examined. Pertinent results reviewed.  Spoke with patient, HO, and Dr Bynum.

## 2022-11-24 LAB
ALBUMIN SERPL ELPH-MCNC: 3.8 G/DL — SIGNIFICANT CHANGE UP (ref 3.5–5.2)
ALP SERPL-CCNC: 67 U/L — SIGNIFICANT CHANGE UP (ref 30–115)
ALT FLD-CCNC: 24 U/L — SIGNIFICANT CHANGE UP (ref 0–41)
ANION GAP SERPL CALC-SCNC: 6 MMOL/L — LOW (ref 7–14)
AST SERPL-CCNC: 17 U/L — SIGNIFICANT CHANGE UP (ref 0–41)
BASOPHILS # BLD AUTO: 0.01 K/UL — SIGNIFICANT CHANGE UP (ref 0–0.2)
BASOPHILS NFR BLD AUTO: 0.1 % — SIGNIFICANT CHANGE UP (ref 0–1)
BILIRUB SERPL-MCNC: 0.2 MG/DL — SIGNIFICANT CHANGE UP (ref 0.2–1.2)
BUN SERPL-MCNC: 20 MG/DL — SIGNIFICANT CHANGE UP (ref 10–20)
CALCIUM SERPL-MCNC: 8.6 MG/DL — SIGNIFICANT CHANGE UP (ref 8.4–10.5)
CHLORIDE SERPL-SCNC: 100 MMOL/L — SIGNIFICANT CHANGE UP (ref 98–110)
CO2 SERPL-SCNC: 37 MMOL/L — HIGH (ref 17–32)
CREAT SERPL-MCNC: 0.5 MG/DL — LOW (ref 0.7–1.5)
EGFR: 103 ML/MIN/1.73M2 — SIGNIFICANT CHANGE UP
EOSINOPHIL # BLD AUTO: 0.01 K/UL — SIGNIFICANT CHANGE UP (ref 0–0.7)
EOSINOPHIL NFR BLD AUTO: 0.1 % — SIGNIFICANT CHANGE UP (ref 0–8)
GLUCOSE SERPL-MCNC: 106 MG/DL — HIGH (ref 70–99)
HCT VFR BLD CALC: 46.7 % — SIGNIFICANT CHANGE UP (ref 37–47)
HGB BLD-MCNC: 14 G/DL — SIGNIFICANT CHANGE UP (ref 12–16)
IMM GRANULOCYTES NFR BLD AUTO: 0.4 % — HIGH (ref 0.1–0.3)
LYMPHOCYTES # BLD AUTO: 0.95 K/UL — LOW (ref 1.2–3.4)
LYMPHOCYTES # BLD AUTO: 11.8 % — LOW (ref 20.5–51.1)
MAGNESIUM SERPL-MCNC: 1.7 MG/DL — LOW (ref 1.8–2.4)
MCHC RBC-ENTMCNC: 30 G/DL — LOW (ref 32–37)
MCHC RBC-ENTMCNC: 31.1 PG — HIGH (ref 27–31)
MCV RBC AUTO: 103.8 FL — HIGH (ref 81–99)
MONOCYTES # BLD AUTO: 0.79 K/UL — HIGH (ref 0.1–0.6)
MONOCYTES NFR BLD AUTO: 9.8 % — HIGH (ref 1.7–9.3)
NEUTROPHILS # BLD AUTO: 6.28 K/UL — SIGNIFICANT CHANGE UP (ref 1.4–6.5)
NEUTROPHILS NFR BLD AUTO: 77.8 % — HIGH (ref 42.2–75.2)
NRBC # BLD: 0 /100 WBCS — SIGNIFICANT CHANGE UP (ref 0–0)
PLATELET # BLD AUTO: 140 K/UL — SIGNIFICANT CHANGE UP (ref 130–400)
POTASSIUM SERPL-MCNC: 4.2 MMOL/L — SIGNIFICANT CHANGE UP (ref 3.5–5)
POTASSIUM SERPL-SCNC: 4.2 MMOL/L — SIGNIFICANT CHANGE UP (ref 3.5–5)
PROCALCITONIN SERPL-MCNC: 0.08 NG/ML — SIGNIFICANT CHANGE UP (ref 0.02–0.1)
PROT SERPL-MCNC: 5.9 G/DL — LOW (ref 6–8)
RBC # BLD: 4.5 M/UL — SIGNIFICANT CHANGE UP (ref 4.2–5.4)
RBC # FLD: 13.1 % — SIGNIFICANT CHANGE UP (ref 11.5–14.5)
SODIUM SERPL-SCNC: 143 MMOL/L — SIGNIFICANT CHANGE UP (ref 135–146)
WBC # BLD: 8.07 K/UL — SIGNIFICANT CHANGE UP (ref 4.8–10.8)
WBC # FLD AUTO: 8.07 K/UL — SIGNIFICANT CHANGE UP (ref 4.8–10.8)

## 2022-11-24 RX ORDER — PHENOBARBITAL 60 MG
32.4 TABLET ORAL THREE TIMES A DAY
Refills: 0 | Status: DISCONTINUED | OUTPATIENT
Start: 2022-11-24 | End: 2022-11-25

## 2022-11-24 RX ORDER — MAGNESIUM OXIDE 400 MG ORAL TABLET 241.3 MG
400 TABLET ORAL ONCE
Refills: 0 | Status: COMPLETED | OUTPATIENT
Start: 2022-11-24 | End: 2022-11-24

## 2022-11-24 RX ADMIN — NYSTATIN CREAM 1 APPLICATION(S): 100000 CREAM TOPICAL at 06:36

## 2022-11-24 RX ADMIN — Medication 3 MILLILITER(S): at 01:15

## 2022-11-24 RX ADMIN — Medication 3 MILLILITER(S): at 07:58

## 2022-11-24 RX ADMIN — Medication 100 MILLIGRAM(S): at 21:59

## 2022-11-24 RX ADMIN — Medication 3 MILLILITER(S): at 20:50

## 2022-11-24 RX ADMIN — Medication 3 MILLILITER(S): at 14:50

## 2022-11-24 RX ADMIN — Medication 60 MILLIGRAM(S): at 05:21

## 2022-11-24 RX ADMIN — LIDOCAINE 1 PATCH: 4 CREAM TOPICAL at 13:04

## 2022-11-24 RX ADMIN — Medication 1 TABLET(S): at 13:04

## 2022-11-24 RX ADMIN — LORATADINE 10 MILLIGRAM(S): 10 TABLET ORAL at 12:50

## 2022-11-24 RX ADMIN — Medication 32.4 MILLIGRAM(S): at 13:37

## 2022-11-24 RX ADMIN — MONTELUKAST 10 MILLIGRAM(S): 4 TABLET, CHEWABLE ORAL at 21:59

## 2022-11-24 RX ADMIN — Medication 100 MILLIGRAM(S): at 18:08

## 2022-11-24 RX ADMIN — LIDOCAINE 1 PATCH: 4 CREAM TOPICAL at 22:01

## 2022-11-24 RX ADMIN — ENOXAPARIN SODIUM 40 MILLIGRAM(S): 100 INJECTION SUBCUTANEOUS at 22:00

## 2022-11-24 RX ADMIN — Medication 32.4 MILLIGRAM(S): at 21:59

## 2022-11-24 RX ADMIN — Medication 50 MICROGRAM(S): at 05:21

## 2022-11-24 RX ADMIN — PRAMIPEXOLE DIHYDROCHLORIDE 0.5 MILLIGRAM(S): 0.12 TABLET ORAL at 21:59

## 2022-11-24 RX ADMIN — Medication 32.4 MILLIGRAM(S): at 05:20

## 2022-11-24 RX ADMIN — PANTOPRAZOLE SODIUM 40 MILLIGRAM(S): 20 TABLET, DELAYED RELEASE ORAL at 05:21

## 2022-11-24 RX ADMIN — MAGNESIUM OXIDE 400 MG ORAL TABLET 400 MILLIGRAM(S): 241.3 TABLET ORAL at 10:14

## 2022-11-24 RX ADMIN — CITALOPRAM 20 MILLIGRAM(S): 10 TABLET, FILM COATED ORAL at 12:50

## 2022-11-24 RX ADMIN — Medication 60 MILLIGRAM(S): at 18:09

## 2022-11-24 RX ADMIN — Medication 100 MILLIGRAM(S): at 12:51

## 2022-11-24 RX ADMIN — Medication 100 MILLIGRAM(S): at 10:14

## 2022-11-25 ENCOUNTER — TRANSCRIPTION ENCOUNTER (OUTPATIENT)
Age: 67
End: 2022-11-25

## 2022-11-25 VITALS
HEART RATE: 75 BPM | TEMPERATURE: 97 F | SYSTOLIC BLOOD PRESSURE: 127 MMHG | DIASTOLIC BLOOD PRESSURE: 84 MMHG | RESPIRATION RATE: 18 BRPM

## 2022-11-25 LAB
ALBUMIN SERPL ELPH-MCNC: 4 G/DL — SIGNIFICANT CHANGE UP (ref 3.5–5.2)
ALP SERPL-CCNC: 68 U/L — SIGNIFICANT CHANGE UP (ref 30–115)
ALT FLD-CCNC: 25 U/L — SIGNIFICANT CHANGE UP (ref 0–41)
ANION GAP SERPL CALC-SCNC: 9 MMOL/L — SIGNIFICANT CHANGE UP (ref 7–14)
AST SERPL-CCNC: 14 U/L — SIGNIFICANT CHANGE UP (ref 0–41)
BASOPHILS # BLD AUTO: 0.01 K/UL — SIGNIFICANT CHANGE UP (ref 0–0.2)
BASOPHILS NFR BLD AUTO: 0.1 % — SIGNIFICANT CHANGE UP (ref 0–1)
BILIRUB SERPL-MCNC: 0.3 MG/DL — SIGNIFICANT CHANGE UP (ref 0.2–1.2)
BUN SERPL-MCNC: 20 MG/DL — SIGNIFICANT CHANGE UP (ref 10–20)
CALCIUM SERPL-MCNC: 9 MG/DL — SIGNIFICANT CHANGE UP (ref 8.4–10.5)
CHLORIDE SERPL-SCNC: 98 MMOL/L — SIGNIFICANT CHANGE UP (ref 98–110)
CO2 SERPL-SCNC: 35 MMOL/L — HIGH (ref 17–32)
CREAT SERPL-MCNC: 0.5 MG/DL — LOW (ref 0.7–1.5)
EGFR: 103 ML/MIN/1.73M2 — SIGNIFICANT CHANGE UP
EOSINOPHIL # BLD AUTO: 0.04 K/UL — SIGNIFICANT CHANGE UP (ref 0–0.7)
EOSINOPHIL NFR BLD AUTO: 0.4 % — SIGNIFICANT CHANGE UP (ref 0–8)
GAS PNL BLDA: SIGNIFICANT CHANGE UP
GLUCOSE BLDC GLUCOMTR-MCNC: 104 MG/DL — HIGH (ref 70–99)
GLUCOSE BLDC GLUCOMTR-MCNC: 179 MG/DL — HIGH (ref 70–99)
GLUCOSE SERPL-MCNC: 167 MG/DL — HIGH (ref 70–99)
HCT VFR BLD CALC: 49.3 % — HIGH (ref 37–47)
HGB BLD-MCNC: 14.6 G/DL — SIGNIFICANT CHANGE UP (ref 12–16)
IMM GRANULOCYTES NFR BLD AUTO: 0.4 % — HIGH (ref 0.1–0.3)
LYMPHOCYTES # BLD AUTO: 1.22 K/UL — SIGNIFICANT CHANGE UP (ref 1.2–3.4)
LYMPHOCYTES # BLD AUTO: 12.1 % — LOW (ref 20.5–51.1)
MAGNESIUM SERPL-MCNC: 1.8 MG/DL — SIGNIFICANT CHANGE UP (ref 1.8–2.4)
MCHC RBC-ENTMCNC: 29.6 G/DL — LOW (ref 32–37)
MCHC RBC-ENTMCNC: 31.1 PG — HIGH (ref 27–31)
MCV RBC AUTO: 104.9 FL — HIGH (ref 81–99)
MONOCYTES # BLD AUTO: 0.61 K/UL — HIGH (ref 0.1–0.6)
MONOCYTES NFR BLD AUTO: 6 % — SIGNIFICANT CHANGE UP (ref 1.7–9.3)
NEUTROPHILS # BLD AUTO: 8.19 K/UL — HIGH (ref 1.4–6.5)
NEUTROPHILS NFR BLD AUTO: 81 % — HIGH (ref 42.2–75.2)
NRBC # BLD: 0 /100 WBCS — SIGNIFICANT CHANGE UP (ref 0–0)
PLATELET # BLD AUTO: 156 K/UL — SIGNIFICANT CHANGE UP (ref 130–400)
POTASSIUM SERPL-MCNC: 4.3 MMOL/L — SIGNIFICANT CHANGE UP (ref 3.5–5)
POTASSIUM SERPL-SCNC: 4.3 MMOL/L — SIGNIFICANT CHANGE UP (ref 3.5–5)
PROT SERPL-MCNC: 6.3 G/DL — SIGNIFICANT CHANGE UP (ref 6–8)
RBC # BLD: 4.7 M/UL — SIGNIFICANT CHANGE UP (ref 4.2–5.4)
RBC # FLD: 13.1 % — SIGNIFICANT CHANGE UP (ref 11.5–14.5)
SARS-COV-2 RNA SPEC QL NAA+PROBE: SIGNIFICANT CHANGE UP
SODIUM SERPL-SCNC: 142 MMOL/L — SIGNIFICANT CHANGE UP (ref 135–146)
WBC # BLD: 10.11 K/UL — SIGNIFICANT CHANGE UP (ref 4.8–10.8)
WBC # FLD AUTO: 10.11 K/UL — SIGNIFICANT CHANGE UP (ref 4.8–10.8)

## 2022-11-25 RX ORDER — LEVOFLOXACIN 5 MG/ML
750 INJECTION, SOLUTION INTRAVENOUS ONCE
Refills: 0 | Status: COMPLETED | OUTPATIENT
Start: 2022-11-25 | End: 2022-11-25

## 2022-11-25 RX ADMIN — Medication 1 TABLET(S): at 13:22

## 2022-11-25 RX ADMIN — LIDOCAINE 1 PATCH: 4 CREAM TOPICAL at 11:15

## 2022-11-25 RX ADMIN — NYSTATIN CREAM 1 APPLICATION(S): 100000 CREAM TOPICAL at 06:18

## 2022-11-25 RX ADMIN — Medication 650 MILLIGRAM(S): at 10:56

## 2022-11-25 RX ADMIN — LORATADINE 10 MILLIGRAM(S): 10 TABLET ORAL at 11:15

## 2022-11-25 RX ADMIN — CITALOPRAM 20 MILLIGRAM(S): 10 TABLET, FILM COATED ORAL at 11:15

## 2022-11-25 RX ADMIN — LEVOFLOXACIN 750 MILLIGRAM(S): 5 INJECTION, SOLUTION INTRAVENOUS at 14:30

## 2022-11-25 RX ADMIN — Medication 100 MILLIGRAM(S): at 08:05

## 2022-11-25 RX ADMIN — Medication 3 MILLILITER(S): at 13:25

## 2022-11-25 RX ADMIN — PANTOPRAZOLE SODIUM 40 MILLIGRAM(S): 20 TABLET, DELAYED RELEASE ORAL at 06:18

## 2022-11-25 RX ADMIN — Medication 32.4 MILLIGRAM(S): at 13:22

## 2022-11-25 RX ADMIN — Medication 650 MILLIGRAM(S): at 11:30

## 2022-11-25 RX ADMIN — Medication 3 MILLILITER(S): at 08:50

## 2022-11-25 RX ADMIN — Medication 32.4 MILLIGRAM(S): at 06:18

## 2022-11-25 RX ADMIN — Medication 50 MICROGRAM(S): at 06:18

## 2022-11-25 RX ADMIN — Medication 100 MILLIGRAM(S): at 13:22

## 2022-11-25 RX ADMIN — LIDOCAINE 1 PATCH: 4 CREAM TOPICAL at 03:40

## 2022-11-25 RX ADMIN — Medication 60 MILLIGRAM(S): at 06:16

## 2022-11-25 NOTE — DISCHARGE NOTE PROVIDER - CARE PROVIDER_API CALL
Ramona Bynum)  Medicine  [address] [place] [id number]  Phone: [phone]  Fax: [phone]  Follow Up Time:     Theodore Guardado)  Critical Care Medicine; Internal Medicine; Pulmonary Disease; Sleep Medicine  [address] [place] [id number]  Phone: [phone]  Fax: [phone]  Follow Up Time:

## 2022-11-25 NOTE — DISCHARGE NOTE PROVIDER - NSDCCPCAREPLAN_GEN_ALL_CORE_FT
PRINCIPAL DISCHARGE DIAGNOSIS  Diagnosis: Acute respiratory failure with hypoxia and hypercapnia  Assessment and Plan of Treatment: Your oxygen was low and you were admitted to the hospital for treatment of respiratory failure, which is the term used when you need supplemental oxygen, through means like a mask or nasal cannula. You were treated with IV antibiotics and steroids, and your breathing and oxygenation improved. Continue to use BiPAP at night to improve your oxygenation, and follow up with a Pulmonlogist in clinic for further evaluation, including a sleep study.

## 2022-11-25 NOTE — DISCHARGE NOTE PROVIDER - HOSPITAL COURSE
66 yo female with pmhx of asthma, obesity, epilepsy, hypothyroidism , osteoarthritis, kidney stone, L radial artery occlusion 2021 (previously on Eliquis)presents from nursing home for evaluation of hypoxia. pt was hypoxic to 68% on RA and placed on bipap with improvement of O2 saturation to 92%. In ED pt was in respiratory depression RR 10, 94% on 15 ml NRB. Pt was placed on BIPAP in ED with improvement of her O2 saturation. During my encounter, pt is AOOx3, saturating 100% on 4 L NC. Pt admits to chronic dry cough and SOB and states that she use Intermittent O2 at NH and during sleep. She is Bedbound since L hip fracture s/p ORIF 2021. Pt otherwise denies CP, N/V, HA, Fever or chills or change/worsening cough. ED vitals: 166/74, P 71, RR 10, temp 96.2F, SaO2 94% on NRB 15L/min. Labs significant for: K 5.4, Bicarb 42, , VBG: pH 7.23:  pCO2,: 112 pO2: 47 HCO3: 47 Oxygen Saturation: 81.1.  CXR: Pulmonary vascular congestion with left basilar opacity, increased.  s/p 1x Levaquin in ED.    Treated for acute hypoxic hypercapnic respiratory failure suspected due to THOM in setting of Obesity, pt also has history of asthma, not in exacerbation this admission. Positive for entero/rhinovirus on 11/22. Treated with IV levaquin, solumedrol 60mg q12h, then transitioned to PO levaquin and PO prednisone. Continued NIV at night per pulmonary recommendations, barbara. Recommend outpatient f/u with Pulm for sleep study and CPAP arrangements. Patient is hemodynamically stable and medically ready for discharge.   66 yo female with pmhx of asthma, obesity, epilepsy, hypothyroidism , osteoarthritis, kidney stone, L radial artery occlusion 2021 (previously on Eliquis)presents from nursing home for evaluation of hypoxia. pt was hypoxic to 68% on RA and placed on bipap with improvement of O2 saturation to 92%. In ED pt was in respiratory depression RR 10, 94% on 15 ml NRB. Pt was placed on BIPAP in ED with improvement of her O2 saturation. During my encounter, pt is AOOx3, saturating 100% on 4 L NC. Pt admits to chronic dry cough and SOB and states that she use Intermittent O2 at NH and during sleep. She is Bedbound since L hip fracture s/p ORIF 2021. Pt otherwise denies CP, N/V, HA, Fever or chills or change/worsening cough. ED vitals: 166/74, P 71, RR 10, temp 96.2F, SaO2 94% on NRB 15L/min. Labs significant for: K 5.4, Bicarb 42, , VBG: pH 7.23:  pCO2,: 112 pO2: 47 HCO3: 47 Oxygen Saturation: 81.1.  CXR: Pulmonary vascular congestion with left basilar opacity, increased.  s/p 1x Levaquin in ED.    Treated for acute hypoxic hypercapnic respiratory failure suspected due to THOM in setting of Obesity, pt also has history of asthma, not in exacerbation this admission. Positive for entero/rhinovirus on 11/22. Treated with IV levaquin x5days, solumedrol 60mg q12h, then transitioned to PO prednisone. Continued NIV at night per pulmonary recommendations, barbara. Recommend outpatient f/u with Pulm for sleep study and CPAP arrangements. Patient is hemodynamically stable and medically ready for discharge.

## 2022-11-25 NOTE — PROGRESS NOTE ADULT - SUBJECTIVE AND OBJECTIVE BOX
JACOB KOTHARI  67y  Female  HPI:  68 yo female with pmhx of asthma, obesity, epilepsy, hypothyroidism , osteoarthritis, kidney stone, L radial artery occlusion 2021 (previously on Eliquis)presents from nursing home for evaluation of hypoxia. pt was hypoxic to 68% on RA and placed on bipap with improvement of O2 saturation to 92%. In ED pt was in respiratory depression RR 10, 94% on 15 ml NRB. Pt was placed on BIPAP in ED with improvement of her O2 saturation. During my encounter, pt is AOOx3, saturating 100% on 4 L NC. Pt admits to chronic dry cough and SOB and states that she use Intermittent O2 at NH and during sleep. She is Bedbound since L hip fracture s/p ORIF 2021. Pt otherwise denies CP, N/V, HA, Fever or chills or change/worsening cough.     Vitals in ED  · BP Systolic	166 mm Hg  · BP Diastolic	74 mm Hg  · Heart Rate	71 /min  · Respiration Rate (breaths/min)	 10 /min  · Temp (F)	 96.2 Degrees F  · SpO2 (%)	94 %	mask, nonrebreather 15 L/min    Labs significant for: K 5.4, Bicarb 42, ,   VBG: pH 7.23:  pCO2,: 112 pO2: 47  HCO3: 47 Oxygen Saturation: 81.1    CXR: Pulmonary vascular congestion with left basilar opacity, increased    s/p 1x Levaquin in ED   (21 Nov 2022 22:49)    MEDICATIONS  (STANDING):  albuterol/ipratropium for Nebulization 3 milliLiter(s) Nebulizer every 6 hours  citalopram 20 milliGRAM(s) Oral daily  enoxaparin Injectable 40 milliGRAM(s) SubCutaneous every 24 hours  influenza  Vaccine (HIGH DOSE) 0.7 milliLiter(s) IntraMuscular once  levoFLOXacin IVPB 750 milliGRAM(s) IV Intermittent every 24 hours  levothyroxine 50 MICROGram(s) Oral daily  lidocaine   4% Patch 1 Patch Transdermal daily  loratadine 10 milliGRAM(s) Oral daily  methylPREDNISolone sodium succinate Injectable 60 milliGRAM(s) IV Push every 12 hours  montelukast 10 milliGRAM(s) Oral at bedtime  multivitamin/minerals 1 Tablet(s) Oral daily  nystatin Powder 1 Application(s) Topical two times a day  pantoprazole    Tablet 40 milliGRAM(s) Oral before breakfast  PHENobarbital 32.4 milliGRAM(s) Oral three times a day  pramipexole 0.5 milliGRAM(s) Oral at bedtime  topiramate 100 milliGRAM(s) Oral <User Schedule>  traZODone 100 milliGRAM(s) Oral at bedtime    MEDICATIONS  (PRN):  acetaminophen     Tablet .. 650 milliGRAM(s) Oral every 6 hours PRN Temp greater or equal to 38C (100.4F), Mild Pain (1 - 3)    INTERVAL EVENTS: Patient seen today without distress, has slight cough but feels better. Patient states she did wear the BiPAP last night, which nurse confirms.    T(C): 36.3 (11-25-22 @ 06:40), Max: 36.3 (11-25-22 @ 06:40)  HR: 68 (11-25-22 @ 06:40) (68 - 73)  BP: 113/74 (11-25-22 @ 06:40) (113/74 - 125/82)  RR: 18 (11-25-22 @ 06:40) (18 - 18)  SpO2: 100% (11-24-22 @ 13:00) (100% - 100%)  Wt(kg): --Vital Signs Last 24 Hrs  T(C): 36.3 (25 Nov 2022 06:40), Max: 36.3 (25 Nov 2022 06:40)  T(F): 97.4 (25 Nov 2022 06:40), Max: 97.4 (25 Nov 2022 06:40)  HR: 68 (25 Nov 2022 06:40) (68 - 73)  BP: 113/74 (25 Nov 2022 06:40) (113/74 - 125/82)  BP(mean): --  RR: 18 (25 Nov 2022 06:40) (18 - 18)  SpO2: 100% (24 Nov 2022 13:00) (100% - 100%)    Parameters below as of 24 Nov 2022 13:00  Patient On (Oxygen Delivery Method): nasal cannula  O2 Flow (L/min): 3      PHYSICAL EXAM:  GENERAL:  Awake, alert, Obese, Occ cough  CHEST/LUNG: Occ wheeze  HEART: S1, S2, Regular   ABDOMEN: Soft, Nontender, Nondistended; Bowel sounds present  EXTREMITIES: Trace edema      LABS:                        14.0   8.07  )-----------( 140      ( 24 Nov 2022 06:28 )             46.7             11-24    143  |  100  |  20  ----------------------------<  106<H>  4.2   |  37<H>  |  0.5<L>    Ca    8.6      24 Nov 2022 06:28  Mg     1.7     11-24    TPro  5.9<L>  /  Alb  3.8  /  TBili  0.2  /  DBili  x   /  AST  17  /  ALT  24  /  AlkPhos  67  11-24    LIVER FUNCTIONS - ( 24 Nov 2022 06:28 )  Alb: 3.8 g/dL / Pro: 5.9 g/dL / ALK PHOS: 67 U/L / ALT: 24 U/L / AST: 17 U/L / GGT: x                     Rapid RVP Result : Detected    SARS-CoV-2   NotDetec:    Entero/Rhinovirus (RapRVP) : Detected        RADIOLOGY & ADDITIONAL TESTS:  
  JACOB KOTHARI  67y  Female  HPI:  68 yo female with pmhx of asthma, obesity, epilepsy, hypothyroidism , osteoarthritis, kidney stone, L radial artery occlusion 2021 (previously on Eliquis)presents from nursing home for evaluation of hypoxia. pt was hypoxic to 68% on RA and placed on bipap with improvement of O2 saturation to 92%. In ED pt was in respiratory depression RR 10, 94% on 15 ml NRB. Pt was placed on BIPAP in ED with improvement of her O2 saturation. During my encounter, pt is AOOx3, saturating 100% on 4 L NC. Pt admits to chronic dry cough and SOB and states that she use Intermittent O2 at NH and during sleep. She is Bedbound since L hip fracture s/p ORIF 2021. Pt otherwise denies CP, N/V, HA, Fever or chills or change/worsening cough.     Vitals in ED  · BP Systolic	166 mm Hg  · BP Diastolic	74 mm Hg  · Heart Rate	71 /min  · Respiration Rate (breaths/min)	 10 /min  · Temp (F)	 96.2 Degrees F  · SpO2 (%)	94 %	mask, nonrebreather 15 L/min    Labs significant for: K 5.4, Bicarb 42, ,   VBG: pH 7.23:  pCO2,: 112 pO2: 47  HCO3: 47 Oxygen Saturation: 81.1    CXR: Pulmonary vascular congestion with left basilar opacity, increased    s/p 1x Levaquin in ED   (21 Nov 2022 22:49)    MEDICATIONS  (STANDING):  albuterol/ipratropium for Nebulization 3 milliLiter(s) Nebulizer every 6 hours  citalopram 20 milliGRAM(s) Oral daily  enoxaparin Injectable 40 milliGRAM(s) SubCutaneous every 24 hours  influenza  Vaccine (HIGH DOSE) 0.7 milliLiter(s) IntraMuscular once  levoFLOXacin IVPB 750 milliGRAM(s) IV Intermittent every 24 hours  levothyroxine 50 MICROGram(s) Oral daily  lidocaine   4% Patch 1 Patch Transdermal daily  loratadine 10 milliGRAM(s) Oral daily  methylPREDNISolone sodium succinate Injectable 60 milliGRAM(s) IV Push every 12 hours  montelukast 10 milliGRAM(s) Oral at bedtime  multivitamin/minerals 1 Tablet(s) Oral daily  nystatin Powder 1 Application(s) Topical two times a day  pantoprazole    Tablet 40 milliGRAM(s) Oral before breakfast  PHENobarbital 32.4 milliGRAM(s) Oral three times a day  pramipexole 0.5 milliGRAM(s) Oral at bedtime  topiramate 100 milliGRAM(s) Oral <User Schedule>  traZODone 100 milliGRAM(s) Oral at bedtime    MEDICATIONS  (PRN):  acetaminophen     Tablet .. 650 milliGRAM(s) Oral every 6 hours PRN Temp greater or equal to 38C (100.4F), Mild Pain (1 - 3)    INTERVAL EVENTS: Patient seen today without distress. Patient awake and alert with no memory of events leading up hospitalization, only remembers the noises and lights of the ER.    T(C): 36 (11-24-22 @ 12:27), Max: 36.7 (11-23-22 @ 15:43)  HR: 73 (11-24-22 @ 12:27) (66 - 84)  BP: 125/82 (11-24-22 @ 12:27) (104/55 - 137/79)  RR: 18 (11-24-22 @ 12:27) (18 - 20)  SpO2: 98% (11-24-22 @ 05:01) (95% - 98%)  Wt(kg): --Vital Signs Last 24 Hrs  T(C): 36 (24 Nov 2022 12:27), Max: 36.7 (23 Nov 2022 15:43)  T(F): 96.8 (24 Nov 2022 12:27), Max: 98.1 (23 Nov 2022 15:43)  HR: 73 (24 Nov 2022 12:27) (66 - 84)  BP: 125/82 (24 Nov 2022 12:27) (104/55 - 137/79)  BP(mean): 77 (24 Nov 2022 00:37) (77 - 77)  RR: 18 (24 Nov 2022 12:27) (18 - 20)  SpO2: 98% (24 Nov 2022 05:01) (95% - 98%)    Parameters below as of 24 Nov 2022 02:35  Patient On (Oxygen Delivery Method): nasal cannula  O2 Flow (L/min): 3      PHYSICAL EXAM:  GENERAL: Pale, NAD, O2 via NC in place  CHEST/LUNG: Decreased BS, no Wheeze  HEART: S1, S2  ABDOMEN: Soft, Nontender, Bowel sounds present  EXTREMITIES: + edema    LABS:                        14.0   8.07  )-----------( 140      ( 24 Nov 2022 06:28 )             46.7             11-24    143  |  100  |  20  ----------------------------<  106<H>  4.2   |  37<H>  |  0.5<L>    Ca    8.6      24 Nov 2022 06:28  Mg     1.7     11-24    TPro  5.9<L>  /  Alb  3.8  /  TBili  0.2  /  DBili  x   /  AST  17  /  ALT  24  /  AlkPhos  67  11-24    LIVER FUNCTIONS - ( 24 Nov 2022 06:28 )  Alb: 3.8 g/dL / Pro: 5.9 g/dL / ALK PHOS: 67 U/L / ALT: 24 U/L / AST: 17 U/L / GGT: x                       ABG - ( 23 Nov 2022 03:22 )  pH, Arterial: 7.43  pH, Blood: x     /  pCO2: 67    /  pO2: 106   / HCO3: 44    / Base Excess: 16.6  /  SaO2: 99.9            Respiratory Viral Panel with COVID-19 by RONNY         11.22.22 @ 18:27        Rapid RVP Result : Detected    SARS-CoV-2   NotDetec:    Entero/Rhinovirus (RapRVP) : Detected          RADIOLOGY & ADDITIONAL TESTS:  < from: [facility] Duplex Lower Ext Vein Scan, Bilat (11.23.22 @ 12:52) >  ******PRELIMINARY REPORT******           INTERPRETATION:  CLINICAL INFORMATION The patient is a 67-year-old female   with lower x-ray swelling. A venous duplex examination was performed to   evaluate the patient for deep venous thrombosis of the lower extremities.    The common femoral, great saphenous, femoral, popliteal and small   saphenous veins were visualized bilaterally with no evidence of deep   venous thrombosis    All veins were fully compressible.  There was presence of spontaneous   flow, augmentation with distal compression and phasicity.    The right anterior tibial vein was patent.    The left anterior tibial vein was not visualized.    The posterior tibial veins were  patent    The peroneal veins were patent.    Impression:    No evidence of deep venous thrombosis or superficial thrombophlebitis in   the bilateral lower extremities.      < end of copied text >  < from: Xray Chest 1 View-PORTABLE IMMEDIATE (11.21.22 @ 16:05) >    Findings:    Support devices: None.    Cardiac/mediastinum/hilum: Stable cardiomegaly.    Lung parenchyma/Pleura: Pulmonary vascular congestion with left basilar   opacity, increased.    Skeleton/soft tissues: Stable.    Impression:    Pulmonary vascular congestion with left basilar opacity, increased.      < end of copied text >  
Over Night Events: events noted, on NC, afebrile    PHYSICAL EXAM    ICU Vital Signs Last 24 Hrs  T(C): 35.9 (22 Nov 2022 07:37), Max: 35.9 (22 Nov 2022 07:37)  T(F): 96.7 (22 Nov 2022 07:37), Max: 96.7 (22 Nov 2022 07:37)  HR: 61 (23 Nov 2022 00:04) (61 - 111)  BP: 113/59 (23 Nov 2022 00:04) (113/59 - 126/60)  RR: 18 (22 Nov 2022 07:37) (18 - 18)  SpO2: 98% (23 Nov 2022 00:04) (94% - 99%)    O2 Parameters below as of 23 Nov 2022 00:04  Patient On (Oxygen Delivery Method): BiPAP/CPAP            General: obese  Lungs: dec bs both bases  Cardiovascular: LISA 2.6  Abdomen: Soft, Positive BS  Extremities: No clubbing   Neurological: Non focal         LABS:                          15.2   8.42  )-----------( 153      ( 21 Nov 2022 15:41 )             51.6                                               11-21    145  |  99  |  16  ----------------------------<  118<H>  5.4<H>   |  42<HH>  |  0.6<L>    Ca    8.8      21 Nov 2022 15:41    TPro  6.6  /  Alb  4.2  /  TBili  0.3  /  DBili  x   /  AST  33  /  ALT  29  /  AlkPhos  78  11-21                                                 CARDIAC MARKERS ( 21 Nov 2022 15:41 )  x     / <0.01 ng/mL / x     / x     / x                                                LIVER FUNCTIONS - ( 21 Nov 2022 15:41 )  Alb: 4.2 g/dL / Pro: 6.6 g/dL / ALK PHOS: 78 U/L / ALT: 29 U/L / AST: 33 U/L / GGT: x                                                                                                                                   ABG - ( 23 Nov 2022 03:22 )  pH, Arterial: 7.43  pH, Blood: x     /  pCO2: 67    /  pO2: 106   / HCO3: 44    / Base Excess: 16.6  /  SaO2: 99.9                MEDICATIONS  (STANDING):  albuterol/ipratropium for Nebulization 3 milliLiter(s) Nebulizer every 6 hours  citalopram 20 milliGRAM(s) Oral daily  enoxaparin Injectable 40 milliGRAM(s) SubCutaneous every 24 hours  influenza  Vaccine (HIGH DOSE) 0.7 milliLiter(s) IntraMuscular once  levoFLOXacin IVPB 750 milliGRAM(s) IV Intermittent every 24 hours  levothyroxine 50 MICROGram(s) Oral daily  lidocaine   4% Patch 1 Patch Transdermal daily  loratadine 10 milliGRAM(s) Oral daily  methylPREDNISolone sodium succinate Injectable 60 milliGRAM(s) IV Push every 12 hours  montelukast 10 milliGRAM(s) Oral at bedtime  multivitamin/minerals 1 Tablet(s) Oral daily  nystatin Powder 1 Application(s) Topical two times a day  PHENobarbital Injectable 32.4 milliGRAM(s) IV Push three times a day  pramipexole 0.5 milliGRAM(s) Oral at bedtime  topiramate 100 milliGRAM(s) Oral <User Schedule>  traZODone 100 milliGRAM(s) Oral at bedtime    MEDICATIONS  (PRN):  acetaminophen     Tablet .. 650 milliGRAM(s) Oral every 6 hours PRN Temp greater or equal to 38C (100.4F), Mild Pain (1 - 3)        
Chart reviewed, patient examined. Pertinent results reviewed.  Case discussed with CINDY; specialist f/u reviewed  HD#2; patient new to me.    68 yo female with pmhx of asthma, obesity, epilepsy, hypothyroidism , osteoarthritis, kidney stone, L radial artery occlusion 2021 (previously on Eliquis) presents from nursing home for evaluation of hypoxia. pt was hypoxic to 68% on RA and placed on bipap with improvement of O2 saturation to 92%. In ED pt was in respiratory depression RR 10, 94% on 15 ml NRB. Pt was placed on BIPAP in ED with improvement of her O2 saturation. During my encounter, pt is AOOx3, saturating 100% on 4 L NC. Pt admits to chronic dry cough and SOB and states that she use Intermittent O2 at NH and during sleep. She is Bedbound since L hip fracture s/p ORIF 2021. Pt otherwise denies CP, N/V, HA, Fever or chills or change/worsening cough. Patient was DNR, DNI, and DNH @ NH, but MD reviewed w HCP- and patient was sent in for BIPAP Rx.    Vitals in ED  · BP Systolic	166 mm Hg  · BP Diastolic	74 mm Hg  · Heart Rate	71 /min  · Respiration Rate (breaths/min)	 10 /min  · Temp (F)	 96.2 Degrees F  · SpO2 (%)	94 %	mask, nonrebreather 15 L/min    Labs significant for: K 5.4, Bicarb 42, ,   VBG: pH 7.23:  pCO2,: 112 pO2: 47  HCO3: 47 Oxygen Saturation: 81.1      Over Night Events: events noted, on NC or BIPAP at times., afebrile; more alert this AM    MEDICATIONS  (STANDING):  albuterol/ipratropium for Nebulization 3 milliLiter(s) Nebulizer every 6 hours  citalopram 20 milliGRAM(s) Oral daily  enoxaparin Injectable 40 milliGRAM(s) SubCutaneous every 24 hours  influenza  Vaccine (HIGH DOSE) 0.7 milliLiter(s) IntraMuscular once  levoFLOXacin IVPB 750 milliGRAM(s) IV Intermittent every 24 hours  levothyroxine 50 MICROGram(s) Oral daily  lidocaine   4% Patch 1 Patch Transdermal daily  loratadine 10 milliGRAM(s) Oral daily  methylPREDNISolone sodium succinate Injectable 60 milliGRAM(s) IV Push every 12 hours  montelukast 10 milliGRAM(s) Oral at bedtime  multivitamin/minerals 1 Tablet(s) Oral daily  nystatin Powder 1 Application(s) Topical two times a day  pantoprazole    Tablet 40 milliGRAM(s) Oral before breakfast  PHENobarbital Injectable 32.4 milliGRAM(s) IV Push three times a day  pramipexole 0.5 milliGRAM(s) Oral at bedtime  topiramate 100 milliGRAM(s) Oral <User Schedule>  traZODone 100 milliGRAM(s) Oral at bedtime    MEDICATIONS  (PRN):  acetaminophen     Tablet .. 650 milliGRAM(s) Oral every 6 hours PRN Temp greater or equal to 38C (100.4F), Mild Pain (1 - 3)      PHYSICAL EXAM  Vital Signs Last 24 Hrs  T(C): 36.6 (23 Nov 2022 19:39), Max: 36.7 (23 Nov 2022 15:43)  T(F): 97.9 (23 Nov 2022 19:39), Max: 98.1 (23 Nov 2022 15:43)  HR: 66 (23 Nov 2022 19:39) (61 - 73)  BP: 105/50 (23 Nov 2022 19:39) (105/50 - 126/59)  BP(mean): --  RR: 18 (23 Nov 2022 19:39) (18 - 20)  SpO2: 98% (23 Nov 2022 19:39) (97% - 99%)    Parameters below as of 23 Nov 2022 19:39  Patient On (Oxygen Delivery Method): nasal cannula  O2 Flow (L/min): 0.3    General:  very obese; AA, Ox3-  Lungs: dec bs both bases  Cardiovascular: RRR, + LISA 1/6  Abdomen: obese, Soft, Positive BS; NT  Extremities: No clubbing; chronic edema   Neurological: Non focal ; mod gen weakness w paraplegia        LABS:                            14.9   6.97  )-----------( 125      ( 23 Nov 2022 05:54 )             48.1                       15.2   8.42  )-----------( 153      ( 21 Nov 2022 15:41 )             51.6     11-23    144  |  98  |  20  ----------------------------<  183<H>  4.2   |  38<H>  |  0.5<L>    Ca    8.8      23 Nov 2022 05:54  Mg     1.9     11-23    TPro  6.0  /  Alb  3.8  /  TBili  0.4  /  DBili  x   /  AST  20  /  ALT  28  /  AlkPhos  73  11-23 11-21    145  |  99  |  16  ----------------------------<  118<H>  5.4<H>   |  42<HH>  |  0.6<L>    Ca    8.8      21 Nov 2022 15:41    TPro  6.6  /  Alb  4.2  /  TBili  0.3  /  DBili  x   /  AST  33  /  ALT  29  /  AlkPhos  78  11-21                                                 CARDIAC MARKERS ( 21 Nov 2022 15:41 )  x     / <0.01 ng/mL / x     / x     / x                                                LIVER FUNCTIONS - ( 21 Nov 2022 15:41 )  Alb: 4.2 g/dL / Pro: 6.6 g/dL / ALK PHOS: 78 U/L / ALT: 29 U/L / AST: 33 U/L / GGT: x                                                                                                                                   ABG - ( 23 Nov 2022 03:22 )  pH, Arterial: 7.43  pH, Blood: x     /  pCO2: 67    /  pO2: 106   / HCO3: 44    / Base Excess: 16.6  /  SaO2: 99.9                MEDICATIONS  (STANDING):  albuterol/ipratropium for Nebulization 3 milliLiter(s) Nebulizer every 6 hours  citalopram 20 milliGRAM(s) Oral daily  enoxaparin Injectable 40 milliGRAM(s) SubCutaneous every 24 hours  influenza  Vaccine (HIGH DOSE) 0.7 milliLiter(s) IntraMuscular once  levoFLOXacin IVPB 750 milliGRAM(s) IV Intermittent every 24 hours  levothyroxine 50 MICROGram(s) Oral daily  lidocaine   4% Patch 1 Patch Transdermal daily  loratadine 10 milliGRAM(s) Oral daily  methylPREDNISolone sodium succinate Injectable 60 milliGRAM(s) IV Push every 12 hours  montelukast 10 milliGRAM(s) Oral at bedtime  multivitamin/minerals 1 Tablet(s) Oral daily  nystatin Powder 1 Application(s) Topical two times a day  PHENobarbital Injectable 32.4 milliGRAM(s) IV Push three times a day  pramipexole 0.5 milliGRAM(s) Oral at bedtime  topiramate 100 milliGRAM(s) Oral <User Schedule>  traZODone 100 milliGRAM(s) Oral at bedtime    MEDICATIONS  (PRN):  acetaminophen     Tablet .. 650 milliGRAM(s) Oral every 6 hours PRN Temp greater or equal to 38C (100.4F), Mild Pain (1 - 3)

## 2022-11-25 NOTE — DISCHARGE NOTE NURSING/CASE MANAGEMENT/SOCIAL WORK - PATIENT PORTAL LINK FT
You can access the FollowMyHealth Patient Portal offered by [facility] by registering at the following website: [email or web address]. By joining [facility]’s FollowMyHealth portal, you will also be able to view your health information using other applications (apps) compatible with our system.

## 2022-11-25 NOTE — PROGRESS NOTE ADULT - ASSESSMENT
66 yo female with pmhx of asthma, obesity, epilepsy, hypothyroidism , osteoarthritis, kidney stone, L radial artery occlusion 2021 (previously on Eliquis)presents from nursing home for evaluation of hypoxia. pt was hypoxic to 68% on RA and placed on bipap with improvement of O2 saturation to 92%. In ED pt was in respiratory depression RR 10, 94% on 15 ml NRB. Pt was placed on BIPAP in ED with improvement of her O2 saturation.     Acute hypoxic hypercapnic respiratory failure, THOM in setting of Obesity   Hx of Asthma  Entero/Rhinovirus (RapRVP) : Detected  - CXR: Pulmonary vascular congestion with left basilar opacity, increased  - on Levaquin   - continue NIV at night as per pulmonary recommendations  - continue resp treatments  - continue Droplet Precaution for Entero/Rhinovirus  - Clarify period of isolation  - outpatient f/u with Pulm for sleep study and CPAP arrangements  - patient stable    Hx Left radial Ischemia 2021  - no longer on Eliquis    Hypothyroidism   - continue Synthroid 50 mcg     Seizure Disorder  - continue Home Phenobarbital 32.4 TID    Morbid Obesity   - low olga dash diet    Depression  - continue home Citalopram 20 mg daily    DVT Ppx: Lovenox 40mg QD  GI Ppx: N/A  Diet: DASH  Activity: IAT  Dispo: From Nursing Home    MOLST on chart, DNR /DNI.  D/C planning back to SNF... need recommendation for BiPAP at night, Clarify Droplet precaution duration, Will need new Covid Swab for Discharge back to SNF.
68 yo female with pmhx of asthma, obesity, epilepsy, hypothyroidism , osteoarthritis, kidney stone, L radial artery occlusion 2021 (previously on Eliquis)presents from nursing home for evaluation of hypoxia. pt was hypoxic to 68% on RA and placed on bipap with improvement of O2 saturation to 92%. In ED pt was in respiratory depression RR 10, 94% on 15 ml NRB. Pt was placed on BIPAP in ED with improvement of her O2 saturation.     Acute hypoxic hypercapnic respiratory failure, THOM in setting of Obesity   Hx of Asthma  - CXR: Pulmonary vascular congestion with left basilar opacity, increased  - on Levaquin   - continue NIV at night as per pulmonary recommendations  - continue resp treatments  - f/u ABG  - now on isolation for Entero/Rhinovirus  - outpatient f/u with Pulm for sleep study and CPAP arrangements      Hx Left radial Ischemia 2021  - no longer on Eliquis    Hypothyroidism   - continue Synthroid 50 mcg     Seizure Disorder  - continue Home Phenobarbital 32.4 TID    Morbid Obesity   - low olga dash diet    Depression  - continue home Citalopram 20 mg daily    DVT Ppx: Lovenox 40mg QD  GI Ppx: N/A  Diet: DASH  Activity: IAT  Dispo: From Nursing Home    MOLST on chart, DNR /DNI.  D/C planning back to SNF... need recommendation for BiPAP at night
IMPRESSION:    Acute on chronic hypercapnic/ hypoxemic resp failure improved  THOM/ OHS  possible aspiration pneumonia  Asthma exacerbation  HX of seizure      PLAN:    CNS: Avoid CNS depressant, AED as OP    HEENT:  Oral care    PULMONARY:  HOB @ 45 degrees, aspiration precaution, NIV at night and as needed, solumedrol 60 q 12, Neb as needed, repeat CXR    CARDIOVASCULAR: avoid overload, echo    GI: GI prophylaxis                                          Feeding PO    RENAL:  F/u  lytes.  Correct as needed. accurate I/O    INFECTIOUS DISEASE: procal, ABX    HEMATOLOGICAL:  DVT prophylaxis. LE doppler    ENDOCRINE:  Follow up FS.  Insulin protocol if needed.    FLOOR  DNR/I  Poor prognosis  
IMPRESSION:    Acute on chronic hypercapnic/ hypoxemic resp failure improved  THOM/ OHS  Morbid Obesity w Hypoventilation syndrome  possible aspiration pneumonia  Asthma exacerbation  HX of seizure      PLAN:    CNS: Avoid CNS depressant, as OP    HEENT:  Oral care    PULMONARY:  HOB @ 45 degrees, aspiration precaution, NIV at night and as needed, solumedrol 60 q 12, Neb as needed, repeat CXR    CARDIOVASCULAR: avoid overload, echo    GI: GI prophylaxis                                          Feeding PO    RENAL:  F/u  lytes. See Hyperkalemia- already improved.  Correct as needed. accurate I/O    INFECTIOUS DISEASE: procal, ABX    HEMATOLOGICAL:  DVT prophylaxis. LE doppler- as per Pulm.  MUSCULOSKELETAL: BED BOUND FOR > 1 YR; + DECONDITIONED; OK TO ASK PT TO SEE & RX    ENDOCRINE:  Follow up FS.  Insulin protocol if needed.    FLOOR, and aim to return to NH soon  DNR/I  Poor prognosis

## 2022-11-25 NOTE — DISCHARGE NOTE NURSING/CASE MANAGEMENT/SOCIAL WORK - NSDCPEFALRISK_GEN_ALL_CORE
For information on Fall & Injury Prevention, visit: [email or web address] OR  [email or web address] OR  https://www.cdc.gov/steadi/patient.html

## 2022-11-25 NOTE — DISCHARGE NOTE PROVIDER - NSDCMRMEDTOKEN_GEN_ALL_CORE_FT
biotin 5 mg oral capsule: 1 cap(s) orally once a day  cetirizine 5 mg oral tablet: 1 tab(s) orally once a day  citalopram 20 mg oral tablet: 1 tab(s) orally every 24 hours  Desyrel 50 mg oral tablet: 2 tab(s) orally once a day (at bedtime)  DuoNeb 0.5 mg-2.5 mg/3 mL inhalation solution: 3 milliliter(s) inhaled 3 times a day  levothyroxine 50 mcg (0.05 mg) oral tablet: 1 tab(s) orally every 24 hours  lidocaine 4% patch: Apply topically to affected area once a day  MiraLax oral powder for reconstitution: 17 gram(s) orally once a day  Mirapex 0.5 mg oral tablet: 1 tab(s) orally once a day (at bedtime)  Multiple Vitamins with Minerals oral capsule: 1 cap(s) orally once a day  Nizoral A-D 1% topical shampoo: Apply topically to affected area every 3 days  nystatin 100,000 units/g topical cream (obsolete): Apply topically to affected area 2 times a day under bilateral breast and under left arm  PHENobarbital 32.4 mg oral tablet: 1 tab(s) orally 3 times a day  Senna Plus 50 mg-8.6 mg oral tablet: 2 tab(s) orally once a day (at bedtime)  Singulair 10 mg oral tablet: 1 tab(s) orally once a day (at bedtime)  Topamax 100 mg oral tablet: 1 tab(s) orally 3 times a day  Tylenol 500 mg oral tablet: 2 tab(s) orally every 8 hours, As Needed  Urocit-K 15 mEq oral tablet, extended release: 1 tab(s) orally once a day

## 2022-12-05 ENCOUNTER — INPATIENT (INPATIENT)
Facility: HOSPITAL | Age: 67
LOS: 4 days | Discharge: NURSING, REHAB OR LONG-TERM CARE | End: 2022-12-10
Attending: INTERNAL MEDICINE | Admitting: INTERNAL MEDICINE

## 2022-12-05 VITALS
HEART RATE: 75 BPM | RESPIRATION RATE: 18 BRPM | TEMPERATURE: 98 F | DIASTOLIC BLOOD PRESSURE: 59 MMHG | HEIGHT: 62 IN | OXYGEN SATURATION: 89 % | SYSTOLIC BLOOD PRESSURE: 113 MMHG | WEIGHT: 281.97 LBS

## 2022-12-05 DIAGNOSIS — K08.409 PARTIAL LOSS OF TEETH, UNSPECIFIED CAUSE, UNSPECIFIED CLASS: Chronic | ICD-10-CM

## 2022-12-05 DIAGNOSIS — Z98.890 OTHER SPECIFIED POSTPROCEDURAL STATES: Chronic | ICD-10-CM

## 2022-12-05 DIAGNOSIS — E66.2 MORBID (SEVERE) OBESITY WITH ALVEOLAR HYPOVENTILATION: ICD-10-CM

## 2022-12-05 DIAGNOSIS — Z87.448 PERSONAL HISTORY OF OTHER DISEASES OF URINARY SYSTEM: Chronic | ICD-10-CM

## 2022-12-05 DIAGNOSIS — Z74.01 BED CONFINEMENT STATUS: ICD-10-CM

## 2022-12-05 DIAGNOSIS — Z88.0 ALLERGY STATUS TO PENICILLIN: ICD-10-CM

## 2022-12-05 DIAGNOSIS — Z79.890 HORMONE REPLACEMENT THERAPY: ICD-10-CM

## 2022-12-05 DIAGNOSIS — J45.909 UNSPECIFIED ASTHMA, UNCOMPLICATED: ICD-10-CM

## 2022-12-05 DIAGNOSIS — J96.01 ACUTE RESPIRATORY FAILURE WITH HYPOXIA: ICD-10-CM

## 2022-12-05 DIAGNOSIS — Z66 DO NOT RESUSCITATE: ICD-10-CM

## 2022-12-05 DIAGNOSIS — B97.4 RESPIRATORY SYNCYTIAL VIRUS AS THE CAUSE OF DISEASES CLASSIFIED ELSEWHERE: ICD-10-CM

## 2022-12-05 DIAGNOSIS — Z91.040 LATEX ALLERGY STATUS: ICD-10-CM

## 2022-12-05 DIAGNOSIS — J96.02 ACUTE RESPIRATORY FAILURE WITH HYPERCAPNIA: ICD-10-CM

## 2022-12-05 DIAGNOSIS — Z88.8 ALLERGY STATUS TO OTHER DRUGS, MEDICAMENTS AND BIOLOGICAL SUBSTANCES STATUS: ICD-10-CM

## 2022-12-05 DIAGNOSIS — Z99.81 DEPENDENCE ON SUPPLEMENTAL OXYGEN: ICD-10-CM

## 2022-12-05 DIAGNOSIS — G40.909 EPILEPSY, UNSPECIFIED, NOT INTRACTABLE, WITHOUT STATUS EPILEPTICUS: ICD-10-CM

## 2022-12-05 DIAGNOSIS — E03.9 HYPOTHYROIDISM, UNSPECIFIED: ICD-10-CM

## 2022-12-05 DIAGNOSIS — Z90.89 ACQUIRED ABSENCE OF OTHER ORGANS: Chronic | ICD-10-CM

## 2022-12-05 LAB
ALBUMIN SERPL ELPH-MCNC: 3.8 G/DL — SIGNIFICANT CHANGE UP (ref 3.5–5.2)
ALBUMIN SERPL ELPH-MCNC: 4.2 G/DL — SIGNIFICANT CHANGE UP (ref 3.5–5.2)
ALP SERPL-CCNC: 65 U/L — SIGNIFICANT CHANGE UP (ref 30–115)
ALP SERPL-CCNC: 66 U/L — SIGNIFICANT CHANGE UP (ref 30–115)
ALT FLD-CCNC: 23 U/L — SIGNIFICANT CHANGE UP (ref 0–41)
ALT FLD-CCNC: 26 U/L — SIGNIFICANT CHANGE UP (ref 0–41)
ANION GAP SERPL CALC-SCNC: 1 MMOL/L — LOW (ref 7–14)
ANION GAP SERPL CALC-SCNC: 7 MMOL/L — SIGNIFICANT CHANGE UP (ref 7–14)
AST SERPL-CCNC: 24 U/L — SIGNIFICANT CHANGE UP (ref 0–41)
AST SERPL-CCNC: 45 U/L — HIGH (ref 0–41)
BASE EXCESS BLDV CALC-SCNC: 10.1 MMOL/L — HIGH (ref -2–3)
BASOPHILS # BLD AUTO: 0.03 K/UL — SIGNIFICANT CHANGE UP (ref 0–0.2)
BASOPHILS NFR BLD AUTO: 0.4 % — SIGNIFICANT CHANGE UP (ref 0–1)
BILIRUB SERPL-MCNC: 0.3 MG/DL — SIGNIFICANT CHANGE UP (ref 0.2–1.2)
BILIRUB SERPL-MCNC: 0.4 MG/DL — SIGNIFICANT CHANGE UP (ref 0.2–1.2)
BUN SERPL-MCNC: 18 MG/DL — SIGNIFICANT CHANGE UP (ref 10–20)
BUN SERPL-MCNC: 19 MG/DL — SIGNIFICANT CHANGE UP (ref 10–20)
CA-I SERPL-SCNC: 1.27 MMOL/L — SIGNIFICANT CHANGE UP (ref 1.15–1.33)
CALCIUM SERPL-MCNC: 8 MG/DL — LOW (ref 8.4–10.5)
CALCIUM SERPL-MCNC: 9.5 MG/DL — SIGNIFICANT CHANGE UP (ref 8.4–10.4)
CHLORIDE SERPL-SCNC: 97 MMOL/L — LOW (ref 98–110)
CHLORIDE SERPL-SCNC: 98 MMOL/L — SIGNIFICANT CHANGE UP (ref 98–110)
CO2 SERPL-SCNC: 38 MMOL/L — HIGH (ref 17–32)
CO2 SERPL-SCNC: 43 MMOL/L — CRITICAL HIGH (ref 17–32)
CREAT SERPL-MCNC: 0.5 MG/DL — LOW (ref 0.7–1.5)
CREAT SERPL-MCNC: 0.5 MG/DL — LOW (ref 0.7–1.5)
EGFR: 103 ML/MIN/1.73M2 — SIGNIFICANT CHANGE UP
EGFR: 103 ML/MIN/1.73M2 — SIGNIFICANT CHANGE UP
EOSINOPHIL # BLD AUTO: 0.03 K/UL — SIGNIFICANT CHANGE UP (ref 0–0.7)
EOSINOPHIL NFR BLD AUTO: 0.4 % — SIGNIFICANT CHANGE UP (ref 0–8)
FLUAV AG NPH QL: SIGNIFICANT CHANGE UP
FLUBV AG NPH QL: SIGNIFICANT CHANGE UP
GAS PNL BLDV: 141 MMOL/L — SIGNIFICANT CHANGE UP (ref 136–145)
GAS PNL BLDV: SIGNIFICANT CHANGE UP
GAS PNL BLDV: SIGNIFICANT CHANGE UP
GLUCOSE SERPL-MCNC: 148 MG/DL — HIGH (ref 70–99)
GLUCOSE SERPL-MCNC: 172 MG/DL — HIGH (ref 70–99)
HCO3 BLDV-SCNC: 46 MMOL/L — CRITICAL HIGH (ref 22–29)
HCT VFR BLD CALC: 54.1 % — HIGH (ref 37–47)
HCT VFR BLDA CALC: 44 % — SIGNIFICANT CHANGE UP (ref 39–51)
HGB BLD CALC-MCNC: 14.7 G/DL — SIGNIFICANT CHANGE UP (ref 12.6–17.4)
HGB BLD-MCNC: 15.3 G/DL — SIGNIFICANT CHANGE UP (ref 12–16)
IMM GRANULOCYTES NFR BLD AUTO: 1.4 % — HIGH (ref 0.1–0.3)
LACTATE BLDV-MCNC: 0.5 MMOL/L — SIGNIFICANT CHANGE UP (ref 0.5–2)
LYMPHOCYTES # BLD AUTO: 0.34 K/UL — LOW (ref 1.2–3.4)
LYMPHOCYTES # BLD AUTO: 4.3 % — LOW (ref 20.5–51.1)
MCHC RBC-ENTMCNC: 28.3 G/DL — LOW (ref 32–37)
MCHC RBC-ENTMCNC: 31.4 PG — HIGH (ref 27–31)
MCV RBC AUTO: 110.9 FL — HIGH (ref 81–99)
MONOCYTES # BLD AUTO: 0.39 K/UL — SIGNIFICANT CHANGE UP (ref 0.1–0.6)
MONOCYTES NFR BLD AUTO: 4.9 % — SIGNIFICANT CHANGE UP (ref 1.7–9.3)
NEUTROPHILS # BLD AUTO: 7.08 K/UL — HIGH (ref 1.4–6.5)
NEUTROPHILS NFR BLD AUTO: 88.6 % — HIGH (ref 42.2–75.2)
NRBC # BLD: 0 /100 WBCS — SIGNIFICANT CHANGE UP (ref 0–0)
NT-PROBNP SERPL-SCNC: 900 PG/ML — HIGH (ref 0–300)
PCO2 BLDV: 140 MMHG — HIGH (ref 39–42)
PH BLDV: 7.12 — LOW (ref 7.32–7.43)
PLATELET # BLD AUTO: 150 K/UL — SIGNIFICANT CHANGE UP (ref 130–400)
PO2 BLDV: 85 MMHG — SIGNIFICANT CHANGE UP
POTASSIUM BLDV-SCNC: 4.5 MMOL/L — SIGNIFICANT CHANGE UP (ref 3.5–5.1)
POTASSIUM SERPL-MCNC: 6.2 MMOL/L — CRITICAL HIGH (ref 3.5–5)
POTASSIUM SERPL-MCNC: 6.4 MMOL/L — CRITICAL HIGH (ref 3.5–5)
POTASSIUM SERPL-SCNC: 6.2 MMOL/L — CRITICAL HIGH (ref 3.5–5)
POTASSIUM SERPL-SCNC: 6.4 MMOL/L — CRITICAL HIGH (ref 3.5–5)
PROT SERPL-MCNC: 6.3 G/DL — SIGNIFICANT CHANGE UP (ref 6–8)
PROT SERPL-MCNC: 7.2 G/DL — SIGNIFICANT CHANGE UP (ref 6–8)
RBC # BLD: 4.88 M/UL — SIGNIFICANT CHANGE UP (ref 4.2–5.4)
RBC # FLD: 13.2 % — SIGNIFICANT CHANGE UP (ref 11.5–14.5)
RSV RNA NPH QL NAA+NON-PROBE: SIGNIFICANT CHANGE UP
SAO2 % BLDV: 97.8 % — SIGNIFICANT CHANGE UP
SARS-COV-2 RNA SPEC QL NAA+PROBE: SIGNIFICANT CHANGE UP
SODIUM SERPL-SCNC: 141 MMOL/L — SIGNIFICANT CHANGE UP (ref 135–146)
SODIUM SERPL-SCNC: 143 MMOL/L — SIGNIFICANT CHANGE UP (ref 135–146)
TROPONIN T SERPL-MCNC: <0.01 NG/ML — SIGNIFICANT CHANGE UP
WBC # BLD: 7.98 K/UL — SIGNIFICANT CHANGE UP (ref 4.8–10.8)
WBC # FLD AUTO: 7.98 K/UL — SIGNIFICANT CHANGE UP (ref 4.8–10.8)

## 2022-12-05 PROCEDURE — 93010 ELECTROCARDIOGRAM REPORT: CPT

## 2022-12-05 PROCEDURE — 95816 EEG AWAKE AND DROWSY: CPT | Mod: 26

## 2022-12-05 PROCEDURE — 99291 CRITICAL CARE FIRST HOUR: CPT

## 2022-12-05 PROCEDURE — 99291 CRITICAL CARE FIRST HOUR: CPT | Mod: GC

## 2022-12-05 PROCEDURE — 71045 X-RAY EXAM CHEST 1 VIEW: CPT | Mod: 26

## 2022-12-05 RX ORDER — MONTELUKAST 4 MG/1
1 TABLET, CHEWABLE ORAL
Qty: 0 | Refills: 0 | DISCHARGE

## 2022-12-05 RX ORDER — TOPIRAMATE 25 MG
1 TABLET ORAL
Qty: 0 | Refills: 0 | DISCHARGE

## 2022-12-05 RX ORDER — IPRATROPIUM/ALBUTEROL SULFATE 18-103MCG
3 AEROSOL WITH ADAPTER (GRAM) INHALATION EVERY 6 HOURS
Refills: 0 | Status: DISCONTINUED | OUTPATIENT
Start: 2022-12-05 | End: 2022-12-10

## 2022-12-05 RX ORDER — LIDOCAINE 4 G/100G
1 CREAM TOPICAL
Qty: 0 | Refills: 0 | DISCHARGE

## 2022-12-05 RX ORDER — PRAMIPEXOLE DIHYDROCHLORIDE 0.12 MG/1
1 TABLET ORAL
Qty: 0 | Refills: 0 | DISCHARGE

## 2022-12-05 RX ORDER — SENNA PLUS 8.6 MG/1
2 TABLET ORAL AT BEDTIME
Refills: 0 | Status: DISCONTINUED | OUTPATIENT
Start: 2022-12-05 | End: 2022-12-10

## 2022-12-05 RX ORDER — ACETAMINOPHEN 500 MG
650 TABLET ORAL EVERY 6 HOURS
Refills: 0 | Status: DISCONTINUED | OUTPATIENT
Start: 2022-12-05 | End: 2022-12-07

## 2022-12-05 RX ORDER — PHENOBARBITAL 60 MG
32.4 TABLET ORAL
Refills: 0 | Status: DISCONTINUED | OUTPATIENT
Start: 2022-12-05 | End: 2022-12-05

## 2022-12-05 RX ORDER — ENOXAPARIN SODIUM 100 MG/ML
40 INJECTION SUBCUTANEOUS EVERY 12 HOURS
Refills: 0 | Status: DISCONTINUED | OUTPATIENT
Start: 2022-12-05 | End: 2022-12-10

## 2022-12-05 RX ORDER — IPRATROPIUM/ALBUTEROL SULFATE 18-103MCG
3 AEROSOL WITH ADAPTER (GRAM) INHALATION
Qty: 0 | Refills: 0 | DISCHARGE

## 2022-12-05 RX ORDER — LEVOTHYROXINE SODIUM 125 MCG
50 TABLET ORAL DAILY
Refills: 0 | Status: DISCONTINUED | OUTPATIENT
Start: 2022-12-05 | End: 2022-12-10

## 2022-12-05 RX ORDER — IPRATROPIUM/ALBUTEROL SULFATE 18-103MCG
3 AEROSOL WITH ADAPTER (GRAM) INHALATION
Refills: 0 | Status: COMPLETED | OUTPATIENT
Start: 2022-12-05 | End: 2022-12-05

## 2022-12-05 RX ORDER — TRAZODONE HCL 50 MG
100 TABLET ORAL AT BEDTIME
Refills: 0 | Status: DISCONTINUED | OUTPATIENT
Start: 2022-12-05 | End: 2022-12-10

## 2022-12-05 RX ORDER — KETOCONAZOLE 20 MG/G
1 AEROSOL, FOAM TOPICAL
Qty: 0 | Refills: 0 | DISCHARGE

## 2022-12-05 RX ORDER — ACETAZOLAMIDE 250 MG/1
500 TABLET ORAL ONCE
Refills: 0 | Status: COMPLETED | OUTPATIENT
Start: 2022-12-05 | End: 2022-12-05

## 2022-12-05 RX ORDER — CITALOPRAM 10 MG/1
20 TABLET, FILM COATED ORAL DAILY
Refills: 0 | Status: DISCONTINUED | OUTPATIENT
Start: 2022-12-05 | End: 2022-12-10

## 2022-12-05 RX ORDER — CETIRIZINE HYDROCHLORIDE 10 MG/1
1 TABLET ORAL
Qty: 0 | Refills: 0 | DISCHARGE

## 2022-12-05 RX ORDER — PHENOBARBITAL 60 MG
30 TABLET ORAL
Refills: 0 | Status: DISCONTINUED | OUTPATIENT
Start: 2022-12-05 | End: 2022-12-10

## 2022-12-05 RX ORDER — TOPIRAMATE 25 MG
100 TABLET ORAL
Refills: 0 | Status: DISCONTINUED | OUTPATIENT
Start: 2022-12-05 | End: 2022-12-05

## 2022-12-05 RX ORDER — CEFTRIAXONE 500 MG/1
1000 INJECTION, POWDER, FOR SOLUTION INTRAMUSCULAR; INTRAVENOUS EVERY 24 HOURS
Refills: 0 | Status: DISCONTINUED | OUTPATIENT
Start: 2022-12-05 | End: 2022-12-08

## 2022-12-05 RX ORDER — AZITHROMYCIN 500 MG/1
500 TABLET, FILM COATED ORAL ONCE
Refills: 0 | Status: COMPLETED | OUTPATIENT
Start: 2022-12-05 | End: 2022-12-05

## 2022-12-05 RX ORDER — PHENOBARBITAL 60 MG
32.4 TABLET ORAL THREE TIMES A DAY
Refills: 0 | Status: DISCONTINUED | OUTPATIENT
Start: 2022-12-05 | End: 2022-12-05

## 2022-12-05 RX ORDER — LACOSAMIDE 50 MG/1
150 TABLET ORAL
Refills: 0 | Status: DISCONTINUED | OUTPATIENT
Start: 2022-12-05 | End: 2022-12-10

## 2022-12-05 RX ORDER — PHENOBARBITAL 60 MG
1 TABLET ORAL
Qty: 0 | Refills: 0 | DISCHARGE

## 2022-12-05 RX ORDER — PRAMIPEXOLE DIHYDROCHLORIDE 0.12 MG/1
0.5 TABLET ORAL AT BEDTIME
Refills: 0 | Status: DISCONTINUED | OUTPATIENT
Start: 2022-12-05 | End: 2022-12-10

## 2022-12-05 RX ORDER — MULTIVIT-MIN/FERROUS GLUCONATE 9 MG/15 ML
1 LIQUID (ML) ORAL DAILY
Refills: 0 | Status: DISCONTINUED | OUTPATIENT
Start: 2022-12-05 | End: 2022-12-10

## 2022-12-05 RX ORDER — ACETAMINOPHEN 500 MG
2 TABLET ORAL
Qty: 0 | Refills: 0 | DISCHARGE

## 2022-12-05 RX ORDER — TRAZODONE HCL 50 MG
2 TABLET ORAL
Qty: 0 | Refills: 0 | DISCHARGE

## 2022-12-05 RX ORDER — METRONIDAZOLE 500 MG
500 TABLET ORAL EVERY 8 HOURS
Refills: 0 | Status: DISCONTINUED | OUTPATIENT
Start: 2022-12-05 | End: 2022-12-08

## 2022-12-05 RX ORDER — MONTELUKAST 4 MG/1
10 TABLET, CHEWABLE ORAL AT BEDTIME
Refills: 0 | Status: DISCONTINUED | OUTPATIENT
Start: 2022-12-05 | End: 2022-12-10

## 2022-12-05 RX ORDER — POTASSIUM CITRATE MONOHYDRATE 100 %
1 POWDER (GRAM) MISCELLANEOUS
Qty: 0 | Refills: 0 | DISCHARGE

## 2022-12-05 RX ORDER — POLYETHYLENE GLYCOL 3350 17 G/17G
17 POWDER, FOR SOLUTION ORAL DAILY
Refills: 0 | Status: DISCONTINUED | OUTPATIENT
Start: 2022-12-05 | End: 2022-12-10

## 2022-12-05 RX ORDER — NYSTATIN 500MM UNIT
1 POWDER (EA) MISCELLANEOUS
Qty: 0 | Refills: 0 | DISCHARGE

## 2022-12-05 RX ORDER — MULTIVIT-MIN/FERROUS GLUCONATE 9 MG/15 ML
1 LIQUID (ML) ORAL
Qty: 0 | Refills: 0 | DISCHARGE

## 2022-12-05 RX ADMIN — Medication 125 MILLIGRAM(S): at 08:39

## 2022-12-05 RX ADMIN — Medication 3 MILLILITER(S): at 09:30

## 2022-12-05 RX ADMIN — Medication 100 MILLIGRAM(S): at 15:04

## 2022-12-05 RX ADMIN — Medication 3 MILLILITER(S): at 08:39

## 2022-12-05 RX ADMIN — ENOXAPARIN SODIUM 40 MILLIGRAM(S): 100 INJECTION SUBCUTANEOUS at 18:09

## 2022-12-05 RX ADMIN — Medication 100 MILLIGRAM(S): at 23:54

## 2022-12-05 RX ADMIN — AZITHROMYCIN 255 MILLIGRAM(S): 500 TABLET, FILM COATED ORAL at 08:39

## 2022-12-05 RX ADMIN — Medication 32.4 MILLIGRAM(S): at 15:04

## 2022-12-05 RX ADMIN — Medication 60 MILLIGRAM(S): at 23:54

## 2022-12-05 RX ADMIN — Medication 100 MILLIGRAM(S): at 14:32

## 2022-12-05 RX ADMIN — ACETAZOLAMIDE 110 MILLIGRAM(S): 250 TABLET ORAL at 13:01

## 2022-12-05 RX ADMIN — CEFTRIAXONE 100 MILLIGRAM(S): 500 INJECTION, POWDER, FOR SOLUTION INTRAMUSCULAR; INTRAVENOUS at 18:09

## 2022-12-05 RX ADMIN — Medication 3 MILLILITER(S): at 09:05

## 2022-12-05 NOTE — CONSULT NOTE ADULT - ASSESSMENT
Impression   Acute on chronic hypercapnic/ hypoxemic resp failure on NIV  AMS secondary to toxic Metabolic    Fluid Overload  THOM/ OHS  possible aspiration pneumonia  Asthma exacerbation  bedbound high risk for PE   HX of seizure      PLAN:    CNS: Avoid CNS depressant, IV AED until tolerating oral. CT head. EEG      HEENT:  Oral care    PULMONARY:  HOB @ 45 degrees, aspiration precaution. NIV on and off q4 and QHS. Duonebs , solumedrol 60 mg IV q 8. Full RVP    CARDIOVASCULAR: Negative balance , ECHO noted     GI: GI prophylaxis           Feeding speecn and swallow eval when off NIV    RENAL:  F/u  lytes.  Correct as needed. accurate I/O    INFECTIOUS DISEASE: procal, unasyn, rvp    HEMATOLOGICAL:  DVT prophylaxis. LE doppler    ENDOCRINE:  Follow up FS.  Insulin protocol if needed.    SDU  Poor prognosis  Call palliative     Impression     Acute on chronic hypercapnic/ hypoxemic resp failure on NIV  AMS secondary to toxic Metabolic  Fluid Overload  THOM/ OHS  possible aspiration pneumonia  Asthma exacerbation  bedbound high risk for PE   HX of seizure      PLAN:    CNS: Avoid CNS depressant, IV AED until tolerating oral. CT head. EEG      HEENT:  Oral care    PULMONARY:  HOB @ 45 degrees, aspiration precaution. NIV on and off q4 and QHS. Duonebs , solumedrol 60 mg IV q 12. Full RVP    CARDIOVASCULAR: Negative balance , ECHO noted     GI: GI prophylaxis           Feeding speecn and swallow eval when off NIV    RENAL:  F/u  lytes.  Correct as needed. accurate I/O    INFECTIOUS DISEASE: procal, unasyn, rvp    HEMATOLOGICAL:  DVT prophylaxis. LE doppler    ENDOCRINE:  Follow up FS.  Insulin protocol if needed.    SDU  Poor prognosis  Call palliative

## 2022-12-05 NOTE — ED ADULT TRIAGE NOTE - HEIGHT IN CM
Call was returned to patient and given advice below. Patient verbalized understanding   
He can use the inhaler prn only (it does not help him get better just treats sx).  As long as he is improving we can continue to just monitor.  If he worsens, we may need to see him in person.  Thanks.   Judah Moody MD    
Patient called and stated that he was placed on the albuterol inhaler in March when he was sick. Patient reports the need to use it still in the morning but has some questions if he should continue to use this as every 6 hours or if it can be as needed and how long he may need to continue to take this. Patient reports that he is still not fully back up to his previous activity level that he was at before this illness. Patient states he feels that in the future he will need to be tested to see if he did have the coronavirus as he stated the illness he had in March has caused a lot of issues with his lungs. Patient would like recommendations on the inhaler. Patient stated it was ok to leave a detailed message on his mobile phone if he doesn't answer. Please advise   
[phone]

## 2022-12-05 NOTE — CONSULT NOTE ADULT - ATTENDING COMMENTS
events noted, AMS/ toxic metabolic, acute on chronic hypercapniec resp faiure/ pul edema/ NIV/ repeat ABX, steroids, diuresis as tolerated, procal abx, poor prognosis, palliative care eval, SDU

## 2022-12-05 NOTE — ED PROVIDER NOTE - ATTENDING CONTRIBUTION TO CARE
67-year-old female with history of COPD, seizures, obesity, THOM, CKD, diabetes, presents from nursing home with lethargy and AMS and hypoxia to the 70s. DNR/DNI paperwork from nursing home and by recent admission. Patient herself unresponsive and unable to obtain further history. On exam, afebrile, hemodynamically stable, saturating well on nonrebreather, no WOB, head NCAT, pupils 3-2 bilaterally, no deviation, anicteric, MMM, no JVD, RRR, nml S1/S2, no m/r/g, lungs poor respiratory effort though scattered wheezes noted, abd soft, NT, not responsive to sternal rub, GCS 3, spontaneously, no leg cyanosis or edema, cool, dry, no rashes or hives. No edema or evidence of fluid overload on exam and last EF in September 2021 was normal. No evidence of DVT and lower suspicion for PE. Patient with history of COPD had noted wheezing at her recent presentation for similar COPD exacerbation with concern for COPD. Patient is DNI. Given duo nebs, Solu-Medrol, azithromycin and started on BiPAP. 67-year-old female with history of COPD, seizures, obesity, THOM, CKD, diabetes, presents from nursing home with lethargy and AMS and hypoxia to the 70s. DNR/DNI paperwork from nursing home and by recent admission. Patient herself unresponsive and unable to obtain further history. On exam, afebrile, hemodynamically stable, saturating well on nonrebreather, no WOB, head NCAT, pupils 3-2 bilaterally, no deviation, anicteric, MMM, no JVD, RRR, nml S1/S2, no m/r/g, lungs poor respiratory effort though scattered wheezes noted, abd soft, NT, not responsive to sternal rub, GCS 3, spontaneously, no leg cyanosis or edema, cool, dry, no rashes or hives. No edema or evidence of fluid overload on exam and last EF in September 2021 was normal. No evidence of DVT and lower suspicion for PE. ECG/trop unremarkable and low suspicion for ACS. No head trauma and no objective e/o ICH and character low suspicion for this. No specific infectious findings. Patient with history of COPD had noted wheezing at her recent presentation for similar COPD exacerbation with concern for COPD. Patient is DNI. Given duo nebs, Solu-Medrol, azithromycin and started on BiPAP. On reassessment later pt alert and oriented and talking. Admitted to SDU.

## 2022-12-05 NOTE — CHART NOTE - NSCHARTNOTEFT_GEN_A_CORE
Due to bipap, pt PO meds were held. Spoke with pharmacy concerning AEDs, will change phenobarbital to IV (1:1 change PO to IV), will exchange topiramate to lacosamide as there is no IV form of topiramate

## 2022-12-05 NOTE — ED PROVIDER NOTE - PHYSICAL EXAMINATION
Pt did not stay 30 min. Post iron infusion d/t scheduled apt at 11:00.  Notified office that pt was going to be a few minutes late and requested they check her BP at beginning and end of visit.   VITAL SIGNS: I have reviewed nursing notes and confirm.  CONSTITUTIONAL: lethargic, obtunded.  SKIN: Warm dry, normal skin turgor  HEAD: NCAT  EYES: pupils 2mm bl, responsive to light  ENT: Moist mucous membranes  CARD: RRR  RESP: poor resp effort, scattered wheezing bl, satruating 85% on NRB  ABD: soft, obese, does not grimace to palpation, non-distended, no rebound or guarding.   NEURO: Obtunded, grimaces to sternal rub.

## 2022-12-05 NOTE — H&P ADULT - ASSESSMENT
68yo F with hx of multiple admissions for hypercapnic respiratory failure, THOM. COPD not on home O2, morbid obesity, hx of asthma, obesity, epilepsy, hypothyroidism, OA, kidney stones, L radial artery occlusion 2021 (previously on eliquis) presenting to the ED from [facility] for unresponsiveness.    #Acute on chronic hypercapnic/hypoxemic respiratory failure on NIV  #Toxic metabolic encephalopathy  #THOM/OHS  #Morbid obesity  #Possible aspiration pneumonia  #Asthma exacerbation  #Bedbound      #Epilepsy disorder    #Hypothyroidism    #DVT ppx: lovenox 40mg q12H  #GI ppx: protonix 40mg   #Diet:  #Code: DNR/DNI  #Dispo: acute SDU admission 68yo F with hx of multiple admissions for hypercapnic respiratory failure, THOM. COPD not on home O2, morbid obesity, hx of asthma, obesity, epilepsy, hypothyroidism, OA, kidney stones, L radial artery occlusion 2021 (previously on eliquis) presenting to the ED from [facility] for unresponsiveness.    #Acute on chronic hypercapnic/hypoxemic respiratory failure on NIV  #Toxic metabolic encephalopathy- improved  #THOM/OHS  #Morbid obesity  #Possible aspiration pneumonia  #Asthma exacerbation  #Recent admission for Entero/Rhinovurus infection, and possible bacterial pneumonia. s/p levaquin  - ABG shows severe respiratory acidosis. Due to DNI/DNI form- not performed. confirmed with patient she hs DNR/DNI after patient's mental status improved  - repeat ABG  - cont NIV 4 hours on / 4 hours off and qHS  - check RVP, procal, UA  - s/p soluemdrol 125mg x1. start solumedrol 60mg q8H  - aspiration precautions  - patient allergic to penicillins. start ceftriaxone + flagyl. monitor   - duoneb q6H PRN  - cont singular   - palliative team consult when patient's mental status improves.   - outpatient pulm f/u. endorsed lifestyle modifications after discussion    #Epilepsy disorder  - cont home meds  - EEG    #Hypothyroidism  - cont home meds    #Hx of radial ischecmia 2021  - no longer on eliquis    #Depression  - cont home meds   #Misc  - cont home laxatives  - PT consult    #DVT ppx: lovenox 40mg q12H  #GI ppx: protonix 40mg   #Diet: NPO. speech and swallow eval. if OK -> low calorie, DASH diet  #Code: DNR/DNI  #Activity: AAT  #Dispo: acute SDU admission

## 2022-12-05 NOTE — ED PROVIDER NOTE - PROGRESS NOTE ADDITIONAL1
[Good] : ~his/her~  mood as  good [Yes] : Yes [No falls in past year] : Patient reported no falls in the past year [0] : 1) Little interest or pleasure doing things: Not at all (0) [Hepatitis C test offered] : Hepatitis C test offered [None] : None [With Significant Other] : lives with significant other [Retired] : retired [College] : College [Sexually Active] : sexually active [Fully functional (bathing, dressing, toileting, transferring, walking, feeding)] : Fully functional (bathing, dressing, toileting, transferring, walking, feeding) [Fully functional (using the telephone, shopping, preparing meals, housekeeping, doing laundry, using] : Fully functional and needs no help or supervision to perform IADLs (using the telephone, shopping, preparing meals, housekeeping, doing laundry, using transportation, managing medications and managing finances) [Smoke Detector] : smoke detector [Carbon Monoxide Detector] : carbon monoxide detector [I will adhere to the patient's wishes as expressed in the advance directive except as noted below.] : I will adhere to the patient's wishes as expressed in the advance directive except as noted below [] : No [Change in mental status noted] : No change in mental status noted [High Risk Behavior] : no high risk behavior [Guns at Home] : no guns at home [FreeTextEntry4] :  is healthcare proxy Additional Progress Note...

## 2022-12-05 NOTE — ED PROVIDER NOTE - CLINICAL SUMMARY MEDICAL DECISION MAKING FREE TEXT BOX
67-year-old female with history of COPD, seizures, obesity, THOM, CKD, diabetes, presents from nursing home with lethargy and AMS and hypoxia to the 70s. DNR/DNI paperwork from nursing home and by recent admission. Patient herself unresponsive and unable to obtain further history. On exam, afebrile, hemodynamically stable, saturating well on nonrebreather, no WOB, head NCAT, pupils 3-2 bilaterally, no deviation, anicteric, MMM, no JVD, RRR, nml S1/S2, no m/r/g, lungs poor respiratory effort though scattered wheezes noted, abd soft, NT, not responsive to sternal rub, GCS 3, spontaneously, no leg cyanosis or edema, cool, dry, no rashes or hives. No edema or evidence of fluid overload on exam and last EF in September 2021 was normal. No evidence of DVT and lower suspicion for PE. ECG/trop unremarkable and low suspicion for ACS. No head trauma and no objective e/o ICH and character low suspicion for this. No specific infectious findings. Patient with history of COPD had noted wheezing at her recent presentation for similar COPD exacerbation with concern for COPD. Patient is DNI. Given duo nebs, Solu-Medrol, azithromycin and started on BiPAP. On reassessment later pt alert and oriented and talking. Admitted to SDU.

## 2022-12-05 NOTE — ED PROVIDER NOTE - NSICDXPASTSURGICALHX_GEN_ALL_CORE_FT
PAST SURGICAL HISTORY:  H/O abdominal hysterectomy NO RADIATION AND CHEMO    History of bladder stone SURGERY 8/3/2018    History of surgery JJ STENT PLACEMENT LEFT OCTOBER 2017    History of tonsillectomy 13 years old    [place] teeth removed

## 2022-12-05 NOTE — H&P ADULT - NSHPPHYSICALEXAM_GEN_ALL_CORE
PHYSICAL EXAM:  GENERAL: NAD, lying in bed comfortably  HEAD:  Atraumatic, Normocephalic  EYES: EOMI, PERRLA, conjunctiva and sclera clear  ENT: Moist mucous membranes  NECK: Supple, No JVD  CHEST/LUNG: Decreased BS due to body habitus, no rales appreciated, on NIV  HEART: Regular rate and rhythm; No murmurs, rubs, or gallops  ABDOMEN: obese abdomen, Bowel sounds present; Soft, Nontender, Nondistended. No hepatomegaly  EXTREMITIES:  2+ Peripheral Pulses, brisk capillary refill. No clubbing, cyanosis, or edema  NERVOUS SYSTEM:  Alert & Oriented X3, speech clear. No deficits   MSK: FROM all 4 extremities, full and equal strength  SKIN: No rashes or lesions

## 2022-12-05 NOTE — ED ADULT NURSE NOTE - NSICDXPASTSURGICALHX_GEN_ALL_CORE_FT
PAST SURGICAL HISTORY:  H/O abdominal hysterectomy NO RADIATION AND CHEMO    History of bladder stone SURGERY 8/3/2018    History of surgery JJ STENT PLACEMENT LEFT OCTOBER 2017    History of tonsillectomy 13 years old    [place] teeth removed

## 2022-12-05 NOTE — H&P ADULT - HISTORY OF PRESENT ILLNESS
66yo F with hx of multiple admissions for hypercapnic respiratory failure, THOM. COPD not on home O2, morbid obesity, hx of asthma, obesity, epilepsy, hypothyroidism, OA, kidney stones, L radial artery occlusion 2021 (previously on eliquis) presenting to the ED from [facility] for unresponsiveness. Patient was found minimally responsive - including to physical stimuli from sternal rub, and brought to the ED. She is normally AAOx4.  Patient was found confused at bed by EMS and brought to ED.    Patient was recently discharged on 11/25/2022 for acute on chronic hypoxic hyercapnic respiratory failure 2/2 THOM/OHS, COPD exacerbation. Was discharged on prednisone taper (completed course on 12/1/22). Patient was supposed to f/u with pulm outpatient for sleep study + CPAP arrangements. [previously No Show for previous appointments]    At the ED, VS- T:98.5F, BP:113/59, HR:75bpm, RR:18bpm, SaO2: 89%. Placed in Bipap and SpO2 improved to 99%. Labs significant for elevated HCO3 (43), BNP- 900, toponin<0.01. VBG shows pH-7.12, pCO2- 140, HCO3- 46, pO2- 85, SaO2- 97.8, lactate- 0.50, and K- 4.5 (on BMP was hemolyzed at 6.4). COVID-19 PCR pending. CXR unchanged from previous admission- Unchanged prominent interstitial markings. Critical care team was consulted. Because patient has altered mental status 2/2 acute on chronic hypercapnic/hypoxemic respiratory failure and is DNR/DNI, patient will be admitted to SDU. Patient was given azithromycin 500mg x 1, diamox 500mg x 1, duoneb x1, and solumedrol 125mg IV x1. CBC, CMP, RVP collected.  66yo F with hx of multiple admissions for hypercapnic respiratory failure, THOM. COPD not on home O2, morbid obesity, hx of asthma, obesity, epilepsy, hypothyroidism, OA, kidney stones, L radial artery occlusion 2021 (previously on eliquis) presenting to the ED from [facility] for unresponsiveness. Patient was found minimally responsive - including to physical stimuli from sternal rub, and brought to the ED. She is normally AAOx4.  Patient was found confused at bed by EMS and brought to ED. Saw patient when her mental status improved with NIV. Patient does not endorse any triggering events. Denies fevers, chills, headaches, chest pain, sick contacts, shortness of breath, choking, wheezing, chest pain, palpitations, abdominal pain, nauesa/vomiting, dysuria.     Patient was recently discharged on 11/25/2022 for acute on chronic hypoxic hyercapnic respiratory failure 2/2 THOM/OHS, COPD exacerbation. Was discharged on prednisone taper (completed course on 12/1/22). Patient was supposed to f/u with pulm outpatient for sleep study + CPAP arrangements. [previously No Show for previous appointments]    At the ED, VS- T:98.5F, BP:113/59, HR:75bpm, RR:18bpm, SaO2: 89%. Placed in Bipap and SpO2 improved to 99%. Labs significant for elevated HCO3 (43), BNP- 900, toponin<0.01. VBG shows pH-7.12, pCO2- 140, HCO3- 46, pO2- 85, SaO2- 97.8, lactate- 0.50, and K- 4.5 (on BMP was hemolyzed at 6.4). COVID-19 PCR pending. CXR unchanged from previous admission- Unchanged prominent interstitial markings. Critical care team was consulted. Because patient has altered mental status 2/2 acute on chronic hypercapnic/hypoxemic respiratory failure and is DNR/DNI, patient will be admitted to SDU. Patient was given azithromycin 500mg x 1, diamox 500mg x 1, duoneb x1, and solumedrol 125mg IV x1. CBC, CMP, RVP collected.

## 2022-12-05 NOTE — ED ADULT NURSE NOTE - INTERVENTIONS DEFINITIONS
[place] to call system/Call bell, personal items and telephone within reach/Physically safe environment: no spills, clutter or unnecessary equipment/Stretcher in lowest position, wheels locked, appropriate side rails in place/Provide visual cue, wrist band, yellow gown, etc./Monitor for mental status changes and reorient to person, place, and time/Reinforce activity limits and safety measures with patient and family

## 2022-12-05 NOTE — ED PROVIDER NOTE - PROGRESS NOTE DETAILS
Mook: first opportunity to chart. Pt was placed on BIPAP, not pulling good tidal volumes. VBG concerning for CO2 narcosis, chronic resp acidosis. Pt was discussed with ICU fellow Dr. Patel. Placed on AVAPS (, EPAP 14, IPAP min 18 max 24, FIO2 60%, RR 16) with mild improvement.   I spoke to [facility], confirmed that pt has signed paperwork for DNR/DNI. I attempted to call pt's brother Luca with number in chart (same as phone number given to me by [facility] staff) and line is not in service.  Will admit to SDU as per ICU fellow. Diamox ordered. Mook: spoke to Dr. Carrera's service to inform that pt is being admitted. Mook: spoke to Dr. Lovelace about admission

## 2022-12-05 NOTE — H&P ADULT - TIME BILLING
Patient known to our service from SNF and prior hospitalizations. Patient morbidly obese with THOM, Asthma, Hypothyroidism, Seizure Disorder, OA, Left Radial Artery Ischemia. Patient now awake, asking questions about events leading to hospitalization. Patient alert and asking to eat...    afebrile    pale, morbidly obese  NAD, pulse ox 98 - 100&% on 2.5 lpm  lungs shallow respiration  heart regular, S1S2  abdomen obese, BS+, soft  extremities + edema    labs noted      Acute on chronic hypercapnic/hypoxemic respiratory failure on NIV  Toxic metabolic encephalopathy- improved  THOM/OHS  Morbid obesity  Possible aspiration pneumonia  Asthma exacerbation  - pulm evaluation noted  - continue IV steroids  - now on NC O2  - passed S/S, can feed  - explained the importance of complying with treatment   DNR/DNI on chart

## 2022-12-05 NOTE — ED ADULT NURSE NOTE - CHIEF COMPLAINT QUOTE
BIBA from [facility] with reports of hypoxia and difficulty breathing. Patient with hx COPD without O2 use. Patient baseline A&Ox4 but now lethargic and responds to painful stimuli.

## 2022-12-05 NOTE — ED PROVIDER NOTE - CARE PLAN
Principal Discharge DX:	COPD with acute exacerbation  Secondary Diagnosis:	Acute respiratory failure with hypoxia and hypercapnia  Secondary Diagnosis:	Acute respiratory acidosis   1

## 2022-12-05 NOTE — ED ADULT NURSE NOTE - OBJECTIVE STATEMENT
BIBA from [facility] with reports of hypoxia and difficulty breathing. Patient with hx COPD without O2 use. Patient baseline A&Ox4 but now lethargic and responds to painful stimuli.

## 2022-12-05 NOTE — H&P ADULT - NSICDXPASTSURGICALHX_GEN_ALL_CORE_FT
PAST SURGICAL HISTORY:  H/O abdominal hysterectomy NO RADIATION AND CHEMO    History of bladder stone SURGERY 8/3/2018    History of surgery JJ STENT PLACEMENT LEFT OCTOBER 2017    History of tonsillectomy 13 years old    [place] teeth removed

## 2022-12-05 NOTE — ED ADULT NURSE NOTE - CAS EDP DISCH DISPOSITION ADMI
CRYS CASTANO Mart-1 - Positive Histology Text: MART-1 staining demonstrates areas of higher density and clustering of melanocytes with Pagetoid spread upwards within the epidermis. The surgical margins are positive for tumor cells.

## 2022-12-05 NOTE — ED PROCEDURE NOTE - ATTENDING CONTRIBUTION TO CARE
I personally supervised the study via review on QPath.  I reviewed the images and interpretation by the resident/ACP and have edited where appropriate.

## 2022-12-05 NOTE — CONSULT NOTE ADULT - SUBJECTIVE AND OBJECTIVE BOX
Patient is a 67y old  Female who presents with a chief complaint of     HPI:67F PMHX COPD, morbid obesity, THOM, hypothyroidism, hx CO2 narcosis, HTN BIBEMs from  [facility] to ED for hypoxia and lethargy. upon arrival pt minimally responsive to physical stimuli including sternal rub, unable to obtain to further hx. Per EMS pt was found confused in her bed (not on the ground). No fevers at NH. Pt was NH paperwork that states DNR/DNI. Pt found to be in Hypoxic / hypercapnic Respiratory Failure . MICU called to evaluate. on NIV       PAST MEDICAL & SURGICAL HISTORY:  Diabetes      Renal stone      Renal failure      Epilepsy      Asthma      Chronic cough      Knee pain      Osteoarthritis      Uterine cancer  s/p hysterectomy      H/O abdominal hysterectomy  NO RADIATION AND CHEMO      History of tonsillectomy  13 years old      [place] teeth removed      History of bladder stone  SURGERY 8/3/2018      History of surgery  JJ STENT PLACEMENT LEFT OCTOBER 2017          SOCIAL HX:   Smoking          UTO                   FAMILY HISTORY:  No pertinent family history in first degree relatives    :  No known cardiovacular family hisotry     Review Of Systems:     All ROS are negative except per HPI       Allergies    Compazine (Unknown)  latex (Urticaria)  penicillins (Unknown)    Intolerances          PHYSICAL EXAM    ICU Vital Signs Last 24 Hrs  T(C): 36.9 (05 Dec 2022 06:58), Max: 36.9 (05 Dec 2022 06:58)  T(F): 98.5 (05 Dec 2022 06:58), Max: 98.5 (05 Dec 2022 06:58)  HR: 75 (05 Dec 2022 06:58) (75 - 75)  BP: 113/59 (05 Dec 2022 06:58) (113/59 - 113/59)  RR: 18 (05 Dec 2022 06:58) (18 - 18)  SpO2: 99% (05 Dec 2022 08:30) (89% - 99%)        CONSTITUTIONAL:  ill appearing  Obtunded  on NIV    ENT:   Airway patent,   Mouth with normal mucosa.   No ton NIV       CARDIAC:   Normal rate,   edema       RESPIRATORY:   wheeze  crackles    GASTROINTESTINAL:  Abdomen soft,   Non-tender,   No guarding,   + BS      NEUROLOGICAL:   AAOx0             LABS:                                                12-05    141  |  97<L>  |  19  ----------------------------<  148<H>  6.4<HH>   |  43<HH>  |  0.5<L>    Ca    9.5      05 Dec 2022 07:53    TPro  7.2  /  Alb  4.2  /  TBili  0.4  /  DBili  x   /  AST  45<H>  /  ALT  26  /  AlkPhos  65  12-05                                                 CARDIAC MARKERS ( 05 Dec 2022 07:53 )  x     / <0.01 ng/mL / x     / x     / x                                                LIVER FUNCTIONS - ( 05 Dec 2022 07:53 )  Alb: 4.2 g/dL / Pro: 7.2 g/dL / ALK PHOS: 65 U/L / ALT: 26 U/L / AST: 45 U/L / GGT: x                                                                                                                                            CXR interpreted by me B/L Infiltrates. Cardiomegaly     MEDICATIONS  (STANDING):  acetaZOLAMIDE  IVPB 500 milliGRAM(s) IV Intermittent Once  albuterol/ipratropium for Nebulization 3 milliLiter(s) Nebulizer every 20 minutes    MEDICATIONS  (PRN):         Patient is a 67y old  Female who presents with a chief complaint of AMS    67F PMHX COPD, morbid obesity, THOM, hypothyroidism, hx CO2 narcosis, HTN BIBEMs from  [facility] to ED for hypoxia and lethargy. upon arrival pt minimally responsive to physical stimuli including sternal rub, unable to obtain to further hx. Per EMS pt was found confused in her bed (not on the ground). No fevers at NH. Pt was NH paperwork that states DNR/DNI. Pt found to be in Hypoxic / hypercapnic Respiratory Failure . MICU called to evaluate. on NIV ,patient was recently dc for same presentation, no fever    PAST MEDICAL & SURGICAL HISTORY:  Diabetes      Renal stone      Renal failure      Epilepsy      Asthma      Chronic cough      Knee pain      Osteoarthritis      Uterine cancer  s/p hysterectomy      H/O abdominal hysterectomy  NO RADIATION AND CHEMO      History of tonsillectomy  13 years old      [place] teeth removed      History of bladder stone  SURGERY 8/3/2018      History of surgery  JJ STENT PLACEMENT LEFT OCTOBER 2017          SOCIAL HX:   Smoking  -             FAMILY HISTORY:  No pertinent family history in first degree relatives    :  No known cardiovacular family hisotry     Review Of Systems:     All ROS are negative except per HPI       Allergies    Compazine (Unknown)  latex (Urticaria)  penicillins (Unknown)    Intolerances          PHYSICAL EXAM    ICU Vital Signs Last 24 Hrs  T(C): 36.9 (05 Dec 2022 06:58), Max: 36.9 (05 Dec 2022 06:58)  T(F): 98.5 (05 Dec 2022 06:58), Max: 98.5 (05 Dec 2022 06:58)  HR: 75 (05 Dec 2022 06:58) (75 - 75)  BP: 113/59 (05 Dec 2022 06:58) (113/59 - 113/59)  RR: 18 (05 Dec 2022 06:58) (18 - 18)  SpO2: 99% (05 Dec 2022 08:30) (89% - 99%)        CONSTITUTIONAL:  ill appearing  Obtunded  on NIV    ENT:   Airway patent,   Mouth with normal mucosa.   No ton NIV       CARDIAC:   LISA 2.6       RESPIRATORY:   wheeze  crackles    GASTROINTESTINAL:  Abdomen soft,   Non-tender,   No guarding,   + BS      NEUROLOGICAL:   AAOx0  Non focal          LABS:                                                12-05    141  |  97<L>  |  19  ----------------------------<  148<H>  6.4<HH>   |  43<HH>  |  0.5<L>    Ca    9.5      05 Dec 2022 07:53    TPro  7.2  /  Alb  4.2  /  TBili  0.4  /  DBili  x   /  AST  45<H>  /  ALT  26  /  AlkPhos  65  12-05                                                 CARDIAC MARKERS ( 05 Dec 2022 07:53 )  x     / <0.01 ng/mL / x     / x     / x                                                LIVER FUNCTIONS - ( 05 Dec 2022 07:53 )  Alb: 4.2 g/dL / Pro: 7.2 g/dL / ALK PHOS: 65 U/L / ALT: 26 U/L / AST: 45 U/L / GGT: x                                                                                                                                            CXR interpreted by me B/L Infiltrates. Cardiomegaly     MEDICATIONS  (STANDING):  acetaZOLAMIDE  IVPB 500 milliGRAM(s) IV Intermittent Once  albuterol/ipratropium for Nebulization 3 milliLiter(s) Nebulizer every 20 minutes    MEDICATIONS  (PRN):

## 2022-12-05 NOTE — ED ADULT TRIAGE NOTE - CHIEF COMPLAINT QUOTE
BIBA from [facility] with reports of hypoxia and difficulty breathing. Patient with hx COPD without O2 use. Patient baseline A&Ox4 but now lethargic and responds to painful stimuli.

## 2022-12-05 NOTE — H&P ADULT - NSHPLABSRESULTS_GEN_ALL_CORE
LABS:  cret    12-05    141  |  97<L>  |  19  ----------------------------<  148<H>  6.4<HH>   |  43<HH>  |  0.5<L>    Ca    9.5      05 Dec 2022 07:53    TPro  7.2  /  Alb  4.2  /  TBili  0.4  /  DBili  x   /  AST  45<H>  /  ALT  26  /  AlkPhos  65  12-05      Blood Gas Profile - Venous (12.05.22 @ 07:26)   pH, Venous: 7.12:  NOTIFIED IN PERSON AT 0731 READ BACK   pCO2, Venous: 140:  NOTIFIED IN PERSON AT 0731 READ BACK mmHg   pO2, Venous: 85:  NOTIFIED IN PERSON AT 0731 READ BACK mmHg   HCO3, Venous: 46:  NOTIFIED IN PERSON AT 0731 READ BACK mmol/L   Base Excess, Venous: 10.1:  NOTIFIED IN PERSON AT 0731 READ BACK mmol/L   Oxygen Saturation, Venous: 97.8:  NOTIFIED IN PERSON AT 0731 READ BACK %   Blood Gas Source Venous: Venous:  NOTIFIED IN PERSON AT 0731 READ BACK    lactate- 0.5  troponin<0.01

## 2022-12-06 LAB
ALBUMIN SERPL ELPH-MCNC: 4.3 G/DL — SIGNIFICANT CHANGE UP (ref 3.5–5.2)
ALP SERPL-CCNC: 72 U/L — SIGNIFICANT CHANGE UP (ref 30–115)
ALT FLD-CCNC: 21 U/L — SIGNIFICANT CHANGE UP (ref 0–41)
ANION GAP SERPL CALC-SCNC: 9 MMOL/L — SIGNIFICANT CHANGE UP (ref 7–14)
AST SERPL-CCNC: 16 U/L — SIGNIFICANT CHANGE UP (ref 0–41)
BASOPHILS # BLD AUTO: 0 K/UL — SIGNIFICANT CHANGE UP (ref 0–0.2)
BASOPHILS NFR BLD AUTO: 0 % — SIGNIFICANT CHANGE UP (ref 0–1)
BILIRUB SERPL-MCNC: 0.3 MG/DL — SIGNIFICANT CHANGE UP (ref 0.2–1.2)
BUN SERPL-MCNC: 18 MG/DL — SIGNIFICANT CHANGE UP (ref 10–20)
CALCIUM SERPL-MCNC: 9.4 MG/DL — SIGNIFICANT CHANGE UP (ref 8.4–10.4)
CHLORIDE SERPL-SCNC: 98 MMOL/L — SIGNIFICANT CHANGE UP (ref 98–110)
CO2 SERPL-SCNC: 40 MMOL/L — HIGH (ref 17–32)
CREAT SERPL-MCNC: <0.5 MG/DL — LOW (ref 0.7–1.5)
EGFR: 109 ML/MIN/1.73M2 — SIGNIFICANT CHANGE UP
EOSINOPHIL # BLD AUTO: 0 K/UL — SIGNIFICANT CHANGE UP (ref 0–0.7)
EOSINOPHIL NFR BLD AUTO: 0 % — SIGNIFICANT CHANGE UP (ref 0–8)
GAS PNL BLDA: SIGNIFICANT CHANGE UP
GLUCOSE BLDC GLUCOMTR-MCNC: 84 MG/DL — SIGNIFICANT CHANGE UP (ref 70–99)
GLUCOSE SERPL-MCNC: 106 MG/DL — HIGH (ref 70–99)
HCT VFR BLD CALC: 49.2 % — HIGH (ref 37–47)
HGB BLD-MCNC: 14 G/DL — SIGNIFICANT CHANGE UP (ref 12–16)
IMM GRANULOCYTES NFR BLD AUTO: 0.6 % — HIGH (ref 0.1–0.3)
LYMPHOCYTES # BLD AUTO: 0.55 K/UL — LOW (ref 1.2–3.4)
LYMPHOCYTES # BLD AUTO: 6.1 % — LOW (ref 20.5–51.1)
MAGNESIUM SERPL-MCNC: 1.9 MG/DL — SIGNIFICANT CHANGE UP (ref 1.8–2.4)
MCHC RBC-ENTMCNC: 28.5 G/DL — LOW (ref 32–37)
MCHC RBC-ENTMCNC: 30.6 PG — SIGNIFICANT CHANGE UP (ref 27–31)
MCV RBC AUTO: 107.7 FL — HIGH (ref 81–99)
MONOCYTES # BLD AUTO: 0.41 K/UL — SIGNIFICANT CHANGE UP (ref 0.1–0.6)
MONOCYTES NFR BLD AUTO: 4.6 % — SIGNIFICANT CHANGE UP (ref 1.7–9.3)
NEUTROPHILS # BLD AUTO: 7.98 K/UL — HIGH (ref 1.4–6.5)
NEUTROPHILS NFR BLD AUTO: 88.7 % — HIGH (ref 42.2–75.2)
NRBC # BLD: 0 /100 WBCS — SIGNIFICANT CHANGE UP (ref 0–0)
PLATELET # BLD AUTO: 174 K/UL — SIGNIFICANT CHANGE UP (ref 130–400)
POTASSIUM SERPL-MCNC: 4.6 MMOL/L — SIGNIFICANT CHANGE UP (ref 3.5–5)
POTASSIUM SERPL-SCNC: 4.6 MMOL/L — SIGNIFICANT CHANGE UP (ref 3.5–5)
PROCALCITONIN SERPL-MCNC: 0.06 NG/ML — SIGNIFICANT CHANGE UP (ref 0.02–0.1)
PROT SERPL-MCNC: 6.7 G/DL — SIGNIFICANT CHANGE UP (ref 6–8)
RBC # BLD: 4.57 M/UL — SIGNIFICANT CHANGE UP (ref 4.2–5.4)
RBC # FLD: 13.2 % — SIGNIFICANT CHANGE UP (ref 11.5–14.5)
SODIUM SERPL-SCNC: 147 MMOL/L — HIGH (ref 135–146)
WBC # BLD: 8.99 K/UL — SIGNIFICANT CHANGE UP (ref 4.8–10.8)
WBC # FLD AUTO: 8.99 K/UL — SIGNIFICANT CHANGE UP (ref 4.8–10.8)

## 2022-12-06 PROCEDURE — 99233 SBSQ HOSP IP/OBS HIGH 50: CPT

## 2022-12-06 RX ORDER — FUROSEMIDE 40 MG
20 TABLET ORAL DAILY
Refills: 0 | Status: DISCONTINUED | OUTPATIENT
Start: 2022-12-06 | End: 2022-12-10

## 2022-12-06 RX ADMIN — Medication 1 TABLET(S): at 13:02

## 2022-12-06 RX ADMIN — Medication 50 MICROGRAM(S): at 05:36

## 2022-12-06 RX ADMIN — Medication 401.84 MILLIGRAM(S): at 15:35

## 2022-12-06 RX ADMIN — MONTELUKAST 10 MILLIGRAM(S): 4 TABLET, CHEWABLE ORAL at 23:45

## 2022-12-06 RX ADMIN — Medication 100 MILLIGRAM(S): at 23:47

## 2022-12-06 RX ADMIN — LACOSAMIDE 130 MILLIGRAM(S): 50 TABLET ORAL at 01:43

## 2022-12-06 RX ADMIN — ENOXAPARIN SODIUM 40 MILLIGRAM(S): 100 INJECTION SUBCUTANEOUS at 17:00

## 2022-12-06 RX ADMIN — CITALOPRAM 20 MILLIGRAM(S): 10 TABLET, FILM COATED ORAL at 13:03

## 2022-12-06 RX ADMIN — POLYETHYLENE GLYCOL 3350 17 GRAM(S): 17 POWDER, FOR SOLUTION ORAL at 13:03

## 2022-12-06 RX ADMIN — Medication 401.84 MILLIGRAM(S): at 23:46

## 2022-12-06 RX ADMIN — Medication 100 MILLIGRAM(S): at 15:23

## 2022-12-06 RX ADMIN — Medication 100 MILLIGRAM(S): at 23:45

## 2022-12-06 RX ADMIN — Medication 60 MILLIGRAM(S): at 05:37

## 2022-12-06 RX ADMIN — Medication 60 MILLIGRAM(S): at 17:00

## 2022-12-06 RX ADMIN — SENNA PLUS 2 TABLET(S): 8.6 TABLET ORAL at 23:45

## 2022-12-06 RX ADMIN — Medication 401.84 MILLIGRAM(S): at 00:45

## 2022-12-06 RX ADMIN — LACOSAMIDE 130 MILLIGRAM(S): 50 TABLET ORAL at 23:46

## 2022-12-06 RX ADMIN — PRAMIPEXOLE DIHYDROCHLORIDE 0.5 MILLIGRAM(S): 0.12 TABLET ORAL at 23:45

## 2022-12-06 RX ADMIN — ENOXAPARIN SODIUM 40 MILLIGRAM(S): 100 INJECTION SUBCUTANEOUS at 05:37

## 2022-12-06 RX ADMIN — LACOSAMIDE 130 MILLIGRAM(S): 50 TABLET ORAL at 14:35

## 2022-12-06 RX ADMIN — Medication 100 MILLIGRAM(S): at 05:38

## 2022-12-06 NOTE — PROGRESS NOTE ADULT - SUBJECTIVE AND OBJECTIVE BOX
Over Night Events: events noted, NIV overnight, sp EEG, more awake    PHYSICAL EXAM    ICU Vital Signs Last 24 Hrs  T(C): 36.7 (06 Dec 2022 06:06), Max: 36.9 (05 Dec 2022 06:58)  T(F): 98.1 (06 Dec 2022 06:06), Max: 98.5 (05 Dec 2022 06:58)  HR: 66 (06 Dec 2022 06:06) (60 - 75)  BP: 134/74 (06 Dec 2022 06:06) (104/71 - 148/69)  BP(mean): 79 (05 Dec 2022 19:48) (79 - 79)  RR: 18 (06 Dec 2022 06:06) (18 - 18)  SpO2: 95% (06 Dec 2022 06:06) (89% - 100%)    O2 Parameters below as of 06 Dec 2022 06:06  Patient On (Oxygen Delivery Method): nasal cannula  O2 Flow (L/min): 4          General: ill looking, obese  Lungs: dec bs both bases  Cardiovascular: LISA 2.6  Abdomen: Soft, Positive BS  Extremities: No clubbing   Chronic changes        LABS:                          15.3   7.98  )-----------( 150      ( 05 Dec 2022 10:20 )             54.1                                               12-05    143  |  98  |  18  ----------------------------<  172<H>  6.2<HH>   |  38<H>  |  0.5<L>    Ca    8.0<L>      05 Dec 2022 10:20    TPro  6.3  /  Alb  3.8  /  TBili  0.3  /  DBili  x   /  AST  24  /  ALT  23  /  AlkPhos  66  12-05                                                 CARDIAC MARKERS ( 05 Dec 2022 07:53 )  x     / <0.01 ng/mL / x     / x     / x                                                LIVER FUNCTIONS - ( 05 Dec 2022 10:20 )  Alb: 3.8 g/dL / Pro: 6.3 g/dL / ALK PHOS: 66 U/L / ALT: 23 U/L / AST: 24 U/L / GGT: x                                                                                                                                       MEDICATIONS  (STANDING):  cefTRIAXone   IVPB 1000 milliGRAM(s) IV Intermittent every 24 hours  citalopram 20 milliGRAM(s) Oral daily  enoxaparin Injectable 40 milliGRAM(s) SubCutaneous every 12 hours  lacosamide IVPB 150 milliGRAM(s) IV Intermittent <User Schedule>  levothyroxine 50 MICROGram(s) Oral daily  methylPREDNISolone sodium succinate Injectable 60 milliGRAM(s) IV Push every 8 hours  metroNIDAZOLE  IVPB 500 milliGRAM(s) IV Intermittent every 8 hours  montelukast 10 milliGRAM(s) Oral at bedtime  multivitamin/minerals 1 Tablet(s) Oral daily  PHENobarbital IVPB 30 milliGRAM(s) IV Intermittent <User Schedule>  polyethylene glycol 3350 17 Gram(s) Oral daily  pramipexole 0.5 milliGRAM(s) Oral at bedtime  senna 2 Tablet(s) Oral at bedtime  traZODone 100 milliGRAM(s) Oral at bedtime    MEDICATIONS  (PRN):  acetaminophen     Tablet .. 650 milliGRAM(s) Oral every 6 hours PRN Mild Pain (1 - 3), Moderate Pain (4 - 6), Severe Pain (7 - 10)  albuterol/ipratropium for Nebulization 3 milliLiter(s) Nebulizer every 6 hours PRN Bronchospasm    CXE reviewed

## 2022-12-06 NOTE — PATIENT PROFILE ADULT - FALL HARM RISK - HARM RISK INTERVENTIONS

## 2022-12-06 NOTE — PHYSICAL THERAPY INITIAL EVALUATION ADULT - GENERAL OBSERVATIONS, REHAB EVAL
Pt pending LE duplex to r/o DVT. PT introduced self to pt at bedside, per chart review and confirmed with pt, pt bed bound, non ambulatory. Pt does not get out of bed with priyanka lift or other assistive devices and has not in >1 year. Pt with no skilled PT needs at this time, to be d/c from services.

## 2022-12-06 NOTE — PROGRESS NOTE ADULT - ASSESSMENT
Impression     Acute on chronic hypercapnic/ hypoxemic resp failure on NIV  AMS secondary to toxic Metabolic  Fluid Overload  THOM/ OHS  possible aspiration pneumonia  Asthma exacerbation  bedbound high risk for PE   HX of seizure      PLAN:    CNS: Avoid CNS depressant, AED per neuro      HEENT:  Oral care    PULMONARY:  HOB @ 45 degrees, aspiration precaution. NIV on and off q4 and QHS. Duonebs , solumedrol 60 mg IV q 12. repeat ABG    CARDIOVASCULAR: Negative balance , ECHO noted , lasix daily    GI: GI prophylaxis           Feeding speech and swallow eval     RENAL:  F/u  lytes.  Correct as needed. accurate I/O    INFECTIOUS DISEASE: procal,    HEMATOLOGICAL:  DVT prophylaxis. LE doppler    ENDOCRINE:  Follow up FS.  Insulin protocol if needed.    floor  Poor prognosis

## 2022-12-07 LAB
ALBUMIN SERPL ELPH-MCNC: 3.9 G/DL — SIGNIFICANT CHANGE UP (ref 3.5–5.2)
ALP SERPL-CCNC: 65 U/L — SIGNIFICANT CHANGE UP (ref 30–115)
ALT FLD-CCNC: 19 U/L — SIGNIFICANT CHANGE UP (ref 0–41)
ANION GAP SERPL CALC-SCNC: 7 MMOL/L — SIGNIFICANT CHANGE UP (ref 7–14)
AST SERPL-CCNC: 19 U/L — SIGNIFICANT CHANGE UP (ref 0–41)
BASE EXCESS BLDA CALC-SCNC: 11.8 MMOL/L — HIGH (ref -2–3)
BASOPHILS # BLD AUTO: 0.02 K/UL — SIGNIFICANT CHANGE UP (ref 0–0.2)
BASOPHILS NFR BLD AUTO: 0.2 % — SIGNIFICANT CHANGE UP (ref 0–1)
BILIRUB SERPL-MCNC: 0.4 MG/DL — SIGNIFICANT CHANGE UP (ref 0.2–1.2)
BUN SERPL-MCNC: 22 MG/DL — HIGH (ref 10–20)
CALCIUM SERPL-MCNC: 9 MG/DL — SIGNIFICANT CHANGE UP (ref 8.4–10.4)
CHLORIDE SERPL-SCNC: 96 MMOL/L — LOW (ref 98–110)
CO2 SERPL-SCNC: 39 MMOL/L — HIGH (ref 17–32)
CREAT SERPL-MCNC: 0.5 MG/DL — LOW (ref 0.7–1.5)
EGFR: 103 ML/MIN/1.73M2 — SIGNIFICANT CHANGE UP
EOSINOPHIL # BLD AUTO: 0.03 K/UL — SIGNIFICANT CHANGE UP (ref 0–0.7)
EOSINOPHIL NFR BLD AUTO: 0.3 % — SIGNIFICANT CHANGE UP (ref 0–8)
GAS PNL BLDA: SIGNIFICANT CHANGE UP
GLUCOSE BLDC GLUCOMTR-MCNC: 123 MG/DL — HIGH (ref 70–99)
GLUCOSE BLDC GLUCOMTR-MCNC: 146 MG/DL — HIGH (ref 70–99)
GLUCOSE BLDC GLUCOMTR-MCNC: 156 MG/DL — HIGH (ref 70–99)
GLUCOSE BLDC GLUCOMTR-MCNC: 84 MG/DL — SIGNIFICANT CHANGE UP (ref 70–99)
GLUCOSE SERPL-MCNC: 134 MG/DL — HIGH (ref 70–99)
HCO3 BLDA-SCNC: 42 MMOL/L — HIGH (ref 21–28)
HCT VFR BLD CALC: 46.6 % — SIGNIFICANT CHANGE UP (ref 37–47)
HGB BLD-MCNC: 13.5 G/DL — SIGNIFICANT CHANGE UP (ref 12–16)
HOROWITZ INDEX BLDA+IHG-RTO: 28 — SIGNIFICANT CHANGE UP
IMM GRANULOCYTES NFR BLD AUTO: 0.6 % — HIGH (ref 0.1–0.3)
LYMPHOCYTES # BLD AUTO: 0.76 K/UL — LOW (ref 1.2–3.4)
LYMPHOCYTES # BLD AUTO: 8.6 % — LOW (ref 20.5–51.1)
MAGNESIUM SERPL-MCNC: 1.8 MG/DL — SIGNIFICANT CHANGE UP (ref 1.8–2.4)
MCHC RBC-ENTMCNC: 29 G/DL — LOW (ref 32–37)
MCHC RBC-ENTMCNC: 30.7 PG — SIGNIFICANT CHANGE UP (ref 27–31)
MCV RBC AUTO: 105.9 FL — HIGH (ref 81–99)
MONOCYTES # BLD AUTO: 0.55 K/UL — SIGNIFICANT CHANGE UP (ref 0.1–0.6)
MONOCYTES NFR BLD AUTO: 6.2 % — SIGNIFICANT CHANGE UP (ref 1.7–9.3)
NEUTROPHILS # BLD AUTO: 7.46 K/UL — HIGH (ref 1.4–6.5)
NEUTROPHILS NFR BLD AUTO: 84.1 % — HIGH (ref 42.2–75.2)
NRBC # BLD: 0 /100 WBCS — SIGNIFICANT CHANGE UP (ref 0–0)
PCO2 BLDA: 83 MMHG — CRITICAL HIGH (ref 25–48)
PH BLDA: 7.31 — LOW (ref 7.35–7.45)
PLATELET # BLD AUTO: 161 K/UL — SIGNIFICANT CHANGE UP (ref 130–400)
PO2 BLDA: 67 MMHG — LOW (ref 83–108)
POTASSIUM SERPL-MCNC: 4.5 MMOL/L — SIGNIFICANT CHANGE UP (ref 3.5–5)
POTASSIUM SERPL-SCNC: 4.5 MMOL/L — SIGNIFICANT CHANGE UP (ref 3.5–5)
PROT SERPL-MCNC: 6.2 G/DL — SIGNIFICANT CHANGE UP (ref 6–8)
RBC # BLD: 4.4 M/UL — SIGNIFICANT CHANGE UP (ref 4.2–5.4)
RBC # FLD: 13.2 % — SIGNIFICANT CHANGE UP (ref 11.5–14.5)
SAO2 % BLDA: 96.1 % — SIGNIFICANT CHANGE UP (ref 94–98)
SODIUM SERPL-SCNC: 142 MMOL/L — SIGNIFICANT CHANGE UP (ref 135–146)
WBC # BLD: 8.87 K/UL — SIGNIFICANT CHANGE UP (ref 4.8–10.8)
WBC # FLD AUTO: 8.87 K/UL — SIGNIFICANT CHANGE UP (ref 4.8–10.8)

## 2022-12-07 PROCEDURE — 99233 SBSQ HOSP IP/OBS HIGH 50: CPT

## 2022-12-07 PROCEDURE — 93970 EXTREMITY STUDY: CPT | Mod: 26

## 2022-12-07 PROCEDURE — 93010 ELECTROCARDIOGRAM REPORT: CPT

## 2022-12-07 RX ORDER — ACETAMINOPHEN 500 MG
650 TABLET ORAL EVERY 6 HOURS
Refills: 0 | Status: DISCONTINUED | OUTPATIENT
Start: 2022-12-07 | End: 2022-12-10

## 2022-12-07 RX ORDER — KETOROLAC TROMETHAMINE 30 MG/ML
15 SYRINGE (ML) INJECTION ONCE
Refills: 0 | Status: DISCONTINUED | OUTPATIENT
Start: 2022-12-07 | End: 2022-12-07

## 2022-12-07 RX ORDER — KETOROLAC TROMETHAMINE 30 MG/ML
15 SYRINGE (ML) INJECTION
Refills: 0 | Status: DISCONTINUED | OUTPATIENT
Start: 2022-12-07 | End: 2022-12-10

## 2022-12-07 RX ADMIN — Medication 100 MILLIGRAM(S): at 23:03

## 2022-12-07 RX ADMIN — Medication 401.84 MILLIGRAM(S): at 11:18

## 2022-12-07 RX ADMIN — Medication 650 MILLIGRAM(S): at 01:07

## 2022-12-07 RX ADMIN — Medication 100 MILLIGRAM(S): at 13:37

## 2022-12-07 RX ADMIN — CITALOPRAM 20 MILLIGRAM(S): 10 TABLET, FILM COATED ORAL at 11:18

## 2022-12-07 RX ADMIN — Medication 60 MILLIGRAM(S): at 06:26

## 2022-12-07 RX ADMIN — Medication 15 MILLIGRAM(S): at 05:04

## 2022-12-07 RX ADMIN — LACOSAMIDE 130 MILLIGRAM(S): 50 TABLET ORAL at 12:08

## 2022-12-07 RX ADMIN — CEFTRIAXONE 100 MILLIGRAM(S): 500 INJECTION, POWDER, FOR SOLUTION INTRAMUSCULAR; INTRAVENOUS at 17:30

## 2022-12-07 RX ADMIN — Medication 401.84 MILLIGRAM(S): at 20:46

## 2022-12-07 RX ADMIN — Medication 650 MILLIGRAM(S): at 00:37

## 2022-12-07 RX ADMIN — MONTELUKAST 10 MILLIGRAM(S): 4 TABLET, CHEWABLE ORAL at 21:30

## 2022-12-07 RX ADMIN — ENOXAPARIN SODIUM 40 MILLIGRAM(S): 100 INJECTION SUBCUTANEOUS at 06:26

## 2022-12-07 RX ADMIN — Medication 20 MILLIGRAM(S): at 06:27

## 2022-12-07 RX ADMIN — LACOSAMIDE 130 MILLIGRAM(S): 50 TABLET ORAL at 23:59

## 2022-12-07 RX ADMIN — Medication 100 MILLIGRAM(S): at 21:31

## 2022-12-07 RX ADMIN — PRAMIPEXOLE DIHYDROCHLORIDE 0.5 MILLIGRAM(S): 0.12 TABLET ORAL at 21:30

## 2022-12-07 RX ADMIN — Medication 100 MILLIGRAM(S): at 06:34

## 2022-12-07 RX ADMIN — ENOXAPARIN SODIUM 40 MILLIGRAM(S): 100 INJECTION SUBCUTANEOUS at 17:30

## 2022-12-07 RX ADMIN — Medication 50 MICROGRAM(S): at 06:27

## 2022-12-07 RX ADMIN — Medication 1 TABLET(S): at 11:18

## 2022-12-07 RX ADMIN — Medication 60 MILLIGRAM(S): at 17:30

## 2022-12-07 RX ADMIN — Medication 15 MILLIGRAM(S): at 04:48

## 2022-12-07 NOTE — PROGRESS NOTE ADULT - ASSESSMENT
Impression     Acute on chronic hypercapnic/ hypoxemic resp failure on NIV  AMS secondary to toxic Metabolic  Fluid Overload  THOM/ OHS  possible aspiration pneumonia  Asthma exacerbation  bedbound high risk for PE   HX of seizure      PLAN:    CNS: Avoid CNS depressant, AED per neuro    HEENT:  Oral care    PULMONARY:  HOB @ 45 degrees, aspiration precaution. REcommend BIPAP on and off q4 and QHS. Duonebs as needed. Solumedrol 40 mg IV q 12 for now and taper slowly. Repeat ABG and CXR today.     CARDIOVASCULAR: Negative balance , ECHO, continue daily IV lasix 20mg.     GI: GI prophylaxis           Feeding per speech and swallow.     RENAL:  F/u  lytes.  Correct as needed. Please check daily weight and accurate fluid balance. Elevated bicarbonate related to chronic hypercapnia.     INFECTIOUS DISEASE: Check procalcitonin. Finish 5 day course of abx for possible aspiration pneumonia. RVP panel is negative. No fevers. no leukocytosis.     HEMATOLOGICAL:  DVT prophylaxis. Check LE doppler    ENDOCRINE:  Follow up FS.  Insulin protocol if needed.    Continue to monitor on the floor. Will follow as needed.     Poor prognosis. DNI/DNR.

## 2022-12-07 NOTE — SWALLOW BEDSIDE ASSESSMENT ADULT - CONSISTENCIES ADMINISTERED
thin liquid/regular solid
3oz water, 2oz soft , 3oz minced & moist/thin liquid/minced & moist/soft & bite-sized

## 2022-12-07 NOTE — PROGRESS NOTE ADULT - SUBJECTIVE AND OBJECTIVE BOX
Chart reviewed, patient examined. Pertinent results reviewed.  Case discussed with HO; specialist f/u reviewed  HD#3; Patient known to me from prior admissions      Over Night Events: events noted, NIV overnight, sp EEG, more awake    PHYSICAL EXAM    ICU Vital Signs Last 24 Hrs  T(C): 36.7 (06 Dec 2022 06:06), Max: 36.9 (05 Dec 2022 06:58)  T(F): 98.1 (06 Dec 2022 06:06), Max: 98.5 (05 Dec 2022 06:58)  HR: 66 (06 Dec 2022 06:06) (60 - 75)  BP: 134/74 (06 Dec 2022 06:06) (104/71 - 148/69)  BP(mean): 79 (05 Dec 2022 19:48) (79 - 79)  RR: 18 (06 Dec 2022 06:06) (18 - 18)  SpO2: 95% (06 Dec 2022 06:06) (89% - 100%)    O2 Parameters below as of 06 Dec 2022 06:06  Patient On (Oxygen Delivery Method): nasal cannula  O2 Flow (L/min): 4          General: ill looking, obese  Lungs: dec bs both bases  Cardiovascular: RRR;  LISA 1/6  Abdomen: obese; Soft, Positive BS; NT, no HSM, M  Extremities: + obese No clubbing   Chronic changes        LABS:                          15.3   7.98  )-----------( 150      ( 05 Dec 2022 10:20 )             54.1                                               12-05    143  |  98  |  18  ----------------------------<  172<H>  6.2<HH>   |  38<H>  |  0.5<L>    Ca    8.0<L>      05 Dec 2022 10:20    TPro  6.3  /  Alb  3.8  /  TBili  0.3  /  DBili  x   /  AST  24  /  ALT  23  /  AlkPhos  66  12-05                                                 CARDIAC MARKERS ( 05 Dec 2022 07:53 )  x     / <0.01 ng/mL / x     / x     / x                                                LIVER FUNCTIONS - ( 05 Dec 2022 10:20 )  Alb: 3.8 g/dL / Pro: 6.3 g/dL / ALK PHOS: 66 U/L / ALT: 23 U/L / AST: 24 U/L / GGT: x                                                                                                                                       MEDICATIONS  (STANDING):  cefTRIAXone   IVPB 1000 milliGRAM(s) IV Intermittent every 24 hours  citalopram 20 milliGRAM(s) Oral daily  enoxaparin Injectable 40 milliGRAM(s) SubCutaneous every 12 hours  lacosamide IVPB 150 milliGRAM(s) IV Intermittent <User Schedule>  levothyroxine 50 MICROGram(s) Oral daily  methylPREDNISolone sodium succinate Injectable 60 milliGRAM(s) IV Push every 8 hours  metroNIDAZOLE  IVPB 500 milliGRAM(s) IV Intermittent every 8 hours  montelukast 10 milliGRAM(s) Oral at bedtime  multivitamin/minerals 1 Tablet(s) Oral daily  PHENobarbital IVPB 30 milliGRAM(s) IV Intermittent <User Schedule>  polyethylene glycol 3350 17 Gram(s) Oral daily  pramipexole 0.5 milliGRAM(s) Oral at bedtime  senna 2 Tablet(s) Oral at bedtime  traZODone 100 milliGRAM(s) Oral at bedtime    MEDICATIONS  (PRN):  acetaminophen     Tablet .. 650 milliGRAM(s) Oral every 6 hours PRN Mild Pain (1 - 3), Moderate Pain (4 - 6), Severe Pain (7 - 10)  albuterol/ipratropium for Nebulization 3 milliLiter(s) Nebulizer every 6 hours PRN Bronchospasm    CXE reviewed   Chart reviewed, patient examined. Pertinent results reviewed.  Case discussed with HO; specialist f/u reviewed  HD#3; Patient known to me from prior admissions    History of Present Illness:   66yo F with hx of multiple admissions for hypercapnic respiratory failure, THOM. COPD not on home O2, morbid obesity, hx of asthma, obesity, epilepsy, hypothyroidism, OA, kidney stones, L radial artery occlusion 2021 (previously on eliquis) presenting to the ED from [facility] for unresponsiveness. Patient was found minimally responsive - including to physical stimuli from sternal rub, and brought to the ED. She is normally AAOx4.  Patient was found confused at bed by EMS and brought to ED. Saw patient when her mental status improved with NIV. Patient does not endorse any triggering events. Denies fevers, chills, headaches, chest pain, sick contacts, shortness of breath, choking, wheezing, chest pain, palpitations, abdominal pain, nauesa/vomiting, dysuria. ED testing  revealed severe hypercapnia (194)    Patient was recently discharged on 11/25/2022 for acute on chronic hypoxic hyercapnic respiratory failure 2/2 THOM/OHS, COPD exacerbation. Was discharged on prednisone taper (completed course on 12/1/22). Patient was supposed to f/u with pulm outpatient for sleep study + CPAP arrangements. [previously No Show for previous appointments]. Avap has been started and Pt is more awake.      Over Night Events: events noted, NIV overnight, sp EEG, more awake    PHYSICAL EXAM    ICU Vital Signs Last 24 Hrs  T(C): 36.7 (06 Dec 2022 06:06), Max: 36.9 (05 Dec 2022 06:58)  T(F): 98.1 (06 Dec 2022 06:06), Max: 98.5 (05 Dec 2022 06:58)  HR: 66 (06 Dec 2022 06:06) (60 - 75)  BP: 134/74 (06 Dec 2022 06:06) (104/71 - 148/69)  BP(mean): 79 (05 Dec 2022 19:48) (79 - 79)  RR: 18 (06 Dec 2022 06:06) (18 - 18)  SpO2: 95% (06 Dec 2022 06:06) (89% - 100%)    O2 Parameters below as of 06 Dec 2022 06:06  Patient On (Oxygen Delivery Method): nasal cannula  O2 Flow (L/min): 4  General: ill looking, obese; AAOx4, slight gen weakness.  Lungs: dec bs both bases  Cardiovascular: RRR;  LISA 1/6  Abdomen: obese; Soft, Positive BS; NT, no HSM, M  Extremities: + obese No clubbing ; weak B/L LE   Chronic changes        LABS:                           14.0   8.99  )-----------( 174      ( 06 Dec 2022 06:11 )             49.2                        15.3   7.98  )-----------( 150      ( 05 Dec 2022 10:20 )             54.1     12-06    147<H>  |  98  |  18  ----------------------------<  106<H>  4.6   |  40<H>  |  <0.5<L>    Ca    9.4      06 Dec 2022 06:11  Mg     1.9     12-06    TPro  6.7  /  Alb  4.3  /  TBili  0.3  /  DBili  x   /  AST  16  /  ALT  21  /  AlkPhos  72  12-06                                                12-05    143  |  98  |  18  ----------------------------<  172<H>  6.2<HH>   |  38<H>  |  0.5<L>    Ca    8.0<L>      05 Dec 2022 10:20    TPro  6.3  /  Alb  3.8  /  TBili  0.3  /  DBili  x   /  AST  24  /  ALT  23  /  AlkPhos  66  12-05                                                 CARDIAC MARKERS ( 05 Dec 2022 07:53 )  x     / <0.01 ng/mL / x     / x     / x                                                LIVER FUNCTIONS - ( 05 Dec 2022 10:20 )  Alb: 3.8 g/dL / Pro: 6.3 g/dL / ALK PHOS: 66 U/L / ALT: 23 U/L / AST: 24 U/L / GGT: x                                                                                             < from: Xray Chest 1 View-PORTABLE IMMEDIATE (12.05.22 @ 08:57) >    ACC: [id number] EXAM:  XR CHEST PORTABLE IMMED 1V                          PROCEDURE DATE:  12/05/2022          INTERPRETATION:  Clinical History / Reason for exam: Shortness of breath    Comparison : Chest radiograph dated 11/23/2022.    Technique/Positioning: Frontal portable.    Findings:    Support devices: None.    Cardiac/mediastinum/hilum: Stable cardiomegaly    Lung parenchyma/Pleura: Low lung volumes. Stable interstitial opacities.   No acute infiltrate. No pneumothorax.    Skeleton/softtissues: Stable.    Impression:    Stable interstitial opacities. Low lung volumes. No definite new   infiltrate.    --- End of Report ---      < end of copied text >

## 2022-12-07 NOTE — PROGRESS NOTE ADULT - SUBJECTIVE AND OBJECTIVE BOX
Patient is a 67y old  Female who presents with a chief complaint of COPD exacerbation (07 Dec 2022 10:25)        Over Night Events:    Drowsy but arousable   No fevers   Denies being short of breath         ROS:  See HPI    PHYSICAL EXAM    ICU Vital Signs Last 24 Hrs  T(C): 35.9 (07 Dec 2022 13:15), Max: 36.9 (06 Dec 2022 16:45)  T(F): 96.7 (07 Dec 2022 13:15), Max: 98.4 (06 Dec 2022 16:45)  HR: 61 (07 Dec 2022 13:15) (60 - 67)  BP: 136/80 (07 Dec 2022 13:15) (117/58 - 156/74)  BP(mean): --  ABP: --  ABP(mean): --  RR: 18 (07 Dec 2022 13:15) (18 - 19)  SpO2: 98% (07 Dec 2022 05:23) (93% - 99%)    O2 Parameters below as of 07 Dec 2022 05:23  Patient On (Oxygen Delivery Method): BiPAP/CPAP            General: NAD   HEENT: RIC             Lymphatic system: No cervical LN   Lungs: Bilateral BS, clear anteriorly   Cardiovascular: Regular   Gastrointestinal: Soft, Positive BS  Extremities: No clubbing.  Moves extremities.  Full Range of motion   Skin: Warm, intact, edema   Neurological: No motor or sensory deficit         LABS:                            13.5   8.87  )-----------( 161      ( 07 Dec 2022 09:24 )             46.6                                               12-07    142  |  96<L>  |  22<H>  ----------------------------<  134<H>  4.5   |  39<H>  |  0.5<L>    Ca    9.0      07 Dec 2022 09:24  Mg     1.8     12-07    TPro  6.2  /  Alb  3.9  /  TBili  0.4  /  DBili  x   /  AST  19  /  ALT  19  /  AlkPhos  65  12-07                                                                                           LIVER FUNCTIONS - ( 07 Dec 2022 09:24 )  Alb: 3.9 g/dL / Pro: 6.2 g/dL / ALK PHOS: 65 U/L / ALT: 19 U/L / AST: 19 U/L / GGT: x                                                                                                                                   ABG - ( 06 Dec 2022 13:41 )  pH, Arterial: 7.36  pH, Blood: x     /  pCO2: 79    /  pO2: 76    / HCO3: 45    / Base Excess: 15.1  /  SaO2: 97.3                MEDICATIONS  (STANDING):  cefTRIAXone   IVPB 1000 milliGRAM(s) IV Intermittent every 24 hours  citalopram 20 milliGRAM(s) Oral daily  enoxaparin Injectable 40 milliGRAM(s) SubCutaneous every 12 hours  furosemide   Injectable 20 milliGRAM(s) IV Push daily  lacosamide IVPB 150 milliGRAM(s) IV Intermittent <User Schedule>  levothyroxine 50 MICROGram(s) Oral daily  methylPREDNISolone sodium succinate Injectable 60 milliGRAM(s) IV Push every 12 hours  metroNIDAZOLE  IVPB 500 milliGRAM(s) IV Intermittent every 8 hours  montelukast 10 milliGRAM(s) Oral at bedtime  multivitamin/minerals 1 Tablet(s) Oral daily  PHENobarbital IVPB 30 milliGRAM(s) IV Intermittent <User Schedule>  polyethylene glycol 3350 17 Gram(s) Oral daily  pramipexole 0.5 milliGRAM(s) Oral at bedtime  senna 2 Tablet(s) Oral at bedtime  traZODone 100 milliGRAM(s) Oral at bedtime    MEDICATIONS  (PRN):  acetaminophen     Tablet .. 650 milliGRAM(s) Oral every 6 hours PRN Mild Pain (1 - 3)  albuterol/ipratropium for Nebulization 3 milliLiter(s) Nebulizer every 6 hours PRN Bronchospasm  ketorolac   Injectable 15 milliGRAM(s) IV Push two times a day PRN Moderate Pain (4 - 6)      Xrays: Cardiomegaly, bilateral infiltrates

## 2022-12-07 NOTE — SWALLOW BEDSIDE ASSESSMENT ADULT - ORAL PHASE
w/ soft & bite sized solids./Decreased anterior-posterior movement of the bolus/Delayed oral transit time
Within functional limits

## 2022-12-07 NOTE — SWALLOW BEDSIDE ASSESSMENT ADULT - SLP PERTINENT HISTORY OF CURRENT PROBLEM
66 yo Female with hx of multiple admissions for hypercapnic respiratory failure, THOM. COPD not on home O2, morbid obesity, hx of asthma, epilepsy, hypothyroidism, OA, kidney stones, L radial artery occlusion 2021. [facility]. Admitted with acute on chronic hypercapnic/hypoxemic respiratory failure and AMS 2' Toxic metabolic encephalopathy.
66 yo Female with hx of multiple admissions for hypercapnic respiratory failure, THOM. COPD not on home O2, morbid obesity, hx of asthma, epilepsy, hypothyroidism, OA, kidney stones, L radial artery occlusion 2021. [facility] ED from [facility]responsiveness. Admitted with acute on chronic hypercapnic/hypoxemic respiratory failure and AMS 2' Toxic metabolic encephalopathy.

## 2022-12-07 NOTE — PROGRESS NOTE ADULT - ASSESSMENT
Impression     Acute on chronic hypercapnic/ hypoxemic resp failure on NIV  AMS secondary to toxic Metabolic  Fluid Overload  THOM/ OHS  possible aspiration pneumonia  Asthma exacerbation  bedbound high risk for PE   HX of seizure      PLAN:    CNS: Avoid CNS depressant, AED per neuro      HEENT:  Oral care    PULMONARY:  HOB @ 45 degrees, aspiration precaution. NIV on and off q4 and QHS. Duonebs , solumedrol 60 mg IV q 12. repeat ABG    CARDIOVASCULAR: Negative balance , ECHO noted , lasix daily    GI: GI prophylaxis           Feeding speech and swallow eval     RENAL:  F/u  lytes.  Correct as needed. accurate I/O    INFECTIOUS DISEASE: procal,    HEMATOLOGICAL:  DVT prophylaxis. LE doppler    ENDOCRINE:  Follow up FS.  Insulin protocol if needed.    floor  Poor prognosis       Impression     Acute on chronic hypercapnic/ hypoxemic resp failure on NIV    2/2 morbid obesity/hypoventilation w COPD/THOM  AMS secondary to toxic Metabolic and hypercapnia  Fluid Overload  THOM/ OHS  possible aspiration pneumonia  Asthma exacerbation  bedbound high risk for PE   HX of seizure      PLAN: as per pulm:    CNS: Avoid CNS depressant, AED per neuro      HEENT:  Oral care    PULMONARY:  HOB @ 45 degrees, aspiration precaution. NIV on and off q4 and QHS. Duonebs , solumedrol 60 mg IV q 12. repeat ABG    CARDIOVASCULAR: Negative balance , ECHO noted , lasix daily    GI: GI prophylaxis           Feeding speech and swallow eval     RENAL:  F/u  lytes.  Correct as needed. accurate I/O    INFECTIOUS DISEASE: procal,- get    HEMATOLOGICAL:  DVT prophylaxis. LE doppler    ENDOCRINE:  Follow up FS.  Insulin protocol if needed.   see Hypernatremia- hydrate and recheck.    MuSkel_ morbid obesity/ bedbound,  Neuro: functional paraplegic.  Pt c/o jt(knee) pains and "the bed is too small"- try to get larger bed- for morbidly obese patient.    floor  Poor prognosis   Pt is DNR/DNI, and has no interest in exploring a tracheostomy.  She also has no interest in Hospice at this time.   We need to review w Pulm AVAP details for NH/SNF.

## 2022-12-07 NOTE — SWALLOW BEDSIDE ASSESSMENT ADULT - NS SPL SWALLOW CLINIC TRIAL FT
+ tolerance for thin liquids and minced & moist without overt s/s of penetration/ aspiration. Mild oral dysphagia with soft & bite sized solids.
estefania-pharyngeal swallow is WFL for thin, puree and regular solids w/o overt s/s of penetration/aspiration

## 2022-12-07 NOTE — SWALLOW BEDSIDE ASSESSMENT ADULT - SWALLOW EVAL: DIAGNOSIS
estefania-pharyngeal swallow is WFL for thin, puree and regular solids w/o overt s/s of penetration/aspiration
+ tolerance for thin liquids and minced & moist without overt s/s of penetration/ aspiration. Mild oral dysphagia with soft & bite sized solids.

## 2022-12-07 NOTE — SWALLOW BEDSIDE ASSESSMENT ADULT - SLP GENERAL OBSERVATIONS
Pt. received at bedside, awake, confused, ox1 (self), 5L NC.
PT received in bed asleep, woke to verbal stim, reportedly tolerating current po diet

## 2022-12-08 LAB
ALBUMIN SERPL ELPH-MCNC: 3.6 G/DL — SIGNIFICANT CHANGE UP (ref 3.5–5.2)
ALP SERPL-CCNC: 56 U/L — SIGNIFICANT CHANGE UP (ref 30–115)
ALT FLD-CCNC: 19 U/L — SIGNIFICANT CHANGE UP (ref 0–41)
ANION GAP SERPL CALC-SCNC: 6 MMOL/L — LOW (ref 7–14)
ANION GAP SERPL CALC-SCNC: 7 MMOL/L — SIGNIFICANT CHANGE UP (ref 7–14)
AST SERPL-CCNC: 33 U/L — SIGNIFICANT CHANGE UP (ref 0–41)
BASE EXCESS BLDA CALC-SCNC: 13.6 MMOL/L — HIGH (ref -2–3)
BILIRUB SERPL-MCNC: 0.2 MG/DL — SIGNIFICANT CHANGE UP (ref 0.2–1.2)
BUN SERPL-MCNC: 16 MG/DL — SIGNIFICANT CHANGE UP (ref 10–20)
BUN SERPL-MCNC: 17 MG/DL — SIGNIFICANT CHANGE UP (ref 10–20)
CALCIUM SERPL-MCNC: 8.3 MG/DL — LOW (ref 8.4–10.5)
CALCIUM SERPL-MCNC: 8.4 MG/DL — SIGNIFICANT CHANGE UP (ref 8.4–10.5)
CHLORIDE SERPL-SCNC: 95 MMOL/L — LOW (ref 98–110)
CHLORIDE SERPL-SCNC: 97 MMOL/L — LOW (ref 98–110)
CO2 SERPL-SCNC: 40 MMOL/L — HIGH (ref 17–32)
CO2 SERPL-SCNC: 41 MMOL/L — CRITICAL HIGH (ref 17–32)
CREAT SERPL-MCNC: 0.5 MG/DL — LOW (ref 0.7–1.5)
CREAT SERPL-MCNC: 0.5 MG/DL — LOW (ref 0.7–1.5)
EGFR: 103 ML/MIN/1.73M2 — SIGNIFICANT CHANGE UP
EGFR: 103 ML/MIN/1.73M2 — SIGNIFICANT CHANGE UP
GLUCOSE BLDC GLUCOMTR-MCNC: 110 MG/DL — HIGH (ref 70–99)
GLUCOSE BLDC GLUCOMTR-MCNC: 159 MG/DL — HIGH (ref 70–99)
GLUCOSE BLDC GLUCOMTR-MCNC: 82 MG/DL — SIGNIFICANT CHANGE UP (ref 70–99)
GLUCOSE SERPL-MCNC: 113 MG/DL — HIGH (ref 70–99)
GLUCOSE SERPL-MCNC: 94 MG/DL — SIGNIFICANT CHANGE UP (ref 70–99)
HCO3 BLDA-SCNC: 44 MMOL/L — HIGH (ref 21–28)
HCT VFR BLD CALC: 43 % — SIGNIFICANT CHANGE UP (ref 37–47)
HGB BLD-MCNC: 13.2 G/DL — SIGNIFICANT CHANGE UP (ref 12–16)
HOROWITZ INDEX BLDA+IHG-RTO: 28 — SIGNIFICANT CHANGE UP
MCHC RBC-ENTMCNC: 30.7 G/DL — LOW (ref 32–37)
MCHC RBC-ENTMCNC: 31.5 PG — HIGH (ref 27–31)
MCV RBC AUTO: 102.6 FL — HIGH (ref 81–99)
NRBC # BLD: 0 /100 WBCS — SIGNIFICANT CHANGE UP (ref 0–0)
PCO2 BLDA: 85 MMHG — CRITICAL HIGH (ref 25–48)
PH BLDA: 7.32 — LOW (ref 7.35–7.45)
PLATELET # BLD AUTO: 101 K/UL — LOW (ref 130–400)
PO2 BLDA: 68 MMHG — LOW (ref 83–108)
POTASSIUM SERPL-MCNC: 3.9 MMOL/L — SIGNIFICANT CHANGE UP (ref 3.5–5)
POTASSIUM SERPL-MCNC: 6.1 MMOL/L — CRITICAL HIGH (ref 3.5–5)
POTASSIUM SERPL-SCNC: 3.9 MMOL/L — SIGNIFICANT CHANGE UP (ref 3.5–5)
POTASSIUM SERPL-SCNC: 6.1 MMOL/L — CRITICAL HIGH (ref 3.5–5)
PROT SERPL-MCNC: 5.9 G/DL — LOW (ref 6–8)
RBC # BLD: 4.19 M/UL — LOW (ref 4.2–5.4)
RBC # FLD: 13.5 % — SIGNIFICANT CHANGE UP (ref 11.5–14.5)
SAO2 % BLDA: 95.8 % — SIGNIFICANT CHANGE UP (ref 94–98)
SODIUM SERPL-SCNC: 143 MMOL/L — SIGNIFICANT CHANGE UP (ref 135–146)
SODIUM SERPL-SCNC: 143 MMOL/L — SIGNIFICANT CHANGE UP (ref 135–146)
WBC # BLD: 6.96 K/UL — SIGNIFICANT CHANGE UP (ref 4.8–10.8)
WBC # FLD AUTO: 6.96 K/UL — SIGNIFICANT CHANGE UP (ref 4.8–10.8)

## 2022-12-08 PROCEDURE — 71045 X-RAY EXAM CHEST 1 VIEW: CPT | Mod: 26

## 2022-12-08 RX ADMIN — Medication 20 MILLIGRAM(S): at 05:30

## 2022-12-08 RX ADMIN — CITALOPRAM 20 MILLIGRAM(S): 10 TABLET, FILM COATED ORAL at 13:27

## 2022-12-08 RX ADMIN — Medication 100 MILLIGRAM(S): at 18:10

## 2022-12-08 RX ADMIN — Medication 1 TABLET(S): at 13:27

## 2022-12-08 RX ADMIN — Medication 60 MILLIGRAM(S): at 05:36

## 2022-12-08 RX ADMIN — Medication 50 MICROGRAM(S): at 05:29

## 2022-12-08 RX ADMIN — ENOXAPARIN SODIUM 40 MILLIGRAM(S): 100 INJECTION SUBCUTANEOUS at 05:30

## 2022-12-08 RX ADMIN — PRAMIPEXOLE DIHYDROCHLORIDE 0.5 MILLIGRAM(S): 0.12 TABLET ORAL at 22:07

## 2022-12-08 RX ADMIN — Medication 60 MILLIGRAM(S): at 18:10

## 2022-12-08 RX ADMIN — Medication 100 MILLIGRAM(S): at 05:32

## 2022-12-08 RX ADMIN — MONTELUKAST 10 MILLIGRAM(S): 4 TABLET, CHEWABLE ORAL at 22:07

## 2022-12-08 RX ADMIN — Medication 401.84 MILLIGRAM(S): at 13:27

## 2022-12-08 RX ADMIN — Medication 401.84 MILLIGRAM(S): at 05:24

## 2022-12-08 RX ADMIN — Medication 401.84 MILLIGRAM(S): at 23:08

## 2022-12-08 RX ADMIN — Medication 100 MILLIGRAM(S): at 22:08

## 2022-12-08 RX ADMIN — LACOSAMIDE 130 MILLIGRAM(S): 50 TABLET ORAL at 13:26

## 2022-12-08 RX ADMIN — LACOSAMIDE 130 MILLIGRAM(S): 50 TABLET ORAL at 23:09

## 2022-12-08 RX ADMIN — SENNA PLUS 2 TABLET(S): 8.6 TABLET ORAL at 22:07

## 2022-12-08 RX ADMIN — ENOXAPARIN SODIUM 40 MILLIGRAM(S): 100 INJECTION SUBCUTANEOUS at 18:10

## 2022-12-08 NOTE — PROGRESS NOTE ADULT - SUBJECTIVE AND OBJECTIVE BOX
JACOB KOTHARI  67y  Female  HPI:  66yo F with hx of multiple admissions for hypercapnic respiratory failure, THOM. COPD not on home O2, morbid obesity, hx of asthma, obesity, epilepsy, hypothyroidism, OA, kidney stones, L radial artery occlusion 2021 (previously on eliquis) presenting to the ED from [facility] for unresponsiveness. Patient was found minimally responsive - including to physical stimuli from sternal rub, and brought to the ED. She is normally AAOx4.  Patient was found confused at bed by EMS and brought to ED. Saw patient when her mental status improved with NIV. Patient does not endorse any triggering events. Denies fevers, chills, headaches, chest pain, sick contacts, shortness of breath, choking, wheezing, chest pain, palpitations, abdominal pain, nauesa/vomiting, dysuria.     Patient was recently discharged on 11/25/2022 for acute on chronic hypoxic hyercapnic respiratory failure 2/2 THOM/OHS, COPD exacerbation. Was discharged on prednisone taper (completed course on 12/1/22). Patient was supposed to f/u with pulm outpatient for sleep study + CPAP arrangements. [previously No Show for previous appointments]    At the ED, VS- T:98.5F, BP:113/59, HR:75bpm, RR:18bpm, SaO2: 89%. Placed in Bipap and SpO2 improved to 99%. Labs significant for elevated HCO3 (43), BNP- 900, toponin<0.01. VBG shows pH-7.12, pCO2- 140, HCO3- 46, pO2- 85, SaO2- 97.8, lactate- 0.50, and K- 4.5 (on BMP was hemolyzed at 6.4). COVID-19 PCR pending. CXR unchanged from previous admission- Unchanged prominent interstitial markings. Critical care team was consulted. Because patient has altered mental status 2/2 acute on chronic hypercapnic/hypoxemic respiratory failure and is DNR/DNI, patient will be admitted to SDU. Patient was given azithromycin 500mg x 1, diamox 500mg x 1, duoneb x1, and solumedrol 125mg IV x1. CBC, CMP, RVP collected.  (05 Dec 2022 10:12)    MEDICATIONS  (STANDING):  citalopram 20 milliGRAM(s) Oral daily  doxycycline monohydrate Capsule 100 milliGRAM(s) Oral every 12 hours  enoxaparin Injectable 40 milliGRAM(s) SubCutaneous every 12 hours  furosemide   Injectable 20 milliGRAM(s) IV Push daily  lacosamide IVPB 150 milliGRAM(s) IV Intermittent <User Schedule>  levothyroxine 50 MICROGram(s) Oral daily  methylPREDNISolone sodium succinate Injectable 60 milliGRAM(s) IV Push every 12 hours  montelukast 10 milliGRAM(s) Oral at bedtime  multivitamin/minerals 1 Tablet(s) Oral daily  PHENobarbital IVPB 30 milliGRAM(s) IV Intermittent <User Schedule>  polyethylene glycol 3350 17 Gram(s) Oral daily  pramipexole 0.5 milliGRAM(s) Oral at bedtime  senna 2 Tablet(s) Oral at bedtime  traZODone 100 milliGRAM(s) Oral at bedtime    MEDICATIONS  (PRN):  acetaminophen     Tablet .. 650 milliGRAM(s) Oral every 6 hours PRN Mild Pain (1 - 3)  albuterol/ipratropium for Nebulization 3 milliLiter(s) Nebulizer every 6 hours PRN Bronchospasm  ketorolac   Injectable 15 milliGRAM(s) IV Push two times a day PRN Moderate Pain (4 - 6)    INTERVAL EVENTS: Patient seen today, case discussed with medical student, proimary nurse. Patient awake and alert stating she's sleeping a lot and wants a new mask at [facility].    T(C): 36.4 (12-08-22 @ 13:15), Max: 37 (12-08-22 @ 05:16)  HR: 53 (12-08-22 @ 13:15) (53 - 69)  BP: 119/59 (12-08-22 @ 13:15) (107/56 - 127/59)  RR: 16 (12-08-22 @ 15:11) (16 - 20)  SpO2: 97% (12-08-22 @ 05:16) (93% - 97%)  Wt(kg): --Vital Signs Last 24 Hrs  T(C): 36.4 (08 Dec 2022 13:15), Max: 37 (08 Dec 2022 05:16)  T(F): 97.6 (08 Dec 2022 13:15), Max: 98.6 (08 Dec 2022 05:16)  HR: 53 (08 Dec 2022 13:15) (53 - 69)  BP: 119/59 (08 Dec 2022 13:15) (107/56 - 127/59)  BP(mean): --  RR: 16 (08 Dec 2022 15:11) (16 - 20)  SpO2: 97% (08 Dec 2022 05:16) (93% - 97%)    Parameters below as of 08 Dec 2022 15:11  Patient On (Oxygen Delivery Method): nasal cannula  O2 Flow (L/min): 2      PHYSICAL EXAM:  GENERAL: NAD, using NC O2  NECK: Chin to chest  CHEST/LUNG: Clear; Shallow resp, No wheeze  HEART: S1, S2, Regular   ABDOMEN: Soft, Nontender, Obese Bowel sounds present  EXTREMITIES: + edema      LABS:                          13.2   6.96  )-----------( 101      ( 08 Dec 2022 07:48 )             43.0             12-08    143  |  97<L>  |  17  ----------------------------<  113<H>  6.1<HH>   |  40<H>  |  0.5<L>    Ca    8.4      08 Dec 2022 07:48  Mg     1.8     12-07    TPro  5.9<L>  /  Alb  3.6  /  TBili  0.2  /  DBili  x   /  AST  33  /  ALT  19  /  AlkPhos  56  12-08    LIVER FUNCTIONS - ( 08 Dec 2022 07:48 )  Alb: 3.6 g/dL / Pro: 5.9 g/dL / ALK PHOS: 56 U/L / ALT: 19 U/L / AST: 33 U/L / GGT: x                       ABG - ( 08 Dec 2022 07:42 )  pH, Arterial: 7.32  pH, Blood: x     /  pCO2: 85    /  pO2: 68    / HCO3: 44    / Base Excess: 13.6  /  SaO2: 95.8                        RADIOLOGY & ADDITIONAL TESTS:

## 2022-12-08 NOTE — PROGRESS NOTE ADULT - ASSESSMENT
Acute on chronic hypercapnic/hypoxemic respiratory failure on NIV  THOM/OHS  Morbid obesity  Possible aspiration pneumonia  Asthma exacerbation  - pulm evaluation noted  - continue IV steroids  - now on NC O2  - tolerating PO diet    Toxic metabolic encephalopathy  - now improved    Seizure Disorder  - continue rx    Depression  - continue Heraclio ALLRED, DNR / DNI on chart.  Patient asking for a new type of mask for better compliance with treatment in SNF?  Will speak with respiratory therapist in SNF, once needs clarified with pulm.

## 2022-12-08 NOTE — PROGRESS NOTE ADULT - ASSESSMENT
ASSESSMENT & PLAN  68yo F with hx of multiple admissions for hypercapnic respiratory failure, THOM. COPD not on home O2, morbid obesity, hx of asthma, obesity, epilepsy, hypothyroidism, OA, kidney stones, L radial artery occlusion 2021 (previously on eliquis) presenting to the ED from [facility] for unresponsiveness.    #Acute on chronic hypercapnic/hypoxemic respiratory failure on NIV  #Toxic metabolic encephalopathy- improved  #THOM/OHS  #Morbid obesity  #Possible aspiration pneumonia  #Asthma exacerbation  #Recent admission for Entero/Rhinovurus infection, and possible bacterial pneumonia. s/p levaquin  - ABG shows severe respiratory acidosis. Due to DNI/DNI form  - Pulmonary recs appreciated  - repeat ABG 12/08 shows pH 7.32 / CO2 85 / O2 68 / HCO3 44  - c/w AVAPS 4 hours on / 4 hours off and qHS  - Will adjust AVAPS settings, encourage compliance, and get repeat ABG  - Procalcitonin 0.06 - per ID no pneumonia  - c/w Solumedrol IV 40mg q12h  - Duoneb q6H PRN  - aspiration precautions  - patient allergic to penicillins  - ID consulted, recs appreciated  - d/c ceftriaxone & flagyl  - Start 5d course of PO Doxycycline 100mg Q12h  - cont singular   - palliative team consult when patient's mental status improves.   - outpatient pulm f/u. Endorsed lifestyle modifications after discussion    #Epilepsy disorder  - c/w home meds  - EEG    #Hypothyroidism  - c/w home meds    #Hx of radial ischemia 2021  - no longer on Eliquis    #Depression  - c/w home meds   #Misc  - cont home laxatives  - PT consult                                                     ----------------------------------------------------  # DVT prophylaxis: Lovenox 40mg q12h    # GI prophylaxis: Protonix 40mg    # Diet: regular    # Activity Score (AM-PAC)    # Code status: DNR/DNI    # Disposition                                                                              --------------------------------------------------------    # Handoff   Pending palliative consult, improvement on ABG

## 2022-12-08 NOTE — PROGRESS NOTE ADULT - SUBJECTIVE AND OBJECTIVE BOX
JACOB KOTHARI 67y Female  MRN#: [id number]   CODE STATUS:___DNR/DNI_____    Hospital Day: 3d    Pt is currently admitted with the primary diagnosis of COPD exacerbation    SUBJECTIVE  68y/o F with history of multiple admissions for hypercapnic respiratory failure, severe obesity, THOM, asthma, COPD (not on home O2), presented to the hospital from [facility] on 12/5 for hypoxia and lethargy. She was found to be minimally responsive at her nursing facility. Pt was placed on NIV to improvement of mentation.     No acute overnight events     Pt was seen and examined at bedside. She was breathing comfortably on 2L O2 via NC. She reports feeling "trapped" when wearing her AVAPS mask and refused treatments today. Pt has been counseled on the importance of this respiratory therapy and is currently refusing AVAPS sessions. She denies any SOB, chest pain, headaches, dizziness, palpitations, nausea/vomiting, abdominal pain, any other complaints.                                             ----------------------------------------------------------  OBJECTIVE  PAST MEDICAL & SURGICAL HISTORY  Diabetes    Renal stone    Renal failure    Epilepsy    Asthma    Chronic cough    Knee pain    Osteoarthritis    Uterine cancer  s/p hysterectomy    H/O abdominal hysterectomy  NO RADIATION AND CHEMO    History of tonsillectomy  13 years old    [place] teeth removed    History of bladder stone  SURGERY 8/3/2018    History of surgery  JJ STENT PLACEMENT LEFT OCTOBER 2017                                              -----------------------------------------------------------  ALLERGIES:  Compazine (Unknown)  latex (Urticaria)  penicillins (Unknown)                                            ------------------------------------------------------------    HOME MEDICATIONS  Home Medications:  biotin 5 mg oral capsule: 1 cap(s) orally once a day (05 Dec 2022 11:18)  citalopram 20 mg oral tablet: 1 tab(s) orally every 24 hours (05 Dec 2022 11:18)  Desyrel 50 mg oral tablet: 2 tab(s) orally once a day (at bedtime) (05 Dec 2022 11:18)  DuoNeb 0.5 mg-2.5 mg/3 mL inhalation solution: 3 milliliter(s) inhaled 3 times a day (05 Dec 2022 11:18)  levothyroxine 50 mcg (0.05 mg) oral tablet: 1 tab(s) orally every 24 hours (05 Dec 2022 11:18)  lidocaine 4% patch: Apply topically to affected area once a day (05 Dec 2022 11:18)  MiraLax oral powder for reconstitution: 17 gram(s) orally once a day (05 Dec 2022 11:18)  Mirapex 0.5 mg oral tablet: 1 tab(s) orally once a day (at bedtime) (05 Dec 2022 11:18)  Multiple Vitamins with Minerals oral capsule: 1 cap(s) orally once a day (05 Dec 2022 11:18)  Nizoral A-D 1% topical shampoo: Apply topically to affected area every 3 days (05 Dec 2022 11:18)  nystatin 100,000 units/g topical cream (obsolete): Apply topically to affected area 2 times a day under bilateral breast and under left arm (05 Dec 2022 11:18)  PHENobarbital 32.4 mg oral tablet: 1 tab(s) orally 3 times a day (05 Dec 2022 11:18)  Senna Plus 50 mg-8.6 mg oral tablet: 2 tab(s) orally once a day (at bedtime) (05 Dec 2022 11:18)  Singulair 10 mg oral tablet: 1 tab(s) orally once a day (at bedtime) (05 Dec 2022 11:18)  Topamax 100 mg oral tablet: 1 tab(s) orally 3 times a day (05 Dec 2022 11:18)  Tylenol 500 mg oral tablet: 2 tab(s) orally every 8 hours, As Needed (05 Dec 2022 11:18)  Urocit-K 15 mEq oral tablet, extended release: 1 tab(s) orally once a day (05 Dec 2022 11:18)                           MEDICATIONS:  STANDING MEDICATIONS  citalopram 20 milliGRAM(s) Oral daily  doxycycline monohydrate Capsule 100 milliGRAM(s) Oral every 12 hours  enoxaparin Injectable 40 milliGRAM(s) SubCutaneous every 12 hours  furosemide   Injectable 20 milliGRAM(s) IV Push daily  lacosamide IVPB 150 milliGRAM(s) IV Intermittent <User Schedule>  levothyroxine 50 MICROGram(s) Oral daily  methylPREDNISolone sodium succinate Injectable 60 milliGRAM(s) IV Push every 12 hours  montelukast 10 milliGRAM(s) Oral at bedtime  multivitamin/minerals 1 Tablet(s) Oral daily  PHENobarbital IVPB 30 milliGRAM(s) IV Intermittent <User Schedule>  polyethylene glycol 3350 17 Gram(s) Oral daily  pramipexole 0.5 milliGRAM(s) Oral at bedtime  senna 2 Tablet(s) Oral at bedtime  traZODone 100 milliGRAM(s) Oral at bedtime    PRN MEDICATIONS  acetaminophen     Tablet .. 650 milliGRAM(s) Oral every 6 hours PRN  albuterol/ipratropium for Nebulization 3 milliLiter(s) Nebulizer every 6 hours PRN  ketorolac   Injectable 15 milliGRAM(s) IV Push two times a day PRN                                            ------------------------------------------------------------  VITAL SIGNS: Last 24 Hours  T(C): 36.4 (08 Dec 2022 13:15), Max: 37 (08 Dec 2022 05:16)  T(F): 97.6 (08 Dec 2022 13:15), Max: 98.6 (08 Dec 2022 05:16)  HR: 53 (08 Dec 2022 13:15) (53 - 69)  BP: 119/59 (08 Dec 2022 13:15) (107/56 - 127/59)  BP(mean): --  RR: 20 (08 Dec 2022 13:15) (19 - 20)  SpO2: 97% (08 Dec 2022 05:16) (93% - 97%)      12-07-22 @ 07:01  -  12-08-22 @ 07:00  --------------------------------------------------------  IN: 0 mL / OUT: 1100 mL / NET: -1100 mL    12-08-22 @ 07:01  -  12-08-22 @ 13:49  --------------------------------------------------------  IN: 0 mL / OUT: 600 mL / NET: -600 mL                                             --------------------------------------------------------------  LABS:                        13.2   6.96  )-----------( 101      ( 08 Dec 2022 07:48 )             43.0     12-08    143  |  97<L>  |  17  ----------------------------<  113<H>  6.1<HH>   |  40<H>  |  0.5<L>    Ca    8.4      08 Dec 2022 07:48  Mg     1.8     12-07    TPro  5.9<L>  /  Alb  3.6  /  TBili  0.2  /  DBili  x   /  AST  33  /  ALT  19  /  AlkPhos  56  12-08        ABG - ( 08 Dec 2022 07:42 )  pH, Arterial: 7.32  pH, Blood: x     /  pCO2: 85    /  pO2: 68    / HCO3: 44    / Base Excess: 13.6  /  SaO2: 95.8                                                                  -------------------------------------------------------------  RADIOLOGY:                                            --------------------------------------------------------------    PHYSICAL EXAM:  General: Morbidly obese female, ill looking, not in acute distress  HEENT: No cervical LAD, or JVD  LUNGS: Diffusely decreased breath sounds. On 2L O2 via NC.  HEART: RRR, S1/S2 appreciated   ABDOMEN: NBS, soft, nontender to palpation  EXT: Chronic peripheral edema to bilateral upper and lower extremities, 2+ pitting  NEURO: AAOx3  SKIN: No skin rash, open wounds                                           --------------------------------------------------------------

## 2022-12-08 NOTE — CONSULT NOTE ADULT - ASSESSMENT
66yo F with hx of multiple admissions for hypercapnic respiratory failure, THOM. COPD not on home O2, morbid obesity, hx of asthma, obesity, epilepsy, hypothyroidism, OA, kidney stones, L radial artery occlusion 2021 (previously on eliquis) presenting to the ED from [facility] for unresponsiveness.    IMPRESSION;  Bronchitis  No PNA  Alert, responsive. No CNS infection  COVID-19 / influenza NG  WBC 8.8  CXR no focal consolidation    RECOMMENDATIONS;  Po Doxycycline 100 mg q12h for 5 days  Please do not hesitate to recall ID if any questions arise either through [id number] or through [employer]

## 2022-12-08 NOTE — CONSULT NOTE ADULT - SUBJECTIVE AND OBJECTIVE BOX
JACOB KOTHARI  67y, Female  Allergy: Compazine (Unknown)  latex (Urticaria)  penicillins (Unknown)      All historical available data reviewed.    HPI:  66yo F with hx of multiple admissions for hypercapnic respiratory failure, THOM. COPD not on home O2, morbid obesity, hx of asthma, obesity, epilepsy, hypothyroidism, OA, kidney stones, L radial artery occlusion 2021 (previously on eliquis) presenting to the ED from [facility] for unresponsiveness. Patient was found minimally responsive - including to physical stimuli from sternal rub, and brought to the ED. She is normally AAOx4.  Patient was found confused at bed by EMS and brought to ED. Saw patient when her mental status improved with NIV. Patient does not endorse any triggering events. Denies fevers, chills, headaches, chest pain, sick contacts, shortness of breath, choking, wheezing, chest pain, palpitations, abdominal pain, nauesa/vomiting, dysuria.     Patient was recently discharged on 11/25/2022 for acute on chronic hypoxic hyercapnic respiratory failure 2/2 THOM/OHS, COPD exacerbation. Was discharged on prednisone taper (completed course on 12/1/22). Patient was supposed to f/u with pulm outpatient for sleep study + CPAP arrangements. [previously No Show for previous appointments]    At the ED, VS- T:98.5F, BP:113/59, HR:75bpm, RR:18bpm, SaO2: 89%. Placed in Bipap and SpO2 improved to 99%. Labs significant for elevated HCO3 (43), BNP- 900, toponin<0.01. VBG shows pH-7.12, pCO2- 140, HCO3- 46, pO2- 85, SaO2- 97.8, lactate- 0.50, and K- 4.5 (on BMP was hemolyzed at 6.4). COVID-19 PCR pending. CXR unchanged from previous admission- Unchanged prominent interstitial markings. Critical care team was consulted. Because patient has altered mental status 2/2 acute on chronic hypercapnic/hypoxemic respiratory failure and is DNR/DNI, patient will be admitted to SDU. Patient was given azithromycin 500mg x 1, diamox 500mg x 1, duoneb x1, and solumedrol 125mg IV x1. CBC, CMP, RVP collected.  (05 Dec 2022 10:12)    FAMILY HISTORY:  No pertinent family history in first degree relatives      PAST MEDICAL & SURGICAL HISTORY:  Diabetes      Renal stone      Renal failure      Epilepsy      Asthma      Chronic cough      Knee pain      Osteoarthritis      Uterine cancer  s/p hysterectomy      H/O abdominal hysterectomy  NO RADIATION AND CHEMO      History of tonsillectomy  13 years old      [place] teeth removed      History of bladder stone  SURGERY 8/3/2018      History of surgery  JJ STENT PLACEMENT LEFT OCTOBER 2017            VITALS:  T(F): 98.6, Max: 98.6 (12-08-22 @ 05:16)  HR: 63  BP: 107/56  RR: 19Vital Signs Last 24 Hrs  T(C): 37 (08 Dec 2022 05:16), Max: 37 (08 Dec 2022 05:16)  T(F): 98.6 (08 Dec 2022 05:16), Max: 98.6 (08 Dec 2022 05:16)  HR: 63 (08 Dec 2022 05:16) (61 - 69)  BP: 107/56 (08 Dec 2022 05:16) (107/56 - 136/80)  BP(mean): --  RR: 19 (08 Dec 2022 05:16) (18 - 19)  SpO2: 97% (08 Dec 2022 05:16) (93% - 97%)    Parameters below as of 08 Dec 2022 05:16  Patient On (Oxygen Delivery Method): nasal cannula        TESTS & MEASUREMENTS:                        13.2   6.96  )-----------( 101      ( 08 Dec 2022 07:48 )             43.0     12-08    143  |  97<L>  |  17  ----------------------------<  113<H>  6.1<HH>   |  40<H>  |  0.5<L>    Ca    8.4      08 Dec 2022 07:48  Mg     1.8     12-07    TPro  5.9<L>  /  Alb  3.6  /  TBili  0.2  /  DBili  x   /  AST  33  /  ALT  19  /  AlkPhos  56  12-08    LIVER FUNCTIONS - ( 08 Dec 2022 07:48 )  Alb: 3.6 g/dL / Pro: 5.9 g/dL / ALK PHOS: 56 U/L / ALT: 19 U/L / AST: 33 U/L / GGT: x                   RADIOLOGY & ADDITIONAL TESTS:  Personal review of radiological diagnostics performed  Echo and EKG results noted when applicable.     MEDICATIONS:  acetaminophen     Tablet .. 650 milliGRAM(s) Oral every 6 hours PRN  albuterol/ipratropium for Nebulization 3 milliLiter(s) Nebulizer every 6 hours PRN  cefTRIAXone   IVPB 1000 milliGRAM(s) IV Intermittent every 24 hours  citalopram 20 milliGRAM(s) Oral daily  enoxaparin Injectable 40 milliGRAM(s) SubCutaneous every 12 hours  furosemide   Injectable 20 milliGRAM(s) IV Push daily  ketorolac   Injectable 15 milliGRAM(s) IV Push two times a day PRN  lacosamide IVPB 150 milliGRAM(s) IV Intermittent <User Schedule>  levothyroxine 50 MICROGram(s) Oral daily  methylPREDNISolone sodium succinate Injectable 60 milliGRAM(s) IV Push every 12 hours  metroNIDAZOLE  IVPB 500 milliGRAM(s) IV Intermittent every 8 hours  montelukast 10 milliGRAM(s) Oral at bedtime  multivitamin/minerals 1 Tablet(s) Oral daily  PHENobarbital IVPB 30 milliGRAM(s) IV Intermittent <User Schedule>  polyethylene glycol 3350 17 Gram(s) Oral daily  pramipexole 0.5 milliGRAM(s) Oral at bedtime  senna 2 Tablet(s) Oral at bedtime  traZODone 100 milliGRAM(s) Oral at bedtime      ANTIBIOTICS:  cefTRIAXone   IVPB 1000 milliGRAM(s) IV Intermittent every 24 hours  metroNIDAZOLE  IVPB 500 milliGRAM(s) IV Intermittent every 8 hours

## 2022-12-09 ENCOUNTER — TRANSCRIPTION ENCOUNTER (OUTPATIENT)
Age: 67
End: 2022-12-09

## 2022-12-09 LAB
GLUCOSE BLDC GLUCOMTR-MCNC: 129 MG/DL — HIGH (ref 70–99)
GLUCOSE BLDC GLUCOMTR-MCNC: 284 MG/DL — HIGH (ref 70–99)
GLUCOSE BLDC GLUCOMTR-MCNC: 65 MG/DL — LOW (ref 70–99)
PROCALCITONIN SERPL-MCNC: 0.06 NG/ML — SIGNIFICANT CHANGE UP (ref 0.02–0.1)
SARS-COV-2 RNA SPEC QL NAA+PROBE: SIGNIFICANT CHANGE UP

## 2022-12-09 PROCEDURE — 99233 SBSQ HOSP IP/OBS HIGH 50: CPT

## 2022-12-09 RX ADMIN — Medication 60 MILLIGRAM(S): at 17:22

## 2022-12-09 RX ADMIN — POLYETHYLENE GLYCOL 3350 17 GRAM(S): 17 POWDER, FOR SOLUTION ORAL at 11:47

## 2022-12-09 RX ADMIN — CITALOPRAM 20 MILLIGRAM(S): 10 TABLET, FILM COATED ORAL at 11:47

## 2022-12-09 RX ADMIN — Medication 1 TABLET(S): at 11:49

## 2022-12-09 RX ADMIN — Medication 401.84 MILLIGRAM(S): at 06:48

## 2022-12-09 RX ADMIN — Medication 50 MICROGRAM(S): at 06:47

## 2022-12-09 RX ADMIN — Medication 60 MILLIGRAM(S): at 06:47

## 2022-12-09 RX ADMIN — Medication 401.84 MILLIGRAM(S): at 13:37

## 2022-12-09 RX ADMIN — SENNA PLUS 2 TABLET(S): 8.6 TABLET ORAL at 22:39

## 2022-12-09 RX ADMIN — LACOSAMIDE 130 MILLIGRAM(S): 50 TABLET ORAL at 23:40

## 2022-12-09 RX ADMIN — PRAMIPEXOLE DIHYDROCHLORIDE 0.5 MILLIGRAM(S): 0.12 TABLET ORAL at 23:40

## 2022-12-09 RX ADMIN — Medication 401.84 MILLIGRAM(S): at 23:40

## 2022-12-09 RX ADMIN — Medication 20 MILLIGRAM(S): at 06:49

## 2022-12-09 RX ADMIN — Medication 100 MILLIGRAM(S): at 06:47

## 2022-12-09 RX ADMIN — LACOSAMIDE 130 MILLIGRAM(S): 50 TABLET ORAL at 11:47

## 2022-12-09 RX ADMIN — Medication 100 MILLIGRAM(S): at 22:39

## 2022-12-09 RX ADMIN — ENOXAPARIN SODIUM 40 MILLIGRAM(S): 100 INJECTION SUBCUTANEOUS at 17:22

## 2022-12-09 RX ADMIN — ENOXAPARIN SODIUM 40 MILLIGRAM(S): 100 INJECTION SUBCUTANEOUS at 06:48

## 2022-12-09 RX ADMIN — MONTELUKAST 10 MILLIGRAM(S): 4 TABLET, CHEWABLE ORAL at 22:39

## 2022-12-09 RX ADMIN — Medication 100 MILLIGRAM(S): at 17:23

## 2022-12-09 NOTE — DISCHARGE NOTE PROVIDER - NSDCCPCAREPLAN_GEN_ALL_CORE_FT
PRINCIPAL DISCHARGE DIAGNOSIS  Diagnosis: COPD with acute exacerbation  Assessment and Plan of Treatment: You were diagnosed with an acute exacerbation of chronic hypercapnic respiratory failure. You were placed on a noninvasive ventilation machine to improvement of both your oxygenation and mental status. You were discharged on the antibiotic doxycycline to be taken twice a day for a total of 5 days.   Please follow up with your primary care physician. Return to the hospital if you have any fever, sudden difficulty breathing, chest pain, or any other complaints.      SECONDARY DISCHARGE DIAGNOSES  Diagnosis: Acute respiratory failure with hypoxia and hypercapnia  Assessment and Plan of Treatment: You were diagnosed with an acute exacerbation of chronic hypercapnic respiratory failure. You were placed on a noninvasive ventilation machine to improvement of both your oxygenation and mental status. You were discharged on the antibiotic doxycycline to be taken twice a day for a total of 5 days.   Please follow up with your primary care physician. Return to the hospital if you have any fever, sudden difficulty breathing, chest pain, or any other complaints.    Diagnosis: Acute respiratory acidosis  Assessment and Plan of Treatment: You were diagnosed with an acute exacerbation of chronic hypercapnic respiratory failure. You were placed on a noninvasive ventilation machine to improvement of both your oxygenation and mental status. You were discharged on the antibiotic doxycycline to be taken twice a day for a total of 5 days.   Please follow up with your primary care physician. Return to the hospital if you have any fever, sudden difficulty breathing, chest pain, or any other complaints.

## 2022-12-09 NOTE — DISCHARGE NOTE PROVIDER - HOSPITAL COURSE
68yo F with  PMHx of multiple admissions for hypercapnic respiratory failure, THOM. COPD not on home O2, morbid obesity, hx of asthma, obesity, epilepsy, hypothyroidism, OA, kidney stones, L radial artery occlusion 2021 (previously on eliquis) presenting to the ED from [facility] for unresponsiveness. Patient was found minimally responsive - including to physical stimuli from sternal rub, and brought to the ED. She is normally AAOx4.  Patient was found confused at bed by EMS and brought to ED. Saw patient when her mental status improved with NIV. Patient does not endorse any triggering events. Denies fevers, chills, headaches, chest pain, sick contacts, shortness of breath, choking, wheezing, chest pain, palpitations, abdominal pain, nausea/vomiting, dysuria.     Patient was recently discharged on 11/25/2022 for acute on chronic hypoxic hypercapnic respiratory failure 2/2 THOM/OHS, COPD exacerbation. Was discharged on prednisone taper (completed course on 12/1/22). Patient was supposed to f/u with pulm outpatient for sleep study + CPAP arrangements. [previously No Show for previous appointments]    COVID-19 PCR was negative. CXR unchanged from previous admission - Unchanged prominent interstitial markings. Critical care team was consulted. Because patient has altered mental status 2/2 acute on chronic hypercapnic/hypoxemic respiratory failure and is DNR/DNI, patient will be admitted to SDU. Patient was given azithromycin 500mg x 1, diamox 500mg x 1, duoneb x1, and solumedrol 125mg IV x1.     She was admitted to the medicine floor for acute on chronic hypercapnic/hypoxemic respiratory failure. She was placed on duonebs q6h prn and AVAPS NIV q4h on and off. then had an arterial blood gas that showed a pH of 7.32 with a CO2 of 85. Respiratory therapy examined the patient and adjusted her AVAPS settings. Her mental status remained stable, and pulmonary consult determined that ABG was necessary only if she had a change in mental status. Pt was started on IV Solumedrol q60mg q8h, which was then tapered to Solumedrol 40mg IV q8h. Pt was initially suspected to have an aspiration pneumonia and was placed on ceftriaxone and flagyl. Infectious disease was consulted, who discontinued flagyl and ceftriaxone after determining that an aspiration pneumonia was unlikely. She was started on a 5d course of PO Doxycycline 100mg q12h.     You were diagnosed with an acute exacerbation of chronic hypercapnic respiratory failure. You were placed on a noninvasive ventilation machine to improvement of both your oxygenation and mental status. You were discharged on the antibiotic doxycycline to be taken twice a day for a total of 5 days.     Pt is stable for discharge.

## 2022-12-09 NOTE — PROGRESS NOTE ADULT - ASSESSMENT
Acute on chronic hypercapnic/hypoxemic respiratory failure on NIV  THOM/OHS  Morbid obesity  Possible aspiration pneumonia  Asthma exacerbation  - pulm f/u for recommendations regarding discharge  - attempt to wean IV steroids  - tolerating PO diet  - complete course PO Doxycycline    Toxic metabolic encephalopathy  - now resolved    Seizure Disorder  - continue rx    Depression  - continue Celexa    CHALINO, DNR / DNI on chart.  Patient asking for a new type of mask for better compliance with treatment in SNF?  Will speak with respiratory therapist in SNF, once needs clarified with pulm.

## 2022-12-09 NOTE — PROGRESS NOTE ADULT - ASSESSMENT
ASSESSMENT & PLAN  66yo F with hx of multiple admissions for hypercapnic respiratory failure, THOM. COPD not on home O2, morbid obesity, hx of asthma, obesity, epilepsy, hypothyroidism, OA, kidney stones, L radial artery occlusion 2021 (previously on eliquis) presenting to the ED from [facility] for unresponsiveness.    #Acute on chronic hypercapnic/hypoxemic respiratory failure on NIV  #Toxic metabolic encephalopathy- improved  #THOM/OHS  #Morbid obesity  #Asthma exacerbation  #Recent admission for Entero/Rhinovurus infection, and possible bacterial pneumonia. s/p levaquin  - ABG shows severe respiratory acidosis. Due to DNI/DNI form  - Pulmonary recs appreciated  - repeat ABG 12/08 shows pH 7.32 / CO2 85 / O2 68 / HCO3 44  - Per pulm, ABG shows compensation, only repeat ABG if change in mental status  - c/w AVAPS 4 hours on / 4 hours off and qHS  - Duoneb q6H PRN  - Procalcitonin 0.06 - per ID no pneumonia  - Attempt to taper IV Solumedrol  - aspiration precautions  - patient allergic to penicillins  - ID consulted, recs appreciated  - d/c ceftriaxone & flagyl  - c/w PO Doxycycline 100mg Q12h to complete 5d course  - c/w singular   - palliative team consult when patient's mental status improves.   - outpatient pulm f/u. Endorsed lifestyle modifications after discussion    #Goals of Care  - MOLST, DNR/DNI in chart  - Pt declines artificial feeding or nutrition    #Epilepsy disorder  - c/w home meds  - EEG    #Hypothyroidism  - c/w home meds    #Hx of radial ischemia 2021  - no longer on Eliquis    #Depression  - c/w home meds     #Misc  - cont home laxatives  - PT consult                                                     ----------------------------------------------------  # DVT prophylaxis: Lovenox 40mg q12h    # GI prophylaxis: Protonix 40mg    # Diet: regular    # Activity Score (AM-PAC)    # Code status: DNR/DNI    # Disposition                                                                              --------------------------------------------------------    # Handoff   Return to [facility] if AVAPS possible there. Continue to wean steroids.

## 2022-12-09 NOTE — PROGRESS NOTE ADULT - SUBJECTIVE AND OBJECTIVE BOX
JACOB KOTHARI  67y  Female  HPI:  68yo F with hx of multiple admissions for hypercapnic respiratory failure, THOM. COPD not on home O2, morbid obesity, hx of asthma, obesity, epilepsy, hypothyroidism, OA, kidney stones, L radial artery occlusion 2021 (previously on eliquis) presenting to the ED from [facility] for unresponsiveness. Patient was found minimally responsive - including to physical stimuli from sternal rub, and brought to the ED. She is normally AAOx4.  Patient was found confused at bed by EMS and brought to ED. Saw patient when her mental status improved with NIV. Patient does not endorse any triggering events. Denies fevers, chills, headaches, chest pain, sick contacts, shortness of breath, choking, wheezing, chest pain, palpitations, abdominal pain, nauesa/vomiting, dysuria.     Patient was recently discharged on 11/25/2022 for acute on chronic hypoxic hyercapnic respiratory failure 2/2 THOM/OHS, COPD exacerbation. Was discharged on prednisone taper (completed course on 12/1/22). Patient was supposed to f/u with pulm outpatient for sleep study + CPAP arrangements. [previously No Show for previous appointments]    At the ED, VS- T:98.5F, BP:113/59, HR:75bpm, RR:18bpm, SaO2: 89%. Placed in Bipap and SpO2 improved to 99%. Labs significant for elevated HCO3 (43), BNP- 900, toponin<0.01. VBG shows pH-7.12, pCO2- 140, HCO3- 46, pO2- 85, SaO2- 97.8, lactate- 0.50, and K- 4.5 (on BMP was hemolyzed at 6.4). COVID-19 PCR pending. CXR unchanged from previous admission- Unchanged prominent interstitial markings. Critical care team was consulted. Because patient has altered mental status 2/2 acute on chronic hypercapnic/hypoxemic respiratory failure and is DNR/DNI, patient will be admitted to SDU. Patient was given azithromycin 500mg x 1, diamox 500mg x 1, duoneb x1, and solumedrol 125mg IV x1. CBC, CMP, RVP collected.  (05 Dec 2022 10:12)    MEDICATIONS  (STANDING):  citalopram 20 milliGRAM(s) Oral daily  doxycycline monohydrate Capsule 100 milliGRAM(s) Oral every 12 hours  enoxaparin Injectable 40 milliGRAM(s) SubCutaneous every 12 hours  furosemide   Injectable 20 milliGRAM(s) IV Push daily  lacosamide IVPB 150 milliGRAM(s) IV Intermittent <User Schedule>  levothyroxine 50 MICROGram(s) Oral daily  methylPREDNISolone sodium succinate Injectable 60 milliGRAM(s) IV Push every 12 hours  montelukast 10 milliGRAM(s) Oral at bedtime  multivitamin/minerals 1 Tablet(s) Oral daily  PHENobarbital IVPB 30 milliGRAM(s) IV Intermittent <User Schedule>  polyethylene glycol 3350 17 Gram(s) Oral daily  pramipexole 0.5 milliGRAM(s) Oral at bedtime  senna 2 Tablet(s) Oral at bedtime  traZODone 100 milliGRAM(s) Oral at bedtime    MEDICATIONS  (PRN):  acetaminophen     Tablet .. 650 milliGRAM(s) Oral every 6 hours PRN Mild Pain (1 - 3)  albuterol/ipratropium for Nebulization 3 milliLiter(s) Nebulizer every 6 hours PRN Bronchospasm  ketorolac   Injectable 15 milliGRAM(s) IV Push two times a day PRN Moderate Pain (4 - 6)    INTERVAL EVENTS: Patient seen today eating breakfast, feel better    T(C): 35.8 (12-09-22 @ 05:34), Max: 36.4 (12-08-22 @ 13:15)  HR: 58 (12-09-22 @ 05:34) (53 - 71)  BP: 107/54 (12-09-22 @ 05:34) (107/54 - 124/64)  RR: 20 (12-09-22 @ 05:34) (16 - 20)  SpO2: 95% (12-09-22 @ 05:34) (93% - 95%)  Wt(kg): --Vital Signs Last 24 Hrs  T(C): 35.8 (09 Dec 2022 05:34), Max: 36.4 (08 Dec 2022 13:15)  T(F): 96.4 (09 Dec 2022 05:34), Max: 97.6 (08 Dec 2022 13:15)  HR: 58 (09 Dec 2022 05:34) (53 - 71)  BP: 107/54 (09 Dec 2022 05:34) (107/54 - 124/64)  BP(mean): --  RR: 20 (09 Dec 2022 05:34) (16 - 20)  SpO2: 95% (09 Dec 2022 05:34) (93% - 95%)    Parameters below as of 09 Dec 2022 05:34  Patient On (Oxygen Delivery Method): BiPAP/CPAP        PHYSICAL EXAM:  GENERAL: NAD  CHEST/LUNG: Clear; Shallow resp  HEART: S1, S2, Regular   ABDOMEN: Soft, Nontender, Nondistended; Bowel sounds present  EXTREMITIES: ++ edema    LABS:                        13.2   6.96  )-----------( 101      ( 08 Dec 2022 07:48 )             43.0             12-08    143  |  95<L>  |  16  ----------------------------<  94  3.9   |  41<HH>  |  0.5<L>    Ca    8.3<L>      08 Dec 2022 19:31    TPro  5.9<L>  /  Alb  3.6  /  TBili  0.2  /  DBili  x   /  AST  33  /  ALT  19  /  AlkPhos  56  12-08    LIVER FUNCTIONS - ( 08 Dec 2022 07:48 )  Alb: 3.6 g/dL / Pro: 5.9 g/dL / ALK PHOS: 56 U/L / ALT: 19 U/L / AST: 33 U/L / GGT: x                       ABG - ( 08 Dec 2022 07:42 )  pH, Arterial: 7.32  pH, Blood: x     /  pCO2: 85    /  pO2: 68    / HCO3: 44    / Base Excess: 13.6  /  SaO2: 95.8        RADIOLOGY & ADDITIONAL TESTS:

## 2022-12-09 NOTE — PROGRESS NOTE ADULT - SUBJECTIVE AND OBJECTIVE BOX
Patient is a 67y old  Female who presents with a chief complaint of COPD exacerbation (09 Dec 2022 09:49)        Over Night Events:    Awake, responsive  On nasal cannula  Afebrile   Denies being short of breath        ROS:  See HPI    PHYSICAL EXAM    ICU Vital Signs Last 24 Hrs  T(C): 35.8 (09 Dec 2022 05:34), Max: 36.4 (08 Dec 2022 13:15)  T(F): 96.4 (09 Dec 2022 05:34), Max: 97.6 (08 Dec 2022 13:15)  HR: 58 (09 Dec 2022 05:34) (53 - 71)  BP: 107/54 (09 Dec 2022 05:34) (107/54 - 124/64)  BP(mean): --  ABP: --  ABP(mean): --  RR: 20 (09 Dec 2022 05:34) (16 - 20)  SpO2: 95% (09 Dec 2022 05:34) (93% - 95%)    O2 Parameters below as of 09 Dec 2022 05:34  Patient On (Oxygen Delivery Method): BiPAP/CPAP            General: NAD   HEENT: RIC             Lymphatic system: No cervical LN   Lungs: Bilateral BS  Cardiovascular: Regular   Gastrointestinal: Soft, Positive BS  Extremities: No cyanosis  Skin: Warm, intact  Neurological: No motor or sensory deficit       12-08-22 @ 07:01  -  12-09-22 @ 07:00  --------------------------------------------------------  IN:  Total IN: 0 mL    OUT:    Voided (mL): 1700 mL  Total OUT: 1700 mL    Total NET: -1700 mL          LABS:                            13.2   6.96  )-----------( 101      ( 08 Dec 2022 07:48 )             43.0                                               12-08    143  |  95<L>  |  16  ----------------------------<  94  3.9   |  41<HH>  |  0.5<L>    Ca    8.3<L>      08 Dec 2022 19:31    TPro  5.9<L>  /  Alb  3.6  /  TBili  0.2  /  DBili  x   /  AST  33  /  ALT  19  /  AlkPhos  56  12-08                                                                                           LIVER FUNCTIONS - ( 08 Dec 2022 07:48 )  Alb: 3.6 g/dL / Pro: 5.9 g/dL / ALK PHOS: 56 U/L / ALT: 19 U/L / AST: 33 U/L / GGT: x                                                                                                                                   ABG - ( 08 Dec 2022 07:42 )  pH, Arterial: 7.32  pH, Blood: x     /  pCO2: 85    /  pO2: 68    / HCO3: 44    / Base Excess: 13.6  /  SaO2: 95.8                MEDICATIONS  (STANDING):  citalopram 20 milliGRAM(s) Oral daily  doxycycline monohydrate Capsule 100 milliGRAM(s) Oral every 12 hours  enoxaparin Injectable 40 milliGRAM(s) SubCutaneous every 12 hours  furosemide   Injectable 20 milliGRAM(s) IV Push daily  lacosamide IVPB 150 milliGRAM(s) IV Intermittent <User Schedule>  levothyroxine 50 MICROGram(s) Oral daily  methylPREDNISolone sodium succinate Injectable 60 milliGRAM(s) IV Push every 12 hours  montelukast 10 milliGRAM(s) Oral at bedtime  multivitamin/minerals 1 Tablet(s) Oral daily  PHENobarbital IVPB 30 milliGRAM(s) IV Intermittent <User Schedule>  polyethylene glycol 3350 17 Gram(s) Oral daily  pramipexole 0.5 milliGRAM(s) Oral at bedtime  senna 2 Tablet(s) Oral at bedtime  traZODone 100 milliGRAM(s) Oral at bedtime    MEDICATIONS  (PRN):  acetaminophen     Tablet .. 650 milliGRAM(s) Oral every 6 hours PRN Mild Pain (1 - 3)  albuterol/ipratropium for Nebulization 3 milliLiter(s) Nebulizer every 6 hours PRN Bronchospasm  ketorolac   Injectable 15 milliGRAM(s) IV Push two times a day PRN Moderate Pain (4 - 6)      Xrays: pulm edema                                                                                     ECHO

## 2022-12-09 NOTE — PROGRESS NOTE ADULT - SUBJECTIVE AND OBJECTIVE BOX
JACOB KOTHARI 67y Female  MRN#: [id number]   CODE STATUS:____DNR/DNI____    Hospital Day: 4d    Pt is currently admitted with the primary diagnosis of COPD exacerbation    SUBJECTIVE  66y/o F with history of multiple admissions for hypercapnic respiratory failure, severe obesity, THOM, asthma, COPD (not on home O2), presented to the hospital from [facility] on 12/5 for hypoxia and lethargy. She was found to be minimally responsive at her nursing facility. Pt was placed on NIV to improvement of mentation.     No acute overnight events     Pt was seen and examined at bedside. She is lying in bed, pleasant and conversational, breathing comfortably on 2L O2 via NC. Pt reports discomfort with wearing the NIV AVAPS mask. She currently denies any SOB, chest pain, palpitations, nausea/vomiting, abdominal pain, dizziness, headache, any other complaints.                                             ----------------------------------------------------------  OBJECTIVE  PAST MEDICAL & SURGICAL HISTORY  Diabetes    Renal stone    Renal failure    Epilepsy    Asthma    Chronic cough    Knee pain    Osteoarthritis    Uterine cancer  s/p hysterectomy    H/O abdominal hysterectomy  NO RADIATION AND CHEMO    History of tonsillectomy  13 years old    [place] teeth removed    History of bladder stone  SURGERY 8/3/2018    History of surgery  JJ STENT PLACEMENT LEFT OCTOBER 2017                                              -----------------------------------------------------------  ALLERGIES:  Compazine (Unknown)  latex (Urticaria)  penicillins (Unknown)                                            ------------------------------------------------------------    HOME MEDICATIONS  Home Medications:  biotin 5 mg oral capsule: 1 cap(s) orally once a day (05 Dec 2022 11:18)  citalopram 20 mg oral tablet: 1 tab(s) orally every 24 hours (05 Dec 2022 11:18)  Desyrel 50 mg oral tablet: 2 tab(s) orally once a day (at bedtime) (05 Dec 2022 11:18)  DuoNeb 0.5 mg-2.5 mg/3 mL inhalation solution: 3 milliliter(s) inhaled 3 times a day (05 Dec 2022 11:18)  levothyroxine 50 mcg (0.05 mg) oral tablet: 1 tab(s) orally every 24 hours (05 Dec 2022 11:18)  lidocaine 4% patch: Apply topically to affected area once a day (05 Dec 2022 11:18)  MiraLax oral powder for reconstitution: 17 gram(s) orally once a day (05 Dec 2022 11:18)  Mirapex 0.5 mg oral tablet: 1 tab(s) orally once a day (at bedtime) (05 Dec 2022 11:18)  Multiple Vitamins with Minerals oral capsule: 1 cap(s) orally once a day (05 Dec 2022 11:18)  Nizoral A-D 1% topical shampoo: Apply topically to affected area every 3 days (05 Dec 2022 11:18)  nystatin 100,000 units/g topical cream (obsolete): Apply topically to affected area 2 times a day under bilateral breast and under left arm (05 Dec 2022 11:18)  PHENobarbital 32.4 mg oral tablet: 1 tab(s) orally 3 times a day (05 Dec 2022 11:18)  Senna Plus 50 mg-8.6 mg oral tablet: 2 tab(s) orally once a day (at bedtime) (05 Dec 2022 11:18)  Singulair 10 mg oral tablet: 1 tab(s) orally once a day (at bedtime) (05 Dec 2022 11:18)  Topamax 100 mg oral tablet: 1 tab(s) orally 3 times a day (05 Dec 2022 11:18)  Tylenol 500 mg oral tablet: 2 tab(s) orally every 8 hours, As Needed (05 Dec 2022 11:18)  Urocit-K 15 mEq oral tablet, extended release: 1 tab(s) orally once a day (05 Dec 2022 11:18)                           MEDICATIONS:  STANDING MEDICATIONS  citalopram 20 milliGRAM(s) Oral daily  doxycycline monohydrate Capsule 100 milliGRAM(s) Oral every 12 hours  enoxaparin Injectable 40 milliGRAM(s) SubCutaneous every 12 hours  furosemide   Injectable 20 milliGRAM(s) IV Push daily  lacosamide IVPB 150 milliGRAM(s) IV Intermittent <User Schedule>  levothyroxine 50 MICROGram(s) Oral daily  methylPREDNISolone sodium succinate Injectable 60 milliGRAM(s) IV Push every 12 hours  montelukast 10 milliGRAM(s) Oral at bedtime  multivitamin/minerals 1 Tablet(s) Oral daily  PHENobarbital IVPB 30 milliGRAM(s) IV Intermittent <User Schedule>  polyethylene glycol 3350 17 Gram(s) Oral daily  pramipexole 0.5 milliGRAM(s) Oral at bedtime  senna 2 Tablet(s) Oral at bedtime  traZODone 100 milliGRAM(s) Oral at bedtime    PRN MEDICATIONS  acetaminophen     Tablet .. 650 milliGRAM(s) Oral every 6 hours PRN  albuterol/ipratropium for Nebulization 3 milliLiter(s) Nebulizer every 6 hours PRN  ketorolac   Injectable 15 milliGRAM(s) IV Push two times a day PRN                                            ------------------------------------------------------------  VITAL SIGNS: Last 24 Hours  T(C): 35.8 (09 Dec 2022 05:34), Max: 36.4 (08 Dec 2022 13:15)  T(F): 96.4 (09 Dec 2022 05:34), Max: 97.6 (08 Dec 2022 13:15)  HR: 58 (09 Dec 2022 05:34) (53 - 71)  BP: 107/54 (09 Dec 2022 05:34) (107/54 - 124/64)  BP(mean): --  RR: 20 (09 Dec 2022 05:34) (16 - 20)  SpO2: 95% (09 Dec 2022 05:34) (93% - 95%)      12-08-22 @ 07:01  -  12-09-22 @ 07:00  --------------------------------------------------------  IN: 0 mL / OUT: 1700 mL / NET: -1700 mL                                             --------------------------------------------------------------  LABS:                        13.2   6.96  )-----------( 101      ( 08 Dec 2022 07:48 )             43.0     12-08    143  |  95<L>  |  16  ----------------------------<  94  3.9   |  41<HH>  |  0.5<L>    Ca    8.3<L>      08 Dec 2022 19:31    TPro  5.9<L>  /  Alb  3.6  /  TBili  0.2  /  DBili  x   /  AST  33  /  ALT  19  /  AlkPhos  56  12-08        ABG - ( 08 Dec 2022 07:42 )  pH, Arterial: 7.32  pH, Blood: x     /  pCO2: 85    /  pO2: 68    / HCO3: 44    / Base Excess: 13.6  /  SaO2: 95.8                                                  -------------------------------------------------------------  RADIOLOGY:                                            --------------------------------------------------------------    PHYSICAL EXAM:  General: Severely obese female, not in acute distress  HEENT: No cervical LAD, or JVD  LUNGS: Breath sounds diffusely diminished. No increased respiratory effort, no accessory muscle use, breathing comfortably on 2L via NC.  HEART: RRR, no murmur  ABDOMEN: NBS, soft, nontender to palpation  EXT: Chronic +2 pitting edema to bilateral upper and lower extremities  NEURO: AAOx3  SKIN: No skin rash, open wounds                                           --------------------------------------------------------------

## 2022-12-09 NOTE — PROGRESS NOTE ADULT - ASSESSMENT
Impression     Acute on chronic hypercapnic/ hypoxemic resp failure on NIV  AMS secondary to toxic Metabolic  Fluid Overload  THOM/ OHS  possible aspiration pneumonia  Asthma exacerbation  bedbound high risk for PE   HX of seizure      PLAN:    CNS: Avoid CNS depressant, AED per neuro    HEENT:  Oral care    PULMONARY:  HOB @ 45 degrees, aspiration precaution. Recommend AVAP on and off q4 and QHS. Duonebs as needed. CXR noted with pulm edema and cardiomegaly. ABG with chronic hypercapnic respiratory failure.     CARDIOVASCULAR: Negative balance , ECHO. Give lasix 40mg once today. Keep equal.     GI: GI prophylaxis           Feeding per speech and swallow.     RENAL:  F/u  lytes.  Correct as needed. Please check daily weight and accurate fluid balance. Elevated bicarbonate related to chronic hypercapnia.     INFECTIOUS DISEASE: Check procalcitonin. Finish 5 day course of abx for possible aspiration pneumonia. RVP panel is negative. No fevers. no leukocytosis.     HEMATOLOGICAL:  DVT prophylaxis. Duplex lower extremities negative.     ENDOCRINE:  Follow up FS.  Insulin protocol if needed.    Continue to monitor on the floor. Will follow as needed.     Poor prognosis. DNI/DNR.

## 2022-12-09 NOTE — DISCHARGE NOTE PROVIDER - NSDCMRMEDTOKEN_GEN_ALL_CORE_FT
biotin 5 mg oral capsule: 1 cap(s) orally once a day  citalopram 20 mg oral tablet: 1 tab(s) orally every 24 hours  Desyrel 50 mg oral tablet: 2 tab(s) orally once a day (at bedtime)  DuoNeb 0.5 mg-2.5 mg/3 mL inhalation solution: 3 milliliter(s) inhaled 3 times a day  levothyroxine 50 mcg (0.05 mg) oral tablet: 1 tab(s) orally every 24 hours  lidocaine 4% patch: Apply topically to affected area once a day  MiraLax oral powder for reconstitution: 17 gram(s) orally once a day  Mirapex 0.5 mg oral tablet: 1 tab(s) orally once a day (at bedtime)  Multiple Vitamins with Minerals oral capsule: 1 cap(s) orally once a day  Nizoral A-D 1% topical shampoo: Apply topically to affected area every 3 days  nystatin 100,000 units/g topical cream (obsolete): Apply topically to affected area 2 times a day under bilateral breast and under left arm  PHENobarbital 32.4 mg oral tablet: 1 tab(s) orally 3 times a day  Senna Plus 50 mg-8.6 mg oral tablet: 2 tab(s) orally once a day (at bedtime)  Singulair 10 mg oral tablet: 1 tab(s) orally once a day (at bedtime)  Topamax 100 mg oral tablet: 1 tab(s) orally 3 times a day  Tylenol 500 mg oral tablet: 2 tab(s) orally every 8 hours, As Needed  Urocit-K 15 mEq oral tablet, extended release: 1 tab(s) orally once a day

## 2022-12-09 NOTE — DISCHARGE NOTE PROVIDER - CARE PROVIDER_API CALL
Maribell Mendoza)  Internal Medicine  [address] [place] [address]  Phone: [phone]  Fax: [phone]  Follow Up Time: 1 week

## 2022-12-10 ENCOUNTER — TRANSCRIPTION ENCOUNTER (OUTPATIENT)
Age: 67
End: 2022-12-10

## 2022-12-10 VITALS
DIASTOLIC BLOOD PRESSURE: 57 MMHG | HEART RATE: 78 BPM | SYSTOLIC BLOOD PRESSURE: 102 MMHG | RESPIRATION RATE: 18 BRPM | TEMPERATURE: 98 F

## 2022-12-10 LAB — GLUCOSE BLDC GLUCOMTR-MCNC: 88 MG/DL — SIGNIFICANT CHANGE UP (ref 70–99)

## 2022-12-10 RX ADMIN — Medication 15 MILLIGRAM(S): at 10:56

## 2022-12-10 RX ADMIN — Medication 401.84 MILLIGRAM(S): at 14:07

## 2022-12-10 RX ADMIN — Medication 15 MILLIGRAM(S): at 11:11

## 2022-12-10 RX ADMIN — Medication 1 TABLET(S): at 11:06

## 2022-12-10 RX ADMIN — Medication 60 MILLIGRAM(S): at 06:42

## 2022-12-10 RX ADMIN — Medication 401.84 MILLIGRAM(S): at 06:41

## 2022-12-10 RX ADMIN — Medication 20 MILLIGRAM(S): at 06:42

## 2022-12-10 RX ADMIN — Medication 50 MICROGRAM(S): at 06:42

## 2022-12-10 RX ADMIN — Medication 100 MILLIGRAM(S): at 07:43

## 2022-12-10 RX ADMIN — LACOSAMIDE 130 MILLIGRAM(S): 50 TABLET ORAL at 10:56

## 2022-12-10 RX ADMIN — ENOXAPARIN SODIUM 40 MILLIGRAM(S): 100 INJECTION SUBCUTANEOUS at 06:42

## 2022-12-10 RX ADMIN — CITALOPRAM 20 MILLIGRAM(S): 10 TABLET, FILM COATED ORAL at 11:06

## 2022-12-10 NOTE — PROGRESS NOTE ADULT - SUBJECTIVE AND OBJECTIVE BOX
Chart reviewed, patient examined. Pertinent results reviewed.  Case discussed with HO; specialist f/u reviewed  HD#6; Patient known to me from prior admissions    History of Present Illness:   66yo F with hx of multiple admissions for hypercapnic respiratory failure, THOM. COPD not on home O2, morbid obesity, hx of asthma, obesity, epilepsy, hypothyroidism, OA, kidney stones, L radial artery occlusion 2021 (previously on eliquis) presenting to the ED from [facility] for unresponsiveness. Patient was found minimally responsive - including to physical stimuli from sternal rub, and brought to the ED. She is normally AAOx4.  Patient was found confused at bed by EMS and brought to ED. Saw patient when her mental status improved with NIV. Patient does not endorse any triggering events. Denies fevers, chills, headaches, chest pain, sick contacts, shortness of breath, choking, wheezing, chest pain, palpitations, abdominal pain, nauesa/vomiting, dysuria. ED testing  revealed severe hypercapnia (194)    Patient was recently discharged on 11/25/2022 for acute on chronic hypoxic hyercapnic respiratory failure 2/2 THOM/OHS, COPD exacerbation. Was discharged on prednisone taper (completed course on 12/1/22). Patient was supposed to f/u with pulm outpatient for sleep study + CPAP arrangements. [previously No Show for previous appointments]. Avap has been started and Pt is more awake.      Over Night Events:  Patient remains awake & alert.    MEDICATIONS  (STANDING):  citalopram 20 milliGRAM(s) Oral daily  doxycycline monohydrate Capsule 100 milliGRAM(s) Oral every 12 hours  enoxaparin Injectable 40 milliGRAM(s) SubCutaneous every 12 hours  furosemide   Injectable 20 milliGRAM(s) IV Push daily  lacosamide IVPB 150 milliGRAM(s) IV Intermittent <User Schedule>  levothyroxine 50 MICROGram(s) Oral daily  methylPREDNISolone sodium succinate Injectable 60 milliGRAM(s) IV Push every 12 hours  montelukast 10 milliGRAM(s) Oral at bedtime  multivitamin/minerals 1 Tablet(s) Oral daily  PHENobarbital IVPB 30 milliGRAM(s) IV Intermittent <User Schedule>  polyethylene glycol 3350 17 Gram(s) Oral daily  pramipexole 0.5 milliGRAM(s) Oral at bedtime  senna 2 Tablet(s) Oral at bedtime  traZODone 100 milliGRAM(s) Oral at bedtime    MEDICATIONS  (PRN):  acetaminophen     Tablet .. 650 milliGRAM(s) Oral every 6 hours PRN Mild Pain (1 - 3)  albuterol/ipratropium for Nebulization 3 milliLiter(s) Nebulizer every 6 hours PRN Bronchospasm  ketorolac   Injectable 15 milliGRAM(s) IV Push two times a day PRN Moderate Pain (4 - 6)      PHYSICAL EXAM  Vital Signs Last 24 Hrs  T(C): 36.6 (10 Dec 2022 05:00), Max: 36.6 (10 Dec 2022 05:00)  T(F): 97.9 (10 Dec 2022 05:00), Max: 97.9 (10 Dec 2022 05:00)  HR: 59 (10 Dec 2022 05:00) (59 - 74)  BP: 106/56 (10 Dec 2022 05:00) (106/56 - 120/59)  BP(mean): --  RR: 18 (10 Dec 2022 05:00) (18 - 19)  SpO2: 93% (09 Dec 2022 13:09) (93% - 93%)      General:  obese; AAOx4, slight gen weakness. Can barely sit forward  TH: MMM  Lungs: dec bs both bases  Cardiovascular: RRR;   no MRG  Abdomen: obese; Soft, Positive BS; NT, no HSM, M  Extremities: + obese No clubbing ; weak B/L LE   Neuro: Paraplegia        LABS:                           14.0   8.99  )-----------( 174      ( 06 Dec 2022 06:11 )             49.2                        15.3   7.98  )-----------( 150      ( 05 Dec 2022 10:20 )             54.1     12-08    143  |  95<L>  |  16  ----------------------------<  94  3.9   |  41<HH>  |  0.5<L>    Ca    8.3<L>      08 Dec 2022 19:31      12-06    147<H>  |  98  |  18  ----------------------------<  106<H>  4.6   |  40<H>  |  <0.5<L>    Ca    9.4      06 Dec 2022 06:11  Mg     1.9     12-06    TPro  6.7  /  Alb  4.3  /  TBili  0.3  /  DBili  x   /  AST  16  /  ALT  21  /  AlkPhos  72  12-06 12-05    143  |  98  |  18  ----------------------------<  172<H>  6.2<HH>   |  38<H>  |  0.5<L>    Ca    8.0<L>      05 Dec 2022 10:20    TPro  6.3  /  Alb  3.8  /  TBili  0.3  /  DBili  x   /  AST  24  /  ALT  23  /  AlkPhos  66  12-05                                                 CARDIAC MARKERS ( 05 Dec 2022 07:53 )  x     / <0.01 ng/mL / x     / x     / x                                                LIVER FUNCTIONS - ( 05 Dec 2022 10:20 )  Alb: 3.8 g/dL / Pro: 6.3 g/dL / ALK PHOS: 66 U/L / ALT: 23 U/L / AST: 24 U/L / GGT: x                                                                                             < from: Xray Chest 1 View-PORTABLE IMMEDIATE (12.05.22 @ 08:57) >    ACC: [id number] EXAM:  XR CHEST PORTABLE IMMED 1V                          PROCEDURE DATE:  12/05/2022          INTERPRETATION:  Clinical History / Reason for exam: Shortness of breath    Comparison : Chest radiograph dated 11/23/2022.    Technique/Positioning: Frontal portable.    Findings:    Support devices: None.    Cardiac/mediastinum/hilum: Stable cardiomegaly    Lung parenchyma/Pleura: Low lung volumes. Stable interstitial opacities.   No acute infiltrate. No pneumothorax.    Skeleton/softtissues: Stable.    Impression:    Stable interstitial opacities. Low lung volumes. No definite new   infiltrate.    --- End of Report ---      < end of copied text >

## 2022-12-10 NOTE — DISCHARGE NOTE NURSING/CASE MANAGEMENT/SOCIAL WORK - NSDCPEFALRISK_GEN_ALL_CORE
For information on Fall & Injury Prevention, visit: [email or web address] OR  [email or web address] OR  https://www.cdc.gov/steadi/patient.html

## 2022-12-10 NOTE — DISCHARGE NOTE NURSING/CASE MANAGEMENT/SOCIAL WORK - PATIENT PORTAL LINK FT
You can access the FollowMyHealth Patient Portal offered by [facility] by registering at the following website: [email or web address]. By joining [employer]’s FollowMyHealth portal, you will also be able to view your health information using other applications (apps) compatible with our system.

## 2022-12-10 NOTE — PROGRESS NOTE ADULT - ASSESSMENT
Impression     Acute on chronic hypercapnic/ hypoxemic resp failure on NIV    2/2 morbid obesity/hypoventilation w COPD/THOM  AMS secondary to toxic Metabolic and hypercapnia  Fluid Overload  THOM/ OHS  possible aspiration pneumonia  Asthma exacerbation  bedbound high risk for PE   HX of seizure      PLAN: as per pulm:    CNS: Avoid CNS depressant, AED per neuro      HEMATOLOGICAL:  DVT prophylaxis. LE doppler    ENDOCRINE:  Follow up FS.  Insulin protocol if needed.   see Hypernatremia- hydrate and recheck.    MuSkel_ morbid obesity/ bedbound,  Neuro: functional paraplegic.  Pt c/o jt(knee) pains and "the bed is too small"- try to get larger bed- for morbidly obese patient.        Assessment and Plan:   · Assessment	      Acute on chronic hypercapnic/hypoxemic respiratory failure on NIV  THOM/OHS  Morbid obesity  Possible aspiration pneumonia  Asthma exacerbation  - pulm f/u for recommendations regarding discharge- needs AVAPs arranged @ SNF for DC  - attempt to wean IV steroids- change to prednisone 40 mg QD x4 on DC  - tolerating PO diet  - complete course PO Doxycycline- until 12/13.    Toxic metabolic encephalopathy  - now resolved    Seizure Disorder  - continue rx    Depression  - continue Celtabitha ALLRED, DNR / DNI on chart.  Patient asking for a new type of mask for better compliance with treatment in SNF?      floor  Poor prognosis   Pt is DNR/DNI, and has no interest in exploring a tracheostomy.  She also has no interest in Hospice at this time. see CHALINO  We need to review w Pulm AVAP details for NH/SNF.

## 2022-12-10 NOTE — PROGRESS NOTE ADULT - PROVIDER SPECIALTY LIST ADULT
Internal Medicine
Pulmonology
Pulmonology
Internal Medicine
Internal Medicine
Pulmonology

## 2022-12-15 DIAGNOSIS — G47.33 OBSTRUCTIVE SLEEP APNEA (ADULT) (PEDIATRIC): ICD-10-CM

## 2022-12-15 DIAGNOSIS — J96.22 ACUTE AND CHRONIC RESPIRATORY FAILURE WITH HYPERCAPNIA: ICD-10-CM

## 2022-12-15 DIAGNOSIS — J96.21 ACUTE AND CHRONIC RESPIRATORY FAILURE WITH HYPOXIA: ICD-10-CM

## 2022-12-15 DIAGNOSIS — E66.2 MORBID (SEVERE) OBESITY WITH ALVEOLAR HYPOVENTILATION: ICD-10-CM

## 2022-12-15 DIAGNOSIS — J44.0 CHRONIC OBSTRUCTIVE PULMONARY DISEASE WITH (ACUTE) LOWER RESPIRATORY INFECTION: ICD-10-CM

## 2022-12-15 DIAGNOSIS — J96.02 ACUTE RESPIRATORY FAILURE WITH HYPERCAPNIA: ICD-10-CM

## 2022-12-15 DIAGNOSIS — J44.1 CHRONIC OBSTRUCTIVE PULMONARY DISEASE WITH (ACUTE) EXACERBATION: ICD-10-CM

## 2022-12-15 DIAGNOSIS — E66.01 MORBID (SEVERE) OBESITY DUE TO EXCESS CALORIES: ICD-10-CM

## 2022-12-15 DIAGNOSIS — G92.8 OTHER TOXIC ENCEPHALOPATHY: ICD-10-CM

## 2022-12-15 DIAGNOSIS — J20.9 ACUTE BRONCHITIS, UNSPECIFIED: ICD-10-CM

## 2022-12-19 ENCOUNTER — INPATIENT (INPATIENT)
Facility: HOSPITAL | Age: 67
LOS: 8 days | Discharge: NURSING, REHAB OR LONG-TERM CARE | End: 2022-12-28
Attending: INTERNAL MEDICINE | Admitting: INTERNAL MEDICINE
Payer: MEDICARE

## 2022-12-19 ENCOUNTER — TRANSCRIPTION ENCOUNTER (OUTPATIENT)
Age: 67
End: 2022-12-19

## 2022-12-19 VITALS
OXYGEN SATURATION: 96 % | DIASTOLIC BLOOD PRESSURE: 55 MMHG | RESPIRATION RATE: 32 BRPM | SYSTOLIC BLOOD PRESSURE: 96 MMHG | HEIGHT: 62 IN | HEART RATE: 70 BPM

## 2022-12-19 DIAGNOSIS — Z88.8 ALLERGY STATUS TO OTHER DRUGS, MEDICAMENTS AND BIOLOGICAL SUBSTANCES: ICD-10-CM

## 2022-12-19 DIAGNOSIS — Z87.448 PERSONAL HISTORY OF OTHER DISEASES OF URINARY SYSTEM: Chronic | ICD-10-CM

## 2022-12-19 DIAGNOSIS — Z98.890 OTHER SPECIFIED POSTPROCEDURAL STATES: Chronic | ICD-10-CM

## 2022-12-19 DIAGNOSIS — N18.9 CHRONIC KIDNEY DISEASE, UNSPECIFIED: ICD-10-CM

## 2022-12-19 DIAGNOSIS — K08.409 PARTIAL LOSS OF TEETH, UNSPECIFIED CAUSE, UNSPECIFIED CLASS: Chronic | ICD-10-CM

## 2022-12-19 DIAGNOSIS — Z16.12 EXTENDED SPECTRUM BETA LACTAMASE (ESBL) RESISTANCE: ICD-10-CM

## 2022-12-19 DIAGNOSIS — Z85.42 PERSONAL HISTORY OF MALIGNANT NEOPLASM OF OTHER PARTS OF UTERUS: ICD-10-CM

## 2022-12-19 DIAGNOSIS — Z90.89 ACQUIRED ABSENCE OF OTHER ORGANS: Chronic | ICD-10-CM

## 2022-12-19 DIAGNOSIS — M19.90 UNSPECIFIED OSTEOARTHRITIS, UNSPECIFIED SITE: ICD-10-CM

## 2022-12-19 DIAGNOSIS — B95.2 ENTEROCOCCUS AS THE CAUSE OF DISEASES CLASSIFIED ELSEWHERE: ICD-10-CM

## 2022-12-19 DIAGNOSIS — E11.22 TYPE 2 DIABETES MELLITUS WITH DIABETIC CHRONIC KIDNEY DISEASE: ICD-10-CM

## 2022-12-19 LAB
ALBUMIN SERPL ELPH-MCNC: 3.5 G/DL — SIGNIFICANT CHANGE UP (ref 3.5–5.2)
ALBUMIN SERPL ELPH-MCNC: 3.9 G/DL — SIGNIFICANT CHANGE UP (ref 3.5–5.2)
ALP SERPL-CCNC: 71 U/L — SIGNIFICANT CHANGE UP (ref 30–115)
ALP SERPL-CCNC: 72 U/L — SIGNIFICANT CHANGE UP (ref 30–115)
ALT FLD-CCNC: 899 U/L — HIGH (ref 0–41)
ALT FLD-CCNC: >700 U/L — HIGH (ref 0–41)
ANION GAP SERPL CALC-SCNC: 7 MMOL/L — SIGNIFICANT CHANGE UP (ref 7–14)
ANION GAP SERPL CALC-SCNC: 9 MMOL/L — SIGNIFICANT CHANGE UP (ref 7–14)
APTT BLD: 31.7 SEC — SIGNIFICANT CHANGE UP (ref 27–39.2)
AST SERPL-CCNC: 795 U/L — HIGH (ref 0–41)
AST SERPL-CCNC: >700 U/L — HIGH (ref 0–41)
BASOPHILS # BLD AUTO: 0.04 K/UL — SIGNIFICANT CHANGE UP (ref 0–0.2)
BASOPHILS NFR BLD AUTO: 0.3 % — SIGNIFICANT CHANGE UP (ref 0–1)
BILIRUB SERPL-MCNC: 0.3 MG/DL — SIGNIFICANT CHANGE UP (ref 0.2–1.2)
BILIRUB SERPL-MCNC: 0.3 MG/DL — SIGNIFICANT CHANGE UP (ref 0.2–1.2)
BUN SERPL-MCNC: 26 MG/DL — HIGH (ref 10–20)
BUN SERPL-MCNC: 27 MG/DL — HIGH (ref 10–20)
CALCIUM SERPL-MCNC: 9.1 MG/DL — SIGNIFICANT CHANGE UP (ref 8.4–10.5)
CALCIUM SERPL-MCNC: 9.1 MG/DL — SIGNIFICANT CHANGE UP (ref 8.4–10.5)
CHLORIDE SERPL-SCNC: 94 MMOL/L — LOW (ref 98–110)
CHLORIDE SERPL-SCNC: 98 MMOL/L — SIGNIFICANT CHANGE UP (ref 98–110)
CO2 SERPL-SCNC: 36 MMOL/L — HIGH (ref 17–32)
CO2 SERPL-SCNC: 39 MMOL/L — HIGH (ref 17–32)
CREAT SERPL-MCNC: 1 MG/DL — SIGNIFICANT CHANGE UP (ref 0.7–1.5)
CREAT SERPL-MCNC: 1.1 MG/DL — SIGNIFICANT CHANGE UP (ref 0.7–1.5)
EGFR: 55 ML/MIN/1.73M2 — LOW
EGFR: 62 ML/MIN/1.73M2 — SIGNIFICANT CHANGE UP
EOSINOPHIL # BLD AUTO: 0.01 K/UL — SIGNIFICANT CHANGE UP (ref 0–0.7)
EOSINOPHIL NFR BLD AUTO: 0.1 % — SIGNIFICANT CHANGE UP (ref 0–8)
FLUAV AG NPH QL: SIGNIFICANT CHANGE UP
FLUBV AG NPH QL: SIGNIFICANT CHANGE UP
GAS PNL BLDA: SIGNIFICANT CHANGE UP
GLUCOSE SERPL-MCNC: 140 MG/DL — HIGH (ref 70–99)
GLUCOSE SERPL-MCNC: 92 MG/DL — SIGNIFICANT CHANGE UP (ref 70–99)
HCT VFR BLD CALC: 47.9 % — HIGH (ref 37–47)
HGB BLD-MCNC: 14 G/DL — SIGNIFICANT CHANGE UP (ref 12–16)
IMM GRANULOCYTES NFR BLD AUTO: 1.2 % — HIGH (ref 0.1–0.3)
INR BLD: 1.14 RATIO — SIGNIFICANT CHANGE UP (ref 0.65–1.3)
LYMPHOCYTES # BLD AUTO: 1.2 K/UL — SIGNIFICANT CHANGE UP (ref 1.2–3.4)
LYMPHOCYTES # BLD AUTO: 8.4 % — LOW (ref 20.5–51.1)
MAGNESIUM SERPL-MCNC: 2.1 MG/DL — SIGNIFICANT CHANGE UP (ref 1.8–2.4)
MCHC RBC-ENTMCNC: 29.2 G/DL — LOW (ref 32–37)
MCHC RBC-ENTMCNC: 31.4 PG — HIGH (ref 27–31)
MCV RBC AUTO: 107.4 FL — HIGH (ref 81–99)
MONOCYTES # BLD AUTO: 1.28 K/UL — HIGH (ref 0.1–0.6)
MONOCYTES NFR BLD AUTO: 9 % — SIGNIFICANT CHANGE UP (ref 1.7–9.3)
NEUTROPHILS # BLD AUTO: 11.52 K/UL — HIGH (ref 1.4–6.5)
NEUTROPHILS NFR BLD AUTO: 81 % — HIGH (ref 42.2–75.2)
NRBC # BLD: 1 /100 WBCS — HIGH (ref 0–0)
PHOSPHATE SERPL-MCNC: 5.1 MG/DL — HIGH (ref 2.1–4.9)
PLATELET # BLD AUTO: 177 K/UL — SIGNIFICANT CHANGE UP (ref 130–400)
POTASSIUM SERPL-MCNC: 5.2 MMOL/L — HIGH (ref 3.5–5)
POTASSIUM SERPL-MCNC: 6.6 MMOL/L — CRITICAL HIGH (ref 3.5–5)
POTASSIUM SERPL-SCNC: 5.2 MMOL/L — HIGH (ref 3.5–5)
POTASSIUM SERPL-SCNC: 6.6 MMOL/L — CRITICAL HIGH (ref 3.5–5)
PROT SERPL-MCNC: 5.7 G/DL — LOW (ref 6–8)
PROT SERPL-MCNC: 6.7 G/DL — SIGNIFICANT CHANGE UP (ref 6–8)
PROTHROM AB SERPL-ACNC: 13.1 SEC — HIGH (ref 9.95–12.87)
RBC # BLD: 4.46 M/UL — SIGNIFICANT CHANGE UP (ref 4.2–5.4)
RBC # FLD: 14.2 % — SIGNIFICANT CHANGE UP (ref 11.5–14.5)
RSV RNA NPH QL NAA+NON-PROBE: SIGNIFICANT CHANGE UP
SARS-COV-2 RNA SPEC QL NAA+PROBE: SIGNIFICANT CHANGE UP
SODIUM SERPL-SCNC: 140 MMOL/L — SIGNIFICANT CHANGE UP (ref 135–146)
SODIUM SERPL-SCNC: 143 MMOL/L — SIGNIFICANT CHANGE UP (ref 135–146)
TROPONIN T SERPL-MCNC: 0.04 NG/ML — CRITICAL HIGH
TROPONIN T SERPL-MCNC: 0.09 NG/ML — CRITICAL HIGH
WBC # BLD: 14.22 K/UL — HIGH (ref 4.8–10.8)
WBC # FLD AUTO: 14.22 K/UL — HIGH (ref 4.8–10.8)

## 2022-12-19 PROCEDURE — 76705 ECHO EXAM OF ABDOMEN: CPT | Mod: 26

## 2022-12-19 PROCEDURE — 99223 1ST HOSP IP/OBS HIGH 75: CPT

## 2022-12-19 PROCEDURE — 93010 ELECTROCARDIOGRAM REPORT: CPT

## 2022-12-19 PROCEDURE — 71045 X-RAY EXAM CHEST 1 VIEW: CPT | Mod: 26

## 2022-12-19 PROCEDURE — 99291 CRITICAL CARE FIRST HOUR: CPT | Mod: CS

## 2022-12-19 RX ORDER — ONDANSETRON 8 MG/1
4 TABLET, FILM COATED ORAL EVERY 8 HOURS
Refills: 0 | Status: DISCONTINUED | OUTPATIENT
Start: 2022-12-19 | End: 2022-12-28

## 2022-12-19 RX ORDER — FUROSEMIDE 40 MG
40 TABLET ORAL
Refills: 0 | Status: DISCONTINUED | OUTPATIENT
Start: 2022-12-19 | End: 2022-12-19

## 2022-12-19 RX ORDER — CALCIUM GLUCONATE 100 MG/ML
1 VIAL (ML) INTRAVENOUS ONCE
Refills: 0 | Status: COMPLETED | OUTPATIENT
Start: 2022-12-19 | End: 2022-12-19

## 2022-12-19 RX ORDER — ALBUTEROL 90 UG/1
2.5 AEROSOL, METERED ORAL ONCE
Refills: 0 | Status: COMPLETED | OUTPATIENT
Start: 2022-12-19 | End: 2022-12-19

## 2022-12-19 RX ORDER — PHENOBARBITAL 60 MG
32.4 TABLET ORAL EVERY 8 HOURS
Refills: 0 | Status: DISCONTINUED | OUTPATIENT
Start: 2022-12-19 | End: 2022-12-28

## 2022-12-19 RX ORDER — LEVOTHYROXINE SODIUM 125 MCG
50 TABLET ORAL DAILY
Refills: 0 | Status: DISCONTINUED | OUTPATIENT
Start: 2022-12-20 | End: 2022-12-28

## 2022-12-19 RX ORDER — CITALOPRAM 10 MG/1
20 TABLET, FILM COATED ORAL DAILY
Refills: 0 | Status: DISCONTINUED | OUTPATIENT
Start: 2022-12-19 | End: 2022-12-28

## 2022-12-19 RX ORDER — MONTELUKAST 4 MG/1
10 TABLET, CHEWABLE ORAL AT BEDTIME
Refills: 0 | Status: DISCONTINUED | OUTPATIENT
Start: 2022-12-19 | End: 2022-12-28

## 2022-12-19 RX ORDER — MULTIVIT-MIN/FERROUS GLUCONATE 9 MG/15 ML
1 LIQUID (ML) ORAL DAILY
Refills: 0 | Status: DISCONTINUED | OUTPATIENT
Start: 2022-12-19 | End: 2022-12-28

## 2022-12-19 RX ORDER — IPRATROPIUM/ALBUTEROL SULFATE 18-103MCG
3 AEROSOL WITH ADAPTER (GRAM) INHALATION EVERY 6 HOURS
Refills: 0 | Status: DISCONTINUED | OUTPATIENT
Start: 2022-12-19 | End: 2022-12-28

## 2022-12-19 RX ORDER — FUROSEMIDE 40 MG
40 TABLET ORAL DAILY
Refills: 0 | Status: DISCONTINUED | OUTPATIENT
Start: 2022-12-20 | End: 2022-12-20

## 2022-12-19 RX ORDER — FUROSEMIDE 40 MG
40 TABLET ORAL ONCE
Refills: 0 | Status: COMPLETED | OUTPATIENT
Start: 2022-12-19 | End: 2022-12-19

## 2022-12-19 RX ORDER — DEXTROSE 50 % IN WATER 50 %
25 SYRINGE (ML) INTRAVENOUS ONCE
Refills: 0 | Status: COMPLETED | OUTPATIENT
Start: 2022-12-19 | End: 2022-12-19

## 2022-12-19 RX ORDER — INSULIN HUMAN 100 [IU]/ML
10 INJECTION, SOLUTION SUBCUTANEOUS ONCE
Refills: 0 | Status: COMPLETED | OUTPATIENT
Start: 2022-12-19 | End: 2022-12-19

## 2022-12-19 RX ORDER — TOPIRAMATE 25 MG
100 TABLET ORAL
Refills: 0 | Status: DISCONTINUED | OUTPATIENT
Start: 2022-12-19 | End: 2022-12-28

## 2022-12-19 RX ORDER — LANOLIN ALCOHOL/MO/W.PET/CERES
3 CREAM (GRAM) TOPICAL AT BEDTIME
Refills: 0 | Status: DISCONTINUED | OUTPATIENT
Start: 2022-12-19 | End: 2022-12-28

## 2022-12-19 RX ADMIN — ALBUTEROL 2.5 MILLIGRAM(S): 90 AEROSOL, METERED ORAL at 13:35

## 2022-12-19 RX ADMIN — INSULIN HUMAN 10 UNIT(S): 100 INJECTION, SOLUTION SUBCUTANEOUS at 13:40

## 2022-12-19 RX ADMIN — Medication 25 GRAM(S): at 13:36

## 2022-12-19 RX ADMIN — MONTELUKAST 10 MILLIGRAM(S): 4 TABLET, CHEWABLE ORAL at 20:57

## 2022-12-19 RX ADMIN — Medication 100 GRAM(S): at 13:37

## 2022-12-19 RX ADMIN — Medication 100 MILLIGRAM(S): at 21:00

## 2022-12-19 RX ADMIN — Medication 3 MILLILITER(S): at 22:00

## 2022-12-19 RX ADMIN — Medication 40 MILLIGRAM(S): at 14:39

## 2022-12-19 RX ADMIN — Medication 32.4 MILLIGRAM(S): at 21:00

## 2022-12-19 NOTE — CONSULT NOTE ADULT - SUBJECTIVE AND OBJECTIVE BOX
Patient is a 67y old  Female who presents with a chief complaint of     HPI:      PAST MEDICAL & SURGICAL HISTORY:  Diabetes      Renal stone      Renal failure      Epilepsy      Asthma      Chronic cough      Knee pain      Osteoarthritis      Uterine cancer  s/p hysterectomy      H/O abdominal hysterectomy  NO RADIATION AND CHEMO      History of tonsillectomy  13 years old      [place] teeth removed      History of bladder stone  SURGERY 8/3/2018      History of surgery  JJ STENT PLACEMENT LEFT OCTOBER 2017          SOCIAL HX:   Smoking                         ETOH                            Other    FAMILY HISTORY:  :  No known cardiovacular family hisotry     Review Of Systems:     All ROS are negative except per HPI       Allergies    Compazine (Unknown)  latex (Urticaria)  penicillins (Unknown)    Intolerances          PHYSICAL EXAM    ICU Vital Signs Last 24 Hrs  T(C): --  T(F): --  HR: 70 (19 Dec 2022 12:43) (70 - 70)  BP: 96/55 (19 Dec 2022 12:43) (96/55 - 96/55)  RR: 32 (19 Dec 2022 12:43) (32 - 32)  SpO2: 96% (19 Dec 2022 12:43) (96% - 96%)    O2 Parameters below as of 19 Dec 2022 12:43  Patient On (Oxygen Delivery Method): mask, nonrebreather  O2 Flow (L/min): 15          CONSTITUTIONAL:  Obese. In no acute distress    ENT:   Airway patent,   Mouth with normal mucosa.   No thrush    EYES:   pupils equal,   round and reactive to light.    CARDIAC:   Normal rate,   Regular rhythm.    Heart sounds S1, S2.   No edema    Vascular:   normal systolic impulse  no bruits    RESPIRATORY:   No wheezing   Normal chest expansion  No use of accessory muscles    GASTROINTESTINAL:  Abdomen soft   Non-tender,   No guarding,   + BS    GENITOURINARY  normal genitalia for sex  no edema    MUSCULOSKELETAL:   Range of motion is not limited,  Nno clubbing, cyanosis    NEUROLOGICAL:   Alert and oriented , sleepy  No motor or sensory deficits.  Pertinent DTRs normal      LABS:                          14.0   14.22 )-----------( 177      ( 19 Dec 2022 13:00 )             47.9                                               12-19    140  |  94<L>  |  26<H>  ----------------------------<  140<H>  6.6<HH>   |  39<H>  |  1.1    Ca    9.1      19 Dec 2022 13:00    TPro  6.7  /  Alb  3.9  /  TBili  0.3  /  DBili  x   /  AST  730<H>  /  ALT  858<H>  /  AlkPhos  71  12-19      PT/INR - ( 19 Dec 2022 13:00 )   PT: 13.10 sec;   INR: 1.14 ratio         PTT - ( 19 Dec 2022 13:00 )  PTT:31.7 sec                                           CARDIAC MARKERS ( 19 Dec 2022 13:00 )  x     / 0.09 ng/mL / x     / x     / x                                                LIVER FUNCTIONS - ( 19 Dec 2022 13:00 )  Alb: 3.9 g/dL / Pro: 6.7 g/dL / ALK PHOS: 71 U/L / ALT: 858 U/L / AST: 730 U/L / GGT: x                                                                                                                                   ABG - ( 19 Dec 2022 13:00 )  pH, Arterial: 7.20  pH, Blood: x     /  pCO2: 118   /  pO2: 135   / HCO3: 46    / Base Excess: 12.4  /  SaO2: 99.0                X-Rays reviewed:                                                                                    ECHO    CXR interpreted by me:    MEDICATIONS  (STANDING):    MEDICATIONS  (PRN):         Patient is a 67y old  Female who presents with a chief complaint of     HPI:   68 yo female, pmh of asthma, dm, epileps, oa, ckd, chf, DNR/DNI, presents to ed for sob, pt from NH, on ra sat is in 70s, placed on BiPAP and MS improved. Patient had negative COVID and RVP swab , positive tropm, b/l infiltrates.    PAST MEDICAL & SURGICAL HISTORY:  Diabetes      Renal stone      Renal failure      Epilepsy      Asthma      Chronic cough      Knee pain      Osteoarthritis      Uterine cancer  s/p hysterectomy      H/O abdominal hysterectomy  NO RADIATION AND CHEMO      History of tonsillectomy  13 years old      [place] teeth removed      History of bladder stone  SURGERY 8/3/2018      History of surgery  JJ STENT PLACEMENT LEFT OCTOBER 2017          SOCIAL HX:   Smoking                         ETOH                            Other    FAMILY HISTORY:  :  No known cardiovacular family hisotry     Review Of Systems:     All ROS are negative except per HPI       Allergies    Compazine (Unknown)  latex (Urticaria)  penicillins (Unknown)    Intolerances          PHYSICAL EXAM    ICU Vital Signs Last 24 Hrs  T(C): --  T(F): --  HR: 70 (19 Dec 2022 12:43) (70 - 70)  BP: 96/55 (19 Dec 2022 12:43) (96/55 - 96/55)  RR: 32 (19 Dec 2022 12:43) (32 - 32)  SpO2: 96% (19 Dec 2022 12:43) (96% - 96%)    O2 Parameters below as of 19 Dec 2022 12:43  Patient On (Oxygen Delivery Method): mask, nonrebreather  O2 Flow (L/min): 15          CONSTITUTIONAL:  Obese. In no acute distress    ENT:   Airway patent,   Mouth with normal mucosa.   No thrush    EYES:   pupils equal,   round and reactive to light.    CARDIAC:   Normal rate,   Regular rhythm.    Heart sounds S1, S2.   No edema    Vascular:   normal systolic impulse  no bruits    RESPIRATORY:   No wheezing   Normal chest expansion  No use of accessory muscles    GASTROINTESTINAL:  Abdomen soft   Non-tender,   No guarding,   + BS    GENITOURINARY  normal genitalia for sex  no edema    MUSCULOSKELETAL:   Range of motion is not limited,  Nno clubbing, cyanosis    NEUROLOGICAL:   Alert and oriented , sleepy  No motor or sensory deficits.  Pertinent DTRs normal      LABS:                          14.0   14.22 )-----------( 177      ( 19 Dec 2022 13:00 )             47.9                                               12-19    140  |  94<L>  |  26<H>  ----------------------------<  140<H>  6.6<HH>   |  39<H>  |  1.1    Ca    9.1      19 Dec 2022 13:00    TPro  6.7  /  Alb  3.9  /  TBili  0.3  /  DBili  x   /  AST  730<H>  /  ALT  858<H>  /  AlkPhos  71  12-19      PT/INR - ( 19 Dec 2022 13:00 )   PT: 13.10 sec;   INR: 1.14 ratio         PTT - ( 19 Dec 2022 13:00 )  PTT:31.7 sec                                           CARDIAC MARKERS ( 19 Dec 2022 13:00 )  x     / 0.09 ng/mL / x     / x     / x                                                LIVER FUNCTIONS - ( 19 Dec 2022 13:00 )  Alb: 3.9 g/dL / Pro: 6.7 g/dL / ALK PHOS: 71 U/L / ALT: 858 U/L / AST: 730 U/L / GGT: x                                                                                                                                   ABG - ( 19 Dec 2022 13:00 )  pH, Arterial: 7.20  pH, Blood: x     /  pCO2: 118   /  pO2: 135   / HCO3: 46    / Base Excess: 12.4  /  SaO2: 99.0                X-Rays reviewed:                                                                                    ECHO    CXR interpreted by me:    MEDICATIONS  (STANDING):    MEDICATIONS  (PRN):

## 2022-12-19 NOTE — H&P ADULT - HISTORY OF PRESENT ILLNESS
68yo F with PMHx of multiple admissions for hypercapnic respiratory failure, THOM, Asthma/COPD not on home O2, morbid obesity, epilepsy, hypothyroidism, OA, kidney stones, L radial artery occlusion 2021 (previously on eliquis) who presents from [facility] due to respiratory distress. History limited as patient on BIPAP. Per collateral from ED staff and nursing home records, patient was noted to be in respiratory distress and minimally responsive. Presented to the ED, with O2 sats on 70s and minimally responsive to verbal and tactile stimuli. Placed on BIPAP 15/8. Labs were n/f WBC 14, K 6.6 (hemolyzed) , , ABG 7.2/118. COVID/Flu/RSV negative. CXR shows unchanged bilateral opacities. She was given hypeK cocktail of Regula insulin, dextrose, Ca gluconate and albuterol. Additionally given IV lasix 40mg for suspected fluid overload. Evaluated by critical care who deemed stable for floor, with close monitoring of labs and blood gas after BIPAP 4hrs on and 4hrs off. She is admitted to [facility] for further management and workup     At time of my interview, patient stable HR: 64, BP: 102/82, 100% on BIPAP 15/8. She is awake with, eyes open and responding to questions. AAOx3 and answering questions appropriately. Able to tell me that she has no pain, or any other spontaneous complaints. Understands she is in the hospital due to her respiratory status.     Of note, she was recently admitted to [facility] from 12/5/22-12/10/22 for similar presentation after being found minimally responsive - including to physical stimuli from sternal rub, and brought to the ED. She was admitted to the medicine floor for acute on chronic hypercapnic/hypoxemic respiratory failure. She was treated with nebs, solumedrol and AVAPS NIV q4h on and off with improvement in her respiratory and mental status. Pt was initially suspected to have an aspiration pneumonia and was placed on ceftriaxone and flagyl. Infectious disease was consulted, who discontinued flagyl and ceftriaxone after determining that an aspiration pneumonia was unlikely. She completed a 5d course of PO Doxycycline 100mg. Eventually discharged back to [facility]

## 2022-12-19 NOTE — ED CLERICAL - NS ED CLERK NOTE PRE-ARRIVAL INFORMATION; ADDITIONAL PRE-ARRIVAL INFORMATION
This patient is enrolled in the [facility] readmission reduction initiative and has active care navigation. This patient can be followed up by the care navigation team within 24 hours.  To arrange close follow-up or to obtain additional clinical information, please call the contact number above. The on call [facility] Hospitalist has been notified and will coordinate care in concert with the ED Physician including consults as necessary. Call [phone] (North), [phone] (South) to reach the hospitalist. You may also call the Hospitalist Division at [phone] at either site. Consider CDU for management per guidelines

## 2022-12-19 NOTE — ED ADULT NURSE NOTE - NSICDXPASTSURGICALHX_GEN_ALL_CORE_FT
PAST SURGICAL HISTORY:  H/O abdominal hysterectomy NO RADIATION AND CHEMO    History of bladder stone SURGERY 8/3/2018    History of surgery JJ STENT PLACEMENT LEFT OCTOBER 2017    History of tonsillectomy 13 years old    [place] teeth removed

## 2022-12-19 NOTE — ED PROVIDER NOTE - NS ED ATTENDING STATEMENT MOD
I have seen and examined this patient and fully participated in the care of this patient as the teaching attending.  The service was shared with the BRENDA.  I reviewed and verified the documentation and independently performed the documented:

## 2022-12-19 NOTE — ED PROVIDER NOTE - PHYSICAL EXAMINATION
Physical Exam    Vital Signs: I have reviewed the initial vital signs.  Constitutional: ill appearing   Eyes: Conjunctiva pink, Sclera clear,  Cardiovascular: S1 and S2, tachycardic, regular rhythm, well-perfused extremities, radial pulses equal and 2+, pedal pulses 2+ and equal  Respiratory: uabored respiratory effort, diffuse rales   Gastrointestinal: soft, non-tender abdomen, no pulsatile mass, normal bowl sounds  Musculoskeletal: supple neck, no lower extremity edema, no midline tenderness  Integumentary: warm, dry, no rash  Neurologic: arouses to tactile stim

## 2022-12-19 NOTE — CONSULT NOTE ADULT - ATTENDING COMMENTS
Attending Statement: I have personally performed a face to face diagnostic evaluation on this patient. The patient is suffering from:  Acute hypoxic respiratory failure  Fluid overload improved with NIV  Aute on chronic hypercapnic respiratory failure  CHF/NSTEMI  Transaminitis  I have made amendments to the documentation where necessary. I have personally seen and examined this patient.  I have fully participated in the care of this patient.  I have reviewed all pertinent clinical information, including history, physical exam, plan and note.

## 2022-12-19 NOTE — CONSULT NOTE ADULT - ASSESSMENT
IMPRESSION:    Acute hypoxic respiratory failure  Fluid overload improved with NIV  Aute on chronic hypercapnic respiratory failure  CHF/NSTEMI  Transaminitis    Cardiology evaluation   Trend trops  NIV 4 hrs on 4 hrs off and PRN, QHS  Supplemental O2 target O2 >92  Lasix BID  Avoid volume overload  HOB at 45  Avoid hepatotoxins  Aspiration precautions  DVT ppx  IMPRESSION:    Acute hypoxic respiratory failure  Fluid overload improved with NIV  Aute on chronic hypercapnic respiratory failure  CHF/NSTEMI  Transaminitis  HO DM  HO CKD    Cardiology evaluation   Trend trops  Echo  NIV 4 hrs on 4 hrs off and PRN, QHS  Supplemental O2 target O2 >92  Lasix BID  Avoid volume overload  HOB at 45  Avoid hepatotoxins  Aspiration precautions  DVT ppx   DNR/DNI per prior [facility]ST   Palliative care eval.

## 2022-12-19 NOTE — ED PROVIDER NOTE - OBJECTIVE STATEMENT
66 yo female, pmh of asthma, dm, epileps, oa, ckd, chf, DNR/DNI, presents to ed for sob, pt from NH, on ra sat is in 70s, pt not arousal to verbal stim. I am unable to obtain a comprehensive history, review of systems, past medical history, and/or physical exam due to constraints imposed by the urgency of the patient's clinical condition and/or mental status.
Patient

## 2022-12-19 NOTE — ED PROVIDER NOTE - NSICDXPASTSURGICALHX_GEN_ALL_CORE_FT
PAST SURGICAL HISTORY:  H/O abdominal hysterectomy NO RADIATION AND CHEMO    History of bladder stone SURGERY 8/3/2018    History of surgery JJ STENT PLACEMENT LEFT OCTOBER 2017    History of tonsillectomy 13 years old    [place] teeth removed

## 2022-12-19 NOTE — ED PROVIDER NOTE - CARE PLAN
Principal Discharge DX:	Acute respiratory failure with hypoxia  Secondary Diagnosis:	Hyperkalemia   1

## 2022-12-19 NOTE — PATIENT PROFILE ADULT - FALL HARM RISK - HARM RISK INTERVENTIONS

## 2022-12-19 NOTE — H&P ADULT - ASSESSMENT
68yo F with PMHx of multiple admissions for hypercapnic respiratory failure, THOM, Asthma/COPD not on home O2, morbid obesity, epilepsy, hypothyroidism, OA, kidney stones, L radial artery occlusion 2021 (previously on eliquis) who presents from [facility] due to respiratory distress. Overall patient's presentation c/w acute on chronic hypercapnic respiratory failure. Patient frequently admitted with same, and tends to improve with BIPAP, nebs and steroids.  Remains on BIPAP currently with improvement in respiratory and mental status. Given concern for fluid overload, will trial diuresis and obtain TTE. Expect lab abnormalities such as respiratory acidosis to improve with BIPAP. Additionally noted to have severely elevated LFT's, c/w shock liver as she was hypotensive on arrival and previous LFTs from less than two weeks ago were normal. Overall appears to have poor prognosis.       #Acute on chronic hypercapnic respiratory failure   #Respiratory acidosis with metabolic compensation   #COPD/Asthma   #THOM/OHS   - Pulmonology reccs -- seen in ED and triaged to floor   - Cont with BIPAP q4hrs on, q 4hrs off  - Repeat ABG after BIPAP x 4 hrs  - Cont duonebs q6h   - Start IV lasix 40mg BID    - Obtain TTE to eval for HF as last echo > 1 yr ago   - Would hold steroids for now due to c/f fluid overload; low threshold to resume if needed   - Aspiration precautions, NPO while on BIPAP     #Elevated troponin  - Likely i/s/o demand ischemia due to respiratory distress  - Check EKG  - Trend troponins   - F/up TTE     #Transaminitis  - Likely due to shock liver as patient was hypotensive on arrival and previously with normal LFTs  - Check RUQUS   - Trend LFTs  - Avoid hepatoxic meds   - GI consult     #Hyperkalemia  - S/p IV insulin, dextrose, Ca gluconate, albuterol and Lasix in ED  - Trend BMP   - Repeat HyperK cocktail as needed     #Seziure disorder  - Resume home AEDs: Topamax, Phenobarb   - Seizure precatutions     #Hypothyroidism   - Resume home synthroid  68yo F with PMHx of multiple admissions for hypercapnic respiratory failure, THOM, Asthma/COPD not on home O2, morbid obesity, epilepsy, hypothyroidism, OA, kidney stones, L radial artery occlusion 2021 (previously on eliquis) who presents from [facility] due to respiratory distress. Overall patient's presentation c/w acute on chronic hypercapnic respiratory failure. Patient frequently admitted with same, and tends to improve with BIPAP, nebs and steroids.  Remains on BIPAP currently with improvement in respiratory and mental status. Given concern for fluid overload, will trial diuresis and obtain TTE. Expect lab abnormalities such as respiratory acidosis to improve with BIPAP. Additionally noted to have severely elevated LFT's, c/w shock liver as she was hypotensive on arrival and previous LFTs from less than two weeks ago were normal. Overall appears to have poor prognosis.       #Acute on chronic hypercapnic respiratory failure   #Respiratory acidosis with metabolic compensation   #COPD/Asthma   #THOM/OHS   - Pulmonology reccs -- seen in ED and triaged to floor   - Cont with BIPAP q4hrs on, q 4hrs off  - Repeat ABG after BIPAP x 4 hrs  - Check full RVP and procal > can start abx if procal elevated  - Cont duonebs q6h   - Start IV lasix 40mg BID    - Obtain TTE to eval for HF as last echo > 1 yr ago   - Would hold steroids for now due to c/f fluid overload; low threshold to resume if needed   - Aspiration precautions, NPO while on BIPAP     #Elevated troponin  - Likely i/s/o demand ischemia due to respiratory distress  - Check EKG  - Trend troponins   - F/up TTE     #Transaminitis  - Likely due to shock liver as patient was hypotensive on arrival and previously with normal LFTs  - Check RUQUS   - Trend LFTs  - Avoid hepatoxic meds   - GI consult     #Hyperkalemia  - S/p IV insulin, dextrose, Ca gluconate, albuterol and Lasix in ED  - Trend BMP   - Repeat HyperK cocktail as needed     #Seziure disorder  - Resume home AEDs: Topamax, Phenobarb   - Seizure precatutions     #Hypothyroidism   - Resume home synthroid  68yo F with PMHx of multiple admissions for hypercapnic respiratory failure, THOM, Asthma/COPD not on home O2, morbid obesity, epilepsy, hypothyroidism, OA, kidney stones, L radial artery occlusion 2021 (previously on eliquis) who presents from [facility] due to respiratory distress. Overall patient's presentation c/w acute on chronic hypercapnic respiratory failure. Patient frequently admitted with same, and tends to improve with BIPAP, nebs and steroids.  Remains on BIPAP currently with improvement in respiratory and mental status. Given concern for fluid overload, will trial diuresis and obtain TTE. Expect lab abnormalities such as respiratory acidosis to improve with BIPAP. Additionally noted to have severely elevated LFT's, c/w shock liver as she was hypotensive on arrival and previous LFTs from less than two weeks ago were normal. Overall appears to have poor prognosis.       #Acute on chronic hypercapnic respiratory failure   #Respiratory acidosis with metabolic compensation   #COPD/Asthma   #THOM/OHS   - Pulmonology reccs -- seen in ED and triaged to floor   - Cont with BIPAP q4hrs on, q 4hrs off  - Repeat ABG after BIPAP x 4 hrs  - Check full RVP and procal > can start abx if procal elevated  - Cont duonebs q6h   - Start IV lasix 40mg daily for now and watch BP closely given initial hypotension   - Obtain TTE to eval for HF as last echo > 1 yr ago   - Would hold steroids for now due to c/f fluid overload; low threshold to resume if needed   - Aspiration precautions, NPO while on BIPAP     #Elevated troponin  - Likely i/s/o demand ischemia due to respiratory distress  - Check EKG  - Trend troponins   - F/up TTE     #Transaminitis  - Likely due to shock liver as patient was hypotensive on arrival and previously with normal LFTs  - Check RUQUS   - Trend LFTs  - Avoid hepatoxic meds   - GI consult     #Hyperkalemia  - S/p IV insulin, dextrose, Ca gluconate, albuterol and Lasix in ED  - Trend BMP   - Repeat HyperK cocktail as needed     #Seziure disorder  - Resume home AEDs: Topamax, Phenobarb   - Seizure precatutions     #Hypothyroidism   - Resume home synthroid

## 2022-12-19 NOTE — H&P ADULT - NSHPPHYSICALEXAM_GEN_ALL_CORE
VITALS:   T(C): --  HR: 78 (12-19-22 @ 14:50) (70 - 78)  BP: 102/82 (12-19-22 @ 14:50) (96/55 - 102/82)  RR: 32 (12-19-22 @ 12:43) (32 - 32)  SpO2: 100% (12-19-22 @ 14:50) (96% - 100%)    GENERAL: morbidly obese female; chronically ill appearing.  BIPAP in place. Appearing comfortable   HEAD:  Atraumatic, Normocephalic  EYES: EOMI, PERRLA, conjunctiva and sclera clear  ENT: Moist mucous membranes  NECK: Supple, No JVD  CHEST/LUNG: course breath sounds bilaterally, No crackles of wheezing. Breathing on BIPAP  HEART: Regular rate and rhythm; No murmurs, rubs, or gallops  ABDOMEN: obese abdomen,  BSx4; Soft, nontender, nondistended  EXTREMITIES: Extremities warm.  2+ Peripheral Pulses, brisk capillary refill. No clubbing, cyanosis, or edema  NERVOUS SYSTEM:  A&Ox3, no focal deficits   SKIN: No rashes or lesions  : Ardon cath in place

## 2022-12-19 NOTE — ED ADULT TRIAGE NOTE - CHIEF COMPLAINT QUOTE
Pt brought in by ambulance from [facility]. As per EMT "She has respiratory distress. When we arrived she was on 3 liters nasal canula and her O2Sat was 84. We put her on a non-rebreather and now she is 95%."

## 2022-12-19 NOTE — ED PROVIDER NOTE - ATTENDING CONTRIBUTION TO CARE
Attending Statement: I have personally provided the amount of critical care time documented below excluding time spent on separate procedures.     Critical Care Time Spent (min) Must be 30 or more minutes to qualify: 35.    67-year-old female history of morbid obesity, diabetes, obstructive sleep apnea, respiratory failure, known history of CO2 narcosis, as per paperwork DNR, DNI, DN H, now presents from a nursing home for lethargy and shortness of breath.  Patient is nonverbal.  History obtained by nursing home papers and Dr. Lozada who called ahead of the patient's arrival.    vss, lethargic, arousable to pain stimuli, pink conj, anicteric, dry mucous membrane, neck supple, creased breath sounds bilaterally RRR, equal radial pulses bilat, abd soft/2+ edema, no rashes.

## 2022-12-19 NOTE — ED ADULT NURSE NOTE - NSIMPLEMENTINTERV_GEN_ALL_ED
Implemented All Fall with Harm Risk Interventions:  [place] to call system. Call bell, personal items and telephone within reach. Instruct patient to call for assistance. Room bathroom lighting operational. Non-slip footwear when patient is off stretcher. Physically safe environment: no spills, clutter or unnecessary equipment. Stretcher in lowest position, wheels locked, appropriate side rails in place. Provide visual cue, wrist band, yellow gown, etc. Monitor gait and stability. Monitor for mental status changes and reorient to person, place, and time. Review medications for side effects contributing to fall risk. Reinforce activity limits and safety measures with patient and family. Provide visual clues: red socks.

## 2022-12-19 NOTE — ED ADULT NURSE NOTE - CHIEF COMPLAINT QUOTE
Pt brought in by ambulance from [facility]. As per EMT "She has respiratory distress. When we arrived she was on 3 liters nasal canula and her O2Sat was 84. We put her on a non-rebreather and now she is 95%."

## 2022-12-19 NOTE — H&P ADULT - NSHPLABSRESULTS_GEN_ALL_CORE
14.0   14.22 )-----------( 177      ( 19 Dec 2022 13:00 )             47.9   12-19    140  |  94<L>  |  26<H>  ----------------------------<  140<H>  6.6<HH>   |  39<H>  |  1.1    Ca    9.1      19 Dec 2022 13:00    TPro  6.7  /  Alb  3.9  /  TBili  0.3  /  DBili  x   /  AST  730<H>  /  ALT  858<H>  /  AlkPhos  71  12-19    ABG - ( 19 Dec 2022 13:00 )  pH, Arterial: 7.20  pH, Blood: x     /  pCO2: 118   /  pO2: 135   / HCO3: 46    / Base Excess: 12.4  /  SaO2: 99.0      CARDIAC MARKERS ( 19 Dec 2022 13:00 )  x     / 0.09 ng/mL / x     / x     / x

## 2022-12-19 NOTE — ED PROVIDER NOTE - PROGRESS NOTE DETAILS
Note authored by Dr. Patton: BiPAP started.  AVAPS is the setting.  Blood gas to be drawn.  Chest x-ray to be done. Note authored by Dr. Patton: Hyperkalemia noted on blood gas.  Respiratory cirrhosis.  Will treat.  EKG without changes. s/w lauren admit to floor

## 2022-12-20 ENCOUNTER — TRANSCRIPTION ENCOUNTER (OUTPATIENT)
Age: 67
End: 2022-12-20

## 2022-12-20 LAB
APPEARANCE UR: ABNORMAL
BACTERIA # UR AUTO: ABNORMAL
BASE EXCESS BLDA CALC-SCNC: 11.4 MMOL/L — HIGH (ref -2–3)
BILIRUB UR-MCNC: ABNORMAL
COLOR SPEC: YELLOW — SIGNIFICANT CHANGE UP
CREAT ?TM UR-MCNC: 130 MG/DL — SIGNIFICANT CHANGE UP
DIFF PNL FLD: ABNORMAL
EPI CELLS # UR: ABNORMAL /HPF
GLUCOSE BLDC GLUCOMTR-MCNC: 76 MG/DL — SIGNIFICANT CHANGE UP (ref 70–99)
GLUCOSE BLDC GLUCOMTR-MCNC: 81 MG/DL — SIGNIFICANT CHANGE UP (ref 70–99)
GLUCOSE BLDC GLUCOMTR-MCNC: 83 MG/DL — SIGNIFICANT CHANGE UP (ref 70–99)
GLUCOSE UR QL: NEGATIVE MG/DL — SIGNIFICANT CHANGE UP
HCO3 BLDA-SCNC: 41 MMOL/L — HIGH (ref 21–28)
HOROWITZ INDEX BLDA+IHG-RTO: 60 — SIGNIFICANT CHANGE UP
HYALINE CASTS # UR AUTO: ABNORMAL /LPF
KETONES UR-MCNC: NEGATIVE — SIGNIFICANT CHANGE UP
LEUKOCYTE ESTERASE UR-ACNC: ABNORMAL
NITRITE UR-MCNC: NEGATIVE — SIGNIFICANT CHANGE UP
OSMOLALITY UR: 374 MOS/KG — SIGNIFICANT CHANGE UP (ref 50–1200)
PCO2 BLDA: 82 MMHG — CRITICAL HIGH (ref 35–48)
PH BLDA: 7.31 — LOW (ref 7.35–7.45)
PH UR: 5.5 — SIGNIFICANT CHANGE UP (ref 5–8)
PO2 BLDA: 205 MMHG — HIGH (ref 83–108)
POTASSIUM UR-SCNC: 56 MMOL/L — SIGNIFICANT CHANGE UP
PROT ?TM UR-MCNC: 61 MG/DLG/24H — SIGNIFICANT CHANGE UP
PROT UR-MCNC: 100 MG/DL
PROT/CREAT UR-RTO: 0.5 RATIO — HIGH (ref 0–0.2)
RBC CASTS # UR COMP ASSIST: SIGNIFICANT CHANGE UP /HPF
SAO2 % BLDA: 99.4 % — HIGH (ref 94–98)
SODIUM UR-SCNC: <20 MMOL/L — SIGNIFICANT CHANGE UP
SP GR SPEC: 1.02 — SIGNIFICANT CHANGE UP (ref 1.01–1.03)
UROBILINOGEN FLD QL: 0.2 MG/DL — SIGNIFICANT CHANGE UP
UUN UR-MCNC: 445 MG/DL — SIGNIFICANT CHANGE UP
WBC UR QL: ABNORMAL /HPF

## 2022-12-20 PROCEDURE — 99223 1ST HOSP IP/OBS HIGH 75: CPT

## 2022-12-20 PROCEDURE — 93010 ELECTROCARDIOGRAM REPORT: CPT

## 2022-12-20 PROCEDURE — 93010 ELECTROCARDIOGRAM REPORT: CPT | Mod: 77

## 2022-12-20 PROCEDURE — 99233 SBSQ HOSP IP/OBS HIGH 50: CPT

## 2022-12-20 PROCEDURE — 71045 X-RAY EXAM CHEST 1 VIEW: CPT | Mod: 26

## 2022-12-20 RX ORDER — MIDODRINE HYDROCHLORIDE 2.5 MG/1
10 TABLET ORAL ONCE
Refills: 0 | Status: DISCONTINUED | OUTPATIENT
Start: 2022-12-20 | End: 2022-12-20

## 2022-12-20 RX ORDER — SODIUM CHLORIDE 9 MG/ML
1000 INJECTION INTRAMUSCULAR; INTRAVENOUS; SUBCUTANEOUS ONCE
Refills: 0 | Status: COMPLETED | OUTPATIENT
Start: 2022-12-20 | End: 2022-12-20

## 2022-12-20 RX ORDER — SODIUM CHLORIDE 9 MG/ML
500 INJECTION INTRAMUSCULAR; INTRAVENOUS; SUBCUTANEOUS ONCE
Refills: 0 | Status: COMPLETED | OUTPATIENT
Start: 2022-12-20 | End: 2022-12-20

## 2022-12-20 RX ORDER — DILTIAZEM HCL 120 MG
10 CAPSULE, EXT RELEASE 24 HR ORAL ONCE
Refills: 0 | Status: DISCONTINUED | OUTPATIENT
Start: 2022-12-20 | End: 2022-12-20

## 2022-12-20 RX ADMIN — Medication 100 MILLIGRAM(S): at 13:17

## 2022-12-20 RX ADMIN — SODIUM CHLORIDE 1000 MILLILITER(S): 9 INJECTION INTRAMUSCULAR; INTRAVENOUS; SUBCUTANEOUS at 01:56

## 2022-12-20 RX ADMIN — Medication 32.4 MILLIGRAM(S): at 21:07

## 2022-12-20 RX ADMIN — Medication 32.4 MILLIGRAM(S): at 13:17

## 2022-12-20 RX ADMIN — Medication 100 MILLIGRAM(S): at 21:07

## 2022-12-20 RX ADMIN — Medication 3 MILLILITER(S): at 21:41

## 2022-12-20 RX ADMIN — MONTELUKAST 10 MILLIGRAM(S): 4 TABLET, CHEWABLE ORAL at 21:07

## 2022-12-20 RX ADMIN — SODIUM CHLORIDE 500 MILLILITER(S): 9 INJECTION INTRAMUSCULAR; INTRAVENOUS; SUBCUTANEOUS at 00:30

## 2022-12-20 RX ADMIN — Medication 3 MILLILITER(S): at 04:20

## 2022-12-20 NOTE — CHART NOTE - NSCHARTNOTEFT_GEN_A_CORE
Called for hypotension bp 78/41  Plan  NS bolus 500ml Called for hypotension bp 78/41  a/Plan  pan culture  UA  cbc  cmp  cxr  blood culture  VS as per routine  NS bolus 500ml

## 2022-12-20 NOTE — PROGRESS NOTE ADULT - SUBJECTIVE AND OBJECTIVE BOX
JACOB KOTHARI  67y  Female      Patient is a 67y old  Female who presents with a chief complaint of Acute on Chronic Hypercapnic Respiratory Failure (19 Dec 2022 16:06)    change in mental status    REVIEW OF SYSTEMS:  CONSTITUTIONAL: No fever, weight loss, or fatigue  RESPIRATORY: No cough, wheezing, chills or hemoptysis;  shortness of breath++  CARDIOVASCULAR: No chest pain, palpitations, dizziness, or leg swelling  GASTROINTESTINAL: No abdominal or epigastric pain. No nausea, vomiting, or hematemesis; No diarrhea or constipation. No melena or hematochezia.  GENITOURINARY: No dysuria, frequency, hematuria,  MUSCULOSKELETAL: No joint pain or swelling; No muscle, back, or extremity pain  HEME/LYMPH: No easy bruising, or bleeding gums  ALLERY AND IMMUNOLOGIC: No hives or eczema  FAMILY HISTORY:    T(C): 35.9 (12-20-22 @ 01:00), Max: 37.2 (12-19-22 @ 23:05)  HR: 54 (12-20-22 @ 05:17) (48 - 81)  BP: 113/50 (12-20-22 @ 05:17) (71/48 - 113/50)  RR: 32 (12-19-22 @ 12:43) (32 - 32)  SpO2: 100% (12-20-22 @ 05:17) (96% - 100%)  Wt(kg): --Vital Signs Last 24 Hrs  T(C): 35.9 (20 Dec 2022 01:00), Max: 37.2 (19 Dec 2022 23:05)  T(F): 96.6 (20 Dec 2022 01:00), Max: 98.9 (19 Dec 2022 23:05)  HR: 54 (20 Dec 2022 05:17) (48 - 81)  BP: 113/50 (20 Dec 2022 05:17) (71/48 - 113/50)  BP(mean): --  RR: 32 (19 Dec 2022 12:43) (32 - 32)  SpO2: 100% (20 Dec 2022 05:17) (96% - 100%)    Parameters below as of 20 Dec 2022 05:17  Patient On (Oxygen Delivery Method): BiPAP/CPAP      Compazine (Unknown)  latex (Urticaria)  penicillins (Unknown)      PHYSICAL EXAM:  GENERAL: NAD,   HEAD:  Atraumatic, Normocephalic  EYES: EOMI, PERRLA, conjunctiva and sclera clear  ENMT: No tonsillar erythema, exudates,   NECK: Supple, No JVD, Normal thyroid  NERVOUS SYSTEM:  Alert  CHEST/LUNG: vbs decreased breath sound  HEART: Regular rate and rhythm; No murmurs, rubs, or gallops  ABDOMEN: Soft, Nontender, Nondistended; Bowel sounds present  EXTREMITIES: , No clubbing, cyanosis, or edema  LYMPH: No lymphadenopathy noted  SKIN: No rashes or lesions      LABS:  12-19    143  |  98  |  27<H>  ----------------------------<  92  5.2<H>   |  36<H>  |  1.0    Ca    9.1      19 Dec 2022 19:47  Phos  5.1     12-19  Mg     2.1     12-19    TPro  5.7<L>  /  Alb  3.5  /  TBili  0.3  /  DBili  x   /  AST  795<H>  /  ALT  899<H>  /  AlkPhos  72  12-19                          14.0   14.22 )-----------( 177      ( 19 Dec 2022 13:00 )             47.9         RADIOLOGY & ADDITIONAL TESTS:  < from: US Abdomen Upper Quadrant Right (12.19.22 @ 23:02) >  Cholelithiasis.  No focal tenderness or evidence of cholecystitis.    < end of copied text >  < from: Xray Chest 1 View-PORTABLE IMMEDIATE (12.19.22 @ 14:42) >  Stable bilateral lung opacities    < end of copied text >    MEDICATION:  albuterol/ipratropium for Nebulization 3 milliLiter(s) Nebulizer every 6 hours  aluminum hydroxide/magnesium hydroxide/simethicone Suspension 30 milliLiter(s) Oral every 4 hours PRN  citalopram 20 milliGRAM(s) Oral daily  furosemide   Injectable 40 milliGRAM(s) IV Push daily  levothyroxine 50 MICROGram(s) Oral daily  melatonin 3 milliGRAM(s) Oral at bedtime PRN  montelukast 10 milliGRAM(s) Oral at bedtime  multivitamin/minerals 1 Tablet(s) Oral daily  ondansetron Injectable 4 milliGRAM(s) IV Push every 8 hours PRN  PHENobarbital 32.4 milliGRAM(s) Oral every 8 hours  topiramate 100 milliGRAM(s) Oral <User Schedule>      HEALTH ISSUES - PROBLEM Dx:  acute respiratory Failure due to hypoventilation ,retaining co2,THOM onocomplaint with Bipap now on BIPAP,duoneb nebulizer  Morbid obesity ,hx lt hip ORIF bedbound refused to get up due to uncomfortable  Functional Quadriplegia  hx seizure disorder on pheno and trileptal  Hypothyroid on levothyroid  abnormal LFT probably due to shock ,hypotension hypoventilation,will do GI consult

## 2022-12-20 NOTE — CONSULT NOTE ADULT - ASSESSMENT
66yo F with PMHx of multiple admissions for hypercapnic respiratory failure, THOM, Asthma/COPD not on home O2, morbid obesity, epilepsy, hypothyroidism, OA, kidney stones, L radial artery occlusion 2021 (previously on eliquis) who presents from [facility] due to respiratory distress. Consulted for possible aflutter and CHF      Impression:  #SOB: appears multifactorial in nature  #AFlutter?  #CHF?    On exam, no overt signs of volume overload  Cxr: Stable b/l opacities  No pBNP on record  ECHO done on 9/2021 showed EF of 60% with normal systolic and diastolic function  Trop 0.09-->0.04. Elevated in the past. Pt has No CP and no acute changes on ECG  ECG: Sinus Anil with nonspecific ST changes      Plan:  TTE pending  Check pBNP, TSH, lipids, HgA1C  Repeat ECG, Cxr in am  Monitor lytes  F/u with Pulm reccs  Monitor lytes    Discussed with Dr Gruber

## 2022-12-20 NOTE — CHART NOTE - NSCHARTNOTEFT_GEN_A_CORE
Called by respiratory  for ABG  pco2 82 down from 88.  plan:  increase rate from 32 to 34  pulmonary consult Called by respiratory  for ABG  pco2 82 down from 88.  plan:  increasing bipap rate from 32 to 34  pulmonary consult

## 2022-12-20 NOTE — PROGRESS NOTE ADULT - ASSESSMENT
IMPRESSION:    Acute hypoxic respiratory failure  Fluid overload improved with NIV  Aute on chronic hypercapnic respiratory failure  CHF/NSTEMI  Transaminitis  HO DM  HO CKD    Cardiology evaluation   Trend trops  Echo  NIV 4 hrs on 4 hrs off and PRN, QHS  Supplemental O2 target O2 >92  Lasix BID  Avoid volume overload  HOB at 45  Avoid hepatotoxins  Aspiration precautions  DVT ppx   DNR/DNI per prior MOLST   Palliative care eval.

## 2022-12-20 NOTE — CONSULT NOTE ADULT - SUBJECTIVE AND OBJECTIVE BOX
HPI:  66yo F with PMHx of multiple admissions for hypercapnic respiratory failure, THOM, Asthma/COPD not on home O2, morbid obesity, epilepsy, hypothyroidism, OA, kidney stones, L radial artery occlusion 2021 (previously on eliquis) who presents from [facility] due to respiratory distress. History limited as patient on BIPAP. Per collateral from ED staff and nursing home records, patient was noted to be in respiratory distress and minimally responsive. Presented to the ED, with O2 sats on 70s and minimally responsive to verbal and tactile stimuli. Placed on BIPAP 15/8. Labs were n/f WBC 14, K 6.6 (hemolyzed) , , ABG 7.2/118. COVID/Flu/RSV negative. CXR shows unchanged bilateral opacities. She was given hypeK cocktail of Regula insulin, dextrose, Ca gluconate and albuterol. Additionally given IV lasix 40mg for suspected fluid overload. Evaluated by critical care who deemed stable for floor, with close monitoring of labs and blood gas after BIPAP 4hrs on and 4hrs off. She is admitted to [facility] for further management and workup     At time of my interview, patient stable HR: 64, BP: 102/82, 100% on BIPAP 15/8. She is awake with, eyes open and responding to questions. AAOx3 and answering questions appropriately. Able to tell me that she has no pain, or any other spontaneous complaints. Understands she is in the hospital due to her respiratory status.     Of note, she was recently admitted to [place] from 12/5/22-12/10/22 for similar presentation after being found minimally responsive - including to physical stimuli from sternal rub, and brought to the ED. She was admitted to the medicine floor for acute on chronic hypercapnic/hypoxemic respiratory failure. She was treated with nebs, solumedrol and AVAPS NIV q4h on and off with improvement in her respiratory and mental status. Pt was initially suspected to have an aspiration pneumonia and was placed on ceftriaxone and flagyl. Infectious disease was consulted, who discontinued flagyl and ceftriaxone after determining that an aspiration pneumonia was unlikely. She completed a 5d course of PO Doxycycline 100mg. Eventually discharged back to [facility]          (19 Dec 2022 16:06)        HPI-Cardiology   Pt with the above Hx, evaluated at bedside with Dr Gruber. Unable to obtain full HPI, as Pt on BiPap, but was able to anser basic questions. Endorses that she was having difficulty breathing and denies any CP, palpitations. Radiology tests and hospital records, were reviewed, as well as previous notes on this patient.      PAST MEDICAL & SURGICAL HISTORY  Diabetes    Renal stone    Renal failure    Epilepsy    Asthma    Chronic cough    Knee pain    Osteoarthritis    Uterine cancer  s/p hysterectomy    H/O abdominal hysterectomy  NO RADIATION AND CHEMO    History of tonsillectomy  13 years old    [place] teeth removed    History of bladder stone  SURGERY 8/3/2018    History of surgery  JJ STENT PLACEMENT LEFT OCTOBER 2017      ALLERGIES:  Compazine (Unknown)  latex (Urticaria)  penicillins (Unknown)      MEDICATIONS:  MEDICATIONS  (STANDING):  albuterol/ipratropium for Nebulization 3 milliLiter(s) Nebulizer every 6 hours  citalopram 20 milliGRAM(s) Oral daily  levothyroxine 50 MICROGram(s) Oral daily  montelukast 10 milliGRAM(s) Oral at bedtime  multivitamin/minerals 1 Tablet(s) Oral daily  PHENobarbital 32.4 milliGRAM(s) Oral every 8 hours  topiramate 100 milliGRAM(s) Oral <User Schedule>    MEDICATIONS  (PRN):  aluminum hydroxide/magnesium hydroxide/simethicone Suspension 30 milliLiter(s) Oral every 4 hours PRN Dyspepsia  melatonin 3 milliGRAM(s) Oral at bedtime PRN Insomnia  ondansetron Injectable 4 milliGRAM(s) IV Push every 8 hours PRN Nausea and/or Vomiting      HOME MEDICATIONS:  Home Medications:  biotin 5 mg oral capsule: 1 cap(s) orally once a day (05 Dec 2022 11:18)  citalopram 20 mg oral tablet: 1 tab(s) orally every 24 hours (05 Dec 2022 11:18)  Desyrel 50 mg oral tablet: 2 tab(s) orally once a day (at bedtime) (05 Dec 2022 11:18)  DuoNeb 0.5 mg-2.5 mg/3 mL inhalation solution: 3 milliliter(s) inhaled 3 times a day (05 Dec 2022 11:18)  levothyroxine 50 mcg (0.05 mg) oral tablet: 1 tab(s) orally every 24 hours (05 Dec 2022 11:18)  lidocaine 4% patch: Apply topically to affected area once a day (05 Dec 2022 11:18)  MiraLax oral powder for reconstitution: 17 gram(s) orally once a day (05 Dec 2022 11:18)  Mirapex 0.5 mg oral tablet: 1 tab(s) orally once a day (at bedtime) (05 Dec 2022 11:18)  Multiple Vitamins with Minerals oral capsule: 1 cap(s) orally once a day (05 Dec 2022 11:18)  Nizoral A-D 1% topical shampoo: Apply topically to affected area every 3 days (05 Dec 2022 11:18)  nystatin 100,000 units/g topical cream (obsolete): Apply topically to affected area 2 times a day under bilateral breast and under left arm (05 Dec 2022 11:18)  PHENobarbital 32.4 mg oral tablet: 1 tab(s) orally 3 times a day (05 Dec 2022 11:18)  Senna Plus 50 mg-8.6 mg oral tablet: 2 tab(s) orally once a day (at bedtime) (05 Dec 2022 11:18)  Singulair 10 mg oral tablet: 1 tab(s) orally once a day (at bedtime) (05 Dec 2022 11:18)  Topamax 100 mg oral tablet: 1 tab(s) orally 3 times a day (05 Dec 2022 11:18)  Tylenol 500 mg oral tablet: 2 tab(s) orally every 8 hours, As Needed (05 Dec 2022 11:18)  Urocit-K 15 mEq oral tablet, extended release: 1 tab(s) orally once a day (05 Dec 2022 11:18)      VITALS:   T(F): 96.6 (12-20 @ 01:00), Max: 98.9 (12-19 @ 23:05)  HR: 54 (12-20 @ 05:17) (48 - 81)  BP: 113/50 (12-20 @ 05:17) (71/48 - 113/50)  BP(mean): --  RR: 32 (12-19 @ 12:43) (32 - 32)  SpO2: 100% (12-20 @ 08:37) (96% - 100%)    I&O's Summary    19 Dec 2022 07:01  -  20 Dec 2022 07:00  --------------------------------------------------------  IN: 0 mL / OUT: 350 mL / NET: -350 mL        REVIEW OF SYSTEMS:  See HPI      PHYSICAL EXAM:  NEURO: patient is awake , on Bipap, answers questions  GEN: Appears in moderate distress  NECK: no thyroid enlargement, no JVD  LUNGS: decreased at bases?/rhonchi noted b/l  CARDIOVASCULAR: S1/S2 present, RRR , no murmurs or rubs, no carotid bruits,  + PP bilaterally  ABD: Soft, non-tender, non-distended, +BS  EXT: trace of b/l le pitting edema  SKIN: Intact    LABS:                        14.0   14.22 )-----------( 177      ( 19 Dec 2022 13:00 )             47.9     12-19    143  |  98  |  27<H>  ----------------------------<  92  5.2<H>   |  36<H>  |  1.0    Ca    9.1      19 Dec 2022 19:47  Phos  5.1     12-19  Mg     2.1     12-19    TPro  5.7<L>  /  Alb  3.5  /  TBili  0.3  /  DBili  x   /  AST  795<H>  /  ALT  899<H>  /  AlkPhos  72  12-19    PT/INR - ( 19 Dec 2022 13:00 )   PT: 13.10 sec;   INR: 1.14 ratio         PTT - ( 19 Dec 2022 13:00 )  PTT:31.7 sec  Troponin T, Serum: 0.04 ng/mL *HH* (12-19-22 @ 19:47)  Troponin T, Serum: 0.09 ng/mL *HH* (12-19-22 @ 13:00)    CARDIAC MARKERS ( 19 Dec 2022 19:47 )  x     / 0.04 ng/mL / x     / x     / x      CARDIAC MARKERS ( 19 Dec 2022 13:00 )  x     / 0.09 ng/mL / x     / x     / x            RADIOLOGY:  -CXR:  < from: Xray Chest 1 View-PORTABLE IMMEDIATE (12.19.22 @ 14:42) >  Impression:    Stable bilateral lung opacities    --- End of Report ---    < end of copied text >      ECG:  < from: 12 Lead ECG (12.20.22 @ 07:55) >  Sinus bradycardia  Poor R wave progression  Nonspecific ST-T changes  Confirmed by LISSY STONE, DEANN ([id number]) on 12/20/2022 9:34:38 AM    < end of copied text >     HPI:  68yo F with PMHx of multiple admissions for hypercapnic respiratory failure, THOM, Asthma/COPD not on home O2, morbid obesity, epilepsy, hypothyroidism, OA, kidney stones, L radial artery occlusion 2021 (previously on eliquis) who presents from [facility] due to respiratory distress. History limited as patient on BIPAP. Per collateral from ED staff and nursing home records, patient was noted to be in respiratory distress and minimally responsive. Presented to the ED, with O2 sats on 70s and minimally responsive to verbal and tactile stimuli. Placed on BIPAP 15/8. Labs were n/f WBC 14, K 6.6 (hemolyzed) , , ABG 7.2/118. COVID/Flu/RSV negative. CXR shows unchanged bilateral opacities. She was given hypeK cocktail of Regula insulin, dextrose, Ca gluconate and albuterol. Additionally given IV lasix 40mg for suspected fluid overload. Evaluated by critical care who deemed stable for floor, with close monitoring of labs and blood gas after BIPAP 4hrs on and 4hrs off. She is admitted to [facility] for further management and workup     At time of my interview, patient stable HR: 64, BP: 102/82, 100% on BIPAP 15/8. She is awake with, eyes open and responding to questions. AAOx3 and answering questions appropriately. Able to tell me that she has no pain, or any other spontaneous complaints. Understands she is in the hospital due to her respiratory status.     Of note, she was recently admitted to [place] from 12/5/22-12/10/22 for similar presentation after being found minimally responsive - including to physical stimuli from sternal rub, and brought to the ED. She was admitted to the medicine floor for acute on chronic hypercapnic/hypoxemic respiratory failure. She was treated with nebs, solumedrol and AVAPS NIV q4h on and off with improvement in her respiratory and mental status. Pt was initially suspected to have an aspiration pneumonia and was placed on ceftriaxone and flagyl. Infectious disease was consulted, who discontinued flagyl and ceftriaxone after determining that an aspiration pneumonia was unlikely. She completed a 5d course of PO Doxycycline 100mg. Eventually discharged back to [facility]          (19 Dec 2022 16:06)        HPI-Cardiology   Pt with the above Hx, evaluated at bedside with Dr Gruber. Unable to obtain full HPI, as Pt on BiPap, but was able to answer basic questions. Endorses that she was having difficulty breathing and denies any CP, palpitations. Radiology tests and hospital records, were reviewed, as well as previous notes on this patient.      PAST MEDICAL & SURGICAL HISTORY  Diabetes    Renal stone    Renal failure    Epilepsy    Asthma    Chronic cough    Knee pain    Osteoarthritis    Uterine cancer  s/p hysterectomy    H/O abdominal hysterectomy  NO RADIATION AND CHEMO    History of tonsillectomy  13 years old    [place] teeth removed    History of bladder stone  SURGERY 8/3/2018    History of surgery  JJ STENT PLACEMENT LEFT OCTOBER 2017      ALLERGIES:  Compazine (Unknown)  latex (Urticaria)  penicillins (Unknown)      MEDICATIONS:  MEDICATIONS  (STANDING):  albuterol/ipratropium for Nebulization 3 milliLiter(s) Nebulizer every 6 hours  citalopram 20 milliGRAM(s) Oral daily  levothyroxine 50 MICROGram(s) Oral daily  montelukast 10 milliGRAM(s) Oral at bedtime  multivitamin/minerals 1 Tablet(s) Oral daily  PHENobarbital 32.4 milliGRAM(s) Oral every 8 hours  topiramate 100 milliGRAM(s) Oral <User Schedule>    MEDICATIONS  (PRN):  aluminum hydroxide/magnesium hydroxide/simethicone Suspension 30 milliLiter(s) Oral every 4 hours PRN Dyspepsia  melatonin 3 milliGRAM(s) Oral at bedtime PRN Insomnia  ondansetron Injectable 4 milliGRAM(s) IV Push every 8 hours PRN Nausea and/or Vomiting      HOME MEDICATIONS:  Home Medications:  biotin 5 mg oral capsule: 1 cap(s) orally once a day (05 Dec 2022 11:18)  citalopram 20 mg oral tablet: 1 tab(s) orally every 24 hours (05 Dec 2022 11:18)  Desyrel 50 mg oral tablet: 2 tab(s) orally once a day (at bedtime) (05 Dec 2022 11:18)  DuoNeb 0.5 mg-2.5 mg/3 mL inhalation solution: 3 milliliter(s) inhaled 3 times a day (05 Dec 2022 11:18)  levothyroxine 50 mcg (0.05 mg) oral tablet: 1 tab(s) orally every 24 hours (05 Dec 2022 11:18)  lidocaine 4% patch: Apply topically to affected area once a day (05 Dec 2022 11:18)  MiraLax oral powder for reconstitution: 17 gram(s) orally once a day (05 Dec 2022 11:18)  Mirapex 0.5 mg oral tablet: 1 tab(s) orally once a day (at bedtime) (05 Dec 2022 11:18)  Multiple Vitamins with Minerals oral capsule: 1 cap(s) orally once a day (05 Dec 2022 11:18)  Nizoral A-D 1% topical shampoo: Apply topically to affected area every 3 days (05 Dec 2022 11:18)  nystatin 100,000 units/g topical cream (obsolete): Apply topically to affected area 2 times a day under bilateral breast and under left arm (05 Dec 2022 11:18)  PHENobarbital 32.4 mg oral tablet: 1 tab(s) orally 3 times a day (05 Dec 2022 11:18)  Senna Plus 50 mg-8.6 mg oral tablet: 2 tab(s) orally once a day (at bedtime) (05 Dec 2022 11:18)  Singulair 10 mg oral tablet: 1 tab(s) orally once a day (at bedtime) (05 Dec 2022 11:18)  Topamax 100 mg oral tablet: 1 tab(s) orally 3 times a day (05 Dec 2022 11:18)  Tylenol 500 mg oral tablet: 2 tab(s) orally every 8 hours, As Needed (05 Dec 2022 11:18)  Urocit-K 15 mEq oral tablet, extended release: 1 tab(s) orally once a day (05 Dec 2022 11:18)      VITALS:   T(F): 96.6 (12-20 @ 01:00), Max: 98.9 (12-19 @ 23:05)  HR: 54 (12-20 @ 05:17) (48 - 81)  BP: 113/50 (12-20 @ 05:17) (71/48 - 113/50)  BP(mean): --  RR: 32 (12-19 @ 12:43) (32 - 32)  SpO2: 100% (12-20 @ 08:37) (96% - 100%)    I&O's Summary    19 Dec 2022 07:01  -  20 Dec 2022 07:00  --------------------------------------------------------  IN: 0 mL / OUT: 350 mL / NET: -350 mL        REVIEW OF SYSTEMS:  See HPI      PHYSICAL EXAM:  NEURO: patient is awake , on Bipap, answers questions  GEN: Appears in moderate distress  NECK: no thyroid enlargement, no JVD  LUNGS: decreased at bases?/rhonchi noted b/l  CARDIOVASCULAR: S1/S2 present, RRR , no murmurs or rubs, no carotid bruits,  + PP bilaterally  ABD: Soft, non-tender, non-distended, +BS  EXT: trace of b/l le pitting edema  SKIN: Intact    LABS:                        14.0   14.22 )-----------( 177      ( 19 Dec 2022 13:00 )             47.9     12-19    143  |  98  |  27<H>  ----------------------------<  92  5.2<H>   |  36<H>  |  1.0    Ca    9.1      19 Dec 2022 19:47  Phos  5.1     12-19  Mg     2.1     12-19    TPro  5.7<L>  /  Alb  3.5  /  TBili  0.3  /  DBili  x   /  AST  795<H>  /  ALT  899<H>  /  AlkPhos  72  12-19    PT/INR - ( 19 Dec 2022 13:00 )   PT: 13.10 sec;   INR: 1.14 ratio         PTT - ( 19 Dec 2022 13:00 )  PTT:31.7 sec  Troponin T, Serum: 0.04 ng/mL *HH* (12-19-22 @ 19:47)  Troponin T, Serum: 0.09 ng/mL *HH* (12-19-22 @ 13:00)    CARDIAC MARKERS ( 19 Dec 2022 19:47 )  x     / 0.04 ng/mL / x     / x     / x      CARDIAC MARKERS ( 19 Dec 2022 13:00 )  x     / 0.09 ng/mL / x     / x     / x            RADIOLOGY:  -CXR:  < from: Xray Chest 1 View-PORTABLE IMMEDIATE (12.19.22 @ 14:42) >  Impression:    Stable bilateral lung opacities    --- End of Report ---    < end of copied text >      ECG:  < from: 12 Lead ECG (12.20.22 @ 07:55) >  Sinus bradycardia  Poor R wave progression  Nonspecific ST-T changes  Confirmed by LISSY STONE, DEANN ([id number]) on 12/20/2022 9:34:38 AM    < end of copied text >

## 2022-12-20 NOTE — PROGRESS NOTE ADULT - SUBJECTIVE AND OBJECTIVE BOX
Patient is a 67y old  Female who presents with a chief complaint of Acute on Chronic Hypercapnic Respiratory Failure (20 Dec 2022 08:33)        HPI:  68yo F with PMHx of multiple admissions for hypercapnic respiratory failure, THOM, Asthma/COPD not on home O2, morbid obesity, epilepsy, hypothyroidism, OA, kidney stones, L radial artery occlusion  (previously on eliquis) who presents from [facility] due to respiratory distress. History limited as patient on BIPAP. Per collateral from ED staff and nursing home records, patient was noted to be in respiratory distress and minimally responsive. Presented to the ED, with O2 sats on 70s and minimally responsive to verbal and tactile stimuli. Placed on BIPAP 15/8. Labs were n/f WBC 14, K 6.6 (hemolyzed) , , ABG 7.2/118. COVID/Flu/RSV negative. CXR shows unchanged bilateral opacities. She was given hypeK cocktail of Regula insulin, dextrose, Ca gluconate and albuterol. Additionally given IV lasix 40mg for suspected fluid overload. Evaluated by critical care who deemed stable for floor, with close monitoring of labs and blood gas after BIPAP 4hrs on and 4hrs off. She is admitted to [facility] for further management and workup     At time of my interview, patient stable HR: 64, BP: 102/82, 100% on BIPAP 15/8. She is awake with, eyes open and responding to questions. AAOx3 and answering questions appropriately. Able to tell me that she has no pain, or any other spontaneous complaints. Understands she is in the hospital due to her respiratory status.     Of note, she was recently admitted to [place] from 22-12/10/22 for similar presentation after being found minimally responsive - including to physical stimuli from sternal rub, and brought to the ED. She was admitted to the medicine floor for acute on chronic hypercapnic/hypoxemic respiratory failure. She was treated with nebs, solumedrol and AVAPS NIV q4h on and off with improvement in her respiratory and mental status. Pt was initially suspected to have an aspiration pneumonia and was placed on ceftriaxone and flagyl. Infectious disease was consulted, who discontinued flagyl and ceftriaxone after determining that an aspiration pneumonia was unlikely. She completed a 5d course of PO Doxycycline 100mg. Eventually discharged back to [facility]          (19 Dec 2022 16:06)      Pt evaluated on rounds.  I reviewed the radiology tests and hospital record.    I reviewed previous notes on this patient.    Interval Events: No overnight events.      CAM:    SAT:    SBT:      REVIEW OF SYSTEMS:   see HPI      OBJECTIVE:  ICU Vital Signs Last 24 Hrs  T(C): 35.9 (20 Dec 2022 01:00), Max: 37.2 (19 Dec 2022 23:05)  T(F): 96.6 (20 Dec 2022 01:00), Max: 98.9 (19 Dec 2022 23:05)  HR: 54 (20 Dec 2022 05:17) (48 - 81)  BP: 113/50 (20 Dec 2022 05:17) (71/48 - 113/50)  BP(mean): --  ABP: --  ABP(mean): --  RR: 32 (19 Dec 2022 12:43) (32 - 32)  SpO2: 100% (20 Dec 2022 05:17) (96% - 100%)    O2 Parameters below as of 20 Dec 2022 05:17  Patient On (Oxygen Delivery Method): BiPAP/CPAP              -19 @ 07:01  -  12- @ 07:00  --------------------------------------------------------  IN: 0 mL / OUT: 350 mL / NET: -350 mL      CAPILLARY BLOOD GLUCOSE      POCT Blood Glucose.: 81 mg/dL (20 Dec 2022 01:07)        PHYSICAL EXAM:       · ENMT:   Airway patent,   Nasal mucosa clear.  Mouth with normal mucosa.   No thrush    · EYES:   Clear bilaterally,   pupils equal,   round and reactive to light.    · CARDIAC:   Normal rate,   regular rhythm.    Heart sounds S1, S2.   No murmurs, no rubs or gallops on auscultation  no edema        CAROTID:   normal systolic impulse  no bruits    · RESPIRATORY:   rales  normal chest expansion  no retractions or use of accessory muscles  percussion of chest demonstrates no hyperresonance or dullness    · GASTROINTESTINAL:  Abdomen soft,   non-tender,   + BS  liver/spleen not palpable    · MUSCULOSKELETAL:   no clubbing, cyanosis      · SKIN:   Skin normal color for race,   warm, dry   No evidence of rash.        · HEME LYMPH:   no splenomegaly.  No cervical  lymphadenopathy.  no inguinal lymphadenopathy    HOSPITAL MEDICATIONS:  MEDICATIONS  (STANDING):  albuterol/ipratropium for Nebulization 3 milliLiter(s) Nebulizer every 6 hours  citalopram 20 milliGRAM(s) Oral daily  levothyroxine 50 MICROGram(s) Oral daily  montelukast 10 milliGRAM(s) Oral at bedtime  multivitamin/minerals 1 Tablet(s) Oral daily  PHENobarbital 32.4 milliGRAM(s) Oral every 8 hours  topiramate 100 milliGRAM(s) Oral <User Schedule>    MEDICATIONS  (PRN):  aluminum hydroxide/magnesium hydroxide/simethicone Suspension 30 milliLiter(s) Oral every 4 hours PRN Dyspepsia  melatonin 3 milliGRAM(s) Oral at bedtime PRN Insomnia  ondansetron Injectable 4 milliGRAM(s) IV Push every 8 hours PRN Nausea and/or Vomiting    sodium chloride 0.9% Bolus:   1000 milliLiter(s), IV Bolus, once, infuse over 60 Minute(s), Stop After 1 Doses  Indication: low bp  Provider's Contact #: [phone]  sodium chloride 0.9% Bolus:   500 milliLiter(s), IV Bolus, once, infuse over 1 Hr, Stop After 1 Doses  Provider's Contact #: [phone]      LABS:                        14.0   14.22 )-----------( 177      ( 19 Dec 2022 13:00 )             47.9         143  |  98  |  27<H>  ----------------------------<  92  5.2<H>   |  36<H>  |  1.0    Ca    9.1      19 Dec 2022 19:47  Phos  5.1       Mg     2.1         TPro  5.7<L>  /  Alb  3.5  /  TBili  0.3  /  DBili  x   /  AST  795<H>  /  ALT  899<H>  /  AlkPhos  72      PT/INR - ( 19 Dec 2022 13:00 )   PT: 13.10 sec;   INR: 1.14 ratio         PTT - ( 19 Dec 2022 13:00 )  PTT:31.7 sec  Urinalysis Basic - ( 20 Dec 2022 02:15 )    Color: Yellow / Appearance: Slightly Cloudy / S.025 / pH: x  Gluc: x / Ketone: Negative  / Bili: Small / Urobili: 0.2 mg/dL   Blood: x / Protein: 100 mg/dL / Nitrite: Negative   Leuk Esterase: Moderate / RBC: 1-2 /HPF / WBC 26-50 /HPF   Sq Epi: x / Non Sq Epi: Moderate /HPF / Bacteria: Moderate      Arterial Blood Gas:   @ 02:40  7.31/82/205/41/99.4/11.4  ABG lactate: --  Arterial Blood Gas:   @ 16:42  7.29/88/148/42/99.2/11.7  ABG lactate: --  Arterial Blood Gas:   @ 13:00  7.20/118/135/46/99.0/12.4  ABG lactate: --        CARDIAC MARKERS ( 19 Dec 2022 19:47 )  x     / 0.04 ng/mL / x     / x     / x      CARDIAC MARKERS ( 19 Dec 2022 13:00 )  x     / 0.09 ng/mL / x     / x     / x          COVID-19 PCR: NotDetec (09 Dec 2022 16:28)  COVID-19 PCR: NotDetec (2022 14:00)  SARS-CoV-2: NotDetec (2022 18:27)        ABG - ( 20 Dec 2022 02:40 )  pH, Arterial: 7.31  pH, Blood: x     /  pCO2: 82    /  pO2: 205   / HCO3: 41    / Base Excess: 11.4  /  SaO2: 99.4                RADIOLOGY: Today I personally interpreted the latest CXR and other pertinent films.

## 2022-12-20 NOTE — CHART NOTE - NSCHARTNOTEFT_GEN_A_CORE
CAlled by nurse concerned for possible afib on monitor.  Plan  EKG  stat  will endorse to incoming PA

## 2022-12-21 DIAGNOSIS — J96.22 ACUTE AND CHRONIC RESPIRATORY FAILURE WITH HYPERCAPNIA: ICD-10-CM

## 2022-12-21 DIAGNOSIS — Z87.898 PERSONAL HISTORY OF OTHER SPECIFIED CONDITIONS: ICD-10-CM

## 2022-12-21 DIAGNOSIS — Z51.5 ENCOUNTER FOR PALLIATIVE CARE: ICD-10-CM

## 2022-12-21 DIAGNOSIS — R79.89 OTHER SPECIFIED ABNORMAL FINDINGS OF BLOOD CHEMISTRY: ICD-10-CM

## 2022-12-21 LAB
-  STAPHYLOCOCCUS EPIDERMIDIS, METHICILLIN RESISTANT: SIGNIFICANT CHANGE UP
A1C WITH ESTIMATED AVERAGE GLUCOSE RESULT: 5.6 % — SIGNIFICANT CHANGE UP (ref 4–5.6)
ALBUMIN SERPL ELPH-MCNC: 3.3 G/DL — LOW (ref 3.5–5.2)
ALP SERPL-CCNC: 66 U/L — SIGNIFICANT CHANGE UP (ref 30–115)
ALT FLD-CCNC: 529 U/L — HIGH (ref 0–41)
ANION GAP SERPL CALC-SCNC: 4 MMOL/L — LOW (ref 7–14)
APPEARANCE UR: CLEAR — SIGNIFICANT CHANGE UP
AST SERPL-CCNC: 199 U/L — HIGH (ref 0–41)
BACTERIA # UR AUTO: ABNORMAL
BILIRUB SERPL-MCNC: 0.2 MG/DL — SIGNIFICANT CHANGE UP (ref 0.2–1.2)
BILIRUB UR-MCNC: NEGATIVE — SIGNIFICANT CHANGE UP
BUN SERPL-MCNC: 26 MG/DL — HIGH (ref 10–20)
CALCIUM SERPL-MCNC: 8.4 MG/DL — SIGNIFICANT CHANGE UP (ref 8.4–10.5)
CHLORIDE SERPL-SCNC: 97 MMOL/L — LOW (ref 98–110)
CHOLEST SERPL-MCNC: 157 MG/DL — SIGNIFICANT CHANGE UP
CO2 SERPL-SCNC: 40 MMOL/L — HIGH (ref 17–32)
COLOR SPEC: YELLOW — SIGNIFICANT CHANGE UP
CREAT SERPL-MCNC: 0.5 MG/DL — LOW (ref 0.7–1.5)
DIFF PNL FLD: ABNORMAL
EGFR: 103 ML/MIN/1.73M2 — SIGNIFICANT CHANGE UP
EPI CELLS # UR: ABNORMAL /HPF
ESTIMATED AVERAGE GLUCOSE: 114 MG/DL — SIGNIFICANT CHANGE UP (ref 68–114)
GLUCOSE BLDC GLUCOMTR-MCNC: 99 MG/DL — SIGNIFICANT CHANGE UP (ref 70–99)
GLUCOSE SERPL-MCNC: 173 MG/DL — HIGH (ref 70–99)
GLUCOSE UR QL: NEGATIVE MG/DL — SIGNIFICANT CHANGE UP
GRAM STN FLD: SIGNIFICANT CHANGE UP
HCT VFR BLD CALC: 40 % — SIGNIFICANT CHANGE UP (ref 37–47)
HDLC SERPL-MCNC: 46 MG/DL — LOW
HGB BLD-MCNC: 12 G/DL — SIGNIFICANT CHANGE UP (ref 12–16)
KETONES UR-MCNC: NEGATIVE — SIGNIFICANT CHANGE UP
LEUKOCYTE ESTERASE UR-ACNC: ABNORMAL
LIPID PNL WITH DIRECT LDL SERPL: 88 MG/DL — SIGNIFICANT CHANGE UP
MCHC RBC-ENTMCNC: 30 G/DL — LOW (ref 32–37)
MCHC RBC-ENTMCNC: 31.3 PG — HIGH (ref 27–31)
MCV RBC AUTO: 104.2 FL — HIGH (ref 81–99)
METHOD TYPE: SIGNIFICANT CHANGE UP
NITRITE UR-MCNC: NEGATIVE — SIGNIFICANT CHANGE UP
NON HDL CHOLESTEROL: 111 MG/DL — SIGNIFICANT CHANGE UP
NRBC # BLD: 0 /100 WBCS — SIGNIFICANT CHANGE UP (ref 0–0)
PH UR: >=9 — SIGNIFICANT CHANGE UP (ref 5–8)
PLATELET # BLD AUTO: 133 K/UL — SIGNIFICANT CHANGE UP (ref 130–400)
POTASSIUM SERPL-MCNC: 3.8 MMOL/L — SIGNIFICANT CHANGE UP (ref 3.5–5)
POTASSIUM SERPL-SCNC: 3.8 MMOL/L — SIGNIFICANT CHANGE UP (ref 3.5–5)
PROT SERPL-MCNC: 5.1 G/DL — LOW (ref 6–8)
PROT UR-MCNC: 100 MG/DL
RBC # BLD: 3.84 M/UL — LOW (ref 4.2–5.4)
RBC # FLD: 14.6 % — HIGH (ref 11.5–14.5)
RBC CASTS # UR COMP ASSIST: SIGNIFICANT CHANGE UP /HPF
SODIUM SERPL-SCNC: 141 MMOL/L — SIGNIFICANT CHANGE UP (ref 135–146)
SP GR SPEC: 1.01 — SIGNIFICANT CHANGE UP (ref 1.01–1.03)
TRI-PHOS CRY UR QL COMP ASSIST: ABNORMAL /HPF
TRIGL SERPL-MCNC: 114 MG/DL — SIGNIFICANT CHANGE UP
TSH SERPL-MCNC: 5.03 UIU/ML — HIGH (ref 0.27–4.2)
UROBILINOGEN FLD QL: 0.2 MG/DL — SIGNIFICANT CHANGE UP
WBC # BLD: 7.39 K/UL — SIGNIFICANT CHANGE UP (ref 4.8–10.8)
WBC # FLD AUTO: 7.39 K/UL — SIGNIFICANT CHANGE UP (ref 4.8–10.8)
WBC UR QL: >50 /HPF

## 2022-12-21 PROCEDURE — 99223 1ST HOSP IP/OBS HIGH 75: CPT

## 2022-12-21 PROCEDURE — 93306 TTE W/DOPPLER COMPLETE: CPT | Mod: 26

## 2022-12-21 PROCEDURE — 71045 X-RAY EXAM CHEST 1 VIEW: CPT | Mod: 26

## 2022-12-21 RX ADMIN — Medication 100 MILLIGRAM(S): at 05:29

## 2022-12-21 RX ADMIN — Medication 32.4 MILLIGRAM(S): at 14:54

## 2022-12-21 RX ADMIN — Medication 100 MILLIGRAM(S): at 14:54

## 2022-12-21 RX ADMIN — Medication 1 TABLET(S): at 11:49

## 2022-12-21 RX ADMIN — Medication 3 MILLILITER(S): at 07:34

## 2022-12-21 RX ADMIN — Medication 100 MILLIGRAM(S): at 21:49

## 2022-12-21 RX ADMIN — CITALOPRAM 20 MILLIGRAM(S): 10 TABLET, FILM COATED ORAL at 11:49

## 2022-12-21 RX ADMIN — Medication 3 MILLILITER(S): at 14:13

## 2022-12-21 RX ADMIN — Medication 50 MICROGRAM(S): at 05:29

## 2022-12-21 RX ADMIN — Medication 32.4 MILLIGRAM(S): at 21:49

## 2022-12-21 RX ADMIN — Medication 32.4 MILLIGRAM(S): at 05:29

## 2022-12-21 RX ADMIN — MONTELUKAST 10 MILLIGRAM(S): 4 TABLET, CHEWABLE ORAL at 21:49

## 2022-12-21 NOTE — CONSULT NOTE ADULT - PROBLEM SELECTOR RECOMMENDATION 9
In the setting of THOM, COPD, CHF. Patinet with fluid overload. Recurrent hospitalizations  -patient noted on nasal cannula  -continue IV lasix per pulm/primary team  -TTE noted  -f/u pulm  -GOC as appropriate

## 2022-12-21 NOTE — CONSULT NOTE ADULT - SUBJECTIVE AND OBJECTIVE BOX
Chief complaint/Reason for consult: altered LFTs    HPI:  68yo F with PMHx of multiple admissions for hypercapnic respiratory failure, THOM, Asthma/COPD not on home O2, morbid obesity, epilepsy, hypothyroidism, OA, kidney stones, L radial artery occlusion 2021 (previously on eliquis) who presents from [facility] due to respiratory distress. History limited as patient on BIPAP. Per collateral from ED staff and nursing home records, patient was noted to be in respiratory distress and minimally responsive. Presented to the ED, with O2 sats on 70s and minimally responsive to verbal and tactile stimuli. Placed on BIPAP 15/8. Labs were n/f WBC 14, K 6.6 (hemolyzed) , , ABG 7.2/118. COVID/Flu/RSV negative. CXR shows unchanged bilateral opacities. She was given hypeK cocktail of Regula insulin, dextrose, Ca gluconate and albuterol. Additionally given IV lasix 40mg for suspected fluid overload. Evaluated by critical care who deemed stable for floor, with close monitoring of labs and blood gas after BIPAP 4hrs on and 4hrs off. She is admitted to [facility] for further management and workup     At time of my interview, patient stable HR: 64, BP: 102/82, 100% on BIPAP 15/8. She is awake with, eyes open and responding to questions. AAOx3 and answering questions appropriately. Able to tell me that she has no pain, or any other spontaneous complaints. Understands she is in the hospital due to her respiratory status.     Of note, she was recently admitted to [facility] from 12/5/22-12/10/22 for similar presentation after being found minimally responsive - including to physical stimuli from sternal rub, and brought to the ED. She was admitted to the medicine floor for acute on chronic hypercapnic/hypoxemic respiratory failure. She was treated with nebs, solumedrol and AVAPS NIV q4h on and off with improvement in her respiratory and mental status. Pt was initially suspected to have an aspiration pneumonia and was placed on ceftriaxone and flagyl. Infectious disease was consulted, who discontinued flagyl and ceftriaxone after determining that an aspiration pneumonia was unlikely. She completed a 5d course of PO Doxycycline 100mg. Eventually discharged back to [facility]          (19 Dec 2022 16:06)    GI Updates: 67yFemale pmh DM, asthma, COPD, asthma, OA presents for acute respiratory distress. Patient had documented hypotension. Patient denies nausea, vomiting, hematemesis, melena, blood in stool, diarrhea, constipation, abdominal pain.      PAST MEDICAL & SURGICAL HISTORY:   Diabetes      Renal stone      Renal failure      Epilepsy      Asthma      Chronic cough      Knee pain      Osteoarthritis      Uterine cancer  s/p hysterectomy      H/O abdominal hysterectomy  NO RADIATION AND CHEMO      History of tonsillectomy  13 years old      [place] teeth removed      History of bladder stone  SURGERY 8/3/2018      History of surgery  JJ STENT PLACEMENT LEFT OCTOBER 2017            Family history:  FAMILY HISTORY:    No GI cancers in first or second degree relatives    Social History: No smoking. No alcohol. No illegal drug use.    Allergies:   Compazine (Unknown)  latex (Urticaria)  penicillins (Unknown)        MEDICATIONS: Home Medications:  biotin 5 mg oral capsule: 1 cap(s) orally once a day (05 Dec 2022 11:18)  citalopram 20 mg oral tablet: 1 tab(s) orally every 24 hours (05 Dec 2022 11:18)  Desyrel 50 mg oral tablet: 2 tab(s) orally once a day (at bedtime) (05 Dec 2022 11:18)  DuoNeb 0.5 mg-2.5 mg/3 mL inhalation solution: 3 milliliter(s) inhaled 3 times a day (05 Dec 2022 11:18)  levothyroxine 50 mcg (0.05 mg) oral tablet: 1 tab(s) orally every 24 hours (05 Dec 2022 11:18)  lidocaine 4% patch: Apply topically to affected area once a day (05 Dec 2022 11:18)  MiraLax oral powder for reconstitution: 17 gram(s) orally once a day (05 Dec 2022 11:18)  Mirapex 0.5 mg oral tablet: 1 tab(s) orally once a day (at bedtime) (05 Dec 2022 11:18)  Multiple Vitamins with Minerals oral capsule: 1 cap(s) orally once a day (05 Dec 2022 11:18)  Nizoral A-D 1% topical shampoo: Apply topically to affected area every 3 days (05 Dec 2022 11:18)  nystatin 100,000 units/g topical cream (obsolete): Apply topically to affected area 2 times a day under bilateral breast and under left arm (05 Dec 2022 11:18)  PHENobarbital 32.4 mg oral tablet: 1 tab(s) orally 3 times a day (05 Dec 2022 11:18)  Senna Plus 50 mg-8.6 mg oral tablet: 2 tab(s) orally once a day (at bedtime) (05 Dec 2022 11:18)  Singulair 10 mg oral tablet: 1 tab(s) orally once a day (at bedtime) (05 Dec 2022 11:18)  Topamax 100 mg oral tablet: 1 tab(s) orally 3 times a day (05 Dec 2022 11:18)  Tylenol 500 mg oral tablet: 2 tab(s) orally every 8 hours, As Needed (05 Dec 2022 11:18)  Urocit-K 15 mEq oral tablet, extended release: 1 tab(s) orally once a day (05 Dec 2022 11:18)    MEDICATIONS  (STANDING):  albuterol/ipratropium for Nebulization 3 milliLiter(s) Nebulizer every 6 hours  citalopram 20 milliGRAM(s) Oral daily  levothyroxine 50 MICROGram(s) Oral daily  montelukast 10 milliGRAM(s) Oral at bedtime  multivitamin/minerals 1 Tablet(s) Oral daily  PHENobarbital 32.4 milliGRAM(s) Oral every 8 hours  topiramate 100 milliGRAM(s) Oral <User Schedule>    MEDICATIONS  (PRN):  aluminum hydroxide/magnesium hydroxide/simethicone Suspension 30 milliLiter(s) Oral every 4 hours PRN Dyspepsia  melatonin 3 milliGRAM(s) Oral at bedtime PRN Insomnia  ondansetron Injectable 4 milliGRAM(s) IV Push every 8 hours PRN Nausea and/or Vomiting        REVIEW OF SYSTEMS  General:  No weight loss, fevers, or chills.  Eyes:  No reported pain or visual changes  ENT:  No sore throat or runny nose.  NECK: No stiffness or lymphadenopathy  CV:  No chest pain or palpitations.  Resp:  No shortness of breath, cough, wheezing or hemoptysis  GI:  No abdominal pain, nausea, vomiting, dysphagia, diarrhea or constipation. No rectal bleeding, melena, or hematemesis.  Muscle:  No aches or weakness  Neuro:  No tingling, numbness       VITALS:   T(F): 96.7 (12-20-22 @ 21:00), Max: 96.7 (12-20-22 @ 21:00)  HR: 56 (12-21-22 @ 05:01) (53 - 61)  BP: 107/56 (12-21-22 @ 05:01) (102/58 - 121/54)  RR: 18 (12-21-22 @ 05:01) (18 - 98)  SpO2: 100% (12-21-22 @ 08:44) (100% - 100%)    PHYSICAL EXAM:  GENERAL: AAOx3, no acute distress.  HEAD:  Atraumatic, Normocephalic  EYES: conjunctiva and sclera clear  NECK: Supple, No thyromegaly   CHEST/LUNG: Clear to auscultation bilaterally; No wheeze, rhonchi, or rales  HEART: Regular rate and rhythm; normal S1, S2, No murmurs.  ABDOMEN: Soft, nontender, nondistended; Bowel sounds present  NEUROLOGY: No asterixis or tremor  SKIN: Intact, no jaundice          LABS:  12-19    143  |  98  |  27<H>  ----------------------------<  92  5.2<H>   |  36<H>  |  1.0    Ca    9.1      19 Dec 2022 19:47  Phos  5.1     12-19  Mg     2.1     12-19    TPro  5.7<L>  /  Alb  3.5  /  TBili  0.3  /  DBili  x   /  AST  795<H>  /  ALT  899<H>  /  AlkPhos  72  12-19                          14.0   14.22 )-----------( 177      ( 19 Dec 2022 13:00 )             47.9     LIVER FUNCTIONS - ( 19 Dec 2022 19:47 )  Alb: 3.5 g/dL / Pro: 5.7 g/dL / ALK PHOS: 72 U/L / ALT: 899 U/L / AST: 795 U/L / GGT: x           PT/INR - ( 19 Dec 2022 13:00 )   PT: 13.10 sec;   INR: 1.14 ratio         PTT - ( 19 Dec 2022 13:00 )  PTT:31.7 sec    IMAGING:    < from: US Abdomen Upper Quadrant Right (12.19.22 @ 23:02) >    ACC: [id number] EXAM:  US ABDOMEN RT UPR QUADRANT                          PROCEDURE DATE:  12/19/2022          INTERPRETATION:  CLINICAL INFORMATION: Severe elevated liver function   tests, transaminitis.    COMPARISON: CT abdomen pelvis on 9/15/2021    TECHNIQUE: Sonography of the right upper quadrant.    FINDINGS: LIMITED EXAM DUE TO PT HEAVY BREATHING ON BI PAP / POOR   POSITIONING / BODY HABITUS / OVERLYING BOWEL GAS  Liver: Within normal limits.  Bile ducts: Normal caliber. Common bile ductmeasures .  Gallbladder: Cholelithiasis.  No focal tenderness or evidence of   cholecystitis.  Pancreas: Visualized portions are within normal limits.  Right kidney: . No hydronephrosis.  Ascites: None.  IVC: Visualized portions are within normal limits.    IMPRESSION:  Cholelithiasis.  No focal tenderness or evidence of cholecystitis.        --- End of Report ---            DANIEL WIGGINS MD; Attending Radiologist  This document has been electronically signed. Dec 20 2022  7:57AM    < end of copied text >

## 2022-12-21 NOTE — CONSULT NOTE ADULT - NS ATTEND AMEND GEN_ALL_CORE FT
I edited the note
Patient seen and examined. Pertinent labs, imaging and telemetry reviewed. I agree with the above:    Patient on BiPAP and is minimally responsive. She will wake up and answer some questions, difficult to understand on and off BiPAP.   Patient with disordered breathing with possible pulmonary vascular congestion. CXR looks fairly stable from previous hospitalization with trace pitting edema of B/L LE. Elevated LFTs could represent congestive hepatopathy but without any renal congestion, less likely.   Could represent mixed picture of acute HFpEF exacerbation with COPD and hypercapneic respiratory failure.   -Would continue with diuresis with lasix 40mg IVP BID.   -Strict I&Os with daily standing weights, if possible.   -Check BMP/Mg BID with repletion of lytes.   -Check BNP.   -Pulm recs for respiratory status.   -Would have further GOC discussion.     Discussed with patient and ACP.

## 2022-12-21 NOTE — CHART NOTE - NSCHARTNOTEFT_GEN_A_CORE
Patient seen and examined at bedside this morning.    Pt is awake. SpO2 99% on 4L NC.   S/p cental line placement on 12/20.  Pt offers no new complaints.  F/u ECHO and CXR.  Obtain BNP, TSH, lipids and Hg A1c as per cardiology.  GI is open for transaminitis.   Will consult palliative care.  Patient encouraged to contact PA with any further issues.    Case d/w Dr. Carrera Patient seen and examined at bedside this morning.    Pt is awake. SpO2 99% on 4L NC.   S/p cental line placement on 12/20.  Pt offers no new complaints.  F/u ECHO and CXR.  Obtain BNP, TSH, lipids and Hg A1c as per cardiology.  GI is open for transaminitis.   Will consult palliative care. Pt DNR/DNI as per [facility].  Will start pt on SCD for dvt ppx.  Patient encouraged to contact PA with any further issues.    Case d/w Dr. Carrera

## 2022-12-21 NOTE — PROGRESS NOTE ADULT - SUBJECTIVE AND OBJECTIVE BOX
JACOB KOTHARI  67y  Female      Patient is a 67y old  Female who presents with a chief complaint of Acute on Chronic Hypercapnic Respiratory Failure (20 Dec 2022 11:13)        REVIEW OF SYSTEMS:  CONSTITUTIONAL: No fever, weight loss, or fatigue  RESPIRATORY: No cough, wheezing, chills or hemoptysis;  shortness of breath+  CARDIOVASCULAR: No chest pain, palpitations, dizziness, or leg swelling  GASTROINTESTINAL: No abdominal or epigastric pain. No nausea, vomiting, or hematemesis; No diarrhea or constipation. No melena or hematochezia.  GENITOURINARY: No dysuria, frequency, hematuria, incontinence+  PSYCHIATRIC: No depression, anxiety, mood swings, or difficulty sleeping  HEME/LYMPH: No easy bruising, or bleeding gums  ALLERY AND IMMUNOLOGIC: No hives or eczema  FAMILY HISTORY:    T(C): 35.9 (12-20-22 @ 21:00), Max: 35.9 (12-20-22 @ 15:46)  HR: 56 (12-21-22 @ 05:01) (53 - 61)  BP: 107/56 (12-21-22 @ 05:01) (102/58 - 121/54)  RR: 18 (12-21-22 @ 05:01) (18 - 98)  SpO2: 100% (12-20-22 @ 20:00) (100% - 100%)  Wt(kg): --Vital Signs Last 24 Hrs  T(C): 35.9 (20 Dec 2022 21:00), Max: 35.9 (20 Dec 2022 15:46)  T(F): 96.7 (20 Dec 2022 21:00), Max: 96.7 (20 Dec 2022 21:00)  HR: 56 (21 Dec 2022 05:01) (53 - 61)  BP: 107/56 (21 Dec 2022 05:01) (102/58 - 121/54)  BP(mean): --  RR: 18 (21 Dec 2022 05:01) (18 - 98)  SpO2: 100% (20 Dec 2022 20:00) (100% - 100%)    Parameters below as of 20 Dec 2022 20:00  Patient On (Oxygen Delivery Method): mask, nonrebreather      Compazine (Unknown)  latex (Urticaria)  penicillins (Unknown)      PHYSICAL EXAM:  GENERAL: NAD,   HEAD:  Atraumatic, Normocephalic  EYES: EOMI, PERRLA, conjunctiva and sclera clear  ENMT: No tonsillar erythema, exudates,   NECK: Supple, No JVD, Normal thyroid  NERVOUS SYSTEM:  Alert  CHEST/LUNG: vbs decreased breath sound  HEART: Regular rate and rhythm; No murmurs, rubs, or gallops  ABDOMEN: Soft, Nontender, Nondistended; Bowel sounds present  EXTREMITIES: , No clubbing, cyanosis, or edema  LYMPH: No lymphadenopathy noted  SKIN: No rashes or lesions      LABS:  12-19    143  |  98  |  27<H>  ----------------------------<  92  5.2<H>   |  36<H>  |  1.0    Ca    9.1      19 Dec 2022 19:47  Phos  5.1     12-19  Mg     2.1     12-19    TPro  5.7<L>  /  Alb  3.5  /  TBili  0.3  /  DBili  x   /  AST  795<H>  /  ALT  899<H>  /  AlkPhos  72  12-19                          14.0   14.22 )-----------( 177      ( 19 Dec 2022 13:00 )             47.9         RADIOLOGY & ADDITIONAL TESTS:    MEDICATION:  albuterol/ipratropium for Nebulization 3 milliLiter(s) Nebulizer every 6 hours  aluminum hydroxide/magnesium hydroxide/simethicone Suspension 30 milliLiter(s) Oral every 4 hours PRN  citalopram 20 milliGRAM(s) Oral daily  levothyroxine 50 MICROGram(s) Oral daily  LORazepam   Injectable 2 milliGRAM(s) IV Push every 4 hours PRN  melatonin 3 milliGRAM(s) Oral at bedtime PRN  montelukast 10 milliGRAM(s) Oral at bedtime  multivitamin/minerals 1 Tablet(s) Oral daily  ondansetron Injectable 4 milliGRAM(s) IV Push every 8 hours PRN  PHENobarbital 32.4 milliGRAM(s) Oral every 8 hours  topiramate 100 milliGRAM(s) Oral <User Schedule>      HEALTH ISSUES - PROBLEM Dx:  Acute on chronic Hypercapnic respiratory Failure  due to nick,copd,Morbid obesity hypoventilation on bipap,oxygen  COPD on duoneb nebulizer  Seizure disorder on phenobarbitol,topamax  Hypothyroid on levothyroid  abnormal LFT probably from shock hypoxia,hypotension GI consult cholelthiasis          JACOB KOTHARI  67y  Female      Patient is a 67y old  Female who presents with a chief complaint of Acute on Chronic Hypercapnic Respiratory Failure (20 Dec 2022 11:13)        REVIEW OF SYSTEMS:  CONSTITUTIONAL: No fever, weight loss, or fatigue  RESPIRATORY: No cough, wheezing, chills or hemoptysis;  shortness of breath+  CARDIOVASCULAR: No chest pain, palpitations, dizziness, or leg swelling  GASTROINTESTINAL: No abdominal or epigastric pain. No nausea, vomiting, or hematemesis; No diarrhea or constipation. No melena or hematochezia.  GENITOURINARY: No dysuria, frequency, hematuria, incontinence+  PSYCHIATRIC: No depression, anxiety, mood swings, or difficulty sleeping  HEME/LYMPH: No easy bruising, or bleeding gums  ALLERY AND IMMUNOLOGIC: No hives or eczema  FAMILY HISTORY:    T(C): 35.9 (12-20-22 @ 21:00), Max: 35.9 (12-20-22 @ 15:46)  HR: 56 (12-21-22 @ 05:01) (53 - 61)  BP: 107/56 (12-21-22 @ 05:01) (102/58 - 121/54)  RR: 18 (12-21-22 @ 05:01) (18 - 98)  SpO2: 100% (12-20-22 @ 20:00) (100% - 100%)  Wt(kg): --Vital Signs Last 24 Hrs  T(C): 35.9 (20 Dec 2022 21:00), Max: 35.9 (20 Dec 2022 15:46)  T(F): 96.7 (20 Dec 2022 21:00), Max: 96.7 (20 Dec 2022 21:00)  HR: 56 (21 Dec 2022 05:01) (53 - 61)  BP: 107/56 (21 Dec 2022 05:01) (102/58 - 121/54)  BP(mean): --  RR: 18 (21 Dec 2022 05:01) (18 - 98)  SpO2: 100% (20 Dec 2022 20:00) (100% - 100%)    Parameters below as of 20 Dec 2022 20:00  Patient On (Oxygen Delivery Method): mask, nonrebreather      Compazine (Unknown)  latex (Urticaria)  penicillins (Unknown)      PHYSICAL EXAM:  GENERAL: NAD,   HEAD:  Atraumatic, Normocephalic  EYES: EOMI, PERRLA, conjunctiva and sclera clear  ENMT: No tonsillar erythema, exudates,   NECK: Supple, No JVD, Normal thyroid  NERVOUS SYSTEM:  Alert  CHEST/LUNG: vbs decreased breath sound  HEART: Regular rate and rhythm; No murmurs, rubs, or gallops  ABDOMEN: Soft, Nontender, Nondistended; Bowel sounds present  EXTREMITIES: , No clubbing, cyanosis, or edema  LYMPH: No lymphadenopathy noted  SKIN: No rashes or lesions      LABS:  12-19    143  |  98  |  27<H>  ----------------------------<  92  5.2<H>   |  36<H>  |  1.0    Ca    9.1      19 Dec 2022 19:47  Phos  5.1     12-19  Mg     2.1     12-19    TPro  5.7<L>  /  Alb  3.5  /  TBili  0.3  /  DBili  x   /  AST  795<H>  /  ALT  899<H>  /  AlkPhos  72  12-19                          14.0   14.22 )-----------( 177      ( 19 Dec 2022 13:00 )             47.9         RADIOLOGY & ADDITIONAL TESTS:    MEDICATION:  albuterol/ipratropium for Nebulization 3 milliLiter(s) Nebulizer every 6 hours  aluminum hydroxide/magnesium hydroxide/simethicone Suspension 30 milliLiter(s) Oral every 4 hours PRN  citalopram 20 milliGRAM(s) Oral daily  levothyroxine 50 MICROGram(s) Oral daily  LORazepam   Injectable 2 milliGRAM(s) IV Push every 4 hours PRN  melatonin 3 milliGRAM(s) Oral at bedtime PRN  montelukast 10 milliGRAM(s) Oral at bedtime  multivitamin/minerals 1 Tablet(s) Oral daily  ondansetron Injectable 4 milliGRAM(s) IV Push every 8 hours PRN  PHENobarbital 32.4 milliGRAM(s) Oral every 8 hours  topiramate 100 milliGRAM(s) Oral <User Schedule>      HEALTH ISSUES - PROBLEM Dx:  Acute on chronic Hypercapnic respiratory Failure  due to nick,copd,Morbid obesity hypoventilation on bipap,oxygen  COPD on duoneb nebulizer  Seizure disorder on phenobarbitol,topamax  Hypothyroid on levothyroid  abnormal LFT probably from shock hypoxia,hypotension GI consult cholelthiasis   functional quadriplegia

## 2022-12-21 NOTE — CONSULT NOTE ADULT - SUBJECTIVE AND OBJECTIVE BOX
CC:  respiratory distress    HPI:  68yo F with PMHx of multiple admissions for hypercapnic respiratory failure, THOM, Asthma/COPD not on home O2, morbid obesity, epilepsy, hypothyroidism, OA, kidney stones, L radial artery occlusion  (previously on eliquis) who presents from [facility] due to respiratory distress. History limited as patient on BIPAP. Per collateral from ED staff and nursing home records, patient was noted to be in respiratory distress and minimally responsive. Presented to the ED, with O2 sats on 70s and minimally responsive to verbal and tactile stimuli. Placed on BIPAP 15/8. Labs were n/f WBC 14, K 6.6 (hemolyzed) , , ABG 7.2/118. COVID/Flu/RSV negative. CXR shows unchanged bilateral opacities. She was given hypeK cocktail of Regula insulin, dextrose, Ca gluconate and albuterol. Additionally given IV lasix 40mg for suspected fluid overload. Evaluated by critical care who deemed stable for floor, with close monitoring of labs and blood gas after BIPAP 4hrs on and 4hrs off. She is admitted to [facility] for further management and workup     At time of my interview, patient stable HR: 64, BP: 102/82, 100% on BIPAP 15/8. She is awake with, eyes open and responding to questions. AAOx3 and answering questions appropriately. Able to tell me that she has no pain, or any other spontaneous complaints. Understands she is in the hospital due to her respiratory status.     Of note, she was recently admitted to [place] from 22-12/10/22 for similar presentation after being found minimally responsive - including to physical stimuli from sternal rub, and brought to the ED. She was admitted to the medicine floor for acute on chronic hypercapnic/hypoxemic respiratory failure. She was treated with nebs, solumedrol and AVAPS NIV q4h on and off with improvement in her respiratory and mental status. Pt was initially suspected to have an aspiration pneumonia and was placed on ceftriaxone and flagyl. Infectious disease was consulted, who discontinued flagyl and ceftriaxone after determining that an aspiration pneumonia was unlikely. She completed a 5d course of PO Doxycycline 100mg. Eventually discharged back to [facility]          (19 Dec 2022 16:06)    PERTINENT PM/SXH:   Diabetes    Renal stone    Renal failure    Epilepsy    Asthma    Chronic cough    Knee pain    Osteoarthritis    Uterine cancer      H/O abdominal hysterectomy    History of tonsillectomy    [place] teeth removed    History of bladder stone    History of surgery      FAMILY HISTORY:  Unable to obtain from patient    ITEMS NOT CHECKED ARE NOT PRESENT    SOCIAL HISTORY:   Significant other/partner[ ]  Children[ ]  Yarsanism/Spirituality:  Substance hx:  [ ]   Tobacco hx:  [ ]   Alcohol hx: [ ]   Living Situation: [x]Home  [ ]Long term care  [ ]Rehab [ ]Other  Home Services: [ ] HHA [ ] Visting RN [ ] Hospice  Occupation:  Home Opioid hx:  [ ] Y [ ] N [x] I-Stop Reference No: Reference #: [id number]    Others' Prescriptions  Patient Name: Alee BurroughszpatrickBirth Date: 1955  Address: [address] [place] [id number]: Female  Rx Written	Rx Dispensed	Drug	Quantity	Days Supply	Prescriber Name	Prescriber Christianne #	Payment Method	Dispenser  2022	phenobarbital 32.4 mg tablet	60	20	Maribell Carrera MD	[id number]	Medicare	[facility]  2022	phenobarbital 32.4 mg tablet	60	20	Maribell Carrera MD	[id number]	Medicare	[facility]  10/20/2022	10/20/2022	phenobarbital 32.4 mg tablet	60	20	Maribell Carrera MD	[id number]	Medicare	[facility]  2022	phenobarbital 32.4 mg tablet	60	20	Maribell Carrera MD	[id number]	Medicare	[facility]  2022	phenobarbital 32.4 mg tablet	60	20	Maribell Carrera MD	[id number]	Medicare	[facility]  2022	phenobarbital 32.4 mg tablet	60	20	Maribell Carrera MD	[id number]	Cash	[facility]  2022	phenobarbital 32.4 mg tablet	90	30	Maribell Carrera MD	[id number]	Medicare	[facility]  2022	phenobarbital 32.4 mg tablet	60	20	Maribell Carrera MD	[id number]	Medicare	[facility]  2022	2022	phenobarbital 32.4 mg tablet	60	20	Maribell Carrera MD	[id number]	Medicare	[facility]  2022	phenobarbital 32.4 mg tablet	60	20	Mraibell Carrera MD	[id number]	Medicare	[facility]  2022	phenobarbital 32.4 mg tablet	60	30	Maribell Carrera MD	[id number]	Medicare	[facility]  2022	phenobarbital 32.4 mg tablet	60	20	Maribell Carrera MD	[id number]	Medicare	[facility]  2022	phenobarbital 32.4 mg tablet	60	20	Maribell Carrera MD	[id number]	Medicare	[facility]  2021	phenobarbital 32.4 mg tablet	42	14	Ramona Bynum MD	[id number]	Medicare	[facility]  2021	02/15/2022	phenobarbital 32.4 mg tablet	42	14	Ramona Bynum MD	[id number]	Medicare	[facility]  2022	phenobarbital 32.4 mg tablet	90	30	Maribell Carrera MD	[id number]	Cash	[facility]  2021	phenobarbital 32.4 mg tablet	42	14	Ramona Bynum MD	[id number]	[place]	[facility]    ADVANCE DIRECTIVES:    [ ] Full Code [x ] DNR  MOLST  [ ]  Living Will  [ ]   DECISION MAKER(s):  [ ] Health Care Proxy(s)  [ x] Surrogate(s)  [ ] Guardian           Name(s): Phone Number(s): Brother (only if necessary)    Allergies  Compazine (Unknown)  latex (Urticaria)  penicillins (Unknown)    MEDICATIONS  (STANDING):  albuterol/ipratropium for Nebulization 3 milliLiter(s) Nebulizer every 6 hours  citalopram 20 milliGRAM(s) Oral daily  levothyroxine 50 MICROGram(s) Oral daily  montelukast 10 milliGRAM(s) Oral at bedtime  multivitamin/minerals 1 Tablet(s) Oral daily  PHENobarbital 32.4 milliGRAM(s) Oral every 8 hours  topiramate 100 milliGRAM(s) Oral <User Schedule>    MEDICATIONS  (PRN):  aluminum hydroxide/magnesium hydroxide/simethicone Suspension 30 milliLiter(s) Oral every 4 hours PRN Dyspepsia  melatonin 3 milliGRAM(s) Oral at bedtime PRN Insomnia  ondansetron Injectable 4 milliGRAM(s) IV Push every 8 hours PRN Nausea and/or Vomiting    PRESENT SYMPTOMS: [ ]Unable to obtain due to poor mentation   Source if other than patient:  [ ]Family   [ ]Team     Pain: [ ]yes [ x]no  QOL impact -   Location -                    Aggravating factors -  Quality -  Radiation -  Timing-  Severity (0-10 scale):  Minimal acceptable level (0-10 scale):     Dyspnea:                           [ x]Mild [ ]Moderate [ ]Severe [ ]None  Anxiety:                             [ ]Mild [ ]Moderate [ ]Severe [x ]None  Fatigue:                             [ ]Mild [ ]Moderate [ ]Severe [x ]None  Nausea:                             [ ]Mild [ ]Moderate [ ]Severe [x ]None  Loss of appetite:              [ ]Mild [ ]Moderate [ ]Severe [ x]None  Constipation:                    [ ]Mild [ ]Moderate [ ]Severe [ x]None    Other Symptoms:  [x ]All other review of systems negative     Palliative Performance Status Version 2:         30%    http://npcrc.org/files/news/palliative_performance_scale_ppsv2.pdf    PHYSICAL EXAM:  Vital Signs Last 24 Hrs  T(C): 35.7 (21 Dec 2022 14:44), Max: 35.9 (20 Dec 2022 21:00)  T(F): 96.3 (21 Dec 2022 14:44), Max: 96.7 (20 Dec 2022 21:00)  HR: 55 (21 Dec 2022 14:44) (55 - 61)  BP: 98/55 (21 Dec 2022 14:44) (98/55 - 121/54)  BP(mean): --  RR: 16 (21 Dec 2022 14:44) (16 - 20)  SpO2: 100% (21 Dec 2022 08:44) (100% - 100%)    Parameters below as of 21 Dec 2022 08:44  Patient On (Oxygen Delivery Method): nasal cannula  O2 Flow (L/min): 4   I&O's Summary    20 Dec 2022 07:01  -  21 Dec 2022 07:00  --------------------------------------------------------  IN: 0 mL / OUT: 1300 mL / NET: -1300 mL    21 Dec 2022 07:01  -  21 Dec 2022 18:01  --------------------------------------------------------  IN: 0 mL / OUT: 400 mL / NET: -400 mL    GENERAL:  [x ] No acute distress [ ]Lethargic  [ ]Unarousable  [x ]Verbal  [ ]Non-Verbal [ ]Cachexia    BEHAVIORAL/PSYCH:  [ ]Alert and Oriented x  [ ] Anxiety [ ] Delirium [ ] Agitation [x ] Calm   EYES: [x ] No scleral icterus [ ] Scleral icterus [ ] Closed  ENMT:  [ ]Dry mouth  [ x]No external oral lesions [ ] No external ear or nose lesions  CARDIOVASCULAR:  [ ]Regular [ ]Irregular [ ]Tachy [x ]Not Tachy  [ ]Anil [ ] Edema [ ] No edema  PULMONARY:  [ ]Tachypnea  [ ]Audible excessive secretions [x ] No labored breathing [ ] labored breathing  GASTROINTESTINAL: [ ]Soft  [ ]Distended  [x ]Not distended [ ]Non tender [ ]Tender  MUSCULOSKELETAL: [ ]No clubbing [ ] clubbing  [x ] No cyanosis [ ] cyanosis  NEUROLOGIC: [ ]No focal deficits  [x ]Follows commands  [ ]Does not follow commands  [ ]Cognitive impairment  [ ]Dysphagia  [ ]Dysarthria  [ ]Paresis   SKIN: [ ] Jaundiced [x ] Non-jaundiced [ ]Rash [ ]No Rash [ ] Warm [ ] Dry  MISC/LINES: [ ] ET tube [ ] Trach [ ]NGT/OGT [ ]PEG [ ]Ardon    CRITICAL CARE:  [ ] Shock Present  [ ]Septic [ ]Cardiogenic [ ]Neurologic [ ]Hypovolemic  [ ]  Vasopressors [ ]  Inotropes   [x ]Respiratory failure present [ ]Mechanical ventilation [ ]Non-invasive ventilatory support [ ]High flow  [ ]Acute  [ ]Chronic [ ]Hypoxic  [ ]Hypercarbic [ ]Other  [ ]Other organ failure     LABS: reviewed by me                        12.0   7.39  )-----------( 133      ( 21 Dec 2022 11:00 )             40.0       141  |  97<L>  |  26<H>  ----------------------------<  173<H>  3.8   |  40<H>  |  0.5<L>    Ca    8.4      21 Dec 2022 11:00  Phos  5.1       Mg     2.1         TPro  5.1<L>  /  Alb  3.3<L>  /  TBili  0.2  /  DBili  x   /  AST  199<H>  /  ALT  529<H>  /  AlkPhos  66        Urinalysis Basic - ( 21 Dec 2022 14:22 )    Color: Yellow / Appearance: Clear / S.010 / pH: x  Gluc: x / Ketone: Negative  / Bili: Negative / Urobili: 0.2 mg/dL   Blood: x / Protein: 100 mg/dL / Nitrite: Negative   Leuk Esterase: Large / RBC: 1-2 /HPF / WBC >50 /HPF   Sq Epi: x / Non Sq Epi: Few /HPF / Bacteria: Many      RADIOLOGY & ADDITIONAL STUDIES: reviewed by me  < from: Xray Chest 1 View- PORTABLE-Routine (Xray Chest 1 View- PORTABLE-Routine in AM.) (22 @ 06:19) >  Impression:    Worsened bilateral opacities.      < end of copied text >      EKG: reviewed by me  < from: 12 Lead ECG (22 @ 07:55) >  Ventricular Rate 51 BPM    Atrial Rate 51 BPM    P-R Interval 134 ms    QRS Duration 76 ms    Q-T Interval 408 ms    QTC Calculation(Bazett) 376 ms    P Axis 75 degrees    R Axis 19 degrees    T Axis 23 degrees    Diagnosis Line Sinus bradycardia  Poor R wave progression  Nonspecific ST-T changes  Confirmed by DEANN YUAN MD ([id number]) on 2022 9:34:38 AM    < end of copied text >      PROTEIN CALORIE MALNUTRITION PRESENT: [ ]mild [ ]moderate [ ]severe [ ]underweight [ ]morbid obesity  https://www.andeal.org/vault/1592/web/files/ONC/Table_Clinical%20Characteristics%20to%20Document%20Malnutrition-White%20JV%20et%20al%182509.pdf    Height (cm): 157.5 (22 @ 15:46), 157.5 (22 @ 18:33), 157.5 (22 @ 02:35)  Weight (kg): 173.9 (22 @ 15:46), 121.6 (22 @ 18:33), 127.9 (22 @ :35)  BMI (kg/m2): 70.1 (22 @ 15:46), 49 (22 @ 18:33), 51.6 (22 @ :35)    [ ]PPSV2 < or = to 30% [ ]significant weight loss  [ ]poor nutritional intake  [ ]anasarca      [ ]Artificial Nutrition      REFERRALS:   [x ]Chaplaincy  [ ]Hospice  [ ]Child Life  [ x]Social Work  [ ]Case management [ ]Holistic Therapy     Goals of Care Document:    Normal

## 2022-12-21 NOTE — CONSULT NOTE ADULT - ASSESSMENT
68yo F with PMHx of multiple admissions for hypercapnic respiratory failure, THOM, Asthma/COPD not on home O2, morbid obesity, epilepsy, hypothyroidism, OA, kidney stones, L radial artery occlusion 2021 (previously on eliquis) who presents from [facility] due to respiratory distress. Hospital course complicated by hypercapnic respiratory failure in the setting of THOM, COPD, obesity. Palliative care consulted for GOC.    Met with patient at bedside. She was arousable but went to sleep quickly following waking up. Denied pain and endorsed some SOB, but could have a further conversation regarding symptoms.    Her brother is her only NOK. She noted previously that she did not want the team to contact her brother. We disucssed that if the patient is unable to make decisions herself, somebody will need to be contacted to make decisions. She noted that her brother could be contacted only if the patient is unable to make decisions.    MEDD (morphine equivalent daily dose): NA      See Recs below.    Please call [phone] with questions or concerns 24/7.   We will continue to follow.     Discussed with primary MD.

## 2022-12-21 NOTE — CONSULT NOTE ADULT - PROBLEM SELECTOR RECOMMENDATION 4
-DNR/DNI  -Ongoing medical management  -Bob wants brother contacted only if patient cannot make decisions herself  -Will follow

## 2022-12-22 LAB
-  AMIKACIN: SIGNIFICANT CHANGE UP
-  AMOXICILLIN/CLAVULANIC ACID: SIGNIFICANT CHANGE UP
-  AMPICILLIN/SULBACTAM: SIGNIFICANT CHANGE UP
-  AMPICILLIN: SIGNIFICANT CHANGE UP
-  AMPICILLIN: SIGNIFICANT CHANGE UP
-  AZTREONAM: SIGNIFICANT CHANGE UP
-  CEFAZOLIN: SIGNIFICANT CHANGE UP
-  CEFEPIME: SIGNIFICANT CHANGE UP
-  CEFTRIAXONE: SIGNIFICANT CHANGE UP
-  CEFUROXIME: SIGNIFICANT CHANGE UP
-  CIPROFLOXACIN: SIGNIFICANT CHANGE UP
-  CIPROFLOXACIN: SIGNIFICANT CHANGE UP
-  ERTAPENEM: SIGNIFICANT CHANGE UP
-  GENTAMICIN: SIGNIFICANT CHANGE UP
-  LEVOFLOXACIN: SIGNIFICANT CHANGE UP
-  LEVOFLOXACIN: SIGNIFICANT CHANGE UP
-  MEROPENEM: SIGNIFICANT CHANGE UP
-  NITROFURANTOIN: SIGNIFICANT CHANGE UP
-  NITROFURANTOIN: SIGNIFICANT CHANGE UP
-  PIPERACILLIN/TAZOBACTAM: SIGNIFICANT CHANGE UP
-  TETRACYCLINE: SIGNIFICANT CHANGE UP
-  TOBRAMYCIN: SIGNIFICANT CHANGE UP
-  TRIMETHOPRIM/SULFAMETHOXAZOLE: SIGNIFICANT CHANGE UP
-  VANCOMYCIN: SIGNIFICANT CHANGE UP
ALBUMIN SERPL ELPH-MCNC: 3.1 G/DL — LOW (ref 3.5–5.2)
ALP SERPL-CCNC: 66 U/L — SIGNIFICANT CHANGE UP (ref 30–115)
ALT FLD-CCNC: 375 U/L — HIGH (ref 0–41)
ANION GAP SERPL CALC-SCNC: 6 MMOL/L — LOW (ref 7–14)
AST SERPL-CCNC: 90 U/L — HIGH (ref 0–41)
BILIRUB DIRECT SERPL-MCNC: <0.2 MG/DL — SIGNIFICANT CHANGE UP (ref 0–0.3)
BILIRUB INDIRECT FLD-MCNC: >0.2 MG/DL — SIGNIFICANT CHANGE UP (ref 0.2–1.2)
BILIRUB SERPL-MCNC: 0.4 MG/DL — SIGNIFICANT CHANGE UP (ref 0.2–1.2)
BUN SERPL-MCNC: 18 MG/DL — SIGNIFICANT CHANGE UP (ref 10–20)
CALCIUM SERPL-MCNC: 8.3 MG/DL — LOW (ref 8.4–10.5)
CHLORIDE SERPL-SCNC: 99 MMOL/L — SIGNIFICANT CHANGE UP (ref 98–110)
CO2 SERPL-SCNC: 38 MMOL/L — HIGH (ref 17–32)
CREAT SERPL-MCNC: 0.5 MG/DL — LOW (ref 0.7–1.5)
CULTURE RESULTS: SIGNIFICANT CHANGE UP
CULTURE RESULTS: SIGNIFICANT CHANGE UP
EGFR: 103 ML/MIN/1.73M2 — SIGNIFICANT CHANGE UP
GLUCOSE SERPL-MCNC: 132 MG/DL — HIGH (ref 70–99)
HCT VFR BLD CALC: 41.1 % — SIGNIFICANT CHANGE UP (ref 37–47)
HGB BLD-MCNC: 12.1 G/DL — SIGNIFICANT CHANGE UP (ref 12–16)
MCHC RBC-ENTMCNC: 29.4 G/DL — LOW (ref 32–37)
MCHC RBC-ENTMCNC: 31.1 PG — HIGH (ref 27–31)
MCV RBC AUTO: 105.7 FL — HIGH (ref 81–99)
METHOD TYPE: SIGNIFICANT CHANGE UP
METHOD TYPE: SIGNIFICANT CHANGE UP
NRBC # BLD: 0 /100 WBCS — SIGNIFICANT CHANGE UP (ref 0–0)
NT-PROBNP SERPL-SCNC: 547 PG/ML — HIGH (ref 0–300)
ORGANISM # SPEC MICROSCOPIC CNT: SIGNIFICANT CHANGE UP
PLATELET # BLD AUTO: 139 K/UL — SIGNIFICANT CHANGE UP (ref 130–400)
POTASSIUM SERPL-MCNC: 4 MMOL/L — SIGNIFICANT CHANGE UP (ref 3.5–5)
POTASSIUM SERPL-SCNC: 4 MMOL/L — SIGNIFICANT CHANGE UP (ref 3.5–5)
PROCALCITONIN SERPL-MCNC: 0.1 NG/ML — SIGNIFICANT CHANGE UP (ref 0.02–0.1)
PROT SERPL-MCNC: 5 G/DL — LOW (ref 6–8)
RBC # BLD: 3.89 M/UL — LOW (ref 4.2–5.4)
RBC # FLD: 14.5 % — SIGNIFICANT CHANGE UP (ref 11.5–14.5)
SODIUM SERPL-SCNC: 143 MMOL/L — SIGNIFICANT CHANGE UP (ref 135–146)
SPECIMEN SOURCE: SIGNIFICANT CHANGE UP
SPECIMEN SOURCE: SIGNIFICANT CHANGE UP
WBC # BLD: 6.46 K/UL — SIGNIFICANT CHANGE UP (ref 4.8–10.8)
WBC # FLD AUTO: 6.46 K/UL — SIGNIFICANT CHANGE UP (ref 4.8–10.8)

## 2022-12-22 PROCEDURE — 99232 SBSQ HOSP IP/OBS MODERATE 35: CPT

## 2022-12-22 RX ADMIN — Medication 1 TABLET(S): at 14:16

## 2022-12-22 RX ADMIN — Medication 32.4 MILLIGRAM(S): at 14:15

## 2022-12-22 RX ADMIN — MONTELUKAST 10 MILLIGRAM(S): 4 TABLET, CHEWABLE ORAL at 22:00

## 2022-12-22 RX ADMIN — Medication 100 MILLIGRAM(S): at 22:00

## 2022-12-22 RX ADMIN — Medication 32.4 MILLIGRAM(S): at 05:03

## 2022-12-22 RX ADMIN — Medication 100 MILLIGRAM(S): at 22:02

## 2022-12-22 RX ADMIN — Medication 3 MILLILITER(S): at 20:03

## 2022-12-22 RX ADMIN — Medication 100 MILLIGRAM(S): at 14:15

## 2022-12-22 RX ADMIN — Medication 32.4 MILLIGRAM(S): at 22:00

## 2022-12-22 RX ADMIN — Medication 3 MILLILITER(S): at 08:50

## 2022-12-22 RX ADMIN — Medication 50 MICROGRAM(S): at 05:02

## 2022-12-22 RX ADMIN — Medication 3 MILLILITER(S): at 14:39

## 2022-12-22 RX ADMIN — Medication 100 MILLIGRAM(S): at 04:18

## 2022-12-22 RX ADMIN — Medication 100 MILLIGRAM(S): at 05:03

## 2022-12-22 RX ADMIN — Medication 100 MILLIGRAM(S): at 14:22

## 2022-12-22 RX ADMIN — CITALOPRAM 20 MILLIGRAM(S): 10 TABLET, FILM COATED ORAL at 14:16

## 2022-12-22 NOTE — CHART NOTE - NSCHARTNOTEFT_GEN_A_CORE
PALLIATIVE MEDICINE INTERDISCIPLINARY TEAM NOTE    Provider:  [  x ]Social Work   [   ]          [  x ] Initial visit [   ] Follow up    Family or contact name / phone #   Met with: [ x  ] Patient  [   ] Family  [   ] Other:    Primary Language: [ x  ] English [   ] Other*:                      *Interpretation provided by:    SUPPORT DIAGNOSES            (Check all that apply)  [   ] Psychosocial spiritual assessment (PSSA)  [   ] EOL issues  [   ] Cultural / spiritual concerns  [  x ] Pain / suffering  [   ] Dementia / AMS  [   ] Other:  [   ] AD issues  [   ] Grief / loss / sadness  [   ] Discharge issues  [x   ] Distress / coping    PSYCHOSOCIAL ASSESSMENT OF PATIENT         (Check all that apply)  [ x  ] Initial Assessment            [   ] Reassessment          [   ] Not Applicable this visit    Pain/suffering acuity:  [  x ] None to mild (0-3)           [   ] Moderate (4-6)        [   ] High (7-10)    Mental Status:  [ x  ] Alert/oriented (x3)          [   ] Confused/Altered(x2/x1)         [   ] Non-resp    Functional status:  [   ] Independent w ADLs      [   ] Needs Assistance             [ x  ] Bedbound/Full Care    Coping:  [   ] Coping well                     [ x ] Coping w/difficulty            [   ] Poor coping    Support system:  [   ] Strong                              [ x  ] Adequate                        [   ] Inadequate      Past history and medications for:     [ ] Anxiety       [ ] Depression    [ ] Sleep disorders     SPIRITUAL ASSESSMENT  Adventist/Spiritual practice: ___________________________    Role of organized Yazidism:  [   ] Important                     [   ] Some (fam tradition, cultural)               [   ] None    Effects on medical care:  [   ] Yes, _____________________________________                         [   ] None    Cultural/Catholic need:  [   ] Yes, _____________________________________                         [   ] None    Refer to Pastoral Care:  [   ] Yes           [   ] No, not at this time    SERVICE PROVIDED  [   ]PSSA                                                                             [   ]Discharge support / facilitation  [  x ]AD / goals of care counseling                                  [   ]EOL / death / bereavement counseling  [  x ]Counseling / support                                                [   ] Family meeting  [   ]Prayer / sacrament / ritual                                      [   ] Referral   [   ]Other                                                                       NOTE and Plan of Care (PoC):    Patient was seen by writer on 12/21/22.  Patient is a 67 year old female.  Patient was admitted on 12/19/22 from [facility], dx:  acute respiratory failure with hypoxia, hyperkalemia.      Patient was pleasant when approached and easily engaged.  Provided information regarding role of Palliative Care.  Support rendered.

## 2022-12-22 NOTE — PROGRESS NOTE ADULT - ASSESSMENT
67yFemale pmh DM, asthma, COPD, asthma, OA presents for acute respiratory distress. Patient had documented hypotension.    Problem 1-Altered LFTs  likely ischemic hepatitis  patient with documented hypotension  Rec  -ordered repeat LFTs at 15:00  -Liver WNL on ultrasound   -Monitor LFTs, can check INR, LFTs improving rapidly   -If LFTs fail to improve Check Acute hepatitis panel, Smooth Muscle Antibody, Transferrin Saturation, SPEP, Anti LK M1 antibody, AMA, HUNTER, Ceruloplasmin, Ferritin, Alpha-1-antitrypsin, CMV, Herpes serologies, EBV serologies, Immunoglobulins panel.    -CBD not dilated no abdominal pain  -Avoid hypotension     Problem 2-Asymptomatic Cholelithiasis  Rec  -watchful waiting    Problem 3 -CRC screening   Rec  -Follow up with our [facility] located at [address] [place] [address]. Phone Number: [phone] to schedule CRC screening Colonoscopy     67yFemale pmh DM, asthma, COPD, asthma, OA presents for acute respiratory distress. Patient had documented hypotension.    Problem 1-Altered LFTs  likely ischemic hepatitis  patient with documented hypotension  Rec  -Liver WNL on ultrasound   -Monitor LFTs, can check INR, LFTs improving rapidly   -If LFTs fail to improve Check Acute hepatitis panel, Smooth Muscle Antibody, Transferrin Saturation, SPEP, Anti LK M1 antibody, AMA, HUNTER, Ceruloplasmin, Ferritin, Alpha-1-antitrypsin, CMV, Herpes serologies, EBV serologies, Immunoglobulins panel.    -CBD not dilated no abdominal pain  -Avoid hypotension     Problem 2-Asymptomatic Cholelithiasis  Rec  -watchful waiting    Problem 3 -CRC screening   Rec  -Follow up with our [facility] located at [address] [place] [address]. Phone Number: [phone] to schedule CRC screening Colonoscopy        Recall GI if needed

## 2022-12-22 NOTE — PHYSICAL THERAPY INITIAL EVALUATION ADULT - PHYSICAL ASSIST/NONPHYSICAL ASSIST: SUPINE/SIT, REHAB EVAL
Date of Birth: [birth year]	Time of Birth: 10:40    Admission Weight (g): 1445    Admission Date and Time:  20 @ 10:40         Gestational Age:  34  Source of admission [ X ] Inborn     [ __ ]Transport from    [facility]: Born by scheduled C/S to a 32 year-old  mother. Mother has history of severe PEC, IUGR EFW 2%. Mother is B+, GBS neg , PNL neg/NR/I. NRNL. Significant delivery history of multiple  infants including a baby born in  with omphalocele at 33 weeks that  at age 2 weeks. Baby emerged crying, delayed cord clamping x 30 seconds, WDSS, APGARS 9,9. Baby admitted to NICU for further management.      Social History: No history of alcohol/tobacco exposure obtained  FHx: non-contributory to the condition being treated   ROS: unable to obtain ()     PHYSICAL EXAM:    General:	         Awake and active;   Head:		AFOF  Eyes:		Normally set bilaterally  Ears:		Patent bilaterally, no deformities  Nose/Mouth:	Nares patent, palate intact  Neck:		No masses, intact clavicles  Chest/Lungs:      Breath sounds equal to auscultation. No retractions  CV:		No murmurs appreciated, normal pulses bilaterally  Abdomen:          Soft nontender nondistended, no masses, bowel sounds present  :		Normal for gestational age  Back:		Intact skin, no sacral dimples or tags  Anus:		Grossly patent  Extremities:	FROM, no hip clicks  Skin:		Pink, no lesions  Neuro exam:	Appropriate tone, activity    **************************************************************************************************  Age:6d    LOS:6d    Vital Signs:  T(C): 37 ( @ 05:00), Max: 37.1 ( @ 11:30)  HR: 143 ( @ 05:00) (135 - 169)  BP: 57/30 ( @ 20:00) (57/30 - 57/30)  RR: 65 ( @ 05:00) (45 - 65)  SpO2: 95% ( @ 05:00) (95% - 100%)    hepatitis B IntraMuscular Vaccine - Peds 0.5 milliLiter(s) once  multivitamin Oral Drops - Peds 1 milliLiter(s) daily      LABS:         Blood type, Baby [] ABO: O  Rh; Negative DC; Negative                              20.2   9.03 )-----------( 188             [ @ 18:30]                  57.6  S 56.0%  B 0%  [place] 0%  Myelo 0%  Promyelo 0%  Blasts 0%  Lymph 34.0%  Mono 6.0%  Eos 1.0%  Baso 0%  Retic 0%               Bili T/D  [ @ 02:09] - 5.3/0.3, Bili T/D  [ @ 02:05] - 5.3/0.3, Bili T/D  [ @ 05:45] - 4.4/0.3          POCT Glucose:                                     **************************************************************************************************		  DISCHARGE PLANNING (date and status):  Hep B Vacc:  CCHD:			  :					  Hearing:   [place] screen:	  Circumcision:  Hip US rec:  	  Synagis: 			  Other Immunizations (with dates):    		  Neurodevelop eval?	  CPR class done?  	  PVS at DC?  Vit D at DC?	  FE at DC?	    PMD:          Name:  ______________ _             Contact information:  ______________ _  Pharmacy: Name:  ______________ _              Contact information:  ______________ _    Follow-up appointments (list):      Time spent on the total subsequent encounter with >50% of the visit spent on counseling and/or coordination of care:[ _ ] 15 min[ _ ] 25 min[ _ ] 35 min  [ _ ] Discharge time spent >30 min   [ __ ] Car seat oximetry reviewed. Date of Birth: [birth year]	Time of Birth: 10:40    Admission Weight (g): 1445    Admission Date and Time:  20 @ 10:40         Gestational Age:  34  Source of admission [ X ] Inborn     [ __ ]Transport from    [facility]: Born by scheduled C/S to a 32 year-old  mother. Mother has history of severe PEC, IUGR EFW 2%. Mother is B+, GBS neg , PNL neg/NR/I. NRNL. Significant delivery history of multiple  infants including a baby born in  with omphalocele at 33 weeks that  at age 2 weeks. Baby emerged crying, delayed cord clamping x 30 seconds, WDSS, APGARS 9,9. Baby admitted to NICU for further management.      Social History: No history of alcohol/tobacco exposure obtained  FHx: non-contributory to the condition being treated   ROS: unable to obtain ()     PHYSICAL EXAM:    General:	         Awake and active;   Head:		AFOF  Eyes:		Normally set bilaterally  Ears:		Patent bilaterally, no deformities  Nose/Mouth:	Nares patent, palate intact  Neck:		No masses, intact clavicles  Chest/Lungs:      Breath sounds equal to auscultation. No retractions  CV:		No murmurs appreciated, normal pulses bilaterally  Abdomen:          Soft nontender nondistended, no masses, bowel sounds present  :		Normal for gestational age  Back:		Intact skin, no sacral dimples or tags  Anus:		Grossly patent  Extremities:	FROM, no hip clicks  Skin:		Pink, no lesions  Neuro exam:	Appropriate tone, activity    **************************************************************************************************  Age:6d    LOS:6d    Vital Signs:  T(C): 37 ( @ 05:00), Max: 37.1 ( @ 11:30)  HR: 143 ( @ 05:00) (135 - 169)  BP: 57/30 ( @ 20:00) (57/30 - 57/30)  RR: 65 ( @ 05:00) (45 - 65)  SpO2: 95% ( @ 05:00) (95% - 100%)    hepatitis B IntraMuscular Vaccine - Peds 0.5 milliLiter(s) once  multivitamin Oral Drops - Peds 1 milliLiter(s) daily      LABS:         Blood type, Baby [] ABO: O  Rh; Negative DC; Negative                              20.2   9.03 )-----------( 188             [ @ 18:30]                  57.6  S 56.0%  B 0%  [place] 0%  Myelo 0%  Promyelo 0%  Blasts 0%  Lymph 34.0%  Mono 6.0%  Eos 1.0%  Baso 0%  Retic 0%               Bili T/D  [ @ 02:09] - 5.3/0.3, Bili T/D  [ @ 02:05] - 5.3/0.3, Bili T/D  [ @ 05:45] - 4.4/0.3          POCT Glucose:                                     **************************************************************************************************		  DISCHARGE PLANNING (date and status):  Hep B Vacc: deferred due to birth weight  CCHD: pass  			  : pending					  Hearing: passed   [place] screen: done 	  Circumcision: not needed  Hip US rec: N/A  	  Synagis: 			  Other Immunizations (with dates):    		  Neurodevelop eval?	pending  CPR class done?  	  PVS at DC?  Vit D at DC?	  FE at DC?	    PMD:          Name:  ______________ _             Contact information:  ______________ _  Pharmacy: Name:  ______________ _              Contact information:  ______________ _    Follow-up appointments (list): PMD, ND      Time spent on the total subsequent encounter with >50% of the visit spent on counseling and/or coordination of care:[ _ ] 15 min[ _ ] 25 min[ X ] 35 min  [ _ ] Discharge time spent >30 min   [ __ ] Car seat oximetry reviewed. 2 person assist

## 2022-12-22 NOTE — PHYSICAL THERAPY INITIAL EVALUATION ADULT - PERTINENT HX OF CURRENT PROBLEM, REHAB EVAL
Pt is a 66yo F with PMHx of multiple admissions for hypercapnic respiratory failure, THOM, Asthma/COPD not on home O2, morbid obesity, epilepsy, hypothyroidism, OA, kidney stones, L radial artery occlusion 2021 (previously on eliquis) who presents from [facility] due to respiratory distress

## 2022-12-22 NOTE — PROGRESS NOTE ADULT - SUBJECTIVE AND OBJECTIVE BOX
67yFemale  Being followed for ischemic hepatitis   Interval history: Patient denies nausea, vomiting, hematemesis, melena, blood in stool, diarrhea, constipation, abdominal pain. Patient tolerating diet.      PAST MEDICAL & SURGICAL HISTORY:   Diabetes      Renal stone      Renal failure      Epilepsy      Asthma      Chronic cough      Knee pain      Osteoarthritis      Uterine cancer  s/p hysterectomy      H/O abdominal hysterectomy  NO RADIATION AND CHEMO      History of tonsillectomy  13 years old      [place] teeth removed      History of bladder stone  SURGERY 8/3/2018      History of surgery  JJ STENT PLACEMENT LEFT OCTOBER 2017                Social History: No smoking. No alcohol. No illegal drug use.            MEDICATIONS  (STANDING):  albuterol/ipratropium for Nebulization 3 milliLiter(s) Nebulizer every 6 hours  citalopram 20 milliGRAM(s) Oral daily  clindamycin IVPB 300 milliGRAM(s) IV Intermittent every 8 hours  clindamycin IVPB      levothyroxine 50 MICROGram(s) Oral daily  montelukast 10 milliGRAM(s) Oral at bedtime  multivitamin/minerals 1 Tablet(s) Oral daily  PHENobarbital 32.4 milliGRAM(s) Oral every 8 hours  topiramate 100 milliGRAM(s) Oral <User Schedule>    MEDICATIONS  (PRN):  aluminum hydroxide/magnesium hydroxide/simethicone Suspension 30 milliLiter(s) Oral every 4 hours PRN Dyspepsia  melatonin 3 milliGRAM(s) Oral at bedtime PRN Insomnia  ondansetron Injectable 4 milliGRAM(s) IV Push every 8 hours PRN Nausea and/or Vomiting      Allergies:   Compazine (Unknown)  latex (Urticaria)  penicillins (Unknown)          REVIEW OF SYSTEMS:  General:  No weight loss, fevers, or chills.  Eyes:  No reported pain or visual changes  ENT:  No sore throat or runny nose.  NECK: No stiffness   CV:  No chest pain or palpitations.  Resp:  No shortness of breath, cough  GI:  No abdominal pain, nausea, vomiting, dysphagia, diarrhea or constipation. No rectal bleeding, melena, or hematemesis.  Muscle:  No aches or weakness  Neuro:  No tingling, numbness           VITAL SIGNS:   T(F): 97.5 (12-22-22 @ 05:58), Max: 97.5 (12-22-22 @ 05:58)  HR: 63 (12-22-22 @ 05:58) (55 - 71)  BP: 105/57 (12-22-22 @ 05:58) (98/55 - 129/51)  RR: 16 (12-22-22 @ 05:58) (16 - 20)  SpO2: 100% (12-22-22 @ 08:33) (98% - 100%)    PHYSICAL EXAM:  GENERAL: AAOx3, no acute distress.  HEAD:  Atraumatic, Normocephalic  EYES: conjunctiva and sclera clear  NECK: Supple, no JVD or thyromegaly  CHEST/LUNG: Clear to auscultation bilaterally; No wheeze, rhonchi, or rales  HEART: Regular rate and rhythm; normal S1, S2, No murmurs.  ABDOMEN: Soft, nontender, nondistended; Bowel sounds present  NEUROLOGY: No asterixis or tremor.   SKIN: Intact, no jaundice            LABS:                        12.1   6.46  )-----------( 139      ( 22 Dec 2022 08:00 )             41.1     12-22    143  |  99  |  18  ----------------------------<  132<H>  4.0   |  38<H>  |  0.5<L>    Ca    8.3<L>      22 Dec 2022 08:00    TPro  5.1<L>  /  Alb  3.3<L>  /  TBili  0.2  /  DBili  x   /  AST  199<H>  /  ALT  529<H>  /  AlkPhos  66  12-21    LIVER FUNCTIONS - ( 21 Dec 2022 11:00 )  Alb: 3.3 g/dL / Pro: 5.1 g/dL / ALK PHOS: 66 U/L / ALT: 529 U/L / AST: 199 U/L / GGT: x               IMAGING:    < from: US Abdomen Upper Quadrant Right (12.19.22 @ 23:02) >  ACC: [id number] EXAM:  US ABDOMEN RT UPR QUADRANT                          PROCEDURE DATE:  12/19/2022          INTERPRETATION:  CLINICAL INFORMATION: Severe elevated liver function   tests, transaminitis.    COMPARISON: CT abdomen pelvis on 9/15/2021    TECHNIQUE: Sonography of the right upper quadrant.    FINDINGS: LIMITED EXAM DUE TO PT HEAVY BREATHING ON BI PAP / POOR   POSITIONING / BODY HABITUS / OVERLYING BOWEL GAS  Liver: Within normal limits.  Bile ducts: Normal caliber. Common bile ductmeasures .  Gallbladder: Cholelithiasis.  No focal tenderness or evidence of   cholecystitis.  Pancreas: Visualized portions are within normal limits.  Right kidney: . No hydronephrosis.  Ascites: None.  IVC: Visualized portions are within normal limits.    IMPRESSION:  Cholelithiasis.  No focal tenderness or evidence of cholecystitis.        --- End of Report ---            DANIEL WIGGINS MD; Attending Radiologist  This document has been electronically signed. Dec 20 2022  7:57AM    < end of copied text >

## 2022-12-22 NOTE — PROGRESS NOTE ADULT - SUBJECTIVE AND OBJECTIVE BOX
Chart reviewed, patient examined. Pertinent results reviewed.  reviewed with the PA; specialist f/u reviewed  HD#4  KOTHARI, PATRICIA      Patient is a 67y old  Female who presented with a chief complaint of respiratory distress (91% w O2, and RR-22) and lethargy.    HPI:  67-year-old female history of morbid obesity, diabetes, obstructive sleep apnea, respiratory failure, known history of CO2 narcosis, as per paperwork DNR, DNI, DN H, now presents from a nursing home for lethargy and shortness of breath.  Patient is nonverbal.  History obtained by nursing home papers and Dr. Lozada who called ahead of the patient's arrival. In/out of hosp w similar episodes.pCO2-118 on initial ABG.    vss, was- lethargic, arousable to pain stimuli, pink conj, anicteric, dry mucous membrane, neck supple, creased breath sounds bilaterally RRR, equal radial pulses bilat, abd soft/2+ edema, no rashes.        Interval events: more awake, LFTs improving.    PMH:66yo F with PMHx of multiple admissions for hypercapnic respiratory failure, THOM, Asthma/COPD not on home O2, morbid obesity, epilepsy, hypothyroidism, OA, kidney stones, L radial artery occlusion 2021 (previously on eliquis.    REVIEW OF SYSTEMS:  CONSTITUTIONAL: No fever, weight loss, or fatigue  RESPIRATORY: Min + cough, wheezing, chills or hemoptysis;  shortness of breath+; +/- compliance w Non-invasive breathing devices.  CARDIOVASCULAR: No chest pain, palpitations, dizziness, or leg swelling  GASTROINTESTINAL: No abdominal or epigastric pain. No nausea, vomiting, or hematemesis; No diarrhea or constipation. No melena or hematochezia.  GENITOURINARY: No dysuria, frequency, hematuria, incontinence+; saravia placed in ED  PSYCHIATRIC: No depression, anxiety, mood swings, or difficulty sleeping  HEME/LYMPH: No easy bruising, or bleeding gums  ALLERY AND IMMUNOLOGIC: No hives or eczema  FAMILY HISTORY:  SH: long term res of [facility]    MEDICATIONS  (STANDING):  albuterol/ipratropium for Nebulization 3 milliLiter(s) Nebulizer every 6 hours  citalopram 20 milliGRAM(s) Oral daily  clindamycin IVPB      clindamycin IVPB 300 milliGRAM(s) IV Intermittent every 8 hours  levothyroxine 50 MICROGram(s) Oral daily  montelukast 10 milliGRAM(s) Oral at bedtime  multivitamin/minerals 1 Tablet(s) Oral daily  PHENobarbital 32.4 milliGRAM(s) Oral every 8 hours  topiramate 100 milliGRAM(s) Oral <User Schedule>    MEDICATIONS  (PRN):  aluminum hydroxide/magnesium hydroxide/simethicone Suspension 30 milliLiter(s) Oral every 4 hours PRN Dyspepsia  melatonin 3 milliGRAM(s) Oral at bedtime PRN Insomnia  ondansetron Injectable 4 milliGRAM(s) IV Push every 8 hours PRN Nausea and/or Vomiting      Compazine (Unknown)  latex (Urticaria)  penicillins (Unknown)      Vital Signs Last 24 Hrs  T(C): 36.4 (22 Dec 2022 05:58), Max: 36.4 (22 Dec 2022 05:58)  T(F): 97.5 (22 Dec 2022 05:58), Max: 97.5 (22 Dec 2022 05:58)  HR: 63 (22 Dec 2022 05:58) (55 - 71)  BP: 105/57 (22 Dec 2022 05:58) (98/55 - 129/51)  BP(mean): --  RR: 16 (22 Dec 2022 05:58) (16 - 20)  SpO2: 100% (22 Dec 2022 08:33) (98% - 100%)    Parameters below as of 22 Dec 2022 08:33  Patient On (Oxygen Delivery Method): nasal cannula  O2 Flow (L/min): 4      PHYSICAL EXAM:  GENERAL: NAD,   HEAD:  Atraumatic, Normocephalic; AAOx3-; converses w me once awoken. very obese.  EYES: EOMI, PERRLA, conjunctiva and sclera clear  ENMT:  dry tongue & dry MM; 4/4 airway., hirsute chin.  NECK: Supple, No JVD, Normal thyroid  NERVOUS SYSTEM:  above;  w mod gen weakness. min mvt of LE.  CHEST/LUNG: vbs decreased breath sound; clear.  HEART: RRR; No murmurs, rubs, or gallops  ABDOMEN: very obese. Soft, Nontender, Nondistended; Bowel sounds present  EXTREMITIES: , No clubbing, cyanosis, or edema  LYMPH: No lymphadenopathy noted  SKIN: No rashes or lesions      LABS:                          12.1   6.46  )-----------( 139      ( 22 Dec 2022 08:00 )             41.1   12-22    143  |  99  |  18  ----------------------------<  132<H>  4.0   |  38<H>  |  0.5<L>    Ca    8.3<L>      22 Dec 2022 08:00    TPro  5.1<L>  /  Alb  3.3<L>  /  TBili  0.2  /  DBili  x   /  AST  199<H>  /  ALT  529<H>  /  AlkPhos  66  12-21 12-19    143  |  98  |  27<H>  ----------------------------<  92  5.2<H>   |  36<H>  |  1.0    Ca    9.1      19 Dec 2022 19:47  Phos  5.1     12-19  Mg     2.1     12-19    TPro  5.7<L>  /  Alb  3.5  /  TBili  0.3  /  DBili  x   /  AST  795<H>  /  ALT  899<H>  /  AlkPhos  72  12-19                          14.0   14.22 )-----------( 177      ( 19 Dec 2022 13:00 )             47.9         RADIOLOGY & ADDITIONAL TESTS:  ABD sono noted- + cholelithiasis, Normal Liver.    HEALTH ISSUES - PROBLEM Dx:  Acute on chronic Hypercapnic respiratory with encephalopathy; Failure  due to thom,copd,Morbid obesity hypoventilation on bipap,oxygen;      more awake- get f/u ABG; c/w CO2 narcosis- recurrent.  doubt CHF w BNP-in 500's  W AMS- BC done- 1/2 Gr POS cocci- ? contaminant. and UC also- P/S Saravia placed here.- have ID see, and do TOV  COPD on duoneb nebulizer  Seizure disorder on phenobarbital, Topamax  Hypothyroid on levothyroxine  abnormal LFT probably from shock hypoxia, hypotension GI consult noted;  cholelithiasis on sono.  functional quadriplegia   GOC reviewed- wants no trach, no hospice, but continue present Rx of repeated hospitalizations.

## 2022-12-22 NOTE — PHYSICAL THERAPY INITIAL EVALUATION ADULT - GENERAL OBSERVATIONS, REHAB EVAL
18:10-18:35. Chart reviewed; confirmed with RN to see the pt for PT. Pt ready for PT; received in bed with no complain of pain and in NAD; +C-line, +O2 via NC. Agreeable for PT evaluation.

## 2022-12-22 NOTE — CHART NOTE - NSCHARTNOTEFT_GEN_A_CORE
Patient seen and examined at bedside.  SpO2 100% on 4L NC.  Pt offers no new complaints.   - Bcx from 12/19 growing GPC in clusters. Although could be contaminated with skin mary lou, pt was started on Cleocin (has penicillin allergy) overnight. F/u final cultures.  - Ucx from 12/20 growing Proteus. Will f/u final urine cx  - ID consult  - Will try to wean off oxygen  - PT consult  Results of Labs discussed as well as patient's plan.    All patient's questions answered.      Will d/w the Attending Patient seen and examined at bedside.  SpO2 100% on 4L NC.  Pt offers no new complaints.   - Bcx from 12/19 growing GPC in clusters. Although could be contaminated with skin mary lou, pt was started on Cleocin (has penicillin allergy) overnight. F/u final cultures.  - Ucx from 12/20 growing Proteus. Will f/u final urine cx  - ID consult  - Will try to wean off oxygen  - PT consult  - will dc tele as per Dr. Lovelace  - repeat ABG  Results of Labs discussed as well as patient's plan.    All patient's questions answered.      Will d/w the Attending Patient seen and examined at bedside.  SpO2 100% on 4L NC.  Pt offers no new complaints.   - Bcx from 12/19 growing GPC in clusters. Although could be contaminated with skin mary lou, pt was started on Cleocin (has penicillin allergy) overnight. F/u final cultures.  - Ucx from 12/20 growing Proteus. Will f/u final urine cx and do TOV today.  - ID consult  - Will try to wean off oxygen  - PT consult  - will dc tele  - repeat ABG  Results of Labs discussed as well as patient's plan.    All patient's questions answered.      Case discussed with Dr. Lovelace

## 2022-12-22 NOTE — CHART NOTE - NSCHARTNOTEFT_GEN_A_CORE
Called for positive blood culture, GPC in clusters. Although could be a contaminant will start Cleocin (has penicillin allergy) pending further information and PMD input

## 2022-12-23 ENCOUNTER — TRANSCRIPTION ENCOUNTER (OUTPATIENT)
Age: 67
End: 2022-12-23

## 2022-12-23 LAB
APPEARANCE UR: CLEAR — SIGNIFICANT CHANGE UP
BILIRUB UR-MCNC: NEGATIVE — SIGNIFICANT CHANGE UP
COLOR SPEC: YELLOW — SIGNIFICANT CHANGE UP
DIFF PNL FLD: ABNORMAL
GLUCOSE UR QL: NEGATIVE MG/DL — SIGNIFICANT CHANGE UP
KETONES UR-MCNC: NEGATIVE — SIGNIFICANT CHANGE UP
LEUKOCYTE ESTERASE UR-ACNC: ABNORMAL
NITRITE UR-MCNC: NEGATIVE — SIGNIFICANT CHANGE UP
PH UR: 8.5 — SIGNIFICANT CHANGE UP (ref 5–8)
PROT UR-MCNC: ABNORMAL MG/DL
SP GR SPEC: 1.01 — SIGNIFICANT CHANGE UP (ref 1.01–1.03)
UROBILINOGEN FLD QL: 0.2 MG/DL — SIGNIFICANT CHANGE UP

## 2022-12-23 PROCEDURE — 36415 COLL VENOUS BLD VENIPUNCTURE: CPT

## 2022-12-23 PROCEDURE — 76937 US GUIDE VASCULAR ACCESS: CPT | Mod: 26

## 2022-12-23 RX ORDER — MEROPENEM 1 G/30ML
1000 INJECTION INTRAVENOUS EVERY 12 HOURS
Refills: 0 | Status: DISCONTINUED | OUTPATIENT
Start: 2022-12-23 | End: 2022-12-28

## 2022-12-23 RX ORDER — MEROPENEM 1 G/30ML
INJECTION INTRAVENOUS
Refills: 0 | Status: DISCONTINUED | OUTPATIENT
Start: 2022-12-23 | End: 2022-12-28

## 2022-12-23 RX ORDER — MEROPENEM 1 G/30ML
1000 INJECTION INTRAVENOUS ONCE
Refills: 0 | Status: COMPLETED | OUTPATIENT
Start: 2022-12-23 | End: 2022-12-23

## 2022-12-23 RX ADMIN — CITALOPRAM 20 MILLIGRAM(S): 10 TABLET, FILM COATED ORAL at 11:38

## 2022-12-23 RX ADMIN — Medication 3 MILLIGRAM(S): at 21:33

## 2022-12-23 RX ADMIN — Medication 1 TABLET(S): at 11:38

## 2022-12-23 RX ADMIN — MEROPENEM 100 MILLIGRAM(S): 1 INJECTION INTRAVENOUS at 17:53

## 2022-12-23 RX ADMIN — Medication 32.4 MILLIGRAM(S): at 21:27

## 2022-12-23 RX ADMIN — MONTELUKAST 10 MILLIGRAM(S): 4 TABLET, CHEWABLE ORAL at 21:25

## 2022-12-23 RX ADMIN — Medication 3 MILLILITER(S): at 14:14

## 2022-12-23 RX ADMIN — Medication 3 MILLILITER(S): at 09:12

## 2022-12-23 RX ADMIN — Medication 32.4 MILLIGRAM(S): at 05:52

## 2022-12-23 RX ADMIN — MEROPENEM 100 MILLIGRAM(S): 1 INJECTION INTRAVENOUS at 11:48

## 2022-12-23 RX ADMIN — Medication 100 MILLIGRAM(S): at 14:51

## 2022-12-23 RX ADMIN — Medication 100 MILLIGRAM(S): at 21:25

## 2022-12-23 RX ADMIN — Medication 100 MILLIGRAM(S): at 05:52

## 2022-12-23 RX ADMIN — Medication 32.4 MILLIGRAM(S): at 14:02

## 2022-12-23 RX ADMIN — Medication 3 MILLILITER(S): at 20:39

## 2022-12-23 RX ADMIN — Medication 50 MICROGRAM(S): at 05:52

## 2022-12-23 NOTE — CHART NOTE - NSCHARTNOTEFT_GEN_A_CORE
Patient reported that she was comfortable.  Patient may be discharged back to [facility] today.  Patient discussed that she is looking forward to getting back to SNF.

## 2022-12-23 NOTE — CHART NOTE - NSCHARTNOTEFT_GEN_A_CORE
Will not be able to place a midline for this pt. Pt has a central line in RIJ.  D/w Dr Dolan would like to keep the pt in the hospital for IV meropenem therapy until Tuesday.  dx- MDR UTI

## 2022-12-23 NOTE — DISCHARGE NOTE PROVIDER - NSDCMRMEDTOKEN_GEN_ALL_CORE_FT
biotin 5 mg oral capsule: 1 cap(s) orally once a day  citalopram 20 mg oral tablet: 1 tab(s) orally every 24 hours  Desyrel 50 mg oral tablet: 2 tab(s) orally once a day (at bedtime)  DuoNeb 0.5 mg-2.5 mg/3 mL inhalation solution: 3 milliliter(s) inhaled 3 times a day  levothyroxine 50 mcg (0.05 mg) oral tablet: 1 tab(s) orally every 24 hours  lidocaine 4% patch: Apply topically to affected area once a day  MiraLax oral powder for reconstitution: 17 gram(s) orally once a day  Mirapex 0.5 mg oral tablet: 1 tab(s) orally once a day (at bedtime)  Multiple Vitamins with Minerals oral capsule: 1 cap(s) orally once a day  Nizoral A-D 1% topical shampoo: Apply topically to affected area every 3 days  nystatin 100,000 units/g topical cream (obsolete): Apply topically to affected area 2 times a day under bilateral breast and under left arm  PHENobarbital 32.4 mg oral tablet: 1 tab(s) orally 3 times a day  Senna Plus 50 mg-8.6 mg oral tablet: 2 tab(s) orally once a day (at bedtime)  Singulair 10 mg oral tablet: 1 tab(s) orally once a day (at bedtime)  Topamax 100 mg oral tablet: 1 tab(s) orally 3 times a day  Tylenol 500 mg oral tablet: 2 tab(s) orally every 8 hours, As Needed  Urocit-K 15 mEq oral tablet, extended release: 1 tab(s) orally once a day   biotin 5 mg oral capsule: 1 cap(s) orally once a day  citalopram 20 mg oral tablet: 1 tab(s) orally every 24 hours  Desyrel 50 mg oral tablet: 2 tab(s) orally once a day (at bedtime)  DuoNeb 0.5 mg-2.5 mg/3 mL inhalation solution: 3 milliliter(s) inhaled 3 times a day  ertapenem 1 g injection: 1 gram(s) injectable once a day  levothyroxine 50 mcg (0.05 mg) oral tablet: 1 tab(s) orally every 24 hours  lidocaine 4% patch: Apply topically to affected area once a day  Macrobid 100 mg oral capsule: 1 cap(s) orally 2 times a day  MiraLax oral powder for reconstitution: 17 gram(s) orally once a day  Mirapex 0.5 mg oral tablet: 1 tab(s) orally once a day (at bedtime)  Multiple Vitamins with Minerals oral capsule: 1 cap(s) orally once a day  Nizoral A-D 1% topical shampoo: Apply topically to affected area every 3 days  nystatin 100,000 units/g topical cream (obsolete): Apply topically to affected area 2 times a day under bilateral breast and under left arm  PHENobarbital 32.4 mg oral tablet: 1 tab(s) orally 3 times a day  Senna Plus 50 mg-8.6 mg oral tablet: 2 tab(s) orally once a day (at bedtime)  Singulair 10 mg oral tablet: 1 tab(s) orally once a day (at bedtime)  Topamax 100 mg oral tablet: 1 tab(s) orally 3 times a day  Tylenol 500 mg oral tablet: 2 tab(s) orally every 8 hours, As Needed  Urocit-K 15 mEq oral tablet, extended release: 1 tab(s) orally once a day   biotin 5 mg oral capsule: 1 cap(s) orally once a day  citalopram 20 mg oral tablet: 1 tab(s) orally every 24 hours  Desyrel 50 mg oral tablet: 2 tab(s) orally once a day (at bedtime)  DuoNeb 0.5 mg-2.5 mg/3 mL inhalation solution: 3 milliliter(s) inhaled 3 times a day  enoxaparin: 40 milligram(s) subcutaneous once a day  furosemide 40 mg oral tablet: 1 tab(s) orally once a day  levothyroxine 50 mcg (0.05 mg) oral tablet: 1 tab(s) orally every 24 hours  lidocaine 4% patch: Apply topically to affected area once a day  Macrobid 100 mg oral capsule: 1 cap(s) orally 2 times a day  Mirapex 0.5 mg oral tablet: 1 tab(s) orally once a day (at bedtime)  Multiple Vitamins with Minerals oral capsule: 1 cap(s) orally once a day  Nizoral A-D 1% topical shampoo: Apply topically to affected area every 3 days  nystatin 100,000 units/g topical cream (obsolete): Apply topically to affected area 2 times a day under bilateral breast and under left arm  PHENobarbital 32.4 mg oral tablet: 1 tab(s) orally 3 times a day  Singulair 10 mg oral tablet: 1 tab(s) orally once a day (at bedtime)  Topamax 100 mg oral tablet: 1 tab(s) orally 3 times a day  Tylenol 500 mg oral tablet: 2 tab(s) orally every 8 hours, As Needed  Urocit-K 15 mEq oral tablet, extended release: 1 tab(s) orally once a day

## 2022-12-23 NOTE — DISCHARGE NOTE PROVIDER - CARE PROVIDER_API CALL
Maribell Mendoza)  Internal Medicine  [address] [place] [address]  Phone: [phone]  Fax: [phone]  Follow Up Time: 1 week

## 2022-12-23 NOTE — CONSULT NOTE ADULT - SUBJECTIVE AND OBJECTIVE BOX
JACOB KOTHARI  67y, Female  Allergy: Compazine (Unknown)  latex (Urticaria)  penicillins (Unknown)      CHIEF COMPLAINT: Acute on Chronic Hypercapnic Respiratory Failure (23 Dec 2022 08:19)      LOS  4d    HPI:  68yo F with PMHx of multiple admissions for hypercapnic respiratory failure, THOM, Asthma/COPD not on home O2, morbid obesity, epilepsy, hypothyroidism, OA, kidney stones, L radial artery occlusion 2021 (previously on eliquis) who presents from [facility] due to respiratory distress. History limited as patient on BIPAP. Per collateral from ED staff and nursing home records, patient was noted to be in respiratory distress and minimally responsive. Presented to the ED, with O2 sats on 70s and minimally responsive to verbal and tactile stimuli. Placed on BIPAP 15/8. Labs were n/f WBC 14, K 6.6 (hemolyzed) , , ABG 7.2/118. COVID/Flu/RSV negative. CXR shows unchanged bilateral opacities. She was given hypeK cocktail of Regula insulin, dextrose, Ca gluconate and albuterol. Additionally given IV lasix 40mg for suspected fluid overload. Evaluated by critical care who deemed stable for floor, with close monitoring of labs and blood gas after BIPAP 4hrs on and 4hrs off. She is admitted to [facility] for further management and workup     At time of my interview, patient stable HR: 64, BP: 102/82, 100% on BIPAP 15/8. She is awake with, eyes open and responding to questions. AAOx3 and answering questions appropriately. Able to tell me that she has no pain, or any other spontaneous complaints. Understands she is in the hospital due to her respiratory status.     Of note, she was recently admitted to [facility] from 12/5/22-12/10/22 for similar presentation after being found minimally responsive - including to physical stimuli from sternal rub, and brought to the ED. She was admitted to the medicine floor for acute on chronic hypercapnic/hypoxemic respiratory failure. She was treated with nebs, solumedrol and AVAPS NIV q4h on and off with improvement in her respiratory and mental status. Pt was initially suspected to have an aspiration pneumonia and was placed on ceftriaxone and flagyl. Infectious disease was consulted, who discontinued flagyl and ceftriaxone after determining that an aspiration pneumonia was unlikely. She completed a 5d course of PO Doxycycline 100mg. Eventually discharged back to [facility]          (19 Dec 2022 16:06)      INFECTIOUS DISEASE HISTORY:  History as above.  Presents for acute on chronic hypercapnic respiratory failure.   ID consulted for Urine Cx 12/20 growing ESBL   She has not been started on meropenem until today.   She denies any dysuria or hematuria.   Blood Cx 12/19 Staph epi in one culture.       PAST MEDICAL & SURGICAL HISTORY:  Diabetes      Renal stone      Renal failure      Epilepsy      Asthma      Chronic cough      Knee pain      Osteoarthritis      Uterine cancer  s/p hysterectomy      H/O abdominal hysterectomy  NO RADIATION AND CHEMO      History of tonsillectomy  13 years old      [place] teeth removed      History of bladder stone  SURGERY 8/3/2018      History of surgery  JJ STENT PLACEMENT LEFT OCTOBER 2017          FAMILY HISTORY  No pertinent family history in first degree relatives        SOCIAL HISTORY  Social History:  From [facility] (19 Dec 2022 16:06)        ROS  General: Denies rigors, nightsweats  HEENT: Denies headache, rhinorrhea, sore throat, eye pain  CV: Denies CP, palpitations  PULM: Denies wheezing, hemoptysis  GI: Denies hematemesis, hematochezia, melena  : Denies discharge, hematuria  MSK: Denies arthralgias, myalgias  SKIN: Denies rash, lesions  NEURO: Denies paresthesias, weakness  PSYCH: Denies depression, anxiety    VITALS:  T(F): 95.1, Max: 98 (12-22-22 @ 23:38)  HR: 64  BP: 109/54  RR: 16Vital Signs Last 24 Hrs  T(C): 35.1 (23 Dec 2022 13:57), Max: 36.7 (22 Dec 2022 23:38)  T(F): 95.1 (23 Dec 2022 13:57), Max: 98 (22 Dec 2022 23:38)  HR: 64 (23 Dec 2022 13:57) (58 - 71)  BP: 109/54 (23 Dec 2022 13:57) (103/49 - 114/56)  BP(mean): --  RR: 16 (23 Dec 2022 04:43) (16 - 16)  SpO2: 98% (22 Dec 2022 23:38) (98% - 100%)    Parameters below as of 22 Dec 2022 23:38  Patient On (Oxygen Delivery Method): nasal cannula  O2 Flow (L/min): 3      PHYSICAL EXAM:  Gen: NAD, resting in bed  HEENT: Normocephalic, atraumatic  Neck: supple, no lymphadenopathy  CV: Regular rate & regular rhythm  Lungs: decreased BS at bases, no fremitus  Abdomen: Soft, BS present  Ext: Warm, well perfused  Neuro: non focal, awake  Skin: no rash, no erythema  Lines: no phlebitis    TESTS & MEASUREMENTS:                        12.1   6.46  )-----------( 139      ( 22 Dec 2022 08:00 )             41.1     12-22    143  |  99  |  18  ----------------------------<  132<H>  4.0   |  38<H>  |  0.5<L>    Ca    8.3<L>      22 Dec 2022 08:00    TPro  5.0<L>  /  Alb  3.1<L>  /  TBili  0.4  /  DBili  <0.2  /  AST  90<H>  /  ALT  375<H>  /  AlkPhos  66  12-22      LIVER FUNCTIONS - ( 22 Dec 2022 13:32 )  Alb: 3.1 g/dL / Pro: 5.0 g/dL / ALK PHOS: 66 U/L / ALT: 375 U/L / AST: 90 U/L / GGT: x               Culture - Urine (collected 12-20-22 @ 02:15)  Source: Clean Catch Clean Catch (Midstream)  Final Report (12-22-22 @ 19:17):    50,000 - 99,000 CFU/mL Proteus mirabilis ESBL    >100,000 CFU/ml Enterococcus faecalis  Organism: Enterococcus faecalis  Proteus mirabilis ESBL (12-22-22 @ 19:17)  Organism: Proteus mirabilis ESBL (12-22-22 @ 19:17)      -  Amikacin: S <=16      -  Amoxicillin/Clavulanic Acid: S <=8/4      -  Ampicillin: R >16 These ampicillin results predict results for amoxicillin      -  Ampicillin/Sulbactam: S <=4/2 Enterobacter, Klebsiella aerogenes, Citrobacter, and Serratia may develop resistance during prolonged therapy (3-4 days)      -  Aztreonam: R <=4      -  Cefazolin: R >16 For uncomplicated UTI with K. pneumoniae, E. coli, or P. mirablis: MARTHA <=16 is sensitive and MARTHA >=32 is resistant. This also predicts results for oral agents cefaclor, cefdinir, cefpodoxime, cefprozil, cefuroxime axetil, cephalexin and locarbef for uncomplicated UTI. Note that some isolates may be susceptible to these agents while testing resistant to cefazolin.      -  Cefepime: R >16      -  Ceftriaxone: R >32 Enterobacter, Klebsiella aerogenes, Citrobacter, and Serratia may develop resistance during prolonged therapy      -  Cefuroxime: R >16      -  Ciprofloxacin: R >2      -  Ertapenem: S <=0.5      -  Gentamicin: S <=2      -  Levofloxacin: R 4      -  Meropenem: S <=1      -  Nitrofurantoin: R >64 Should not be used to treat pyelonephritis      -  Piperacillin/Tazobactam: S <=8      -  Tobramycin: S <=2      -  Trimethoprim/Sulfamethoxazole: R >2/38      Method Type: MARTHA  Organism: Enterococcus faecalis (12-22-22 @ 19:17)      -  Ampicillin: S <=2 Predicts results to ampicillin/sulbactam, amoxacillin-clavulanate and  piperacillin-tazobactam.      -  Ciprofloxacin: R >2      -  Levofloxacin: R >4      -  Nitrofurantoin: S <=32 Should not be used to treat pyelonephritis.      -  Tetracycline: R >8      -  Vancomycin: S 2      Method Type: MARTHA    Culture - Blood (collected 12-19-22 @ 13:00)  Source: .Blood Blood-Peripheral  Gram Stain (12-21-22 @ 21:55):    Growth in aerobic bottle: Gram Positive Cocci in Clusters  Final Report (12-22-22 @ 13:58):    Growth in aerobic bottle: Staphylococcus epidermidis    Coag Negative Staphylococcus    Single set isolate, possible contaminant. Contact    Microbiology if susceptibility testing clinically    indicated.    ***Blood Panel PCR results on this specimen are available    approximately 3 hours after the Gram stain result.***    Gram stain, PCR, and/or culture results may not always    correspond due to difference in methodologies.    ************************************************************    This PCR assay was performed by multiplex PCR. This    Assay tests for 66 bacterial and resistance gene targets.    Please refer to the [facility] test directory    at [email or web address] for details.  Organism: Blood Culture PCR (12-22-22 @ 13:58)  Organism: Blood Culture PCR (12-22-22 @ 13:58)      -  Staphylococcus epidermidis, Methicillin resistant: Detec      Method Type: PCR    Culture - Blood (collected 12-19-22 @ 13:00)  Source: .Blood Blood-Peripheral  Preliminary Report (12-21-22 @ 03:02):    No growth to date.            INFECTIOUS DISEASES TESTING  Procalcitonin, Serum: 0.10 ng/mL (12-21-22 @ 14:20)  COVID-19 PCR: NotDetec (12-09-22 @ 16:28)  Procalcitonin, Serum: 0.06 ng/mL (12-08-22 @ 11:59)  Procalcitonin, Serum: 0.06 ng/mL (12-06-22 @ 06:11)  COVID-19 PCR: NotDetec (11-25-22 @ 14:00)  Procalcitonin, Serum: 0.08 ng/mL (11-24-22 @ 06:28)  Procalcitonin, Serum: 0.07 ng/mL (11-22-22 @ 06:25)  Procalcitonin, Serum: 0.04 ng/mL (11-21-22 @ 21:53)      RADIOLOGY & ADDITIONAL TESTS:  I have personally reviewed the last Chest xray  CXR      CT      CARDIOLOGY TESTING  12 Lead ECG:   Ventricular Rate 51 BPM    Atrial Rate 51 BPM    P-R Interval 134 ms    QRS Duration 76 ms    Q-T Interval 408 ms    QTC Calculation(Bazett) 376 ms    P Axis 75 degrees    R Axis 19 degrees    T Axis 23 degrees    Diagnosis Line Sinus bradycardia  Poor R wave progression  Nonspecific ST-T changes  Confirmed by DEANN YUAN MD ([id number]) on 12/20/2022 9:34:38 AM (12-20-22 @ 07:55)  12 Lead ECG:   Ventricular Rate 107 BPM    Atrial Rate 105 BPM    P-R Interval 182 ms    QRS Duration 76 ms    Q-T Interval 398 ms    QTC Calculation(Bazett) 531 ms    P Axis 20 degrees    R Axis 64 degrees    T Axis 42 degrees    Diagnosis Line Undetermined rhythm  Cannot rule out Anterior infarct , age undetermined  Prolonged QT  Abnormal ECG  Baseline artifact Please repeat  Confirmed by Ricardo Elena ([id number]) on 12/20/2022 1:38:30 PM (12-20-22 @ 06:39)      MEDICATIONS  albuterol/ipratropium for Nebulization 3 Nebulizer every 6 hours  citalopram 20 Oral daily  levothyroxine 50 Oral daily  meropenem  IVPB     meropenem  IVPB 1000 IV Intermittent every 12 hours  montelukast 10 Oral at bedtime  multivitamin/minerals 1 Oral daily  PHENobarbital 32.4 Oral every 8 hours  topiramate 100 Oral <User Schedule>      Weight  Weight (kg): 173.9 (12-20-22 @ 15:46)    ANTIBIOTICS:  meropenem  IVPB      meropenem  IVPB 1000 milliGRAM(s) IV Intermittent every 12 hours      ALLERGIES:  Compazine (Unknown)  latex (Urticaria)  penicillins (Unknown)

## 2022-12-23 NOTE — CONSULT NOTE ADULT - REASON FOR ADMISSION
Acute on Chronic Hypercapnic Respiratory Failure

## 2022-12-23 NOTE — PROCEDURE NOTE - NSUS ED ADDITIONAL DETAIL1 FT
Poor veins, no accessible veins visualized. Pt is morbidly obese, has large arms with large amount of fatty tissue  Using sterile technique, attempted insertion peripheral line in right wrist without use of sono ,1 attempt unsuccessful. Pt tolerated procedure well and no complications.

## 2022-12-23 NOTE — CONSULT NOTE ADULT - ASSESSMENT
ASSESSMENT  66yo F with PMHx of multiple admissions for hypercapnic respiratory failure, THOM, Asthma/COPD not on home O2, morbid obesity, epilepsy, hypothyroidism, OA, kidney stones, L radial artery occlusion 2021 (previously on eliquis) who presents from [facility] due to respiratory distress.    IMPRESSION  #Acute on chronic hypercapnic respiratory failure  #Bacteruria - asymptomatic   - Urine Cx 12/20 ESBL proteus and Enterococcus faecalis   - UA 12/20 contaminated with epithelial cells     #Staph epi bateremia - Blood Cx 12/19 in 1 of 2 sets - likely conaminant     #Morbid obesity   #Obesity BMI (kg/m2): 70.1, 49  #Abx allergy: Compazine (Unknown)      RECOMMENDATIONS  - likely asymptomatic bacteruria and favor monitoring off antibotics   - if continuing meropenem -- would check straight cath UA prior to starting antibiotics -- if WBC<10, would stop antibiotics  - has IJ central line -- ensure line care     Please call or message on Microsoft Teams if with any questions.  Spectra [phone]

## 2022-12-23 NOTE — DISCHARGE NOTE PROVIDER - HOSPITAL COURSE
HEALTH ISSUES - PROBLEM Dx:  Acute on chronic respiratory failure with hypercapnia on bipap at night hx nick,  Morbid obesity,functional quadriplegia need total care  History of seizures on pheno,topamax  Hypothyroid on levothyroid  Palliative care by specialist  COPD change clindamycin to 300mg po q 8 for 5 more days,will d/c to [facility] today  Abnormal LFTs improve   HEALTH ISSUES - PROBLEM Dx:  Acute on chronic respiratory failure with hypercapnia on bipap at night hx nick,  Morbid obesity,functional quadriplegia need total care  History of seizures on pheno,topamax  Hypothyroid on levothyroid  Palliative care by specialist  COPD duoneb  Abnormal LFTs improve  uti add on ertapenem proteus esbl for 1 week   Acute on chronic respiratory failure with hypercapnia on bipap at night ,oxygen at daytime  lymphedema will do venous duplex both legs,lasix  History of seizures on pheno,topamax  morbid obesity,nick,hypoventilation need BIpap she is noncomplaint with that at [facility]  abnormal LFT improved  Palliative care by specialist  UTI ESBL on meropenem Acute on chronic respiratory failure with hypercapnia on bipap at night ,oxygen at daytime  lymphedema will do venous duplex both legs,lasix  History of seizures on pheno,topamax  morbid obesity,nick,hypoventilation need BIpap she is noncomplaint with that at [facility]  abnormal LFT improved  Palliative care by specialist  UTI ESBL on meropenem, finished course

## 2022-12-23 NOTE — DISCHARGE NOTE PROVIDER - NSDCCPCAREPLAN_GEN_ALL_CORE_FT
PRINCIPAL DISCHARGE DIAGNOSIS  Diagnosis: Acute respiratory failure with hypoxia  Assessment and Plan of Treatment: treated and improved  please continue medications as directed  follow up with PCP in 1 week for reassessment      SECONDARY DISCHARGE DIAGNOSES  Diagnosis: Hyperkalemia  Assessment and Plan of Treatment: treated and improved

## 2022-12-23 NOTE — PROGRESS NOTE ADULT - SUBJECTIVE AND OBJECTIVE BOX
JACOB KOTHARI  67y  Female      Patient is a 67y old  Female who presents with a chief complaint of Acute on Chronic Hypercapnic Respiratory Failure (22 Dec 2022 13:47)        REVIEW OF SYSTEMS:  CONSTITUTIONAL: No fever, weight loss, or fatigue  RESPIRATORY: No cough, wheezing, chills or hemoptysis; No shortness of breath  CARDIOVASCULAR: No chest pain, palpitations, dizziness, or leg swelling  GASTROINTESTINAL: No abdominal or epigastric pain. No nausea, vomiting, or hematemesis; No diarrhea or constipation. No melena or hematochezia.  GENITOURINARY: No dysuria, frequency, hematuria,  incontinence+  MUSCULOSKELETAL: No joint pain or swelling; No muscle, back, or extremity pain  PSYCHIATRIC: No depression, anxiety, mood swings, or difficulty sleeping  HEME/LYMPH: No easy bruising, or bleeding gums  ALLERY AND IMMUNOLOGIC: No hives or eczema  FAMILY HISTORY:    T(C): 35.8 (12-23-22 @ 04:43), Max: 36.7 (12-22-22 @ 23:38)  HR: 59 (12-23-22 @ 04:43) (58 - 71)  BP: 105/52 (12-23-22 @ 04:43) (103/49 - 115/56)  RR: 16 (12-23-22 @ 04:43) (16 - 16)  SpO2: 98% (12-22-22 @ 23:38) (96% - 100%)  Wt(kg): --Vital Signs Last 24 Hrs  T(C): 35.8 (23 Dec 2022 04:43), Max: 36.7 (22 Dec 2022 23:38)  T(F): 96.5 (23 Dec 2022 04:43), Max: 98 (22 Dec 2022 23:38)  HR: 59 (23 Dec 2022 04:43) (58 - 71)  BP: 105/52 (23 Dec 2022 04:43) (103/49 - 115/56)  BP(mean): --  RR: 16 (23 Dec 2022 04:43) (16 - 16)  SpO2: 98% (22 Dec 2022 23:38) (96% - 100%)    Parameters below as of 22 Dec 2022 23:38  Patient On (Oxygen Delivery Method): nasal cannula  O2 Flow (L/min): 3    Compazine (Unknown)  latex (Urticaria)  penicillins (Unknown)      PHYSICAL EXAM:  GENERAL: NAD,   HEAD:  Atraumatic, Normocephalic  EYES: EOMI, PERRLA, conjunctiva and sclera clear  ENMT: No tonsillar erythema, exudates, or enlargement;  NECK: Supple, No JVD, Normal thyroid  NERVOUS SYSTEM:  Alert   CHEST/LUNG: vbs clear+  HEART: Regular rate and rhythm; No murmurs, rubs, or gallops  ABDOMEN: Soft, Nontender, Nondistended; Bowel sounds present  EXTREMITIES:  , No clubbing, cyanosis, or edema  LYMPH: No lymphadenopathy noted  SKIN: No rashes or lesions      LABS:  12-22    143  |  99  |  18  ----------------------------<  132<H>  4.0   |  38<H>  |  0.5<L>    Ca    8.3<L>      22 Dec 2022 08:00    TPro  5.0<L>  /  Alb  3.1<L>  /  TBili  0.4  /  DBili  <0.2  /  AST  90<H>  /  ALT  375<H>  /  AlkPhos  66  12-22                            12.1   6.46  )-----------( 139      ( 22 Dec 2022 08:00 )             41.1       RADIOLOGY & ADDITIONAL TESTS:    MEDICATION:  albuterol/ipratropium for Nebulization 3 milliLiter(s) Nebulizer every 6 hours  aluminum hydroxide/magnesium hydroxide/simethicone Suspension 30 milliLiter(s) Oral every 4 hours PRN  citalopram 20 milliGRAM(s) Oral daily  clindamycin IVPB      clindamycin IVPB 300 milliGRAM(s) IV Intermittent every 8 hours  levothyroxine 50 MICROGram(s) Oral daily  melatonin 3 milliGRAM(s) Oral at bedtime PRN  montelukast 10 milliGRAM(s) Oral at bedtime  multivitamin/minerals 1 Tablet(s) Oral daily  ondansetron Injectable 4 milliGRAM(s) IV Push every 8 hours PRN  PHENobarbital 32.4 milliGRAM(s) Oral every 8 hours  topiramate 100 milliGRAM(s) Oral <User Schedule>      HEALTH ISSUES - PROBLEM Dx:  Acute on chronic respiratory failure with hypercapnia on bipap at night hx nick,  Morbid obesity,functional quadriplegia need total care  History of seizures on pheno,topamax  Hypothyroid on levothyroid  Palliative care by specialist  COPD change clindamycin to 300mg po q 8 for 5 more days,will d/c to [facility] today  Abnormal LFTs improve             JACOB KOTHARI  67y  Female      Patient is a 67y old  Female who presents with a chief complaint of Acute on Chronic Hypercapnic Respiratory Failure (22 Dec 2022 13:47)        REVIEW OF SYSTEMS:  CONSTITUTIONAL: No fever, weight loss, or fatigue  RESPIRATORY: No cough, wheezing, chills or hemoptysis; No shortness of breath  CARDIOVASCULAR: No chest pain, palpitations, dizziness, or leg swelling  GASTROINTESTINAL: No abdominal or epigastric pain. No nausea, vomiting, or hematemesis; No diarrhea or constipation. No melena or hematochezia.  GENITOURINARY: No dysuria, frequency, hematuria,  incontinence+  MUSCULOSKELETAL: No joint pain or swelling; No muscle, back, or extremity pain  PSYCHIATRIC: No depression, anxiety, mood swings, or difficulty sleeping  HEME/LYMPH: No easy bruising, or bleeding gums  ALLERY AND IMMUNOLOGIC: No hives or eczema  FAMILY HISTORY:    T(C): 35.8 (12-23-22 @ 04:43), Max: 36.7 (12-22-22 @ 23:38)  HR: 59 (12-23-22 @ 04:43) (58 - 71)  BP: 105/52 (12-23-22 @ 04:43) (103/49 - 115/56)  RR: 16 (12-23-22 @ 04:43) (16 - 16)  SpO2: 98% (12-22-22 @ 23:38) (96% - 100%)  Wt(kg): --Vital Signs Last 24 Hrs  T(C): 35.8 (23 Dec 2022 04:43), Max: 36.7 (22 Dec 2022 23:38)  T(F): 96.5 (23 Dec 2022 04:43), Max: 98 (22 Dec 2022 23:38)  HR: 59 (23 Dec 2022 04:43) (58 - 71)  BP: 105/52 (23 Dec 2022 04:43) (103/49 - 115/56)  BP(mean): --  RR: 16 (23 Dec 2022 04:43) (16 - 16)  SpO2: 98% (22 Dec 2022 23:38) (96% - 100%)    Parameters below as of 22 Dec 2022 23:38  Patient On (Oxygen Delivery Method): nasal cannula  O2 Flow (L/min): 3    Compazine (Unknown)  latex (Urticaria)  penicillins (Unknown)      PHYSICAL EXAM:  GENERAL: NAD,   HEAD:  Atraumatic, Normocephalic  EYES: EOMI, PERRLA, conjunctiva and sclera clear  ENMT: No tonsillar erythema, exudates, or enlargement;  NECK: Supple, No JVD, Normal thyroid  NERVOUS SYSTEM:  Alert   CHEST/LUNG: vbs clear+  HEART: Regular rate and rhythm; No murmurs, rubs, or gallops  ABDOMEN: Soft, Nontender, Nondistended; Bowel sounds present  EXTREMITIES:  , No clubbing, cyanosis, or edema  LYMPH: No lymphadenopathy noted  SKIN: No rashes or lesions      LABS:  12-22    143  |  99  |  18  ----------------------------<  132<H>  4.0   |  38<H>  |  0.5<L>    Ca    8.3<L>      22 Dec 2022 08:00    TPro  5.0<L>  /  Alb  3.1<L>  /  TBili  0.4  /  DBili  <0.2  /  AST  90<H>  /  ALT  375<H>  /  AlkPhos  66  12-22                            12.1   6.46  )-----------( 139      ( 22 Dec 2022 08:00 )             41.1       RADIOLOGY & ADDITIONAL TESTS:    MEDICATION:  albuterol/ipratropium for Nebulization 3 milliLiter(s) Nebulizer every 6 hours  aluminum hydroxide/magnesium hydroxide/simethicone Suspension 30 milliLiter(s) Oral every 4 hours PRN  citalopram 20 milliGRAM(s) Oral daily  clindamycin IVPB      clindamycin IVPB 300 milliGRAM(s) IV Intermittent every 8 hours  levothyroxine 50 MICROGram(s) Oral daily  melatonin 3 milliGRAM(s) Oral at bedtime PRN  montelukast 10 milliGRAM(s) Oral at bedtime  multivitamin/minerals 1 Tablet(s) Oral daily  ondansetron Injectable 4 milliGRAM(s) IV Push every 8 hours PRN  PHENobarbital 32.4 milliGRAM(s) Oral every 8 hours  topiramate 100 milliGRAM(s) Oral <User Schedule>      HEALTH ISSUES - PROBLEM Dx:  Acute on chronic respiratory failure with hypercapnia on bipap at night hx nick,  Morbid obesity,functional quadriplegia need total care  History of seizures on pheno,topamax  Hypothyroid on levothyroid  Palliative care by specialist  COPD duoneb   Abnormal LFTs improve  UTI proteus ESBL will give ertapenem 1 g daily for 1 week  blood culture 1/2 staph epi probably contaminated

## 2022-12-23 NOTE — PROCEDURE NOTE - NSICDXPROCEDURE_GEN_ALL_CORE_FT
PROCEDURES:  US vein extremity upper bilateral 23-Dec-2022 10:46:48  Selvin Blank J  
PROCEDURES:  US Doppler guided insertion of central venous catheter 20-Dec-2022 12:16:27  Rao Delvalle

## 2022-12-24 RX ORDER — FUROSEMIDE 40 MG
40 TABLET ORAL DAILY
Refills: 0 | Status: DISCONTINUED | OUTPATIENT
Start: 2022-12-24 | End: 2022-12-28

## 2022-12-24 RX ADMIN — MONTELUKAST 10 MILLIGRAM(S): 4 TABLET, CHEWABLE ORAL at 21:49

## 2022-12-24 RX ADMIN — Medication 40 MILLIGRAM(S): at 11:46

## 2022-12-24 RX ADMIN — Medication 3 MILLILITER(S): at 19:50

## 2022-12-24 RX ADMIN — Medication 32.4 MILLIGRAM(S): at 05:41

## 2022-12-24 RX ADMIN — Medication 100 MILLIGRAM(S): at 13:40

## 2022-12-24 RX ADMIN — CITALOPRAM 20 MILLIGRAM(S): 10 TABLET, FILM COATED ORAL at 11:45

## 2022-12-24 RX ADMIN — Medication 3 MILLILITER(S): at 09:15

## 2022-12-24 RX ADMIN — MEROPENEM 100 MILLIGRAM(S): 1 INJECTION INTRAVENOUS at 17:51

## 2022-12-24 RX ADMIN — Medication 3 MILLILITER(S): at 13:40

## 2022-12-24 RX ADMIN — Medication 100 MILLIGRAM(S): at 21:49

## 2022-12-24 RX ADMIN — Medication 50 MICROGRAM(S): at 05:40

## 2022-12-24 RX ADMIN — Medication 100 MILLIGRAM(S): at 05:41

## 2022-12-24 RX ADMIN — MEROPENEM 100 MILLIGRAM(S): 1 INJECTION INTRAVENOUS at 05:40

## 2022-12-24 RX ADMIN — Medication 1 TABLET(S): at 11:45

## 2022-12-24 RX ADMIN — Medication 3 MILLILITER(S): at 03:33

## 2022-12-24 RX ADMIN — Medication 32.4 MILLIGRAM(S): at 13:40

## 2022-12-24 RX ADMIN — Medication 32.4 MILLIGRAM(S): at 21:50

## 2022-12-24 NOTE — PROGRESS NOTE ADULT - SUBJECTIVE AND OBJECTIVE BOX
JACOB KOTHARI  67y  Female      Patient is a 67y old  Female who presents with a chief complaint of Acute on Chronic Hypercapnic Respiratory Failure (23 Dec 2022 15:07)        REVIEW OF SYSTEMS:  CONSTITUTIONAL: No fever, weight loss, or fatigue  RESPIRATORY: No cough, wheezing, chills or hemoptysis; No shortness of breath  CARDIOVASCULAR: No chest pain, palpitations, dizziness,  leg swelling+  GASTROINTESTINAL: No abdominal or epigastric pain. No nausea, vomiting, or hematemesis; No diarrhea or constipation. No melena or hematochezia.  GENITOURINARY: No dysuria, frequency, hematuria,  incontinence+  MUSCULOSKELETAL: No joint pain or swelling; No muscle, back, or extremity pain  PSYCHIATRIC: No depression, anxiety, mood swings, or difficulty sleeping  HEME/LYMPH: No easy bruising, or bleeding gums  ALLERY AND IMMUNOLOGIC: No hives or eczema  FAMILY HISTORY:    T(C): 36.1 (12-24-22 @ 04:55), Max: 36.1 (12-23-22 @ 21:16)  HR: 87 (12-24-22 @ 04:55) (64 - 87)  BP: 112/54 (12-24-22 @ 04:55) (107/51 - 112/54)  RR: 18 (12-24-22 @ 04:55) (18 - 18)  SpO2: 98% (12-24-22 @ 02:56) (98% - 100%)  Wt(kg): --Vital Signs Last 24 Hrs  T(C): 36.1 (24 Dec 2022 04:55), Max: 36.1 (23 Dec 2022 21:16)  T(F): 96.9 (24 Dec 2022 04:55), Max: 97 (23 Dec 2022 21:16)  HR: 87 (24 Dec 2022 04:55) (64 - 87)  BP: 112/54 (24 Dec 2022 04:55) (107/51 - 112/54)  BP(mean): --  RR: 18 (24 Dec 2022 04:55) (18 - 18)  SpO2: 98% (24 Dec 2022 02:56) (98% - 100%)      Compazine (Unknown)  latex (Urticaria)  penicillins (Unknown)      PHYSICAL EXAM:  GENERAL: NAD,   HEAD:  Atraumatic, Normocephalic  EYES: EOMI, PERRLA, conjunctiva and sclera clear  ENMT: No tonsillar erythema, exudates, or enlargement;   NECK: Supple, No JVD, Normal thyroid  NERVOUS SYSTEM:  Alert   CHEST/LUNG: vbs decreased breath sound  HEART: Regular rate and rhythm; No murmurs, rubs, or gallops  ABDOMEN: Soft, Nontender, Nondistended; Bowel sounds present  EXTREMITIES:  , No clubbing, cyanosis, bilateral leg+ edema  LYMPH: No lymphadenopathy noted  SKIN: No rashes or lesions      LABS:      TPro  5.0<L>  /  Alb  3.1<L>  /  TBili  0.4  /  DBili  <0.2  /  AST  90<H>  /  ALT  375<H>  /  AlkPhos  66  12-22          RADIOLOGY & ADDITIONAL TESTS:    MEDICATION:  albuterol/ipratropium for Nebulization 3 milliLiter(s) Nebulizer every 6 hours  aluminum hydroxide/magnesium hydroxide/simethicone Suspension 30 milliLiter(s) Oral every 4 hours PRN  citalopram 20 milliGRAM(s) Oral daily  furosemide    Tablet 40 milliGRAM(s) Oral daily  levothyroxine 50 MICROGram(s) Oral daily  melatonin 3 milliGRAM(s) Oral at bedtime PRN  meropenem  IVPB      meropenem  IVPB 1000 milliGRAM(s) IV Intermittent every 12 hours  montelukast 10 milliGRAM(s) Oral at bedtime  multivitamin/minerals 1 Tablet(s) Oral daily  ondansetron Injectable 4 milliGRAM(s) IV Push every 8 hours PRN  PHENobarbital 32.4 milliGRAM(s) Oral every 8 hours  topiramate 100 milliGRAM(s) Oral <User Schedule>      HEALTH ISSUES - PROBLEM Dx:  Acute on chronic respiratory failure with hypercapnia on bipap,oxygen   UTI proteus ESBL on meropenem  blood culture probably contaminated  History of seizures on pheno,topamax  Morbid obesity,functional qquadriplgia need total care  Hypothyroid on levothyroid    Palliative care by specialist    Abnormal LFTs improving

## 2022-12-25 LAB
ANION GAP SERPL CALC-SCNC: 5 MMOL/L — LOW (ref 7–14)
BUN SERPL-MCNC: 12 MG/DL — SIGNIFICANT CHANGE UP (ref 10–20)
CALCIUM SERPL-MCNC: 8.3 MG/DL — LOW (ref 8.4–10.5)
CHLORIDE SERPL-SCNC: 98 MMOL/L — SIGNIFICANT CHANGE UP (ref 98–110)
CO2 SERPL-SCNC: 38 MMOL/L — HIGH (ref 17–32)
CREAT SERPL-MCNC: 0.5 MG/DL — LOW (ref 0.7–1.5)
CULTURE RESULTS: SIGNIFICANT CHANGE UP
CULTURE RESULTS: SIGNIFICANT CHANGE UP
EGFR: 103 ML/MIN/1.73M2 — SIGNIFICANT CHANGE UP
GLUCOSE SERPL-MCNC: 143 MG/DL — HIGH (ref 70–99)
HCT VFR BLD CALC: 40.3 % — SIGNIFICANT CHANGE UP (ref 37–47)
HGB BLD-MCNC: 12 G/DL — SIGNIFICANT CHANGE UP (ref 12–16)
MCHC RBC-ENTMCNC: 29.8 G/DL — LOW (ref 32–37)
MCHC RBC-ENTMCNC: 31.2 PG — HIGH (ref 27–31)
MCV RBC AUTO: 104.7 FL — HIGH (ref 81–99)
NRBC # BLD: 0 /100 WBCS — SIGNIFICANT CHANGE UP (ref 0–0)
PLATELET # BLD AUTO: 100 K/UL — LOW (ref 130–400)
POTASSIUM SERPL-MCNC: 4.1 MMOL/L — SIGNIFICANT CHANGE UP (ref 3.5–5)
POTASSIUM SERPL-SCNC: 4.1 MMOL/L — SIGNIFICANT CHANGE UP (ref 3.5–5)
RBC # BLD: 3.85 M/UL — LOW (ref 4.2–5.4)
RBC # FLD: 14.9 % — HIGH (ref 11.5–14.5)
SODIUM SERPL-SCNC: 141 MMOL/L — SIGNIFICANT CHANGE UP (ref 135–146)
SPECIMEN SOURCE: SIGNIFICANT CHANGE UP
SPECIMEN SOURCE: SIGNIFICANT CHANGE UP
WBC # BLD: 5.77 K/UL — SIGNIFICANT CHANGE UP (ref 4.8–10.8)
WBC # FLD AUTO: 5.77 K/UL — SIGNIFICANT CHANGE UP (ref 4.8–10.8)

## 2022-12-25 RX ORDER — ENOXAPARIN SODIUM 100 MG/ML
40 INJECTION SUBCUTANEOUS EVERY 24 HOURS
Refills: 0 | Status: DISCONTINUED | OUTPATIENT
Start: 2022-12-25 | End: 2022-12-28

## 2022-12-25 RX ADMIN — MEROPENEM 100 MILLIGRAM(S): 1 INJECTION INTRAVENOUS at 05:38

## 2022-12-25 RX ADMIN — Medication 100 MILLIGRAM(S): at 13:39

## 2022-12-25 RX ADMIN — MONTELUKAST 10 MILLIGRAM(S): 4 TABLET, CHEWABLE ORAL at 22:15

## 2022-12-25 RX ADMIN — Medication 32.4 MILLIGRAM(S): at 22:15

## 2022-12-25 RX ADMIN — Medication 100 MILLIGRAM(S): at 22:15

## 2022-12-25 RX ADMIN — Medication 100 MILLIGRAM(S): at 05:33

## 2022-12-25 RX ADMIN — Medication 40 MILLIGRAM(S): at 09:11

## 2022-12-25 RX ADMIN — Medication 32.4 MILLIGRAM(S): at 13:39

## 2022-12-25 RX ADMIN — Medication 50 MICROGRAM(S): at 05:32

## 2022-12-25 RX ADMIN — Medication 32.4 MILLIGRAM(S): at 05:35

## 2022-12-25 RX ADMIN — Medication 3 MILLILITER(S): at 19:51

## 2022-12-25 RX ADMIN — Medication 1 TABLET(S): at 12:05

## 2022-12-25 RX ADMIN — ENOXAPARIN SODIUM 40 MILLIGRAM(S): 100 INJECTION SUBCUTANEOUS at 12:07

## 2022-12-25 RX ADMIN — Medication 3 MILLILITER(S): at 08:21

## 2022-12-25 RX ADMIN — Medication 3 MILLILITER(S): at 14:03

## 2022-12-25 RX ADMIN — CITALOPRAM 20 MILLIGRAM(S): 10 TABLET, FILM COATED ORAL at 12:05

## 2022-12-25 RX ADMIN — MEROPENEM 100 MILLIGRAM(S): 1 INJECTION INTRAVENOUS at 17:37

## 2022-12-25 NOTE — PROGRESS NOTE ADULT - SUBJECTIVE AND OBJECTIVE BOX
JACOB KOTHARI  67y  Female      Patient is a 67y old  Female who presents with a chief complaint of Acute on Chronic Hypercapnic Respiratory Failure (24 Dec 2022 08:36)        REVIEW OF SYSTEMS:  CONSTITUTIONAL: No fever, weight loss, or fatigue  RESPIRATORY: No cough, wheezing, chills or hemoptysis; shortness of breath+  CARDIOVASCULAR: No chest pain, palpitations, dizziness,  leg swelling+  GASTROINTESTINAL: No abdominal or epigastric pain. No nausea, vomiting, or hematemesis; No diarrhea or constipation. No melena or hematochezia.  GENITOURINARY: No dysuria, frequency, hematuria,  incontinence+  MUSCULOSKELETAL: No joint pain or swelling; No muscle, back, or extremity pain  PSYCHIATRIC: No depression, anxiety, mood swings, or difficulty sleeping  HEME/LYMPH: No easy bruising, or bleeding gums  ALLERY AND IMMUNOLOGIC: No hives or eczema  FAMILY HISTORY:    T(C): 36.8 (12-25-22 @ 04:27), Max: 36.8 (12-25-22 @ 04:27)  HR: 66 (12-25-22 @ 04:27) (63 - 75)  BP: 103/50 (12-25-22 @ 04:27) (103/50 - 126/60)  RR: 18 (12-25-22 @ 04:27) (18 - 18)  SpO2: 98% (12-25-22 @ 03:55) (96% - 98%)  Wt(kg): --Vital Signs Last 24 Hrs  T(C): 36.8 (25 Dec 2022 04:27), Max: 36.8 (25 Dec 2022 04:27)  T(F): 98.2 (25 Dec 2022 04:27), Max: 98.2 (25 Dec 2022 04:27)  HR: 66 (25 Dec 2022 04:27) (63 - 75)  BP: 103/50 (25 Dec 2022 04:27) (103/50 - 126/60)  BP(mean): --  RR: 18 (25 Dec 2022 04:27) (18 - 18)  SpO2: 98% (25 Dec 2022 03:55) (96% - 98%)    Parameters below as of 24 Dec 2022 22:00  Patient On (Oxygen Delivery Method): nasal cannula  O2 Flow (L/min): 2    Compazine (Unknown)  latex (Urticaria)  penicillins (Unknown)      PHYSICAL EXAM:  GENERAL: NAD,   HEAD:  Atraumatic, Normocephalic  EYES: EOMI, PERRLA, conjunctiva and sclera clear  ENMT: No tonsillar erythema, exudates, or enlargement;  NECK: Supple, No JVD, Normal thyroid  NERVOUS SYSTEM:  Alert & Oriented   CHEST/LUNG: vbs bibasilar rales+  HEART: Regular rate and rhythm; No murmurs, rubs, or gallops  ABDOMEN: Soft, Nontender, Nondistended; Bowel sounds present  EXTREMITIES:  , No clubbing, cyanosis,both legs,pedal  edema+  LYMPH: No lymphadenopathy noted  SKIN: No rashes or lesions      LABS:              RADIOLOGY & ADDITIONAL TESTS:    MEDICATION:  albuterol/ipratropium for Nebulization 3 milliLiter(s) Nebulizer every 6 hours  aluminum hydroxide/magnesium hydroxide/simethicone Suspension 30 milliLiter(s) Oral every 4 hours PRN  citalopram 20 milliGRAM(s) Oral daily  enoxaparin Injectable 40 milliGRAM(s) SubCutaneous every 24 hours  furosemide    Tablet 40 milliGRAM(s) Oral daily  levothyroxine 50 MICROGram(s) Oral daily  melatonin 3 milliGRAM(s) Oral at bedtime PRN  meropenem  IVPB      meropenem  IVPB 1000 milliGRAM(s) IV Intermittent every 12 hours  montelukast 10 milliGRAM(s) Oral at bedtime  multivitamin/minerals 1 Tablet(s) Oral daily  ondansetron Injectable 4 milliGRAM(s) IV Push every 8 hours PRN  PHENobarbital 32.4 milliGRAM(s) Oral every 8 hours  topiramate 100 milliGRAM(s) Oral <User Schedule>      HEALTH ISSUES - PROBLEM Dx:  Acute on chronic respiratory failure with hypercapnia on bipap due to nick,copd,hypoventilation duoneb nebulizer  lymphedema on lasix lovenox for prophylaxis  History of seizures on pheno,topamax  functional quadriplegia need total care  UTI on meropenem  Palliative care by specialist  Hypothyroid on levothyroid  Abnormal LFTs improved will repeat

## 2022-12-25 NOTE — CHART NOTE - NSCHARTNOTEFT_GEN_A_CORE
Patient seen and examined at bedside this morning.   Pt is awake and alert. Asking for food and offers no new complaints.   Pt is currently on Meropenem.   Ucx from 12/23 growing gram negative rods. Will f/u final cultures.   Plan d/w pt.  All questions answered.  Patient encouraged to contact PA with any further issues.

## 2022-12-26 LAB
ALBUMIN SERPL ELPH-MCNC: 3.1 G/DL — LOW (ref 3.5–5.2)
ALP SERPL-CCNC: 61 U/L — SIGNIFICANT CHANGE UP (ref 30–115)
ALT FLD-CCNC: 91 U/L — HIGH (ref 0–41)
ANION GAP SERPL CALC-SCNC: 4 MMOL/L — LOW (ref 7–14)
AST SERPL-CCNC: 18 U/L — SIGNIFICANT CHANGE UP (ref 0–41)
BILIRUB DIRECT SERPL-MCNC: <0.2 MG/DL — SIGNIFICANT CHANGE UP (ref 0–0.3)
BILIRUB INDIRECT FLD-MCNC: >0.1 MG/DL — LOW (ref 0.2–1.2)
BILIRUB SERPL-MCNC: 0.3 MG/DL — SIGNIFICANT CHANGE UP (ref 0.2–1.2)
BUN SERPL-MCNC: 12 MG/DL — SIGNIFICANT CHANGE UP (ref 10–20)
CALCIUM SERPL-MCNC: 8.4 MG/DL — SIGNIFICANT CHANGE UP (ref 8.4–10.5)
CHLORIDE SERPL-SCNC: 97 MMOL/L — LOW (ref 98–110)
CO2 SERPL-SCNC: 40 MMOL/L — HIGH (ref 17–32)
CREAT SERPL-MCNC: 0.5 MG/DL — LOW (ref 0.7–1.5)
EGFR: 103 ML/MIN/1.73M2 — SIGNIFICANT CHANGE UP
GLUCOSE SERPL-MCNC: 137 MG/DL — HIGH (ref 70–99)
HCT VFR BLD CALC: 39.8 % — SIGNIFICANT CHANGE UP (ref 37–47)
HGB BLD-MCNC: 12.2 G/DL — SIGNIFICANT CHANGE UP (ref 12–16)
MCHC RBC-ENTMCNC: 30.7 G/DL — LOW (ref 32–37)
MCHC RBC-ENTMCNC: 31.6 PG — HIGH (ref 27–31)
MCV RBC AUTO: 103.1 FL — HIGH (ref 81–99)
NRBC # BLD: 0 /100 WBCS — SIGNIFICANT CHANGE UP (ref 0–0)
PLATELET # BLD AUTO: 92 K/UL — LOW (ref 130–400)
POTASSIUM SERPL-MCNC: 3.5 MMOL/L — SIGNIFICANT CHANGE UP (ref 3.5–5)
POTASSIUM SERPL-SCNC: 3.5 MMOL/L — SIGNIFICANT CHANGE UP (ref 3.5–5)
PROT SERPL-MCNC: 5 G/DL — LOW (ref 6–8)
RBC # BLD: 3.86 M/UL — LOW (ref 4.2–5.4)
RBC # FLD: 14.9 % — HIGH (ref 11.5–14.5)
SODIUM SERPL-SCNC: 141 MMOL/L — SIGNIFICANT CHANGE UP (ref 135–146)
WBC # BLD: 5.08 K/UL — SIGNIFICANT CHANGE UP (ref 4.8–10.8)
WBC # FLD AUTO: 5.08 K/UL — SIGNIFICANT CHANGE UP (ref 4.8–10.8)

## 2022-12-26 PROCEDURE — 93970 EXTREMITY STUDY: CPT | Mod: 26

## 2022-12-26 RX ADMIN — MEROPENEM 100 MILLIGRAM(S): 1 INJECTION INTRAVENOUS at 05:59

## 2022-12-26 RX ADMIN — MONTELUKAST 10 MILLIGRAM(S): 4 TABLET, CHEWABLE ORAL at 21:35

## 2022-12-26 RX ADMIN — MEROPENEM 100 MILLIGRAM(S): 1 INJECTION INTRAVENOUS at 17:02

## 2022-12-26 RX ADMIN — ENOXAPARIN SODIUM 40 MILLIGRAM(S): 100 INJECTION SUBCUTANEOUS at 11:56

## 2022-12-26 RX ADMIN — Medication 3 MILLILITER(S): at 20:04

## 2022-12-26 RX ADMIN — Medication 100 MILLIGRAM(S): at 21:34

## 2022-12-26 RX ADMIN — Medication 1 TABLET(S): at 11:56

## 2022-12-26 RX ADMIN — Medication 3 MILLILITER(S): at 08:30

## 2022-12-26 RX ADMIN — Medication 40 MILLIGRAM(S): at 05:59

## 2022-12-26 RX ADMIN — Medication 100 MILLIGRAM(S): at 11:56

## 2022-12-26 RX ADMIN — Medication 32.4 MILLIGRAM(S): at 11:56

## 2022-12-26 RX ADMIN — Medication 32.4 MILLIGRAM(S): at 05:58

## 2022-12-26 RX ADMIN — Medication 100 MILLIGRAM(S): at 05:59

## 2022-12-26 RX ADMIN — Medication 32.4 MILLIGRAM(S): at 21:34

## 2022-12-26 RX ADMIN — CITALOPRAM 20 MILLIGRAM(S): 10 TABLET, FILM COATED ORAL at 11:56

## 2022-12-26 RX ADMIN — Medication 50 MICROGRAM(S): at 05:59

## 2022-12-26 RX ADMIN — Medication 3 MILLILITER(S): at 15:39

## 2022-12-26 NOTE — PROGRESS NOTE ADULT - SUBJECTIVE AND OBJECTIVE BOX
JACOB KOTHARI  67y  Female      Patient is a 67y old  Female who presents with a chief complaint of Acute on Chronic Hypercapnic Respiratory Failure (25 Dec 2022 08:27)        REVIEW OF SYSTEMS:  CONSTITUTIONAL: No fever, weight loss, or fatigue  RESPIRATORY: No cough, wheezing, chills or hemoptysis; sob+  CARDIOVASCULAR: No chest pain, palpitations, dizziness,  leg swelling+  GASTROINTESTINAL: No abdominal or epigastric pain. No nausea, vomiting, or hematemesis; No diarrhea or constipation. No melena or hematochezia.  GENITOURINARY: No dysuria, frequency, hematuria,  incontinence+  PSYCHIATRIC: No depression, anxiety, mood swings, or difficulty sleeping  HEME/LYMPH: No easy bruising, or bleeding gums  ALLERY AND IMMUNOLOGIC: No hives or eczema  FAMILY HISTORY:    T(C): 36 (12-26-22 @ 05:27), Max: 36.2 (12-25-22 @ 20:54)  HR: 68 (12-26-22 @ 05:27) (68 - 80)  BP: 114/58 (12-26-22 @ 05:27) (109/53 - 119/55)  RR: 16 (12-26-22 @ 05:27) (16 - 18)  SpO2: 96% (12-25-22 @ 09:09) (96% - 96%)  Wt(kg): --Vital Signs Last 24 Hrs  T(C): 36 (26 Dec 2022 05:27), Max: 36.2 (25 Dec 2022 20:54)  T(F): 96.8 (26 Dec 2022 05:27), Max: 97.1 (25 Dec 2022 20:54)  HR: 68 (26 Dec 2022 05:27) (68 - 80)  BP: 114/58 (26 Dec 2022 05:27) (109/53 - 119/55)  BP(mean): --  RR: 16 (26 Dec 2022 05:27) (16 - 18)  SpO2: 96% (25 Dec 2022 09:09) (96% - 96%)    Parameters below as of 25 Dec 2022 09:09  Patient On (Oxygen Delivery Method): nasal cannula  O2 Flow (L/min): 2    Compazine (Unknown)  latex (Urticaria)  penicillins (Unknown)      PHYSICAL EXAM:  GENERAL: NAD,   HEAD:  Atraumatic, Normocephalic  EYES: EOMI, PERRLA, conjunctiva and sclera clear  ENMT: No tonsillar erythema, exudates  NECK: Supple, No JVD, Normal thyroid  NERVOUS SYSTEM:  Alert   CHEST/LUNG: vbs bibasilar rales+  HEART: Regular rate and rhythm; No murmurs, rubs, or gallops  ABDOMEN: Soft, Nontender, Nondistended; Bowel sounds present  EXTREMITIES:  , No clubbing, cyanosis, both legs edema+  LYMPH: No lymphadenopathy noted  SKIN: No rashes or lesions      LABS:  12-25    141  |  98  |  12  ----------------------------<  143<H>  4.1   |  38<H>  |  0.5<L>    Ca    8.3<L>      25 Dec 2022 10:30                          12.0   5.77  )-----------( 100      ( 25 Dec 2022 10:30 )             40.3           RADIOLOGY & ADDITIONAL TESTS:    MEDICATION:  albuterol/ipratropium for Nebulization 3 milliLiter(s) Nebulizer every 6 hours  aluminum hydroxide/magnesium hydroxide/simethicone Suspension 30 milliLiter(s) Oral every 4 hours PRN  citalopram 20 milliGRAM(s) Oral daily  enoxaparin Injectable 40 milliGRAM(s) SubCutaneous every 24 hours  furosemide    Tablet 40 milliGRAM(s) Oral daily  levothyroxine 50 MICROGram(s) Oral daily  melatonin 3 milliGRAM(s) Oral at bedtime PRN  meropenem  IVPB      meropenem  IVPB 1000 milliGRAM(s) IV Intermittent every 12 hours  montelukast 10 milliGRAM(s) Oral at bedtime  multivitamin/minerals 1 Tablet(s) Oral daily  ondansetron Injectable 4 milliGRAM(s) IV Push every 8 hours PRN  PHENobarbital 32.4 milliGRAM(s) Oral every 8 hours  topiramate 100 milliGRAM(s) Oral <User Schedule>      HEALTH ISSUES - PROBLEM Dx:  Acute on chronic respiratory failure with hypercapnia on bipap at night ,oxygen at daytime  lymphedema will do venous duplex both legs,lasix  History of seizures on pheno,topamax  morbid obesity,nick,hypoventilation need BIpap she is noncomplaint with that at [facility]  abnormal LFT improved  Palliative care by specialist  UTI ESBL on meropenem

## 2022-12-27 LAB
ALBUMIN SERPL ELPH-MCNC: 2.9 G/DL — LOW (ref 3.5–5.2)
ALP SERPL-CCNC: 56 U/L — SIGNIFICANT CHANGE UP (ref 30–115)
ALT FLD-CCNC: 63 U/L — HIGH (ref 0–41)
ANION GAP SERPL CALC-SCNC: 4 MMOL/L — LOW (ref 7–14)
AST SERPL-CCNC: 16 U/L — SIGNIFICANT CHANGE UP (ref 0–41)
BILIRUB SERPL-MCNC: 0.3 MG/DL — SIGNIFICANT CHANGE UP (ref 0.2–1.2)
BUN SERPL-MCNC: 11 MG/DL — SIGNIFICANT CHANGE UP (ref 10–20)
CALCIUM SERPL-MCNC: 8.2 MG/DL — LOW (ref 8.4–10.5)
CHLORIDE SERPL-SCNC: 100 MMOL/L — SIGNIFICANT CHANGE UP (ref 98–110)
CO2 SERPL-SCNC: 38 MMOL/L — HIGH (ref 17–32)
CREAT SERPL-MCNC: <0.5 MG/DL — LOW (ref 0.7–1.5)
EGFR: 109 ML/MIN/1.73M2 — SIGNIFICANT CHANGE UP
GLUCOSE SERPL-MCNC: 106 MG/DL — HIGH (ref 70–99)
HCT VFR BLD CALC: 38.4 % — SIGNIFICANT CHANGE UP (ref 37–47)
HGB BLD-MCNC: 11.7 G/DL — LOW (ref 12–16)
MCHC RBC-ENTMCNC: 30.5 G/DL — LOW (ref 32–37)
MCHC RBC-ENTMCNC: 31.3 PG — HIGH (ref 27–31)
MCV RBC AUTO: 102.7 FL — HIGH (ref 81–99)
NRBC # BLD: 0 /100 WBCS — SIGNIFICANT CHANGE UP (ref 0–0)
PLATELET # BLD AUTO: 94 K/UL — LOW (ref 130–400)
POTASSIUM SERPL-MCNC: 3.7 MMOL/L — SIGNIFICANT CHANGE UP (ref 3.5–5)
POTASSIUM SERPL-SCNC: 3.7 MMOL/L — SIGNIFICANT CHANGE UP (ref 3.5–5)
PROT SERPL-MCNC: 4.7 G/DL — LOW (ref 6–8)
RBC # BLD: 3.74 M/UL — LOW (ref 4.2–5.4)
RBC # FLD: 15 % — HIGH (ref 11.5–14.5)
SARS-COV-2 RNA SPEC QL NAA+PROBE: SIGNIFICANT CHANGE UP
SODIUM SERPL-SCNC: 142 MMOL/L — SIGNIFICANT CHANGE UP (ref 135–146)
WBC # BLD: 5.4 K/UL — SIGNIFICANT CHANGE UP (ref 4.8–10.8)
WBC # FLD AUTO: 5.4 K/UL — SIGNIFICANT CHANGE UP (ref 4.8–10.8)

## 2022-12-27 RX ADMIN — Medication 40 MILLIGRAM(S): at 05:13

## 2022-12-27 RX ADMIN — Medication 3 MILLILITER(S): at 03:02

## 2022-12-27 RX ADMIN — MONTELUKAST 10 MILLIGRAM(S): 4 TABLET, CHEWABLE ORAL at 22:09

## 2022-12-27 RX ADMIN — Medication 32.4 MILLIGRAM(S): at 22:09

## 2022-12-27 RX ADMIN — MEROPENEM 100 MILLIGRAM(S): 1 INJECTION INTRAVENOUS at 17:04

## 2022-12-27 RX ADMIN — Medication 100 MILLIGRAM(S): at 05:13

## 2022-12-27 RX ADMIN — Medication 3 MILLILITER(S): at 14:43

## 2022-12-27 RX ADMIN — Medication 1 TABLET(S): at 12:16

## 2022-12-27 RX ADMIN — Medication 32.4 MILLIGRAM(S): at 13:49

## 2022-12-27 RX ADMIN — Medication 100 MILLIGRAM(S): at 13:51

## 2022-12-27 RX ADMIN — Medication 32.4 MILLIGRAM(S): at 05:14

## 2022-12-27 RX ADMIN — Medication 3 MILLILITER(S): at 19:59

## 2022-12-27 RX ADMIN — Medication 50 MICROGRAM(S): at 05:13

## 2022-12-27 RX ADMIN — Medication 100 MILLIGRAM(S): at 22:09

## 2022-12-27 RX ADMIN — ENOXAPARIN SODIUM 40 MILLIGRAM(S): 100 INJECTION SUBCUTANEOUS at 12:16

## 2022-12-27 RX ADMIN — Medication 3 MILLILITER(S): at 09:25

## 2022-12-27 RX ADMIN — CITALOPRAM 20 MILLIGRAM(S): 10 TABLET, FILM COATED ORAL at 12:16

## 2022-12-27 RX ADMIN — MEROPENEM 100 MILLIGRAM(S): 1 INJECTION INTRAVENOUS at 05:15

## 2022-12-27 NOTE — PROGRESS NOTE ADULT - ASSESSMENT
ASSESSMENT  68yo F with PMHx of multiple admissions for hypercapnic respiratory failure, THOM, Asthma/COPD not on home O2, morbid obesity, epilepsy, hypothyroidism, OA, kidney stones, L radial artery occlusion 2021 (previously on eliquis) who presents from [facility] due to respiratory distress.    IMPRESSION  #Acute on chronic hypercapnic respiratory failure  #Bacteruria - asymptomatic -- now reports symptoms?  - Urine Cx 12/20 ESBL proteus and Enterococcus faecalis   - UA 12/20 contaminated with epithelial cells     #Staph epi bateremia - Blood Cx 12/19 in 1 of 2 sets - likely contaminant     #Morbid obesity   #Obesity BMI (kg/m2): 70.1, 49  #Abx allergy: Compazine (Unknown)      RECOMMENDATIONS  - continue meropenem 1g q 8 hours until 12/29       Please call or message on [employer] Teams if with any questions.  Spectra [phone]

## 2022-12-27 NOTE — PROGRESS NOTE ADULT - SUBJECTIVE AND OBJECTIVE BOX
JACOB KOTHARI  67y, Female  Allergy: Compazine (Unknown)  latex (Urticaria)  penicillins (Unknown)      LOS  8d    CHIEF COMPLAINT: Acute on Chronic Hypercapnic Respiratory Failure (27 Dec 2022 08:14)      INTERVAL EVENTS/HPI  - No acute events overnight  - T(F): , Max: 97.7 (12-26-22 @ 14:00)  - Denies any worsening symptoms  - Tolerating medication  - WBC Count: 5.40 (12-27-22 @ 10:25)  WBC Count: 5.08 (12-26-22 @ 09:47)     - Creatinine, Serum: <0.5 (12-27-22 @ 10:25)  Creatinine, Serum: 0.5 (12-26-22 @ 09:47)       ROS  General: Denies rigors, nightsweats  HEENT: Denies headache, rhinorrhea, sore throat, eye pain  CV: Denies CP, palpitations  PULM: Denies wheezing, hemoptysis  GI: Denies hematemesis, hematochezia, melena  : Denies discharge, hematuria  MSK: Denies arthralgias, myalgias  SKIN: Denies rash, lesions  NEURO: Denies paresthesias, weakness  PSYCH: Denies depression, anxiety    VITALS:  T(F): 97.5, Max: 97.7 (12-26-22 @ 14:00)  HR: 73  BP: 108/53  RR: 18Vital Signs Last 24 Hrs  T(C): 36.4 (27 Dec 2022 05:03), Max: 36.5 (26 Dec 2022 14:00)  T(F): 97.5 (27 Dec 2022 05:03), Max: 97.7 (26 Dec 2022 14:00)  HR: 73 (27 Dec 2022 05:03) (69 - 76)  BP: 108/53 (27 Dec 2022 05:03) (99/53 - 115/56)  BP(mean): --  RR: 18 (27 Dec 2022 05:03) (16 - 18)  SpO2: --        PHYSICAL EXAM:  Gen: NAD, resting in bed  HEENT: Normocephalic, atraumatic  Neck: supple, no lymphadenopathy  CV: Regular rate & regular rhythm  Lungs: decreased BS at bases, no fremitus  Abdomen: Soft, BS present  Ext: Warm, well perfused  Neuro: non focal, awake  Skin: no rash, no erythema  Lines: no phlebitis    FH: Non-contributory  Social Hx: Non-contributory    TESTS & MEASUREMENTS:                        11.7   5.40  )-----------( 94       ( 27 Dec 2022 10:25 )             38.4     12-27    142  |  100  |  11  ----------------------------<  106<H>  3.7   |  38<H>  |  <0.5<L>    Ca    8.2<L>      27 Dec 2022 10:25    TPro  4.7<L>  /  Alb  2.9<L>  /  TBili  0.3  /  DBili  x   /  AST  16  /  ALT  63<H>  /  AlkPhos  56  12-27      LIVER FUNCTIONS - ( 27 Dec 2022 10:25 )  Alb: 2.9 g/dL / Pro: 4.7 g/dL / ALK PHOS: 56 U/L / ALT: 63 U/L / AST: 16 U/L / GGT: x               Culture - Urine (collected 12-23-22 @ 16:10)  Source: Catheterized Catheterized  Final Report (12-25-22 @ 14:40):    Culture grew 3 or more types of organisms which indicate    collection contamination; consider recollection only if clinically    indicated.    Culture - Urine (collected 12-20-22 @ 02:15)  Source: Clean Catch Clean Catch (Midstream)  Final Report (12-22-22 @ 19:17):    50,000 - 99,000 CFU/mL Proteus mirabilis ESBL    >100,000 CFU/ml Enterococcus faecalis  Organism: Enterococcus faecalis  Proteus mirabilis ESBL (12-22-22 @ 19:17)  Organism: Proteus mirabilis ESBL (12-22-22 @ 19:17)      -  Amikacin: S <=16      -  Amoxicillin/Clavulanic Acid: S <=8/4      -  Ampicillin: R >16 These ampicillin results predict results for amoxicillin      -  Ampicillin/Sulbactam: S <=4/2 Enterobacter, Klebsiella aerogenes, Citrobacter, and Serratia may develop resistance during prolonged therapy (3-4 days)      -  Aztreonam: R <=4      -  Cefazolin: R >16 For uncomplicated UTI with K. pneumoniae, E. coli, or P. mirablis: MARTHA <=16 is sensitive and MARTHA >=32 is resistant. This also predicts results for oral agents cefaclor, cefdinir, cefpodoxime, cefprozil, cefuroxime axetil, cephalexin and locarbef for uncomplicated UTI. Note that some isolates may be susceptible to these agents while testing resistant to cefazolin.      -  Cefepime: R >16      -  Ceftriaxone: R >32 Enterobacter, Klebsiella aerogenes, Citrobacter, and Serratia may develop resistance during prolonged therapy      -  Cefuroxime: R >16      -  Ciprofloxacin: R >2      -  Ertapenem: S <=0.5      -  Gentamicin: S <=2      -  Levofloxacin: R 4      -  Meropenem: S <=1      -  Nitrofurantoin: R >64 Should not be used to treat pyelonephritis      -  Piperacillin/Tazobactam: S <=8      -  Tobramycin: S <=2      -  Trimethoprim/Sulfamethoxazole: R >2/38      Method Type: MARTHA  Organism: Enterococcus faecalis (12-22-22 @ 19:17)      -  Ampicillin: S <=2 Predicts results to ampicillin/sulbactam, amoxacillin-clavulanate and  piperacillin-tazobactam.      -  Ciprofloxacin: R >2      -  Levofloxacin: R >4      -  Nitrofurantoin: S <=32 Should not be used to treat pyelonephritis.      -  Tetracycline: R >8      -  Vancomycin: S 2      Method Type: MARTHA    Culture - Blood (collected 12-19-22 @ 13:00)  Source: .Blood Blood-Peripheral  Gram Stain (12-21-22 @ 21:55):    Growth in aerobic bottle: Gram Positive Cocci in Clusters  Final Report (12-22-22 @ 13:58):    Growth in aerobic bottle: Staphylococcus epidermidis    Coag Negative Staphylococcus    Single set isolate, possible contaminant. Contact    Microbiology if susceptibility testing clinically    indicated.    ***Blood Panel PCR results on this specimen are available    approximately 3 hours after the Gram stain result.***    Gram stain, PCR, and/or culture results may not always    correspond due to difference in methodologies.    ************************************************************    This PCR assay was performed by multiplex PCR. This    Assay tests for 66 bacterial and resistance gene targets.    Please refer to the [facility] test directory    at [email or web address] for details.  Organism: Blood Culture PCR (12-22-22 @ 13:58)  Organism: Blood Culture PCR (12-22-22 @ 13:58)      -  Staphylococcus epidermidis, Methicillin resistant: Detec      Method Type: PCR    Culture - Blood (collected 12-19-22 @ 13:00)  Source: .Blood Blood-Peripheral  Final Report (12-25-22 @ 03:00):    No Growth Final            INFECTIOUS DISEASES TESTING  Procalcitonin, Serum: 0.10 (12-21-22 @ 14:20)  COVID-19 PCR: NotDetec (12-09-22 @ 16:28)  Procalcitonin, Serum: 0.06 (12-08-22 @ 11:59)  Procalcitonin, Serum: 0.06 (12-06-22 @ 06:11)  COVID-19 PCR: NotDetec (11-25-22 @ 14:00)  Procalcitonin, Serum: 0.08 (11-24-22 @ 06:28)  Rapid RVP Result: Detected (11-22-22 @ 18:27)  Procalcitonin, Serum: 0.07 (11-22-22 @ 06:25)  Procalcitonin, Serum: 0.04 (11-21-22 @ 21:53)      INFLAMMATORY MARKERS      RADIOLOGY & ADDITIONAL TESTS:  I have personally reviewed the last available Chest xray  CXR      CT      CARDIOLOGY TESTING  12 Lead ECG:   Ventricular Rate 51 BPM    Atrial Rate 51 BPM    P-R Interval 134 ms    QRS Duration 76 ms    Q-T Interval 408 ms    QTC Calculation(Bazett) 376 ms    P Axis 75 degrees    R Axis 19 degrees    T Axis 23 degrees    Diagnosis Line Sinus bradycardia  Poor R wave progression  Nonspecific ST-T changes  Confirmed by DEANN YUAN MD ([id number]) on 12/20/2022 9:34:38 AM (12-20-22 @ 07:55)  12 Lead ECG:   Ventricular Rate 107 BPM    Atrial Rate 105 BPM    P-R Interval 182 ms    QRS Duration 76 ms    Q-T Interval 398 ms    QTC Calculation(Bazett) 531 ms    P Axis 20 degrees    R Axis 64 degrees    T Axis 42 degrees    Diagnosis Line Undetermined rhythm  Cannot rule out Anterior infarct , age undetermined  Prolonged QT  Abnormal ECG  Baseline artifact Please repeat  Confirmed by Ricardo Elena ([id number]) on 12/20/2022 1:38:30 PM (12-20-22 @ 06:39)      MEDICATIONS  albuterol/ipratropium for Nebulization 3 Nebulizer every 6 hours  citalopram 20 Oral daily  enoxaparin Injectable 40 SubCutaneous every 24 hours  furosemide    Tablet 40 Oral daily  levothyroxine 50 Oral daily  meropenem  IVPB     meropenem  IVPB 1000 IV Intermittent every 12 hours  montelukast 10 Oral at bedtime  multivitamin/minerals 1 Oral daily  PHENobarbital 32.4 Oral every 8 hours  topiramate 100 Oral <User Schedule>      WEIGHT  Weight (kg): 173.9 (12-20-22 @ 15:46)  Creatinine, Serum: <0.5 mg/dL (12-27-22 @ 10:25)      ANTIBIOTICS:  meropenem  IVPB      meropenem  IVPB 1000 milliGRAM(s) IV Intermittent every 12 hours      All available historical records have been reviewed

## 2022-12-27 NOTE — PROGRESS NOTE ADULT - SUBJECTIVE AND OBJECTIVE BOX
JACOB KOTHARI  67y  Female      Patient is a 67y old  Female who presents with a chief complaint of Acute on Chronic Hypercapnic Respiratory Failure (26 Dec 2022 08:17)        REVIEW OF SYSTEMS:  CONSTITUTIONAL: No fever, weight loss, or fatigue  RESPIRATORY: No cough, wheezing, chills or hemoptysis; No shortness of breath  CARDIOVASCULAR: No chest pain, palpitations, dizziness,  leg swelling+  GASTROINTESTINAL: No abdominal or epigastric pain. No nausea, vomiting, or hematemesis; No diarrhea or constipation. No melena or hematochezia.  GENITOURINARY: No dysuria, frequency, hematuria,  incontinence+  MUSCULOSKELETAL: No joint pain or swelling; No muscle, back, or extremity pain  PSYCHIATRIC: No depression, anxiety, mood swings, or difficulty sleeping  HEME/LYMPH: No easy bruising, or bleeding gums  ALLERY AND IMMUNOLOGIC: No hives or eczema  FAMILY HISTORY:    T(C): 36.4 (12-27-22 @ 05:03), Max: 36.5 (12-26-22 @ 14:00)  HR: 73 (12-27-22 @ 05:03) (69 - 76)  BP: 108/53 (12-27-22 @ 05:03) (99/53 - 115/56)  RR: 18 (12-27-22 @ 05:03) (16 - 18)  SpO2: --  Wt(kg): --Vital Signs Last 24 Hrs  T(C): 36.4 (27 Dec 2022 05:03), Max: 36.5 (26 Dec 2022 14:00)  T(F): 97.5 (27 Dec 2022 05:03), Max: 97.7 (26 Dec 2022 14:00)  HR: 73 (27 Dec 2022 05:03) (69 - 76)  BP: 108/53 (27 Dec 2022 05:03) (99/53 - 115/56)  BP(mean): --  RR: 18 (27 Dec 2022 05:03) (16 - 18)  SpO2: --      Compazine (Unknown)  latex (Urticaria)  penicillins (Unknown)      PHYSICAL EXAM:  GENERAL: NAD,   HEAD:  Atraumatic, Normocephalic  EYES: EOMI, PERRLA, conjunctiva and sclera clear  ENMT: No tonsillar erythema, exudates, or enlargement;   NECK: Supple, No JVD, Normal thyroid  NERVOUS SYSTEM:  Alert & Oriented   CHEST/LUNG: vbs minimal rales+  HEART: Regular rate and rhythm; No murmurs, rubs, or gallops  ABDOMEN: Soft, Nontender, Nondistended; Bowel sounds present  EXTREMITIES:  , No clubbing, cyanosis, both legs edema+  LYMPH: No lymphadenopathy noted  SKIN: No rashes or lesions      LABS:  12-26    141  |  97<L>  |  12  ----------------------------<  137<H>  3.5   |  40<H>  |  0.5<L>    Ca    8.4      26 Dec 2022 09:47    TPro  5.0<L>  /  Alb  3.1<L>  /  TBili  0.3  /  DBili  <0.2  /  AST  18  /  ALT  91<H>  /  AlkPhos  61  12-26                          12.2   5.08  )-----------( 92       ( 26 Dec 2022 09:47 )             39.8         RADIOLOGY & ADDITIONAL TESTS:    MEDICATION:  albuterol/ipratropium for Nebulization 3 milliLiter(s) Nebulizer every 6 hours  aluminum hydroxide/magnesium hydroxide/simethicone Suspension 30 milliLiter(s) Oral every 4 hours PRN  citalopram 20 milliGRAM(s) Oral daily  enoxaparin Injectable 40 milliGRAM(s) SubCutaneous every 24 hours  < from: [facility] Duplex Lower Ext Vein Scan, Bilat (12.26.22 @ 11:10) >  No evidence of deep venous thrombosis or superficial thrombophlebitis in   the bilateral lower extremities. Limited evaluation of the calf veins due   to the presence of bandages.    < end of copied text >  furosemide    Tablet 40 milliGRAM(s) Oral daily  levothyroxine 50 MICROGram(s) Oral daily  melatonin 3 milliGRAM(s) Oral at bedtime PRN  meropenem  IVPB      meropenem  IVPB 1000 milliGRAM(s) IV Intermittent every 12 hours  montelukast 10 milliGRAM(s) Oral at bedtime  multivitamin/minerals 1 Tablet(s) Oral daily  ondansetron Injectable 4 milliGRAM(s) IV Push every 8 hours PRN  PHENobarbital 32.4 milliGRAM(s) Oral every 8 hours  topiramate 100 milliGRAM(s) Oral <User Schedule>      HEALTH ISSUES - PROBLEM Dx:  Acute on chronic respiratory failure with hypercapnia on bipap at night oxygen at daytime due to nick,,Morbid obesity hypoventilation  UTI esbl on meropenem will send her back to [facility] in am  Morbid obesity,functional quadriplegia hx lt hip ORIF refused for oob to chair  History of seizures on phenobarbitol,topamax  Palliative care by specialist  Abnormal LFTs resolved  Hypothyroid on levothyroid

## 2022-12-28 ENCOUNTER — TRANSCRIPTION ENCOUNTER (OUTPATIENT)
Age: 67
End: 2022-12-28

## 2022-12-28 VITALS
SYSTOLIC BLOOD PRESSURE: 126 MMHG | DIASTOLIC BLOOD PRESSURE: 66 MMHG | HEART RATE: 59 BPM | RESPIRATION RATE: 18 BRPM | TEMPERATURE: 97 F

## 2022-12-28 RX ORDER — SENNOSIDES/DOCUSATE SODIUM 8.6MG-50MG
2 TABLET ORAL
Qty: 0 | Refills: 0 | DISCHARGE

## 2022-12-28 RX ORDER — FUROSEMIDE 40 MG
1 TABLET ORAL
Qty: 0 | Refills: 0 | DISCHARGE
Start: 2022-12-28

## 2022-12-28 RX ORDER — POLYETHYLENE GLYCOL 3350 17 G/17G
17 POWDER, FOR SOLUTION ORAL
Qty: 0 | Refills: 0 | DISCHARGE

## 2022-12-28 RX ORDER — ERTAPENEM SODIUM 1 G/1
1 INJECTION, POWDER, LYOPHILIZED, FOR SOLUTION INTRAMUSCULAR; INTRAVENOUS
Qty: 0 | Refills: 0 | DISCHARGE
End: 2022-12-30

## 2022-12-28 RX ORDER — ENOXAPARIN SODIUM 100 MG/ML
40 INJECTION SUBCUTANEOUS
Qty: 0 | Refills: 0 | DISCHARGE
Start: 2022-12-28

## 2022-12-28 RX ORDER — NITROFURANTOIN MACROCRYSTAL 50 MG
1 CAPSULE ORAL
Qty: 0 | Refills: 0 | DISCHARGE
End: 2024-01-03

## 2022-12-28 RX ORDER — NITROFURANTOIN MACROCRYSTAL 50 MG
1 CAPSULE ORAL
Qty: 0 | Refills: 0 | DISCHARGE
End: 2022-12-31

## 2022-12-28 RX ADMIN — Medication 3 MILLILITER(S): at 09:12

## 2022-12-28 RX ADMIN — CITALOPRAM 20 MILLIGRAM(S): 10 TABLET, FILM COATED ORAL at 12:32

## 2022-12-28 RX ADMIN — MEROPENEM 100 MILLIGRAM(S): 1 INJECTION INTRAVENOUS at 05:39

## 2022-12-28 RX ADMIN — Medication 50 MICROGRAM(S): at 05:38

## 2022-12-28 RX ADMIN — Medication 100 MILLIGRAM(S): at 13:31

## 2022-12-28 RX ADMIN — Medication 1 TABLET(S): at 12:32

## 2022-12-28 RX ADMIN — ENOXAPARIN SODIUM 40 MILLIGRAM(S): 100 INJECTION SUBCUTANEOUS at 12:32

## 2022-12-28 RX ADMIN — Medication 3 MILLILITER(S): at 02:38

## 2022-12-28 RX ADMIN — Medication 100 MILLIGRAM(S): at 05:38

## 2022-12-28 RX ADMIN — Medication 40 MILLIGRAM(S): at 05:38

## 2022-12-28 RX ADMIN — Medication 32.4 MILLIGRAM(S): at 05:37

## 2022-12-28 RX ADMIN — Medication 32.4 MILLIGRAM(S): at 13:31

## 2022-12-28 NOTE — PROGRESS NOTE ADULT - PROVIDER SPECIALTY LIST ADULT
Internal Medicine
Pulmonology
Internal Medicine
Internal Medicine
Gastroenterology
Internal Medicine
Infectious Disease

## 2022-12-28 NOTE — PROGRESS NOTE ADULT - REASON FOR ADMISSION
Acute on Chronic Hypercapnic Respiratory Failure

## 2022-12-28 NOTE — PROGRESS NOTE ADULT - SUBJECTIVE AND OBJECTIVE BOX
JACOB KOTHARI  67y  Female      Patient is a 67y old  Female who presents with a chief complaint of Acute on Chronic Hypercapnic Respiratory Failure (27 Dec 2022 11:33)        REVIEW OF SYSTEMS:  CONSTITUTIONAL: No fever, weight loss, or fatigue  RESPIRATORY: No cough, wheezing, chills or hemoptysis; No shortness of breath  CARDIOVASCULAR: No chest pain, palpitations, dizziness,both legs leg swelling+  GASTROINTESTINAL: No abdominal or epigastric pain. No nausea, vomiting, or hematemesis; No diarrhea or constipation. No melena or hematochezia.  GENITOURINARY: No dysuria, frequency, hematuria,  incontinence+  MUSCULOSKELETAL: No joint pain or swelling; No muscle, back, or extremity pain  PSYCHIATRIC: No depression, anxiety, mood swings, or difficulty sleeping  HEME/LYMPH: No easy bruising, or bleeding gums  ALLERY AND IMMUNOLOGIC: No hives or eczema  FAMILY HISTORY:    T(C): 36.6 (12-28-22 @ 05:12), Max: 36.7 (12-27-22 @ 21:06)  HR: 69 (12-28-22 @ 05:12) (69 - 81)  BP: 122/56 (12-28-22 @ 05:12) (116/58 - 122/71)  RR: 18 (12-28-22 @ 05:12) (18 - 18)  SpO2: --  Wt(kg): --Vital Signs Last 24 Hrs  T(C): 36.6 (28 Dec 2022 05:12), Max: 36.7 (27 Dec 2022 21:06)  T(F): 97.9 (28 Dec 2022 05:12), Max: 98 (27 Dec 2022 21:06)  HR: 69 (28 Dec 2022 05:12) (69 - 81)  BP: 122/56 (28 Dec 2022 05:12) (116/58 - 122/71)  BP(mean): --  RR: 18 (28 Dec 2022 05:12) (18 - 18)  SpO2: --      Compazine (Unknown)  latex (Urticaria)  penicillins (Unknown)      PHYSICAL EXAM:  GENERAL: NAD,  HEAD:  Atraumatic, Normocephalic  EYES: EOMI, PERRLA, conjunctiva and sclera clear  ENMT: No tonsillar erythema, exudates, or enlargement;  NECK: Supple, No JVD, Normal thyroid  NERVOUS SYSTEM:  Alert & Oriented  CHEST/LUNG: Clear to percussion bilaterally; No rales, rhonchi, wheezing, or rubs  HEART: Regular rate and rhythm; No murmurs, rubs, or gallops  ABDOMEN: Soft, Nontender, Nondistended; Bowel sounds present  EXTREMITIES:  , No clubbing, cyanosis,both legs edema++  LYMPH: No lymphadenopathy noted  SKIN: No rashes or lesions      LABS:  12-27    142  |  100  |  11  ----------------------------<  106<H>  3.7   |  38<H>  |  <0.5<L>    Ca    8.2<L>      27 Dec 2022 10:25    TPro  4.7<L>  /  Alb  2.9<L>  /  TBili  0.3  /  DBili  x   /  AST  16  /  ALT  63<H>  /  AlkPhos  56  12-27                          11.7   5.40  )-----------( 94       ( 27 Dec 2022 10:25 )             38.4         RADIOLOGY & ADDITIONAL TESTS:    MEDICATION:  albuterol/ipratropium for Nebulization 3 milliLiter(s) Nebulizer every 6 hours  aluminum hydroxide/magnesium hydroxide/simethicone Suspension 30 milliLiter(s) Oral every 4 hours PRN  citalopram 20 milliGRAM(s) Oral daily  enoxaparin Injectable 40 milliGRAM(s) SubCutaneous every 24 hours  furosemide    Tablet 40 milliGRAM(s) Oral daily  levothyroxine 50 MICROGram(s) Oral daily  melatonin 3 milliGRAM(s) Oral at bedtime PRN  meropenem  IVPB      meropenem  IVPB 1000 milliGRAM(s) IV Intermittent every 12 hours  montelukast 10 milliGRAM(s) Oral at bedtime  multivitamin/minerals 1 Tablet(s) Oral daily  ondansetron Injectable 4 milliGRAM(s) IV Push every 8 hours PRN  PHENobarbital 32.4 milliGRAM(s) Oral every 8 hours  topiramate 100 milliGRAM(s) Oral <User Schedule>      HEALTH ISSUES - PROBLEM Dx:  Acute on chronic respiratory failure with hypercapnia on bipap at night, oxygen on daytime  UTI esbl on meropenem will d/c send her back to [facility] today  History of seizures pheno,topamax  Hypothyroid on levothyroid  lymphedema on lasix  Morbid obesity ,functional quadriplegia,hx lt hip ORIF refused to get up    Palliative care by specialist    Abnormal LFTs  resolved

## 2022-12-28 NOTE — CHART NOTE - NSCHARTNOTESELECT_GEN_ALL_CORE
Palliative Care Sw Initial Assessment/Event Note
Brief note
Event Note
PA NOTE
PA Note/Event Note
Palliative Care SW Note/Event Note
Palliative Care SW Note/Event Note
brief note
ekg . r/o afib

## 2022-12-28 NOTE — DISCHARGE NOTE NURSING/CASE MANAGEMENT/SOCIAL WORK - PATIENT PORTAL LINK FT
You can access the FollowMyHealth Patient Portal offered by [facility] by registering at the following website: [email or web address]. By joining [facility]’s FollowMyHealth portal, you will also be able to view your health information using other applications (apps) compatible with our system.

## 2023-01-03 DIAGNOSIS — Z51.5 ENCOUNTER FOR PALLIATIVE CARE: ICD-10-CM

## 2023-01-03 DIAGNOSIS — I50.33 ACUTE ON CHRONIC DIASTOLIC (CONGESTIVE) HEART FAILURE: ICD-10-CM

## 2023-01-03 DIAGNOSIS — K80.20 CALCULUS OF GALLBLADDER WITHOUT CHOLECYSTITIS WITHOUT OBSTRUCTION: ICD-10-CM

## 2023-01-03 DIAGNOSIS — R06.03 ACUTE RESPIRATORY DISTRESS: ICD-10-CM

## 2023-01-03 DIAGNOSIS — M15.9 POLYOSTEOARTHRITIS, UNSPECIFIED: ICD-10-CM

## 2023-01-03 DIAGNOSIS — Z91.199 PATIENT'S NONCOMPLIANCE WITH OTHER MEDICAL TREATMENT AND REGIMEN DUE TO UNSPECIFIED REASON: ICD-10-CM

## 2023-01-03 DIAGNOSIS — E87.5 HYPERKALEMIA: ICD-10-CM

## 2023-01-03 DIAGNOSIS — E66.2 MORBID (SEVERE) OBESITY WITH ALVEOLAR HYPOVENTILATION: ICD-10-CM

## 2023-01-03 DIAGNOSIS — Z91.040 LATEX ALLERGY STATUS: ICD-10-CM

## 2023-01-03 DIAGNOSIS — Z88.0 ALLERGY STATUS TO PENICILLIN: ICD-10-CM

## 2023-01-03 DIAGNOSIS — E03.9 HYPOTHYROIDISM, UNSPECIFIED: ICD-10-CM

## 2023-01-03 DIAGNOSIS — G40.909 EPILEPSY, UNSPECIFIED, NOT INTRACTABLE, WITHOUT STATUS EPILEPTICUS: ICD-10-CM

## 2023-01-03 DIAGNOSIS — I95.9 HYPOTENSION, UNSPECIFIED: ICD-10-CM

## 2023-01-03 DIAGNOSIS — N39.0 URINARY TRACT INFECTION, SITE NOT SPECIFIED: ICD-10-CM

## 2023-01-03 DIAGNOSIS — Z99.89 DEPENDENCE ON OTHER ENABLING MACHINES AND DEVICES: ICD-10-CM

## 2023-01-03 DIAGNOSIS — K72.00 ACUTE AND SUBACUTE HEPATIC FAILURE WITHOUT COMA: ICD-10-CM

## 2023-01-03 DIAGNOSIS — G93.40 ENCEPHALOPATHY, UNSPECIFIED: ICD-10-CM

## 2023-01-03 DIAGNOSIS — Z87.442 PERSONAL HISTORY OF URINARY CALCULI: ICD-10-CM

## 2023-01-03 DIAGNOSIS — E87.4 MIXED DISORDER OF ACID-BASE BALANCE: ICD-10-CM

## 2023-01-03 DIAGNOSIS — J96.22 ACUTE AND CHRONIC RESPIRATORY FAILURE WITH HYPERCAPNIA: ICD-10-CM

## 2023-01-03 DIAGNOSIS — J96.01 ACUTE RESPIRATORY FAILURE WITH HYPOXIA: ICD-10-CM

## 2023-01-03 DIAGNOSIS — E11.9 TYPE 2 DIABETES MELLITUS WITHOUT COMPLICATIONS: ICD-10-CM

## 2023-01-03 DIAGNOSIS — I89.0 LYMPHEDEMA, NOT ELSEWHERE CLASSIFIED: ICD-10-CM

## 2023-01-03 DIAGNOSIS — Z88.9 ALLERGY STATUS TO UNSPECIFIED DRUGS, MEDICAMENTS AND BIOLOGICAL SUBSTANCES: ICD-10-CM

## 2023-01-03 DIAGNOSIS — R74.01 ELEVATION OF LEVELS OF LIVER TRANSAMINASE LEVELS: ICD-10-CM

## 2023-01-03 DIAGNOSIS — J45.909 UNSPECIFIED ASTHMA, UNCOMPLICATED: ICD-10-CM

## 2023-01-03 DIAGNOSIS — Z16.30 RESISTANCE TO UNSPECIFIED ANTIMICROBIAL DRUGS: ICD-10-CM

## 2023-01-03 DIAGNOSIS — R53.2 FUNCTIONAL QUADRIPLEGIA: ICD-10-CM

## 2023-01-03 DIAGNOSIS — J44.9 CHRONIC OBSTRUCTIVE PULMONARY DISEASE, UNSPECIFIED: ICD-10-CM

## 2023-01-03 DIAGNOSIS — I21.4 NON-ST ELEVATION (NSTEMI) MYOCARDIAL INFARCTION: ICD-10-CM

## 2023-01-12 NOTE — ED PROVIDER NOTE - GASTROINTESTINAL NEGATIVE STATEMENT, MLM
no abdominal pain, no bloating, no constipation, no diarrhea, no nausea and no vomiting. Consent: The patient's consent was obtained including but not limited to risks of crusting, scabbing, blistering, scarring, darker or lighter pigmentary change, recurrence, incomplete removal and infection. Show Aperture Variable?: Yes Duration Of Freeze Thaw-Cycle (Seconds): 5 Post-Care Instructions: I reviewed with the patient in detail post-care instructions. Patient is to wear sunprotection, and avoid picking at any of the treated lesions. Pt may apply Vaseline to crusted or scabbing areas. Application Tool (Optional): Liquid Nitrogen Sprayer Detail Level: Detailed Render Note In Bullet Format When Appropriate: No Number Of Freeze-Thaw Cycles: 2 freeze-thaw cycles

## 2023-03-01 ENCOUNTER — APPOINTMENT (OUTPATIENT)
Age: 68
End: 2023-03-01

## 2023-05-06 NOTE — ED ADULT TRIAGE NOTE - CHIEF COMPLAINT QUOTE
BIBA ems states "[facility] with complaining of cough".
No
Other non-recurrent acute nonsuppurative otitis media of right ear

## 2023-07-27 NOTE — PHYSICAL THERAPY INITIAL EVALUATION ADULT - LEVEL OF CONSCIOUSNESS, REHAB EVAL
amLODIPine 10 mg oral tablet: 1 tab(s) orally once a day  aspirin 81 mg oral tablet: 1 tab(s) orally once a day - continue  atorvastatin 40 mg oral tablet: 1 tab(s) orally once a day (at bedtime)  CeleBREX 200 mg oral capsule: 1 cap(s) orally once a day  Creon 36,000 units oral delayed release capsule: 1 cap(s) orally 3 times a day (with meals)  Eliquis Starter Pack for Treatment of DVT and PE 5 mg oral tablet: 1 tab(s) orally 2 times a day  fenofibrate 145 mg oral tablet: 1 tab(s) orally once a day  losartan 100 mg oral tablet: 1 tab(s) orally once a day  metoprolol succinate 25 mg oral tablet, extended release: 1 tab(s) orally once a day  morphine 10 mg/5 mL oral solution: 1.5 milliliter(s) orally every 4 hours as needed for  severe pain MDD: 9mL  pantoprazole 40 mg oral delayed release tablet: 1 tab(s) orally once a day (before a meal)  polyethylene glycol 3350 oral powder for reconstitution: 17 gram(s) orally once a day As needed Constipation  senna leaf extract oral tablet: 2 tab(s) orally once a day (at bedtime)  Tresiba 100 units/mL subcutaneous solution: 20 subcutaneous once a day (at bedtime)  zolpidem 5 mg oral tablet: 1 tab(s) orally once a day (at bedtime) as needed for Insomnia MDD: 5mg   alert amLODIPine 10 mg oral tablet: 1 tab(s) orally once a day  atorvastatin 40 mg oral tablet: 1 tab(s) orally once a day (at bedtime)  CeleBREX 200 mg oral capsule: 1 cap(s) orally once a day  Creon 36,000 units oral delayed release capsule: 1 cap(s) orally 3 times a day (with meals)  Eliquis Starter Pack for Treatment of DVT and PE 5 mg oral tablet: 1 tab(s) orally 2 times a day  fenofibrate 145 mg oral tablet: 1 tab(s) orally once a day  losartan 100 mg oral tablet: 1 tab(s) orally once a day  metoprolol succinate 25 mg oral tablet, extended release: 1 tab(s) orally once a day  morphine 10 mg/5 mL oral solution: 1.5 milliliter(s) orally every 4 hours as needed for  severe pain MDD: 9mL  pantoprazole 40 mg oral delayed release tablet: 1 tab(s) orally once a day (before a meal)  polyethylene glycol 3350 oral powder for reconstitution: 17 gram(s) orally once a day As needed Constipation  senna leaf extract oral tablet: 2 tab(s) orally once a day (at bedtime)  Tresiba 100 units/mL subcutaneous solution: 20 subcutaneous once a day (at bedtime)  zolpidem 5 mg oral tablet: 1 tab(s) orally once a day (at bedtime) as needed for Insomnia MDD: 5mg   amLODIPine 10 mg oral tablet: 1 tab(s) orally once a day  atorvastatin 40 mg oral tablet: 1 tab(s) orally once a day (at bedtime)  CeleBREX 200 mg oral capsule: 1 cap(s) orally once a day  Creon 36,000 units oral delayed release capsule: 1 cap(s) orally 3 times a day (with meals)  Eliquis Starter Pack for Treatment of DVT and PE 5 mg oral tablet: 1 tab(s) orally 2 times a day Once discharged, take four tablets daily for 4 days (20mg daily). After the fourth day, take two tablets daily (10mg daily).  fenofibrate 145 mg oral tablet: 1 tab(s) orally once a day  haloperidol 1 mg oral tablet: 1 tab(s) orally every 6 hours as needed for  agitation  hyoscyamine 0.125 mg oral tablet: 1 tab(s) orally every 6 hours as needed for  terminal/excessive secretions 1 tab(s) orally by mouth or under the tongue every 6 hours, As Needed for terminal/excessive secretions.  losartan 100 mg oral tablet: 1 tab(s) orally once a day  metoprolol succinate 25 mg oral tablet, extended release: 1 tab(s) orally once a day  mirtazapine 15 mg oral tablet: 1 tab(s) orally once a day (at bedtime)  morphine 10 mg/5 mL oral solution: 1.5 milliliter(s) orally every 4 hours as needed for  severe pain MDD: 9mL  pantoprazole 40 mg oral delayed release tablet: 1 tab(s) orally once a day (before a meal)  polyethylene glycol 3350 oral powder for reconstitution: 17 gram(s) orally once a day As needed Constipation  prochlorperazine 10 mg oral tablet: 1 tab(s) orally every 12 hours as needed for  nausea/vomiting  senna leaf extract oral tablet: 2 tab(s) orally once a day (at bedtime)  Tresiba 100 units/mL subcutaneous solution: 20 subcutaneous once a day (at bedtime)  zolpidem 5 mg oral tablet: 1 tab(s) orally once a day (at bedtime) as needed for Insomnia MDD: 5mg   amLODIPine 10 mg oral tablet: 1 tab(s) orally once a day  atorvastatin 40 mg oral tablet: 1 tab(s) orally once a day (at bedtime)  CeleBREX 200 mg oral capsule: 1 cap(s) orally once a day  Creon 36,000 units oral delayed release capsule: 1 cap(s) orally 3 times a day (with meals)  Eliquis Starter Pack for Treatment of DVT and PE 5 mg oral tablet: 1 tab(s) orally 2 times a day Once discharged, take four tablets daily for 4 days (20mg daily). After the fourth day, take two tablets daily (10mg daily).  fenofibrate 145 mg oral tablet: 1 tab(s) orally once a day  haloperidol 1 mg oral tablet: 1 tab(s) orally every 6 hours as needed for  agitation  hyoscyamine 0.125 mg oral tablet: 1 tab(s) orally every 6 hours as needed for  terminal/excessive secretions 1 tab(s) orally by mouth or under the tongue every 6 hours, As Needed for terminal/excessive secretions.  LORazepam 2 mg/mL oral concentrate: 0.5 milliliter(s) orally every 6 hours as needed for  anxiety MDD: 2 mL  losartan 100 mg oral tablet: 1 tab(s) orally once a day  metoprolol succinate 25 mg oral tablet, extended release: 1 tab(s) orally once a day  mirtazapine 15 mg oral tablet: 1 tab(s) orally once a day (at bedtime)  morphine 10 mg/5 mL oral solution: 1.5 milliliter(s) orally every 4 hours as needed for  severe pain MDD: 9mL  morphine 20 mg/mL oral concentrate: 0.25 milliliter(s) sublingual every 6 hours as needed for  pain or shortness of breath MDD: 1 mL  pantoprazole 40 mg oral delayed release tablet: 1 tab(s) orally once a day (before a meal)  polyethylene glycol 3350 oral powder for reconstitution: 17 gram(s) orally once a day As needed Constipation  prochlorperazine 10 mg oral tablet: 1 tab(s) orally every 12 hours as needed for  nausea/vomiting  senna leaf extract oral tablet: 2 tab(s) orally once a day (at bedtime)  sodium chloride 1 g oral tablet: 2 tab(s) orally 3 times a day  Tresiba 100 units/mL subcutaneous solution: 20 subcutaneous once a day (at bedtime)  zolpidem 5 mg oral tablet: 1 tab(s) orally once a day (at bedtime) as needed for Insomnia MDD: 5mg   atorvastatin 40 mg oral tablet: 1 tab(s) orally once a day (at bedtime)  CeleBREX 200 mg oral capsule: 1 cap(s) orally once a day  Creon 36,000 units oral delayed release capsule: 1 cap(s) orally 3 times a day (with meals)  diphenhydramine/lidocaine/aluminum hydroxide/magnesium hydroxide/simethicone mucous membrane suspension: 10 milliliter(s) mucous membrane 4 times a day  Eliquis Starter Pack for Treatment of DVT and PE 5 mg oral tablet: 1 tab(s) orally 2 times a day Take 1 tablet twice a day (10mg daily).  fenofibrate 145 mg oral tablet: 1 tab(s) orally once a day  haloperidol 1 mg oral tablet: 1 tab(s) orally every 6 hours as needed for  agitation  hyoscyamine 0.125 mg oral tablet: 1 tab(s) orally every 6 hours as needed for  terminal/excessive secretions 1 tab(s) orally by mouth or under the tongue every 6 hours, As Needed for terminal/excessive secretions.  lidocaine 2% mucous membrane solution: 10 milliliter(s) mucous membrane 4 times a day  LORazepam 2 mg/mL oral concentrate: 0.5 milliliter(s) orally every 6 hours as needed for  anxiety MDD: 2 mL  methadone 10 mg/5 mL oral solution: 2 milliliter(s) orally 2 times a day MDD: 6L  mirtazapine 15 mg oral tablet: 1 tab(s) orally once a day (at bedtime)  morphine 10 mg/5 mL oral solution: 2 milliliter(s) orally every 4 hours as needed for  moderate pain MDD: 9mL  morphine 20 mg/5 mL oral solution: 1.5 milliliter(s) orally every 4 hours as needed for  severe pain MDD: 9L  Norvasc 5 mg oral tablet: 1 tab(s) orally once a day  pantoprazole 40 mg oral delayed release tablet: 1 tab(s) orally once a day (before a meal)  polyethylene glycol 3350 oral powder for reconstitution: 17 gram(s) orally once a day As needed Constipation  prochlorperazine 10 mg oral tablet: 1 tab(s) orally every 12 hours as needed for  nausea/vomiting  senna leaf extract oral tablet: 2 tab(s) orally once a day (at bedtime)  sodium chloride 1 g oral tablet: 2 tab(s) orally 3 times a day  Tresiba 100 units/mL subcutaneous solution: 20 subcutaneous once a day (at bedtime)  zolpidem 5 mg oral tablet: 1 tab(s) orally once a day (at bedtime) as needed for Insomnia MDD: 5mg   atorvastatin 40 mg oral tablet: 1 tab(s) orally once a day (at bedtime)  benzocaine 20% mucous membrane spray: Apply topically to affected area 4 times a day as needed for  mouth pain  CeleBREX 200 mg oral capsule: 1 cap(s) orally once a day  Creon 36,000 units oral delayed release capsule: 1 cap(s) orally 3 times a day (with meals)  diphenhydramine/lidocaine/aluminum hydroxide/magnesium hydroxide/simethicone mucous membrane suspension: 10 milliliter(s) mucous membrane 3 times a day  Eliquis Starter Pack for Treatment of DVT and PE 5 mg oral tablet: 1 tab(s) orally 2 times a day Take 1 tablet twice a day (10mg daily).  fenofibrate 145 mg oral tablet: 1 tab(s) orally once a day  gabapentin 100 mg oral capsule: 1 cap(s) orally 3 times a day  haloperidol 1 mg oral tablet: 1 tab(s) orally every 6 hours as needed for  agitation  hyoscyamine 0.125 mg oral tablet: 1 tab(s) orally every 6 hours as needed for  terminal/excessive secretions 1 tab(s) orally by mouth or under the tongue every 6 hours, As Needed for terminal/excessive secretions.  lidocaine 2% mucous membrane solution: 10 milliliter(s) mucous membrane 4 times a day  LORazepam 2 mg/mL oral concentrate: 0.5 milliliter(s) orally every 6 hours as needed for  anxiety MDD: 2 mL  Melatonin 3 mg oral tablet: 1 orally once a day (at bedtime) as needed for  insomnia  methadone 10 mg/5 mL oral solution: 2 milliliter(s) orally 2 times a day MDD: 4mL  metoprolol succinate 25 mg oral tablet, extended release: 1 tab(s) orally once a day  mirtazapine 15 mg oral tablet: 1 tab(s) orally once a day (at bedtime)  morphine 10 mg/5 mL oral solution: 2 milliliter(s) orally every 4 hours as needed for  pain take 2 mL every 4 hours as needed for moderate pain (4-7);  take 3 mL every 4 hours as needed for severe pain (7-10) MDD: 18mL  Norvasc 5 mg oral tablet: 1 tab(s) orally once a day  pantoprazole 40 mg oral delayed release tablet: 1 tab(s) orally once a day (before a meal)  polyethylene glycol 3350 oral powder for reconstitution: 17 gram(s) orally 3 times a week as needed for Constipation take it Monday, Wednesday, Friday  prochlorperazine 10 mg oral tablet: 1 tab(s) orally every 12 hours as needed for  nausea/vomiting  senna leaf extract oral tablet: 2 tab(s) orally once a day (at bedtime)  sodium chloride 1 g oral tablet: 2 tab(s) orally 3 times a day  Tresiba 100 units/mL subcutaneous solution: 20 subcutaneous once a day (at bedtime)  zolpidem 5 mg oral tablet: 1 tab(s) orally once a day (at bedtime) as needed for Insomnia MDD: 5mg   atorvastatin 40 mg oral tablet: 1 tab(s) orally once a day (at bedtime)  benzocaine 20% mucous membrane spray: Apply topically to affected area 4 times a day as needed for  mouth pain  CeleBREX 200 mg oral capsule: 1 cap(s) orally once a day  Creon 36,000 units oral delayed release capsule: 1 cap(s) orally 3 times a day (with meals)  diphenhydramine/lidocaine/aluminum hydroxide/magnesium hydroxide/simethicone mucous membrane suspension: 10 milliliter(s) mucous membrane 3 times a day  Eliquis Starter Pack for Treatment of DVT and PE 5 mg oral tablet: 1 tab(s) orally 2 times a day Take 1 tablet twice a day (10mg daily).  fenofibrate 145 mg oral tablet: 1 tab(s) orally once a day  gabapentin 100 mg oral capsule: 1 cap(s) orally 3 times a day  Glucerna Protein Shake: 8oz bottle twice a day  haloperidol 1 mg oral tablet: 1 tab(s) orally every 6 hours as needed for  agitation  hyoscyamine 0.125 mg oral tablet: 1 tab(s) orally every 6 hours as needed for  terminal/excessive secretions 1 tab(s) orally by mouth or under the tongue every 6 hours, As Needed for terminal/excessive secretions.  lidocaine 2% mucous membrane solution: 10 milliliter(s) mucous membrane 4 times a day  LORazepam 2 mg/mL oral concentrate: 0.5 milliliter(s) orally every 6 hours as needed for  anxiety MDD: 2 mL  Melatonin 3 mg oral tablet: 1 orally once a day (at bedtime) as needed for  insomnia  methadone 10 mg/5 mL oral solution: 2 milliliter(s) orally 2 times a day MDD: 4mL  metoprolol succinate 25 mg oral tablet, extended release: 1 tab(s) orally once a day  mirtazapine 15 mg oral tablet: 1 tab(s) orally once a day (at bedtime)  morphine 10 mg/5 mL oral solution: 3 milliliter(s) orally every 4 hours as needed for  pain take 2 mL every 4 hours as needed for moderate pain (4-7);  take 3 mL every 4 hours as needed for severe pain (7-10) MDD: 18mL  Norvasc 5 mg oral tablet: 1 tab(s) orally once a day  pantoprazole 40 mg oral delayed release tablet: 1 tab(s) orally once a day (before a meal)  polyethylene glycol 3350 oral powder for reconstitution: 17 gram(s) orally 3 times a week as needed for Constipation take it Monday, Wednesday, Friday  prochlorperazine 10 mg oral tablet: 1 tab(s) orally every 12 hours as needed for  nausea/vomiting  senna leaf extract oral tablet: 2 tab(s) orally once a day (at bedtime)  sodium chloride 1 g oral tablet: 2 tab(s) orally 3 times a day  Tylenol Extended Release 650 mg oral tablet, extended release: 1 tab(s) orally every 6 hours as needed for  mild pain (1-3)  zolpidem 5 mg oral tablet: 1 tab(s) orally once a day (at bedtime) as needed for Insomnia MDD: 5mg   atorvastatin 40 mg oral tablet: 1 tab(s) orally once a day (at bedtime)  benzocaine 20% mucous membrane spray: Apply topically to affected area 4 times a day as needed for  mouth pain  CeleBREX 200 mg oral capsule: 1 cap(s) orally once a day  Creon 36,000 units oral delayed release capsule: 1 cap(s) orally 3 times a day (with meals)  diphenhydramine/lidocaine/aluminum hydroxide/magnesium hydroxide/simethicone mucous membrane suspension: 10 milliliter(s) mucous membrane 3 times a day  Eliquis Starter Pack for Treatment of DVT and PE 5 mg oral tablet: 1 tab(s) orally 2 times a day Take 1 tablet twice a day (10mg daily).  fenofibrate 145 mg oral tablet: 1 tab(s) orally once a day  gabapentin 100 mg oral capsule: 1 cap(s) orally 3 times a day  Glucerna Protein Shake: 8oz bottle twice a day  haloperidol 1 mg oral tablet: 1 tab(s) orally every 6 hours as needed for  agitation  hyoscyamine 0.125 mg oral tablet: 1 tab(s) orally every 6 hours as needed for  terminal/excessive secretions 1 tab(s) orally by mouth or under the tongue every 6 hours, As Needed for terminal/excessive secretions.  Lasix 20 mg oral tablet: 1 tab(s) orally once a day volume overload  lidocaine 2% mucous membrane solution: 10 milliliter(s) mucous membrane 4 times a day  LORazepam 2 mg/mL oral concentrate: 0.5 milliliter(s) orally every 6 hours as needed for  anxiety MDD: 2 mL  Melatonin 3 mg oral tablet: 1 orally once a day (at bedtime) as needed for  insomnia  methadone 10 mg/5 mL oral solution: 2 milliliter(s) orally 2 times a day MDD: 4mL  metoprolol succinate 25 mg oral tablet, extended release: 1 tab(s) orally once a day  mirtazapine 15 mg oral tablet: 1 tab(s) orally once a day (at bedtime)  morphine 10 mg/5 mL oral solution: 3 milliliter(s) orally every 4 hours as needed for  pain take 2 mL every 4 hours as needed for moderate pain (4-7);  take 3 mL every 4 hours as needed for severe pain (7-10) MDD: 18mL  Norvasc 5 mg oral tablet: 1 tab(s) orally once a day  pantoprazole 40 mg oral delayed release tablet: 1 tab(s) orally once a day (before a meal)  polyethylene glycol 3350 oral powder for reconstitution: 17 gram(s) orally 3 times a week as needed for Constipation take it Monday, Wednesday, Friday  prochlorperazine 10 mg oral tablet: 1 tab(s) orally every 12 hours as needed for  nausea/vomiting  senna leaf extract oral tablet: 2 tab(s) orally once a day (at bedtime)  sodium chloride 1 g oral tablet: 2 tab(s) orally 3 times a day  Tylenol Extended Release 650 mg oral tablet, extended release: 1 tab(s) orally every 6 hours as needed for  mild pain (1-3)  zolpidem 5 mg oral tablet: 1 tab(s) orally once a day (at bedtime) as needed for Insomnia MDD: 5mg   atorvastatin 40 mg oral tablet: 1 tab(s) orally once a day (at bedtime)  benzocaine 20% mucous membrane spray: Apply topically to affected area 4 times a day as needed for  mouth pain  CeleBREX 200 mg oral capsule: 1 cap(s) orally once a day  ciprofloxacin 500 mg oral tablet: 1 tab(s) orally 2 times a day already received antibiotic coverage for 8/5. Start ciprofloxacin on 8/6 x 2 doses  Creon 36,000 units oral delayed release capsule: 1 cap(s) orally 3 times a day (with meals)  diphenhydramine/lidocaine/aluminum hydroxide/magnesium hydroxide/simethicone mucous membrane suspension: 10 milliliter(s) mucous membrane 3 times a day  Eliquis Starter Pack for Treatment of DVT and PE 5 mg oral tablet: 1 tab(s) orally 2 times a day Take 1 tablet twice a day (10mg daily).  fenofibrate 145 mg oral tablet: 1 tab(s) orally once a day  gabapentin 100 mg oral capsule: 1 cap(s) orally 3 times a day  Glucerna Protein Shake: 8oz bottle twice a day  haloperidol 1 mg oral tablet: 1 tab(s) orally every 6 hours as needed for  agitation  hyoscyamine 0.125 mg oral tablet: 1 tab(s) orally every 6 hours as needed for  terminal/excessive secretions 1 tab(s) orally by mouth or under the tongue every 6 hours, As Needed for terminal/excessive secretions.  Lasix 20 mg oral tablet: 1 tab(s) orally once a day volume overload  lidocaine 2% mucous membrane solution: 10 milliliter(s) mucous membrane 4 times a day  LORazepam 2 mg/mL oral concentrate: 0.5 milliliter(s) orally every 6 hours as needed for  anxiety MDD: 2 mL  Melatonin 3 mg oral tablet: 1 orally once a day (at bedtime) as needed for  insomnia  methadone 10 mg/5 mL oral solution: 2 milliliter(s) orally 2 times a day MDD: 4mL  metoprolol succinate 25 mg oral tablet, extended release: 1 tab(s) orally once a day  mirtazapine 15 mg oral tablet: 1 tab(s) orally once a day (at bedtime)  morphine 10 mg/5 mL oral solution: 3 milliliter(s) orally every 4 hours as needed for  pain take 2 mL every 4 hours as needed for moderate pain (4-7);  take 3 mL every 4 hours as needed for severe pain (7-10) MDD: 18mL  Norvasc 5 mg oral tablet: 1 tab(s) orally once a day  pantoprazole 40 mg oral delayed release tablet: 1 tab(s) orally once a day (before a meal)  polyethylene glycol 3350 oral powder for reconstitution: 17 gram(s) orally 3 times a week as needed for Constipation take it Monday, Wednesday, Friday  prochlorperazine 10 mg oral tablet: 1 tab(s) orally every 12 hours as needed for  nausea/vomiting  senna leaf extract oral tablet: 2 tab(s) orally once a day (at bedtime)  sodium chloride 1 g oral tablet: 2 tab(s) orally 3 times a day  Tylenol Extended Release 650 mg oral tablet, extended release: 1 tab(s) orally every 6 hours as needed for  mild pain (1-3)  zolpidem 5 mg oral tablet: 1 tab(s) orally once a day (at bedtime) as needed for Insomnia MDD: 5mg

## 2023-09-02 NOTE — ED ADULT TRIAGE NOTE - ARRIVAL FROM
Grace Matt, you were seen in the emergency department for an episode of passing out, also known as syncope. This was likely caused by dehydration. I in the future highly encourage you to make sure that you are eating regular meals, at least 3 meals per day. If you are not able to do so I highly recommend at least eating some snacks throughout the day. I also recommend that you drink electrolyte beverages such as Gatorade to keep your electrolytes up. Make sure that you are drinking at least 80 ounces of fluids per day. As we discussed, I recommend keeping your a water bottle in line of sight to have the visual reminder to make sure that you are drinking plenty of water. Otherwise please follow-up with your primary care provider.
[facility]

## 2023-10-25 NOTE — ED PROVIDER NOTE - PHYSICAL EXAMINATION
Paperwork was printed and is ready for provider approval.    --EXAM--  VITAL SIGNS: I have reviewed vs documented at present.  CONSTITUTIONAL: Well-developed; well-nourished; in no acute distress.   SKIN: Warm and dry, no acute rash.   HEAD: Normocephalic; atraumatic.  EYES: conjunctiva and sclera clear. No nystagmus.  ENT: No nasal discharge; airway clear.  NECK: Supple; non tender.  CARD: S1, S2, Regular rate and rhythm.   RESP: No wheezes, rales or rhonchi.  ABD: Obese, Normal bowel sounds; soft; non-distended; non-tender.  EXT: Normal ROM.   NEURO: Alert, oriented, grossly unremarkable. As is her usual per pt.  PSYCH: Cooperative, appropriate.

## 2023-12-21 NOTE — ED ADULT TRIAGE NOTE - CHIEF COMPLAINT QUOTE
Pt would like to keep emilia in until she works with PT this AM. Will pass it along to day shift nurse.   pt states "sore throat and cough [id number]"

## 2024-03-20 NOTE — ED ADULT NURSE NOTE - NSSUHOSCREENINGYN_ED_ALL_ED
[facility] - TIFFANY Bazzi / TIFFANY England / APARNA Vanegas/ TIFFANY Mcfarlane/ TIFFANY Madden/ MASSIEL Perkins / RAINER Potts / ESTEPHANIE Griffith / DEVEN Smith  ---------------------------------------------------------------------------------------------------------------  seen and examined today for ESRD  Interval : NAD  VITALS:  T(F): 98.2 (03-20-24 @ 06:32), Max: 98.7 (03-19-24 @ 16:17)  HR: 68 (03-20-24 @ 06:32)  BP: 119/64 (03-20-24 @ 06:32)  RR: 18 (03-20-24 @ 06:32)  SpO2: 96% (03-20-24 @ 06:32)  Wt(kg): --    Physical Exam :-  Constitutional: NAD  Neck: Supple.  Respiratory: Bilateral equal breath sounds,  Cardiovascular: S1, S2 normal,  Gastrointestinal: Bowel Sounds present, soft, non tender.  Extremities: right BKA, thigh ulcer +  Neurological: Alert and Oriented x 3, no focal deficits  Psychiatric: Normal mood, normal affect  Data:-  Allergies :   No Known Allergies    Hospital Medications:   MEDICATIONS  (STANDING):  dextrose 5%. 1000 milliLiter(s) (50 mL/Hr) IV Continuous <Continuous>  dextrose 5%. 1000 milliLiter(s) (100 mL/Hr) IV Continuous <Continuous>  dextrose 50% Injectable 25 Gram(s) IV Push once  dextrose 50% Injectable 12.5 Gram(s) IV Push once  dextrose 50% Injectable 25 Gram(s) IV Push once  glucagon  Injectable 1 milliGRAM(s) IntraMuscular once  insulin lispro (ADMELOG) corrective regimen sliding scale   SubCutaneous three times a day before meals  insulin lispro (ADMELOG) corrective regimen sliding scale   SubCutaneous at bedtime  simvastatin 40 milliGRAM(s) Oral at bedtime    03-20    138  |  96<L>  |  65<H>  ----------------------------<  79  4.9   |  24  |  8.05<H>    Ca    9.3      20 Mar 2024 05:50  Phos  7.3     03-20  Mg     2.40     03-20    TPro  8.0  /  Alb  4.4  /  TBili  0.3  /  DBili      /  AST  10  /  ALT  5   /  AlkPhos  94  03-18    Creatinine Trend: 8.05 <--, 6.16 <--, 5.22 <--                        10.5   6.31  )-----------( 141      ( 20 Mar 2024 05:50 )             32.8    Yes - the patient is able to be screened

## 2024-03-27 NOTE — CONSULT NOTE ADULT - PROBLEM/RECOMMENDATION-1
DISPLAY PLAN FREE TEXT I have personally seen and examined the patient. I have collaborated with and supervised the

## 2024-08-19 NOTE — ED PROVIDER NOTE - CLINICAL SUMMARY MEDICAL DECISION MAKING FREE TEXT BOX
declines
Patient presents for right flank pain that is moderate and throbbing in nature consistent in addition found to have hematuria she denies any fevers or chills   we obtained labs, ua and ct scan we consulted urology who reccomends discharge with follow up as an outpatient no abx reccomended as outpatient

## 2025-02-11 NOTE — CONSULT NOTE ADULT - ASSESSMENT
67yFemale pmh DM, asthma, COPD, asthma, OA presents for acute respiratory distress. Patient had documented hypotension.    Problem 1-Altered LFTs  likely ischemic hepatitis  patient with documented hypotension  Rec  -Monitor LFTs, can check INR  -If LFTs fail to improve Check Acute hepatitis panel, Smooth Muscle Antibody, Transferrin Saturation, SPEP, Anti LK M1 antibody, AMA, HUNTER, Ceruloplasmin, Ferritin, Alpha-1-antitrypsin, CMV, Herpes serologies, EBV serologies, Immunoglobulins panel.    -CBD not dilated no abdominal pain  -Avoid hypotension     Problem 2-Asymptomatic Cholelithiasis  Rec  -watchful waiting    Problem 3 -CRC screening   Rec  -Follow up with our [facility] located at [address] [place] [address]. Phone Number: [phone] to schedule CRC screening Colonoscopy   67yFemale pmh DM, asthma, COPD, asthma, OA presents for acute respiratory distress. Patient had documented hypotension.    Problem 1-Altered LFTs  likely ischemic hepatitis  patient with documented hypotension  Rec  -Liver WNL on ultrasound   -Monitor LFTs, can check INR, LFTs improving rapidly   -If LFTs fail to improve Check Acute hepatitis panel, Smooth Muscle Antibody, Transferrin Saturation, SPEP, Anti LK M1 antibody, AMA, HUNTER, Ceruloplasmin, Ferritin, Alpha-1-antitrypsin, CMV, Herpes serologies, EBV serologies, Immunoglobulins panel.    -CBD not dilated no abdominal pain  -Avoid hypotension     Problem 2-Asymptomatic Cholelithiasis  Rec  -watchful waiting    Problem 3 -CRC screening   Rec  -Follow up with our [facility] located at [address] [place] [address]. Phone Number: [phone] to schedule CRC screening Colonoscopy   65

## 2025-06-05 NOTE — DISCHARGE NOTE PROVIDER - HOSPITAL COURSE
[facility]  [address]  [address]  [facility] [address]  Dept: [phone]  Dept Fax: [phone]         2025  Shonda Kelsey       Chief Complaint  Chief Complaint   Patient presents with    New Patient     Abnormal uterine bleeding for a over year , had blood work and RBC is low, had a US 1 year ago that showed fibroids    .    Blood pressure 111/71, pulse 78, last menstrual period 2025, not currently breastfeeding.    HPI:     Shonda Kelsey  1976 is a 49 y.o.  here today to discuss abnormal cycles.  She has monthly cycles that are heavier than they used to be and the heavy flow last 24 hours.  She can fill a pad in 1-2 hour  She can also have spotting in between that has lasted up to 3 months.  She recently was found to be anemic and is now on iron (labs not seen as they were done at an independent lab)  Last ultrasound 2023 uterus 12.3 cm  Colonoscopy is scheduled    OB History    Para Term  AB Living   4 2 2 0 2 1   SAB IAB Ectopic Molar Multiple Live Births   2 0 0 0 0 1      # Outcome Date GA Lbr Negro/2nd Weight Sex Type Anes PTL Lv   4 Term 14 39w0d  3.629 kg (8 lb) M CS-LTranv  N       Apgar1: 9  Apgar5: 9   3 Term     M CS-LTranv   DUC      Birth Comments: failure to progress   2 SAB            1 SAB                Past Medical History:   Diagnosis Date    Headache     Heart palpitations     Hypertension ?    Not sure if it was consistent or if it's a thing i have,  at the time.  We're keeping an eye on it Hughes if the aneurysm    Hypothyroidism     s/p radioiodine ablation        Past Surgical History:   Procedure Laterality Date    BREAST ENHANCEMENT SURGERY      x3     SECTION      failure to progress    DILATION AND CURETTAGE OF UTERUS      s/p SAB x 3    HAND SURGERY Right     CRPP R fifth [facility]    UMBILICAL HERNIA REPAIR N/A 3/7/2019    HERNIA  9/16: A 62 y/o female with pmhx of asthma, copd, obesity, epilepsy, hypothyroidism , osteoarthritis , kidney stone  s/p slip and fall at [facility]  admitted for left femoral fracture. Pt reports was taking shower this am, her shower chair slipped and she fell on her buttocks. PT reports left hip pain. PT  denies neck pain, hitting head or loc. Pt reports was not able to stand or straighten her left leg. Pt reports usually ambulates with a walker. Pt denies numbness or tingling on her left leg. Pt denies urinary or fecal incontinence. PT reports last seizure was 15 yrs ago. Pt denies taking any blood thinners. Pt denies fever, chills, chest pain, shortness of breath, burning with urination, diarrhea.     9/17: Per neuro, as patient missed her 6 AM phenobarb dose she should get double her afternoon dose at 2 PM. However, unable to order as patient is in OR. Per attending, ok to give STAT dose of phenobarb after patient returns from surgery. Patient was seen by orthopedics for Subtrochanteric fracture of left femur and procedure of intermedullary rodding was performed.     9/18: Arterial puncture/cannulation performed for the purpose of arterial puncture to obtain ABGs - blood sampling - critical patient - monitoring purposes.    9/20: Events of past 24 hours: Patient has dopplerable pulses in radial, ulnar, and brachial veins. Patient 's fingers are less dusky than previous days and are returning to normal.     9/21: Patient went to the OR with orthopedics on 9/16 and underwent ORIF of left femur and long IM gamma nail placement. Patient hypotensive during the case and required chloe gtt. Patient extubated to bipap. In the PACU, patient hypotensive to 60s/40s via L radial a line. Patient received 1L bolus, chloe gtt started via PIV. RIJ central line placed by ICU team. Levo was started and bolused another 1L NS. L radial a line d/c'ed and R radial a line placed (wave form diminished shortly after). Patient brought to SICU for hypovolemic shock. SICU course complicated by elevated lactic acidosis, MONSTER, L radial a-line occlusion (treated with heparin gtt).     9/24: Patient was examined and seen at bedside. This morning she is resting comfortably in bed and reports no new issues or overnight events. Spoke to private attending Dr. Bynum who stated pt is cleared for discharge medically. Patient is also cleared by vascular and ortho.    Acute comminuted, displaced fracture of the left proximal femur with avulsion of the lesser trochanter  - Intermedullary rodding was performed  - Eliquis    Left radial Ischemia  - condition improved   - continue Eliquis  - out pat vascular f/u    Volume Overload  - CXR and 2D echo noted  - continue Lasix    Acute Blood Loss Anemia  - H/H noted  - can add Fe replacement daily    Hypothyroidism   - continue Synthroid     Seizure Disorder  - continue Phenobarbital     Morbid Obesity   - low olga dash diet when able  - check albumin    DM  - on oral agent in SNF  - FS noted   - insulin coverage    hx of Depression  - continue home rx    hx of Asthma  - resp treatment as needed prn    Discharge planning back to SNF. Case discussed with house staff. Patient cleared for D/C by ortho and Vascular.

## 2025-06-09 NOTE — ED PROVIDER NOTE - NS ED MD EM SELECTION
I reviewed the H&P, I examined the patient, and there are no changes in the patient's condition.    
[id number] Comprehensive

## 2025-06-11 NOTE — SWALLOW BEDSIDE ASSESSMENT ADULT - POSITIONING
Post-Op Assessment Note    CV Status:  Stable  Pain Score: 0    Pain management: adequate       Mental Status:  Alert and awake   Hydration Status:  Euvolemic and stable   PONV Controlled:  Controlled   Airway Patency:  Patent and adequate     Post Op Vitals Reviewed: Yes    No anethesia notable event occurred.    Staff: CRNA           Last Filed PACU Vitals:  Vitals Value Taken Time   Temp 97.8 °F (36.6 °C) 06/11/25 10:30   Pulse 88 06/11/25 10:30   /76 06/11/25 10:30   Resp 18 06/11/25 10:30   SpO2                
upright (90 degrees)
upright (90 degrees)